# Patient Record
Sex: MALE | Race: WHITE | NOT HISPANIC OR LATINO | Employment: FULL TIME | URBAN - METROPOLITAN AREA
[De-identification: names, ages, dates, MRNs, and addresses within clinical notes are randomized per-mention and may not be internally consistent; named-entity substitution may affect disease eponyms.]

---

## 2018-12-30 ENCOUNTER — HOSPITAL ENCOUNTER (INPATIENT)
Facility: HOSPITAL | Age: 68
LOS: 1 days | Discharge: HOME | DRG: 247 | End: 2019-01-01
Attending: EMERGENCY MEDICINE | Admitting: ANESTHESIOLOGY
Payer: COMMERCIAL

## 2018-12-30 ENCOUNTER — APPOINTMENT (EMERGENCY)
Dept: INTERVENTIONAL RADIOLOGY/VASCULAR | Facility: HOSPITAL | Age: 68
DRG: 247 | End: 2018-12-30
Attending: EMERGENCY MEDICINE
Payer: COMMERCIAL

## 2018-12-30 ENCOUNTER — APPOINTMENT (EMERGENCY)
Dept: RADIOLOGY | Facility: HOSPITAL | Age: 68
DRG: 247 | End: 2018-12-30
Payer: COMMERCIAL

## 2018-12-30 DIAGNOSIS — I21.11 ST ELEVATION MYOCARDIAL INFARCTION INVOLVING RIGHT CORONARY ARTERY (HCC): ICD-10-CM

## 2018-12-30 DIAGNOSIS — I21.19 ACUTE ST ELEVATION MYOCARDIAL INFARCTION (STEMI) OF INFERIOR WALL (HCC): Primary | ICD-10-CM

## 2018-12-30 DIAGNOSIS — R07.89 CHEST PRESSURE: ICD-10-CM

## 2018-12-30 PROBLEM — Z72.0 TOBACCO ABUSE: Status: ACTIVE | Noted: 2018-12-30

## 2018-12-30 PROBLEM — Z85.818 HISTORY OF CANCER TONSIL: Status: ACTIVE | Noted: 2018-12-30

## 2018-12-30 LAB
ALBUMIN SERPL BCP-MCNC: 4.2 G/DL (ref 3.5–5)
ALP SERPL-CCNC: 93 U/L (ref 46–116)
ALT SERPL W P-5'-P-CCNC: 30 U/L (ref 12–78)
ANION GAP SERPL CALCULATED.3IONS-SCNC: 10 MMOL/L (ref 4–13)
ANION GAP SERPL CALCULATED.3IONS-SCNC: 7 MMOL/L (ref 4–13)
APTT PPP: 22 SECONDS (ref 26–38)
APTT PPP: 29 SECONDS (ref 26–38)
AST SERPL W P-5'-P-CCNC: 30 U/L (ref 5–45)
ATRIAL RATE: 73 BPM
ATRIAL RATE: 87 BPM
BASOPHILS # BLD AUTO: 0.04 THOUSANDS/ΜL (ref 0–0.1)
BASOPHILS # BLD AUTO: 0.09 THOUSANDS/ΜL (ref 0–0.1)
BASOPHILS NFR BLD AUTO: 0 % (ref 0–1)
BASOPHILS NFR BLD AUTO: 1 % (ref 0–1)
BILIRUB SERPL-MCNC: 0.4 MG/DL (ref 0.2–1)
BUN SERPL-MCNC: 14 MG/DL (ref 5–25)
BUN SERPL-MCNC: 14 MG/DL (ref 5–25)
CALCIUM SERPL-MCNC: 8.7 MG/DL (ref 8.3–10.1)
CALCIUM SERPL-MCNC: 9.3 MG/DL (ref 8.3–10.1)
CHLORIDE SERPL-SCNC: 97 MMOL/L (ref 100–108)
CHLORIDE SERPL-SCNC: 99 MMOL/L (ref 100–108)
CHOLEST SERPL-MCNC: 163 MG/DL (ref 50–200)
CO2 SERPL-SCNC: 26 MMOL/L (ref 21–32)
CO2 SERPL-SCNC: 27 MMOL/L (ref 21–32)
CREAT SERPL-MCNC: 1.12 MG/DL (ref 0.6–1.3)
CREAT SERPL-MCNC: 1.37 MG/DL (ref 0.6–1.3)
EOSINOPHIL # BLD AUTO: 0.12 THOUSAND/ΜL (ref 0–0.61)
EOSINOPHIL # BLD AUTO: 0.41 THOUSAND/ΜL (ref 0–0.61)
EOSINOPHIL NFR BLD AUTO: 1 % (ref 0–6)
EOSINOPHIL NFR BLD AUTO: 4 % (ref 0–6)
ERYTHROCYTE [DISTWIDTH] IN BLOOD BY AUTOMATED COUNT: 14 % (ref 11.6–15.1)
ERYTHROCYTE [DISTWIDTH] IN BLOOD BY AUTOMATED COUNT: 14.3 % (ref 11.6–15.1)
EST. AVERAGE GLUCOSE BLD GHB EST-MCNC: 128 MG/DL
GFR SERPL CREATININE-BSD FRML MDRD: 53 ML/MIN/1.73SQ M
GFR SERPL CREATININE-BSD FRML MDRD: 68 ML/MIN/1.73SQ M
GLUCOSE SERPL-MCNC: 110 MG/DL (ref 65–140)
GLUCOSE SERPL-MCNC: 153 MG/DL (ref 65–140)
GLUCOSE SERPL-MCNC: 159 MG/DL (ref 65–140)
HBA1C MFR BLD: 6.1 % (ref 4.2–6.3)
HCT VFR BLD AUTO: 41.5 % (ref 36.5–49.3)
HCT VFR BLD AUTO: 46.9 % (ref 36.5–49.3)
HDLC SERPL-MCNC: 37 MG/DL (ref 40–60)
HGB BLD-MCNC: 13.7 G/DL (ref 12–17)
HGB BLD-MCNC: 15.4 G/DL (ref 12–17)
IMM GRANULOCYTES # BLD AUTO: 0.02 THOUSAND/UL (ref 0–0.2)
IMM GRANULOCYTES # BLD AUTO: 0.03 THOUSAND/UL (ref 0–0.2)
IMM GRANULOCYTES NFR BLD AUTO: 0 % (ref 0–2)
IMM GRANULOCYTES NFR BLD AUTO: 0 % (ref 0–2)
INR PPP: 0.92 (ref 0.86–1.17)
KCT BLD-ACNC: 166 SEC (ref 89–137)
KCT BLD-ACNC: 254 SEC (ref 89–137)
LDLC SERPL CALC-MCNC: 97 MG/DL (ref 0–100)
LYMPHOCYTES # BLD AUTO: 2.02 THOUSANDS/ΜL (ref 0.6–4.47)
LYMPHOCYTES # BLD AUTO: 2.63 THOUSANDS/ΜL (ref 0.6–4.47)
LYMPHOCYTES NFR BLD AUTO: 18 % (ref 14–44)
LYMPHOCYTES NFR BLD AUTO: 27 % (ref 14–44)
MAGNESIUM SERPL-MCNC: 1.9 MG/DL (ref 1.6–2.6)
MCH RBC QN AUTO: 28.6 PG (ref 26.8–34.3)
MCH RBC QN AUTO: 28.7 PG (ref 26.8–34.3)
MCHC RBC AUTO-ENTMCNC: 32.8 G/DL (ref 31.4–37.4)
MCHC RBC AUTO-ENTMCNC: 33 G/DL (ref 31.4–37.4)
MCV RBC AUTO: 87 FL (ref 82–98)
MCV RBC AUTO: 87 FL (ref 82–98)
MONOCYTES # BLD AUTO: 0.64 THOUSAND/ΜL (ref 0.17–1.22)
MONOCYTES # BLD AUTO: 0.65 THOUSAND/ΜL (ref 0.17–1.22)
MONOCYTES NFR BLD AUTO: 6 % (ref 4–12)
MONOCYTES NFR BLD AUTO: 7 % (ref 4–12)
NEUTROPHILS # BLD AUTO: 6.02 THOUSANDS/ΜL (ref 1.85–7.62)
NEUTROPHILS # BLD AUTO: 8.41 THOUSANDS/ΜL (ref 1.85–7.62)
NEUTS SEG NFR BLD AUTO: 61 % (ref 43–75)
NEUTS SEG NFR BLD AUTO: 75 % (ref 43–75)
NONHDLC SERPL-MCNC: 126 MG/DL
NRBC BLD AUTO-RTO: 0 /100 WBCS
NRBC BLD AUTO-RTO: 0 /100 WBCS
P AXIS: 62 DEGREES
P AXIS: 70 DEGREES
PLATELET # BLD AUTO: 220 THOUSANDS/UL (ref 149–390)
PLATELET # BLD AUTO: 271 THOUSANDS/UL (ref 149–390)
PMV BLD AUTO: 8.9 FL (ref 8.9–12.7)
PMV BLD AUTO: 9.1 FL (ref 8.9–12.7)
POTASSIUM SERPL-SCNC: 3.3 MMOL/L (ref 3.5–5.3)
POTASSIUM SERPL-SCNC: 4.6 MMOL/L (ref 3.5–5.3)
PR INTERVAL: 176 MS
PR INTERVAL: 192 MS
PROT SERPL-MCNC: 8.6 G/DL (ref 6.4–8.2)
PROTHROMBIN TIME: 12.3 SECONDS (ref 11.8–14.2)
QRS AXIS: 74 DEGREES
QRS AXIS: 80 DEGREES
QRSD INTERVAL: 100 MS
QRSD INTERVAL: 106 MS
QT INTERVAL: 390 MS
QT INTERVAL: 398 MS
QTC INTERVAL: 438 MS
QTC INTERVAL: 469 MS
RBC # BLD AUTO: 4.78 MILLION/UL (ref 3.88–5.62)
RBC # BLD AUTO: 5.39 MILLION/UL (ref 3.88–5.62)
SODIUM SERPL-SCNC: 132 MMOL/L (ref 136–145)
SODIUM SERPL-SCNC: 134 MMOL/L (ref 136–145)
SPECIMEN SOURCE: ABNORMAL
T WAVE AXIS: 96 DEGREES
T WAVE AXIS: 99 DEGREES
TRIGL SERPL-MCNC: 147 MG/DL
TROPONIN I BLD-MCNC: 0.28 NG/ML (ref 0–0.08)
TROPONIN I SERPL-MCNC: 0.34 NG/ML
VENTRICULAR RATE: 73 BPM
VENTRICULAR RATE: 87 BPM
WBC # BLD AUTO: 11.26 THOUSAND/UL (ref 4.31–10.16)
WBC # BLD AUTO: 9.82 THOUSAND/UL (ref 4.31–10.16)

## 2018-12-30 PROCEDURE — 83735 ASSAY OF MAGNESIUM: CPT | Performed by: PHYSICIAN ASSISTANT

## 2018-12-30 PROCEDURE — C1874 STENT, COATED/COV W/DEL SYS: HCPCS

## 2018-12-30 PROCEDURE — 4A023N7 MEASUREMENT OF CARDIAC SAMPLING AND PRESSURE, LEFT HEART, PERCUTANEOUS APPROACH: ICD-10-PCS | Performed by: INTERNAL MEDICINE

## 2018-12-30 PROCEDURE — 86803 HEPATITIS C AB TEST: CPT | Performed by: PHYSICIAN ASSISTANT

## 2018-12-30 PROCEDURE — 36415 COLL VENOUS BLD VENIPUNCTURE: CPT | Performed by: EMERGENCY MEDICINE

## 2018-12-30 PROCEDURE — 85730 THROMBOPLASTIN TIME PARTIAL: CPT | Performed by: EMERGENCY MEDICINE

## 2018-12-30 PROCEDURE — 96361 HYDRATE IV INFUSION ADD-ON: CPT

## 2018-12-30 PROCEDURE — 96374 THER/PROPH/DIAG INJ IV PUSH: CPT

## 2018-12-30 PROCEDURE — 80053 COMPREHEN METABOLIC PANEL: CPT | Performed by: EMERGENCY MEDICINE

## 2018-12-30 PROCEDURE — 027034Z DILATION OF CORONARY ARTERY, ONE ARTERY WITH DRUG-ELUTING INTRALUMINAL DEVICE, PERCUTANEOUS APPROACH: ICD-10-PCS | Performed by: INTERNAL MEDICINE

## 2018-12-30 PROCEDURE — C1725 CATH, TRANSLUMIN NON-LASER: HCPCS | Performed by: EMERGENCY MEDICINE

## 2018-12-30 PROCEDURE — 85025 COMPLETE CBC W/AUTO DIFF WBC: CPT | Performed by: EMERGENCY MEDICINE

## 2018-12-30 PROCEDURE — 85025 COMPLETE CBC W/AUTO DIFF WBC: CPT | Performed by: PHYSICIAN ASSISTANT

## 2018-12-30 PROCEDURE — 83036 HEMOGLOBIN GLYCOSYLATED A1C: CPT | Performed by: PHYSICIAN ASSISTANT

## 2018-12-30 PROCEDURE — 93005 ELECTROCARDIOGRAM TRACING: CPT

## 2018-12-30 PROCEDURE — B2111ZZ FLUOROSCOPY OF MULTIPLE CORONARY ARTERIES USING LOW OSMOLAR CONTRAST: ICD-10-PCS | Performed by: INTERNAL MEDICINE

## 2018-12-30 PROCEDURE — C1769 GUIDE WIRE: HCPCS | Performed by: EMERGENCY MEDICINE

## 2018-12-30 PROCEDURE — 92941 PRQ TRLML REVSC TOT OCCL AMI: CPT | Performed by: INTERNAL MEDICINE

## 2018-12-30 PROCEDURE — 80048 BASIC METABOLIC PNL TOTAL CA: CPT | Performed by: PHYSICIAN ASSISTANT

## 2018-12-30 PROCEDURE — C1887 CATHETER, GUIDING: HCPCS | Performed by: EMERGENCY MEDICINE

## 2018-12-30 PROCEDURE — 85610 PROTHROMBIN TIME: CPT | Performed by: EMERGENCY MEDICINE

## 2018-12-30 PROCEDURE — 87340 HEPATITIS B SURFACE AG IA: CPT | Performed by: PHYSICIAN ASSISTANT

## 2018-12-30 PROCEDURE — 93458 L HRT ARTERY/VENTRICLE ANGIO: CPT | Performed by: EMERGENCY MEDICINE

## 2018-12-30 PROCEDURE — 85347 COAGULATION TIME ACTIVATED: CPT

## 2018-12-30 PROCEDURE — 84484 ASSAY OF TROPONIN QUANT: CPT | Performed by: EMERGENCY MEDICINE

## 2018-12-30 PROCEDURE — 84484 ASSAY OF TROPONIN QUANT: CPT

## 2018-12-30 PROCEDURE — C9606 PERC D-E COR REVASC W AMI S: HCPCS | Performed by: EMERGENCY MEDICINE

## 2018-12-30 PROCEDURE — 93010 ELECTROCARDIOGRAM REPORT: CPT | Performed by: INTERNAL MEDICINE

## 2018-12-30 PROCEDURE — 80061 LIPID PANEL: CPT | Performed by: PHYSICIAN ASSISTANT

## 2018-12-30 PROCEDURE — 96376 TX/PRO/DX INJ SAME DRUG ADON: CPT

## 2018-12-30 PROCEDURE — 82948 REAGENT STRIP/BLOOD GLUCOSE: CPT

## 2018-12-30 PROCEDURE — 99291 CRITICAL CARE FIRST HOUR: CPT

## 2018-12-30 PROCEDURE — C1894 INTRO/SHEATH, NON-LASER: HCPCS | Performed by: EMERGENCY MEDICINE

## 2018-12-30 PROCEDURE — 86704 HEP B CORE ANTIBODY TOTAL: CPT | Performed by: PHYSICIAN ASSISTANT

## 2018-12-30 PROCEDURE — 99223 1ST HOSP IP/OBS HIGH 75: CPT | Performed by: PHYSICIAN ASSISTANT

## 2018-12-30 PROCEDURE — 71045 X-RAY EXAM CHEST 1 VIEW: CPT

## 2018-12-30 PROCEDURE — 96375 TX/PRO/DX INJ NEW DRUG ADDON: CPT

## 2018-12-30 PROCEDURE — 86705 HEP B CORE ANTIBODY IGM: CPT | Performed by: PHYSICIAN ASSISTANT

## 2018-12-30 PROCEDURE — 93458 L HRT ARTERY/VENTRICLE ANGIO: CPT | Performed by: INTERNAL MEDICINE

## 2018-12-30 RX ORDER — HEPARIN SODIUM 10000 [USP'U]/100ML
3-20 INJECTION, SOLUTION INTRAVENOUS
Status: DISCONTINUED | OUTPATIENT
Start: 2018-12-30 | End: 2018-12-30

## 2018-12-30 RX ORDER — NITROGLYCERIN 20 MG/100ML
INJECTION INTRAVENOUS CODE/TRAUMA/SEDATION MEDICATION
Status: COMPLETED | OUTPATIENT
Start: 2018-12-30 | End: 2018-12-30

## 2018-12-30 RX ORDER — CHLORHEXIDINE GLUCONATE 0.12 MG/ML
15 RINSE ORAL EVERY 12 HOURS SCHEDULED
Status: DISCONTINUED | OUTPATIENT
Start: 2018-12-30 | End: 2019-01-01 | Stop reason: HOSPADM

## 2018-12-30 RX ORDER — LIDOCAINE HYDROCHLORIDE 10 MG/ML
INJECTION, SOLUTION INFILTRATION; PERINEURAL CODE/TRAUMA/SEDATION MEDICATION
Status: COMPLETED | OUTPATIENT
Start: 2018-12-30 | End: 2018-12-30

## 2018-12-30 RX ORDER — VERAPAMIL HCL 2.5 MG/ML
AMPUL (ML) INTRAVENOUS CODE/TRAUMA/SEDATION MEDICATION
Status: COMPLETED | OUTPATIENT
Start: 2018-12-30 | End: 2018-12-30

## 2018-12-30 RX ORDER — ASPIRIN 81 MG/1
243 TABLET, CHEWABLE ORAL ONCE
Status: COMPLETED | OUTPATIENT
Start: 2018-12-30 | End: 2018-12-30

## 2018-12-30 RX ORDER — NICOTINE 21 MG/24HR
21 PATCH, TRANSDERMAL 24 HOURS TRANSDERMAL DAILY
Status: DISCONTINUED | OUTPATIENT
Start: 2018-12-30 | End: 2019-01-01 | Stop reason: HOSPADM

## 2018-12-30 RX ORDER — HYDROMORPHONE HCL/PF 1 MG/ML
1 SYRINGE (ML) INJECTION ONCE
Status: COMPLETED | OUTPATIENT
Start: 2018-12-30 | End: 2018-12-30

## 2018-12-30 RX ORDER — ASPIRIN 81 MG/1
81 TABLET, CHEWABLE ORAL DAILY
Status: DISCONTINUED | OUTPATIENT
Start: 2018-12-30 | End: 2019-01-01 | Stop reason: HOSPADM

## 2018-12-30 RX ORDER — SODIUM CHLORIDE 9 MG/ML
125 INJECTION, SOLUTION INTRAVENOUS CONTINUOUS
Status: DISPENSED | OUTPATIENT
Start: 2018-12-30 | End: 2018-12-30

## 2018-12-30 RX ORDER — MORPHINE SULFATE 10 MG/ML
6 INJECTION, SOLUTION INTRAMUSCULAR; INTRAVENOUS EVERY 4 HOURS PRN
Status: DISCONTINUED | OUTPATIENT
Start: 2018-12-30 | End: 2019-01-01 | Stop reason: HOSPADM

## 2018-12-30 RX ORDER — ATROPINE SULFATE 0.1 MG/ML
INJECTION, SOLUTION ENDOTRACHEAL; INTRAMUSCULAR; INTRAVENOUS; SUBCUTANEOUS CODE/TRAUMA/SEDATION MEDICATION
Status: COMPLETED | OUTPATIENT
Start: 2018-12-30 | End: 2018-12-30

## 2018-12-30 RX ORDER — ACETAMINOPHEN 325 MG/1
650 TABLET ORAL EVERY 4 HOURS PRN
Status: DISCONTINUED | OUTPATIENT
Start: 2018-12-30 | End: 2019-01-01 | Stop reason: HOSPADM

## 2018-12-30 RX ORDER — SODIUM CHLORIDE, SODIUM GLUCONATE, SODIUM ACETATE, POTASSIUM CHLORIDE, MAGNESIUM CHLORIDE, SODIUM PHOSPHATE, DIBASIC, AND POTASSIUM PHOSPHATE .53; .5; .37; .037; .03; .012; .00082 G/100ML; G/100ML; G/100ML; G/100ML; G/100ML; G/100ML; G/100ML
75 INJECTION, SOLUTION INTRAVENOUS CONTINUOUS
Status: DISCONTINUED | OUTPATIENT
Start: 2018-12-30 | End: 2018-12-31

## 2018-12-30 RX ORDER — MAGNESIUM SULFATE HEPTAHYDRATE 40 MG/ML
2 INJECTION, SOLUTION INTRAVENOUS ONCE
Status: COMPLETED | OUTPATIENT
Start: 2018-12-30 | End: 2018-12-31

## 2018-12-30 RX ORDER — ATORVASTATIN CALCIUM 40 MG/1
80 TABLET, FILM COATED ORAL EVERY EVENING
Status: DISCONTINUED | OUTPATIENT
Start: 2018-12-30 | End: 2019-01-01 | Stop reason: HOSPADM

## 2018-12-30 RX ORDER — HEPARIN SODIUM 1000 [USP'U]/ML
INJECTION, SOLUTION INTRAVENOUS; SUBCUTANEOUS CODE/TRAUMA/SEDATION MEDICATION
Status: COMPLETED | OUTPATIENT
Start: 2018-12-30 | End: 2018-12-30

## 2018-12-30 RX ORDER — HEPARIN SODIUM 1000 [USP'U]/ML
4000 INJECTION, SOLUTION INTRAVENOUS; SUBCUTANEOUS ONCE
Status: COMPLETED | OUTPATIENT
Start: 2018-12-30 | End: 2018-12-30

## 2018-12-30 RX ADMIN — NICOTINE 21 MG: 21 PATCH TRANSDERMAL at 13:01

## 2018-12-30 RX ADMIN — HYDROMORPHONE HYDROCHLORIDE 1 MG: 1 INJECTION, SOLUTION INTRAMUSCULAR; INTRAVENOUS; SUBCUTANEOUS at 10:20

## 2018-12-30 RX ADMIN — TICAGRELOR 180 MG: 90 TABLET ORAL at 10:14

## 2018-12-30 RX ADMIN — ATORVASTATIN CALCIUM 80 MG: 40 TABLET, FILM COATED ORAL at 17:30

## 2018-12-30 RX ADMIN — LIDOCAINE HYDROCHLORIDE 2 ML: 10 INJECTION, SOLUTION INFILTRATION; PERINEURAL at 10:45

## 2018-12-30 RX ADMIN — METOPROLOL TARTRATE 25 MG: 25 TABLET, FILM COATED ORAL at 13:55

## 2018-12-30 RX ADMIN — ASPIRIN 81 MG 81 MG: 81 TABLET ORAL at 13:55

## 2018-12-30 RX ADMIN — ATROPINE SULFATE 0.5 MG: 0.1 INJECTION, SOLUTION ENDOTRACHEAL; INTRAMUSCULAR; INTRAVENOUS; SUBCUTANEOUS at 10:55

## 2018-12-30 RX ADMIN — ASPIRIN 81 MG 243 MG: 81 TABLET ORAL at 10:26

## 2018-12-30 RX ADMIN — METOPROLOL TARTRATE 25 MG: 25 TABLET, FILM COATED ORAL at 20:27

## 2018-12-30 RX ADMIN — IODIXANOL 75 ML: 320 INJECTION, SOLUTION INTRAVASCULAR at 11:04

## 2018-12-30 RX ADMIN — HEPARIN SODIUM 4000 UNITS: 1000 INJECTION, SOLUTION INTRAVENOUS; SUBCUTANEOUS at 10:08

## 2018-12-30 RX ADMIN — HEPARIN SODIUM 8000 UNITS: 1000 INJECTION INTRAVENOUS; SUBCUTANEOUS at 10:50

## 2018-12-30 RX ADMIN — SODIUM CHLORIDE, SODIUM GLUCONATE, SODIUM ACETATE, POTASSIUM CHLORIDE, MAGNESIUM CHLORIDE, SODIUM PHOSPHATE, DIBASIC, AND POTASSIUM PHOSPHATE 75 ML/HR: .53; .5; .37; .037; .03; .012; .00082 INJECTION, SOLUTION INTRAVENOUS at 20:15

## 2018-12-30 RX ADMIN — SODIUM CHLORIDE 125 ML/HR: 0.9 INJECTION, SOLUTION INTRAVENOUS at 13:47

## 2018-12-30 RX ADMIN — MORPHINE SULFATE 6 MG: 10 INJECTION INTRAVENOUS at 10:08

## 2018-12-30 RX ADMIN — CHLORHEXIDINE GLUCONATE 0.12% ORAL RINSE 15 ML: 1.2 LIQUID ORAL at 20:27

## 2018-12-30 RX ADMIN — MAGNESIUM SULFATE IN WATER 2 G: 40 INJECTION, SOLUTION INTRAVENOUS at 20:15

## 2018-12-30 RX ADMIN — SODIUM CHLORIDE 500 ML: 0.9 INJECTION, SOLUTION INTRAVENOUS at 10:14

## 2018-12-30 RX ADMIN — NITROGLYCERIN 200 MCG: 20 INJECTION INTRAVENOUS at 10:45

## 2018-12-30 RX ADMIN — VERAPAMIL HYDROCHLORIDE 2.5 MG: 2.5 INJECTION, SOLUTION INTRAVENOUS at 10:46

## 2018-12-30 NOTE — ED PROVIDER NOTES
History  Chief Complaint   Patient presents with    Chest Pain     midsternal chest pain onset today, +sob, 324mg of asa at home      Patient is a 81-year-old male with no reported medical history however he has not seen a physician in many years, presents to the emergency department complaining of chest pressure for the past 1 hour  Patient reports that he began having central and diffuse chest pressure without radiation about 1 hour ago and it is been persistent and progressively worsening  He has not exerted himself so cannot comment on if it is worse with exertion  He reports associated diaphoresis as well as bilateral upper extremity heaviness/numbness tingling  He denies ever having these type of symptoms before  Denies any associated fever, shaking chills, headache, dizziness or near syncope, visual disturbance or eye pain, recent coughing or URI symptoms, palpitations, dyspnea, abdominal pain, nausea, vomiting, recent change in bowel habits, blood per rectum or melena, urinary symptoms, lateralizing extremity weakness or paresthesia or other focal neurologic deficits  Denies any prior cardiac history or history of MI/stents  No history of bleeding disorders  Patient did take one 81 mg aspirin prior to coming to the ED  History provided by:  Patient and spouse   used: No    Chest Pain   Associated symptoms: diaphoresis and weakness    Associated symptoms: no abdominal pain, no back pain, no cough, no dizziness, no fever, no headache, no nausea, no numbness, no palpitations, no shortness of breath and not vomiting        None       History reviewed  No pertinent past medical history  Past Surgical History:   Procedure Laterality Date    KNEE SURGERY         History reviewed  No pertinent family history  I have reviewed and agree with the history as documented      Social History   Substance Use Topics    Smoking status: Current Some Day Smoker     Packs/day: 2 00    Smokeless tobacco: Never Used    Alcohol use Not on file        Review of Systems   Constitutional: Positive for diaphoresis  Negative for chills and fever  HENT: Negative for congestion, ear pain, rhinorrhea and sore throat  Eyes: Negative for photophobia, pain and visual disturbance  Respiratory: Negative for cough, chest tightness, shortness of breath and wheezing  Cardiovascular: Positive for chest pain  Negative for palpitations and leg swelling  Gastrointestinal: Negative for abdominal pain, blood in stool, constipation, diarrhea, nausea and vomiting  Genitourinary: Negative for dysuria, flank pain, frequency and hematuria  Musculoskeletal: Negative for back pain, neck pain and neck stiffness  Skin: Negative for color change, pallor, rash and wound  Allergic/Immunologic: Negative for immunocompromised state  Neurological: Positive for weakness  Negative for dizziness, syncope, facial asymmetry, speech difficulty, light-headedness, numbness and headaches  Hematological: Negative for adenopathy  Does not bruise/bleed easily  Psychiatric/Behavioral: Negative for confusion and decreased concentration  All other systems reviewed and are negative  Physical Exam  Physical Exam   Constitutional: He is oriented to person, place, and time  He appears well-developed and well-nourished  No distress  HENT:   Head: Normocephalic and atraumatic  Mouth/Throat: Oropharynx is clear and moist  No oropharyngeal exudate  Eyes: Pupils are equal, round, and reactive to light  Conjunctivae and EOM are normal    Neck: Normal range of motion  Neck supple  No JVD present  Cardiovascular: Normal rate, regular rhythm, normal heart sounds and intact distal pulses  Exam reveals no gallop and no friction rub  No murmur heard  Pulmonary/Chest: Effort normal and breath sounds normal  No respiratory distress  He has no wheezes  He has no rales  He exhibits no tenderness  Abdominal: Soft   Bowel sounds are normal  He exhibits no distension  There is no tenderness  There is no rebound and no guarding  Musculoskeletal: Normal range of motion  He exhibits no edema or tenderness  Neurological: He is alert and oriented to person, place, and time  No cranial nerve deficit  No gross motor or sensory deficits  5/5 strength throughout  Patient mentating normally  Skin: Skin is warm  No rash noted  He is diaphoretic  No pallor  Psychiatric: He has a normal mood and affect  His behavior is normal    Nursing note and vitals reviewed        Vital Signs  ED Triage Vitals   Temperature Pulse Respirations Blood Pressure SpO2   12/30/18 1029 12/30/18 0949 12/30/18 0949 12/30/18 0949 12/30/18 0949   97 5 °F (36 4 °C) 73 18 168/77 98 %      Temp src Heart Rate Source Patient Position - Orthostatic VS BP Location FiO2 (%)   -- 12/30/18 0949 -- 12/30/18 0949 --    Monitor  Right arm       Pain Score       12/30/18 0949       5         Vitals:    12/30/18 1009 12/30/18 1012 12/30/18 1028 12/30/18 1029   BP:  149/95 159/92    BP Location:       Pulse: 88 86 86    Resp:  16 16    Temp:    97 5 °F (36 4 °C)   SpO2:  98% 97%    Weight:         Visual Acuity      ED Medications  Medications   morphine (PF) 10 mg/mL injection 6 mg (6 mg Intravenous Given 12/30/18 1008)   sodium chloride 0 9 % bolus 500 mL (500 mL Intravenous New Bag 12/30/18 1014)   heparin (porcine) 25,000 units in 250 mL infusion (premix) (0 Units/kg/hr × 85 kg (Order-Specific) Intravenous Hold 12/30/18 1021)   heparin (porcine) injection 4,000 Units (4,000 Units Intravenous Given 12/30/18 1008)   ticagrelor (BRILINTA) tablet 180 mg (180 mg Oral Given 12/30/18 1014)   HYDROmorphone (DILAUDID) injection 1 mg (1 mg Intravenous Given 12/30/18 1020)   aspirin chewable tablet 243 mg (243 mg Oral Given 12/30/18 1026)       Diagnostic Studies  Results Reviewed     Procedure Component Value Units Date/Time    Troponin I [[id number]]  (Abnormal) Collected: 12/30/18 1009    Lab Status:  Final result Specimen:  Blood from Arm, Right Updated:  12/30/18 1038     Troponin I 0 34 (H) ng/mL     Comprehensive metabolic panel [[id number]]  (Abnormal) Collected:  12/30/18 1009    Lab Status:  Final result Specimen:  Blood from Arm, Right Updated:  12/30/18 1032     Sodium 134 (L) mmol/L      Potassium 3 3 (L) mmol/L      Chloride 97 (L) mmol/L      CO2 27 mmol/L      ANION GAP 10 mmol/L      BUN 14 mg/dL      Creatinine 1 37 (H) mg/dL      Glucose 159 (H) mg/dL      Calcium 9 3 mg/dL      AST 30 U/L      ALT 30 U/L      Alkaline Phosphatase 93 U/L      Total Protein 8 6 (H) g/dL      Albumin 4 2 g/dL      Total Bilirubin 0 40 mg/dL      eGFR 53 ml/min/1 73sq m     Narrative:         National Kidney Disease Education Program recommendations are as follows:  GFR calculation is accurate only with a steady state creatinine  Chronic Kidney disease less than 60 ml/min/1 73 sq  meters  Kidney failure less than 15 ml/min/1 73 sq  meters      APTT [[id number]]  (Abnormal) Collected:  12/30/18 1010    Lab Status:  Final result Specimen:  Blood from Arm, Right Updated:  12/30/18 1031     PTT 22 (L) seconds     Protime-INR [[id number]]  (Normal) Collected:  12/30/18 1010    Lab Status:  Final result Specimen:  Blood from Arm, Right Updated:  12/30/18 1027     Protime 12 3 seconds      INR 0 92    Fingerstick Glucose (POCT) [[id number]]  (Abnormal) Collected:  12/30/18 1026    Lab Status:  Final result Updated:  12/30/18 1027     POC Glucose 153 (H) mg/dl     POCT troponin [[id number]]  (Abnormal) Collected:  12/30/18 1013    Lab Status:  Final result Updated:  12/30/18 1024     POC Troponin I 0 28 (H) ng/ml      Specimen Type VENOUS    Narrative:         [employer] i-Stat handheld analyzer 99% cutoff is > 0 08ng/mL in network Emergency Departments    o cTnI 99% cutoff is useful only when applied to patients in the clinical setting of myocardial ischemia  o cTnI 99% cutoff should be interpreted in the context of clinical history, ECG findings and possibly cardiac imaging to establish correct diagnosis  o cTnI 99% cutoff may be suggestive but clearly not indicative of a coronary event without the clinical setting of myocardial ischemia      CBC and differential [[id number]] Collected:  12/30/18 1009    Lab Status:  Final result Specimen:  Blood from Arm, Right Updated:  12/30/18 1016     WBC 9 82 Thousand/uL      RBC 5 39 Million/uL      Hemoglobin 15 4 g/dL      Hematocrit 46 9 %      MCV 87 fL      MCH 28 6 pg      MCHC 32 8 g/dL      RDW 14 3 %      MPV 9 1 fL      Platelets 017 Thousands/uL      nRBC 0 /100 WBCs      Neutrophils Relative 61 %      Immat GRANS % 0 %      Lymphocytes Relative 27 %      Monocytes Relative 7 %      Eosinophils Relative 4 %      Basophils Relative 1 %      Neutrophils Absolute 6 02 Thousands/µL      Immature Grans Absolute 0 02 Thousand/uL      Lymphocytes Absolute 2 63 Thousands/µL      Monocytes Absolute 0 65 Thousand/µL      Eosinophils Absolute 0 41 Thousand/µL      Basophils Absolute 0 09 Thousands/µL                  XR chest 1 view portable   ED Interpretation by Leo Spicer DO (12/30 1041)   Obscured mediastinal borders but no other acute abnormality in the chest                  Procedures  ECG 12 Lead Documentation  Date/Time: 12/30/2018 10:37 AM  Performed by: Catalino Ortiz by: Cynthia Cadet     ECG reviewed by me, the ED Provider: yes    Patient location:  ED  Previous ECG:     Previous ECG:  Unavailable  Interpretation:     Interpretation: abnormal    Rate:     ECG rate:  73    ECG rate assessment: normal    Rhythm:     Rhythm: sinus rhythm    Ectopy:     Ectopy: PVCs      PVCs:  Infrequent  QRS:     QRS axis:  Normal    QRS intervals:  Normal  Conduction:     Conduction: normal    ST segments:     ST segments:  Abnormal    Elevation:  II, III, aVF and V6    Depression:  I, aVL, V1 and V2  T waves:     T waves: normal    Comments: INFERIOR WALL STEMI  ECG 12 Lead Documentation  Date/Time: 12/30/2018 10:38 AM  Performed by: Hernan Martines by: Fei Torres     Indications / Diagnosis:  RIGHT SIDED EKG  ECG reviewed by me, the ED Provider: yes    Patient location:  ED  Rate:     ECG rate:  87    ECG rate assessment: normal    Rhythm:     Rhythm: sinus rhythm    Ectopy:     Ectopy: PAC    QRS:     QRS axis:  Normal    QRS intervals:  Normal  Conduction:     Conduction: normal    ST segments:     ST segments:  Abnormal    Elevation:  II, III, aVF, V4, V5, V6 and V3    Depression:  I, aVL, V1 and V2  Comments:      Right-sided EKG does show ST elevations in V5 and V6 consistent with right ventricular involvement  Phone Contacts  ED Phone Contact    ED Course           Identification of Seniors at Risk      Most Recent Value   (ISAR) Identification of Seniors at Risk   Before the illness or injury that brought you to the Emergency, did you need someone to help you on a regular basis? 0 Filed at: 12/30/2018 2520   In the last 24 hours, have you needed more help than usual?  0 Filed at: 12/30/2018 6648   Have you been hospitalized for one or more nights during the past 6 months? 0 Filed at: 12/30/2018 2181   In general, do you see well?  0 Filed at: 12/30/2018 2064   In general, do you have serious problems with your memory? 0 Filed at: 12/30/2018 9175   Do you take more than three different medications every day?  0 Filed at: 12/30/2018 9687   ISAR Score  0 Filed at: 12/30/2018 6086                          MDM  Number of Diagnoses or Management Options  Acute ST elevation myocardial infarction (STEMI) of inferior wall [facility]):   Chest pressure:   Diagnosis management comments: 72-year-old male presents with acute chest pressure and diaphoresis that started about 1 hour prior to ED arrival   Patient took 81 mg of aspirin prior to coming in    EKG obtained immediately and does show evidence of inferior wall ST elevation MI  STEMI alert/cath lab team activated immediately  Will give additional aspirin, Brilinta, heparin bolus and morphine for pain  Will hang 500 cc of IV fluids to Maria Angelucci  Blood pressure stable  Will avoid nitro given that there is inferior wall involvement  Will check right-sided EKG to assess for RV involvement  Patient and wife updated of diagnosis of acute STEMI and plan to go directly to cardiac catheterization lab once cardiology arrives  Spoke with Dr Johanna Bonds, the interventional cardiologist over the phone and cath team activated and Dr Johanna Bonds on his way to hospital        Amount and/or Complexity of Data Reviewed  Clinical lab tests: ordered and reviewed  Tests in the radiology section of CPT®: reviewed and ordered  Tests in the medicine section of CPT®: ordered and reviewed  Discuss the patient with other providers: yes  Independent visualization of images, tracings, or specimens: yes      CritCare Time    Disposition  Final diagnoses:   Acute ST elevation myocardial infarction (STEMI) of inferior wall (HCC)   Chest pressure     Time reflects when diagnosis was documented in both MDM as applicable and the Disposition within this note     Time User Action Codes Description Comment    12/30/2018 10:32 AM Uriel CASAS Add [I21 19] Acute ST elevation myocardial infarction (STEMI) of inferior wall ([facility] [address] )     12/30/2018 10:32 AM Uriel CASAS Add [R07 89] Chest pressure       ED Disposition     ED Disposition Condition Comment    Send to Cath Lab  Patient sent directly to cath lab from ED      Follow-up Information    None         Patient's Medications    No medications on file     No discharge procedures on file      ED Provider  Electronically Signed by           Ebony Perez DO  12/30/18 [address] [place] [address],   12/30/18 1293

## 2018-12-30 NOTE — H&P
History and Physical - Critical Care   Zoltan Gorman 80 y o  male MRN: [id number]  Unit/Bed#:  Encounter: [id number]    Reason for Admission / Chief Complaint:  Chest pain, STEMI    History of Present Illness:  Zoltan Gorman is a 80 y o  male who presented to the [facility] AT [place] with a 1 hr history of chest pain  Patient has a past medical history of tobacco abuse, and tonsillar cancer however has not seen a physician in a number of years  Patient had central diffuse chest pain about 1 hr prior to arrival during rest   He had associated diaphoresis and bilateral upper extremity numbness and tingling  He had EKG on arrival that showed ST elevations in lead 2, 3, AVF with reciprocal changes  A right-sided EKG showed ST elevations in the 3, 4, 5, 6  A STEMI alert was called  Patient was given aspirin, heparin, and Brilinta prior to cath lab  In the cath lab, patient was found to have a 100% proximal RCA occlusion  This had a drug-eluting stent placed successfully  Patient had moderately elevated LVEDP  He returns to the step-down unit following procedure  Chest pain is now resolved  History obtained from the patient  Past Medical History:  History reviewed  No pertinent past medical history  Past Surgical History:  Past Surgical History:   Procedure Laterality Date    KNEE SURGERY         Past Family History:  History reviewed  No pertinent family history      Social History:  History   Smoking Status    Current Some Day Smoker    Packs/day: 2 00   Smokeless Tobacco    Never Used     History   Alcohol Use No     History   Drug Use No     Marital Status: /Civil Union  Exercise History:  Independent ADLs    Medications:  Current Facility-Administered Medications   Medication Dose Route Frequency    acetaminophen (TYLENOL) tablet 650 mg  650 mg Oral Q4H PRN    aspirin chewable tablet 81 mg  81 mg Oral Daily    atorvastatin (LIPITOR) tablet 80 mg  80 mg Oral QPM    chlorhexidine (PERIDEX) 0 12 % oral rinse 15 mL  15 mL Swish [employer] Spit Q12H [address]    metoprolol tartrate (LOPRESSOR) tablet 25 mg  25 mg Oral Q12H [address]    morphine (PF) 10 mg/mL injection 6 mg  6 mg Intravenous Q4H PRN    nicotine (NICODERM CQ) 21 mg/24 hr TD 24 hr patch 21 mg  21 mg Transdermal Daily    sodium chloride 0 9 % infusion  125 mL/hr Intravenous Continuous    [START ON 2018] ticagrelor (BRILINTA) tablet 90 mg  90 mg Oral BID     Home medications:  Prior to Admission medications    Not on File     Allergies: Allergies no known allergies    ROS:   Review of Systems   Constitutional: Positive for fatigue  All other systems reviewed and are negative  Vitals:  Vitals:    18 1230 18 1300 18 1310 18 1356   BP: 153/99 153/90     BP Location:       Pulse: 92 88 88    Resp: 19 18     Temp:       SpO2: 98% 98%     Weight:    107 kg (235 lb 14 3 oz)   Height:    5' 9" (1 753 m)     Temperature:   Temp (24hrs), Av 4 °F (36 3 °C), Min:97 2 °F (36 2 °C), Max:97 5 °F (36 4 °C)    Current Temperature: (!) 97 2 °F (36 2 °C)    Weights:   IBW: 70 7 kg  Body mass index is 34 84 kg/m²  Hemodynamic Monitoring:  N/A     Non-Invasive/Invasive Ventilation Settings:  Respiratory    Lab Data (Last 4 hours)    None         O2/Vent Data (Last 4 hours)    None              No results found for: PHART, IVL1WNP, PO2ART, ZPM9YEG, B1NBPSTW, BEART, SOURCE  SpO2: SpO2: 98 %, SpO2 Activity: SpO2 Activity: At Rest, SpO2 Device: O2 Device: Nasal cannula     Physical Exam:  Physical Exam   Constitutional: He is oriented to person, place, and time  He appears well-developed and well-nourished  HENT:   Head: Normocephalic and atraumatic  Eyes: Pupils are equal, round, and reactive to light  Neck: Neck supple  No JVD present  Cardiovascular: Normal rate, regular rhythm and normal heart sounds  No murmur heard    Right Radial band in place, normal capillary refill in right hand Pulmonary/Chest: Effort normal and breath sounds normal  He has no decreased breath sounds  Abdominal: Soft  Bowel sounds are normal  He exhibits no distension  There is no tenderness  Genitourinary:   Genitourinary Comments: Deferred   Musculoskeletal: Normal range of motion  He exhibits no edema  Neurological: He is alert and oriented to person, place, and time  No cranial nerve deficit  Skin: Skin is warm and dry  He is not diaphoretic  Nursing note and vitals reviewed  Labs:    Results from last 7 days  Lab Units 12/30/18  1009   WBC Thousand/uL 9 82   HEMOGLOBIN g/dL 15 4   HEMATOCRIT % 46 9   PLATELETS Thousands/uL 271   NEUTROS PCT % 61   MONOS PCT % 7       Results from last 7 days  Lab Units 12/30/18  1009   SODIUM mmol/L 134*   POTASSIUM mmol/L 3 3*   CHLORIDE mmol/L 97*   CO2 mmol/L 27   ANION GAP mmol/L 10   BUN mg/dL 14   CREATININE mg/dL 1 37*   CALCIUM mg/dL 9 3   ALT U/L 30   AST U/L 30   ALK PHOS U/L 93   ALBUMIN g/dL 4 2   TOTAL BILIRUBIN mg/dL 0 40            Results from last 7 days  Lab Units 12/30/18  1010   INR  0 92   PTT seconds 22*       Results from last 7 days  Lab Units 12/30/18  1009   TROPONIN I ng/mL 0 34*         ABG:No results found for: PHART, WXT8EJW, PO2ART, SFB5KTK, E0SVFMFI, BEART, SOURCE  VBG:            Imaging:  I have personally reviewed pertinent films in PACS   XR chest 1 view portable   ED Interpretation by Thor Alonso DO (12/30 1041)   Obscured mediastinal borders but no other acute abnormality in the chest         EKG: This was personally reviewed by myself  Micro:        ______________________________________________________________________    Assessment:   Active Problems:    ST elevation myocardial infarction involving right coronary artery (HCC)    Tobacco abuse    History of cancer tonsil  Resolved Problems:    * No resolved hospital problems  *    Plan:    Neuro:   No issues       Regulate sleep/wake cycle    CV:   STEMI of right coronary artery  = status post PCI with drug-eluting stent  - continue dual anti-platelet therapy  - continue beta-blocker, statin  - echocardiogram tomorrow  - cardiology following, appreciate new recommendations  - will obtain lipid panel and A1c tomorrow    Cardiac infusions:  None  Rhythm: NSR  Follow rhythm on telemetry    Pulm: Tobacco abuse  - strict Tobacco cessation education    Add supplemental oxygen if saturation less than 94%    GI:   No issues  Nutrition/diet plan:  Regular diet  Stress ulcer prophylaxis: No prophylaxis needed      :   Continue normal saline post catheterization to hydrate kidneys    Indwelling Mart present: no     FEN:   Will check post cath lab electrolytes  Fluid/Diuretic plan: No intervention  Goal 24 hour fluid balance:  Even  Electrolytes repleted: yes  Goal: K >4 0, Mag >2 0, and Phos >3 0    ID:   No issues  Trend temps and WBC count    Heme:   Continue dual antiplatelet therapy  Will start Lovenox tomorrow  Trend hgb and plts  Transfuse as needed for goal hgb > 8    Endo:   No history of diabetes  Will send A1c  Glycemic control plan: N/A    Msk/Skin:  No issues    Frequent turning and off-loading    Family:  Family updated within 24 hours: yes   Family meeting planned today: no     Lines:  Peripheral IVs  VTE Prophylaxis:  Pharmacologic Prophylaxis: Enoxaparin (Lovenox)  Mechanical Prophylaxis: sequential compression device    Disposition: Admit to step-down    Code Status: Level 1 - Full Code    Counseling / Coordination of Care  Total Critical Care time spent 38 minutes excluding procedures, teaching and family updates  ______________________________________________________________________    Invasive lines and devices:   Invasive Devices     Peripheral Intravenous Line            Peripheral IV 12/30/18 Left Antecubital less than 1 day    Peripheral IV 12/30/18 Left Antecubital less than 1 day    Peripheral IV 12/30/18 Right Antecubital less than 1 day Code Status: Level 1 - Full Code  POA:    POLST:      Given critical illness, patient length of stay will require greater than two midnights  Portions of the record may have been created with voice recognition software  Occasional wrong word or "sound a like" substitutions may have occurred due to the inherent limitations of voice recognition software  Read the chart carefully and recognize, using context, where substitutions have occurred        Yasmin Umanzor PA-C

## 2018-12-31 ENCOUNTER — APPOINTMENT (OUTPATIENT)
Dept: NON INVASIVE DIAGNOSTICS | Facility: HOSPITAL | Age: 68
DRG: 247 | End: 2018-12-31
Payer: COMMERCIAL

## 2018-12-31 LAB
ANION GAP SERPL CALCULATED.3IONS-SCNC: 8 MMOL/L (ref 4–13)
BASOPHILS # BLD AUTO: 0.05 THOUSANDS/ΜL (ref 0–0.1)
BASOPHILS NFR BLD AUTO: 1 % (ref 0–1)
BUN SERPL-MCNC: 15 MG/DL (ref 5–25)
CALCIUM SERPL-MCNC: 8.6 MG/DL (ref 8.3–10.1)
CHLORIDE SERPL-SCNC: 99 MMOL/L (ref 100–108)
CO2 SERPL-SCNC: 27 MMOL/L (ref 21–32)
CREAT SERPL-MCNC: 1.16 MG/DL (ref 0.6–1.3)
EOSINOPHIL # BLD AUTO: 0.16 THOUSAND/ΜL (ref 0–0.61)
EOSINOPHIL NFR BLD AUTO: 2 % (ref 0–6)
ERYTHROCYTE [DISTWIDTH] IN BLOOD BY AUTOMATED COUNT: 14.1 % (ref 11.6–15.1)
GFR SERPL CREATININE-BSD FRML MDRD: 65 ML/MIN/1.73SQ M
GLUCOSE P FAST SERPL-MCNC: 118 MG/DL (ref 65–99)
GLUCOSE SERPL-MCNC: 118 MG/DL (ref 65–140)
HBV CORE AB SER QL: NORMAL
HBV CORE IGM SER QL: NORMAL
HBV SURFACE AG SER QL: NORMAL
HCT VFR BLD AUTO: 41.3 % (ref 36.5–49.3)
HCV AB SER QL: NORMAL
HGB BLD-MCNC: 13.8 G/DL (ref 12–17)
IMM GRANULOCYTES # BLD AUTO: 0.03 THOUSAND/UL (ref 0–0.2)
IMM GRANULOCYTES NFR BLD AUTO: 0 % (ref 0–2)
LYMPHOCYTES # BLD AUTO: 1.72 THOUSANDS/ΜL (ref 0.6–4.47)
LYMPHOCYTES NFR BLD AUTO: 16 % (ref 14–44)
MAGNESIUM SERPL-MCNC: 2.2 MG/DL (ref 1.6–2.6)
MCH RBC QN AUTO: 29 PG (ref 26.8–34.3)
MCHC RBC AUTO-ENTMCNC: 33.4 G/DL (ref 31.4–37.4)
MCV RBC AUTO: 87 FL (ref 82–98)
MONOCYTES # BLD AUTO: 0.71 THOUSAND/ΜL (ref 0.17–1.22)
MONOCYTES NFR BLD AUTO: 7 % (ref 4–12)
NEUTROPHILS # BLD AUTO: 8.03 THOUSANDS/ΜL (ref 1.85–7.62)
NEUTS SEG NFR BLD AUTO: 74 % (ref 43–75)
NRBC BLD AUTO-RTO: 0 /100 WBCS
PLATELET # BLD AUTO: 218 THOUSANDS/UL (ref 149–390)
PMV BLD AUTO: 8.8 FL (ref 8.9–12.7)
POTASSIUM SERPL-SCNC: 4.1 MMOL/L (ref 3.5–5.3)
RBC # BLD AUTO: 4.76 MILLION/UL (ref 3.88–5.62)
SODIUM SERPL-SCNC: 134 MMOL/L (ref 136–145)
WBC # BLD AUTO: 10.7 THOUSAND/UL (ref 4.31–10.16)

## 2018-12-31 PROCEDURE — 99232 SBSQ HOSP IP/OBS MODERATE 35: CPT | Performed by: ANESTHESIOLOGY

## 2018-12-31 PROCEDURE — 80048 BASIC METABOLIC PNL TOTAL CA: CPT | Performed by: PHYSICIAN ASSISTANT

## 2018-12-31 PROCEDURE — 99232 SBSQ HOSP IP/OBS MODERATE 35: CPT | Performed by: INTERNAL MEDICINE

## 2018-12-31 PROCEDURE — 93306 TTE W/DOPPLER COMPLETE: CPT | Performed by: INTERNAL MEDICINE

## 2018-12-31 PROCEDURE — 93306 TTE W/DOPPLER COMPLETE: CPT

## 2018-12-31 PROCEDURE — 83735 ASSAY OF MAGNESIUM: CPT | Performed by: PHYSICIAN ASSISTANT

## 2018-12-31 PROCEDURE — 85025 COMPLETE CBC W/AUTO DIFF WBC: CPT | Performed by: PHYSICIAN ASSISTANT

## 2018-12-31 RX ORDER — LISINOPRIL 5 MG/1
5 TABLET ORAL DAILY
Status: DISCONTINUED | OUTPATIENT
Start: 2018-12-31 | End: 2019-01-01 | Stop reason: HOSPADM

## 2018-12-31 RX ORDER — SENNOSIDES 8.6 MG
1 TABLET ORAL
Status: DISCONTINUED | OUTPATIENT
Start: 2018-12-31 | End: 2019-01-01 | Stop reason: HOSPADM

## 2018-12-31 RX ORDER — MAGNESIUM SULFATE HEPTAHYDRATE 40 MG/ML
2 INJECTION, SOLUTION INTRAVENOUS ONCE
Status: COMPLETED | OUTPATIENT
Start: 2018-12-31 | End: 2018-12-31

## 2018-12-31 RX ADMIN — LISINOPRIL 5 MG: 5 TABLET ORAL at 11:08

## 2018-12-31 RX ADMIN — METOPROLOL TARTRATE 25 MG: 25 TABLET, FILM COATED ORAL at 21:54

## 2018-12-31 RX ADMIN — PERFLUTREN 0.6 ML/MIN: 6.52 INJECTION, SUSPENSION INTRAVENOUS at 09:11

## 2018-12-31 RX ADMIN — TICAGRELOR 90 MG: 90 TABLET ORAL at 18:31

## 2018-12-31 RX ADMIN — TICAGRELOR 90 MG: 90 TABLET ORAL at 11:09

## 2018-12-31 RX ADMIN — SENNOSIDES 8.6 MG: 8.6 TABLET, FILM COATED ORAL at 21:54

## 2018-12-31 RX ADMIN — CHLORHEXIDINE GLUCONATE 0.12% ORAL RINSE 15 ML: 1.2 LIQUID ORAL at 11:07

## 2018-12-31 RX ADMIN — MAGNESIUM SULFATE IN WATER 2 G: 40 INJECTION, SOLUTION INTRAVENOUS at 03:40

## 2018-12-31 RX ADMIN — METOPROLOL TARTRATE 25 MG: 25 TABLET, FILM COATED ORAL at 11:08

## 2018-12-31 RX ADMIN — ASPIRIN 81 MG 81 MG: 81 TABLET ORAL at 11:07

## 2018-12-31 RX ADMIN — ATORVASTATIN CALCIUM 80 MG: 40 TABLET, FILM COATED ORAL at 18:31

## 2018-12-31 RX ADMIN — ENOXAPARIN SODIUM 40 MG: 40 INJECTION SUBCUTANEOUS at 11:08

## 2018-12-31 RX ADMIN — NICOTINE 21 MG: 21 PATCH TRANSDERMAL at 11:06

## 2018-12-31 NOTE — PROGRESS NOTES
Progress Note - Critical Care   Hiral Bliss 80 y o  male MRN: [id number]  Unit/Bed#:  Encounter: [id number]    Assessment:   Active Problems:    ST elevation myocardial infarction involving right coronary artery (HCC)    Tobacco abuse    History of cancer tonsil  Resolved Problems:    * No resolved hospital problems  *    Plan:   Neuro:   · No acute issues  ·  Delirium precautions  Regulate sleep-wake cycles  Daily CAM ICU  CV:   · STEMI s/p PCI w/ KATIANA to RCA  Continue dual platelet therapy ASA/Brilinta  High-dose statin therapy  Metoprolol 25 mg b i d   Initiate ACE-inhibitor as tolerated  Echocardiogram pending  Encourage lifestyle modifications  Lung:   · Tobacco abuse:  Encourage cessation  Nicoderm patch  · Good pulmonary toilet/incentive spirometry  GI:   · ppx senna  FEN:   · Monitor electrolytes  Replete as warranted  Mg> 2 0, K > 4 0  · Cardiac diet  :   · JESSI-resolved  · Monitor daily BUN/creatinine  · Trend I&Os  ID:   · No obvious source of infectious etiology  Monitor off of antibiotics  · Trend fever curve and WBC count  Heme:   · H&H and platelets stable  Continue to monitor  Transfuse for hemoglobin less than 8 0 in setting of CAD  · ppx vte:  Lovenox  Endo:   · Hemoglobin A1c pending  Msk/Skin:   · Frequent offloading repositioning void skin breakdown  · Encourage up out of bed ambulation  Disposition:   · Transfer Med surge tele versus DC home    ______________________________________________________________________  Chief Complaint: "I have no more pain"      HPI/24hr events:  Patient presented to emergency department with acute onset chest pain approximately 1 hour prior to arrival   EKG was obtained revealing ST elevations II, III, aVF, with reciprocal changes in leads VIII-V  Patient was taken emergently to cath lab with Dr Gianna Olivera  He was found to have 100% occlusion of RCA  One drug-eluting stent was placed    Patient returned to step-down unit for further hemodynamic monitoring  Patient displayed a 9 beat run of V-tach on telemetry however remained asymptomatic   2 mg of IV magnesium was administered EKG remained stable   ______________________________________________________________________  Physical Exam:     Physical Exam   Constitutional: He is oriented to person, place, and time  He appears well-developed and well-nourished  No distress  HENT:   Head: Normocephalic and atraumatic  Eyes: Pupils are equal, round, and reactive to light  EOM are normal    Neck: Normal range of motion  Neck supple  Cardiovascular: Normal rate, regular rhythm and normal heart sounds  Pulmonary/Chest: Effort normal and breath sounds normal  No respiratory distress  He has no wheezes  He has no rales  Abdominal: Soft  Bowel sounds are normal  He exhibits no distension  There is no tenderness  There is no rebound and no guarding  Musculoskeletal: Normal range of motion  He exhibits no edema  Neurological: He is alert and oriented to person, place, and time  No cranial nerve deficit  Skin: Skin is warm  Vitals reviewed  ______________________________________________________________________  Temperature:   Temp (24hrs), Av 6 °F (36 4 °C), Min:97 2 °F (36 2 °C), Max:98 1 °F (36 7 °C)    Current Temperature: 98 1 °F (36 7 °C)    Vitals:    18 1700 18 1800 18 1900 18 2300   BP: 155/92 (!) 151/101 155/92 146/92   BP Location:    Right arm   Pulse: 96 87 77 67   Resp: 21 (!) 26 20 19   Temp:    98 1 °F (36 7 °C)   TempSrc:    Oral   SpO2: 100% 97% 98% 95%   Weight:       Height:                  Weights:   IBW: 70 7 kg    Body mass index is 34 84 kg/m²    Weight (last 2 days)     Date/Time   Weight    18 1356  107 (235 89)    18 0949  106 (234 13)            Height: 5' 9" (175 3 cm)    Intake and Outputs:  I/O     None        Nutrition:        Diet Orders            Start     Ordered    18 1340  Diet Cardiovascular; Cardiac  Diet effective now     Question Answer Comment   Diet Type Cardiovascular    Cardiac Cardiac    RD to adjust diet per protocol? Yes        12/30/18 1339        Labs:     Results from last 7 days  Lab Units 12/30/18  1729 12/30/18  1009   WBC Thousand/uL 11 26* 9 82   HEMOGLOBIN g/dL 13 7 15 4   HEMATOCRIT % 41 5 46 9   PLATELETS Thousands/uL 220 271   NEUTROS PCT % 75 61   MONOS PCT % 6 7      Results from last 7 days  Lab Units 12/30/18  1729 12/30/18  1009   POTASSIUM mmol/L 4 6 3 3*   CHLORIDE mmol/L 99* 97*   CO2 mmol/L 26 27   BUN mg/dL 14 14   CREATININE mg/dL 1 12 1 37*   CALCIUM mg/dL 8 7 9 3   ALK PHOS U/L  --  93   ALT U/L  --  30   AST U/L  --  30       Results from last 7 days  Lab Units 12/30/18  1729   MAGNESIUM mg/dL 1 9            Results from last 7 days  Lab Units 12/30/18  1729 12/30/18  1010   INR   --  0 92   PTT seconds 29 22*           0  Lab Value Date/Time   TROPONINI 0 34 (H) 12/30/2018 1009     Imaging:  I have personally reviewed pertinent reports  and I have personally reviewed pertinent films in PACS  EKG:   Micro:  Blood Culture: No results found for: BLOODCX  Urine Culture: No results found for: URINECX  Sputum Culture: No components found for: SPUTUMCX  Wound Culure: No results found for: WOUNDCULT    No results found for: Ariel Flor, SPUTUMLT  Allergies:  Allergies no known allergies  Medications:   Scheduled Meds:  Current Facility-Administered Medications:  acetaminophen 650 mg Oral Q4H PRN Katharineharman Hatcho, DO    aspirin 81 mg Oral Daily Katharinekeyonna Hatcho, DO    atorvastatin 80 mg Oral QPM Katharineharman Ramos, DO    chlorhexidine 15 mL Swish & Spit Q12H [address] Yasir Gomez PA-C    enoxaparin 40 mg Subcutaneous Q24H [address] Yasir Gomez PA-C    metoprolol tartrate 25 mg Oral Q12H [address] Katharine Ramos, DO    morphine injection 6 mg Intravenous Q4H PRN My Pyle, DO    multi-electrolyte 75 mL/hr Intravenous Continuous Marco Pritchett Last Rate: 75 mL/hr (12/30/18 2015)   nicotine 21 mg Transdermal Daily Henrryley Denver,     ticagrelor 90 mg Oral BID Henrryley Denver,       Continuous Infusions:  multi-electrolyte 75 mL/hr Last Rate: 75 mL/hr (12/30/18 2015)     PRN Meds:    acetaminophen 650 mg Q4H PRN   morphine injection 6 mg Q4H PRN     VTE Pharmacologic Prophylaxis: Enoxaparin (Lovenox)  VTE Mechanical Prophylaxis: sequential compression device  Invasive lines and devices: Invasive Devices     Peripheral Intravenous Line            Peripheral IV 12/30/18 Left Antecubital less than 1 day    Peripheral IV 12/30/18 Left Antecubital less than 1 day    Peripheral IV 12/30/18 Right Antecubital less than 1 day                   Counseling / Coordination of Care  Total Critical Care time spent 30 minutes excluding procedures, teaching and family updates  Code Status: Level 1 - Full Code    Portions of the record may have been created with voice recognition software  Occasional wrong word or "sound a like" substitutions may have occurred due to the inherent limitations of voice recognition software  Read the chart carefully and recognize, using context, where substitutions have occurred      [place] PA-C

## 2018-12-31 NOTE — UTILIZATION REVIEW
[address] Utilization Review Department  Phone: [phone]; Fax [phone]  [email or web address]  ATTENTION: Please call with any questions or concerns to [phone]  and carefully listen to the prompts so that you are directed to the right person  Send all requests for admission clinical reviews, approved or denied determinations and any other requests to fax [phone]  All voicemails are confidential     Initial Clinical Review    Admission: Date/Time/Statement: WAS OUTPATIENT NO CHARGE BED 12/30/18 @1438 CONVERTED TO INPATIENT ADMISSION  12/31/18 @ 6941 DUE TO ACUTE STEMI REQUIRING EMERGENT CARDIAC CATH WITH KATIANA PLACED % RCA OCCLUSION  ADMITTED TO ICU  Orders Placed This Encounter   Procedures    Inpatient Admission     Standing Status:   Standing     Number of Occurrences:   1     Order Specific Question:   Admitting Physician     Answer:   Dimple Delay     Order Specific Question:   Level of Care     Answer:   Level 1 Stepdown [13]     Order Specific Question:   Estimated length of stay     Answer:   More than 2 Midnights     Order Specific Question:   Certification     Answer:   I certify that inpatient services are medically necessary for this patient for a duration of greater than two midnights  See H&P and MD Progress Notes for additional information about the patient's course of treatment  ED Arrival Information     Expected Arrival Acuity Means of Arrival Escorted By Service Admission Type    - 12/30/2018 09:43 Emergent Walk-In Family Member Critical Care/ICU Emergency    Arrival Complaint    Chest pain        Chief Complaint   Patient presents with    Chest Pain     midsternal chest pain onset today, +sob, 324mg of asa at home        History of Illness: 81 yo m to ED from home due to chest pain x 1 hr  Central, diffuse, assoc diaphoresis with BUE numbness & tingling   EKG showed ST elevations in II, III, avF with reciprocal changes  R sided EKG showed ST elevations III, IV, V, VI  STEMI alert was called  Asa, heparin, brilinta given, emergently taken to cath lab  100% prox RCA found  KATIANA placed       ED Vital Signs:   ED Triage Vitals   Temperature Pulse Respirations Blood Pressure SpO2   12/30/18 1029 12/30/18 0949 12/30/18 0949 12/30/18 0949 12/30/18 0949   97 5 °F (36 4 °C) 73 18 168/77 98 %      Temp Source Heart Rate Source Patient Position - Orthostatic VS BP Location FiO2 (%)   12/30/18 2300 12/30/18 0949 12/30/18 2300 12/30/18 0949 --   Oral Monitor Lying Right arm       Pain Score       12/30/18 0949       5        Wt Readings from Last 1 Encounters:   12/31/18 107 kg (235 lb 14 3 oz)       Vital Signs (abnormal):     RR 25, 26    151/101    Abnormal Labs/Diagnostic Test Results:   , 132, 134   CL 97, 99    CREAT 1 37    GLUC 153  t prot 8 6  Trop  34     28   Wbc 11 26, 10 7  ptt 22  , 254  EKG1: Sinus rhythm with occasional Premature ventricular complexes  Inferior infarct , possibly acute  Lateral injury pattern  ** ** ACUTE MI / STEMI ** **  Consider right ventricular involvement in acute inferior infarct    EKG2: Sinus rhythm with occasional Premature ventricular complexes  Inferior infarct (cited on or before 30-DEC-2018)  Anterolateral infarct , new  ** ** ACUTE MI / STEMI ** **  Consider right ventricular involvement in acute inferior infarct    CXR: nothing acute      ED Treatment:   Medication Administration from 12/30/2018 0943 to 12/30/2018 1335       Date/Time Order Dose Route Action     12/30/2018 1008 morphine (PF) 10 mg/mL injection 6 mg 6 mg Intravenous Given     12/30/2018 1151 sodium chloride 0 9 % bolus 500 mL 500 mL Intravenous Continued from OR     12/30/2018 1014 sodium chloride 0 9 % bolus 500 mL 500 mL Intravenous New Bag     12/30/2018 1008 heparin (porcine) injection 4,000 Units 4,000 Units Intravenous Given     12/30/2018 1014 ticagrelor (BRILINTA) tablet 180 mg 180 mg Oral Given     12/30/2018 1020 HYDROmorphone (DILAUDID) injection 1 mg 1 mg Intravenous Given     12/30/2018 1026 aspirin chewable tablet 243 mg 243 mg Oral Given     12/30/2018 1045 lidocaine (XYLOCAINE) 1 % injection 2 mL Infiltration Given     12/30/2018 1045 nitroGLYcerin (TRIDIL) 50 mg in 250 mL infusion (premix) 200 mcg Intra-arterial Given     12/30/2018 1046 verapamil (ISOPTIN) injection 2 5 mg Intra-arterial Given     12/30/2018 1050 heparin (porcine) injection 8,000 Units Intravenous Given     12/30/2018 1055 atropine 1 mg/10 mL injection 0 5 mg Intravenous Given     12/30/2018 1301 nicotine (NICODERM CQ) 21 mg/24 hr TD 24 hr patch 21 mg 21 mg Transdermal Medication Applied          Past Medical/Surgical History: tonsillary ca, smoker      Admitting Diagnosis: Chest pain [R07 9]  Chest pressure [R07 89]  Acute ST elevation myocardial infarction (STEMI) of inferior wall (HCC) [I21 19]    Age/Sex: 80 y o  male    Assessment/Plan: 81 yo m to ED from home admitted as inpatient to ICU due to acute STEMI  C/o Central, diffuse chest pain x 1 hr pta, assoc diaphoresis with BUE numbness & tingling  EKG showed ST elevations in II, III, avF with reciprocal changes  R sided EKG showed ST elevations III, IV, V, VI  STEMI alert was called  Asa, heparin, brilinta given, emergently taken to cath lab  100% prox RCA found  KATIANA placed  9 beat run v tach on tele  IV mg given         Admission Orders:  Scheduled Meds:   Current Facility-Administered Medications:  acetaminophen 650 mg Oral Q4H PRN   aspirin 81 mg Oral Daily   atorvastatin 80 mg Oral QPM   chlorhexidine 15 mL Swish & Spit Q12H [address]   enoxaparin 40 mg Subcutaneous Q24H VISHAL   metoprolol tartrate 25 mg Oral Q12H VISHAL   morphine injection 6 mg Intravenous Q4H PRN   multi-electrolyte 75 mL/hr Intravenous Continuous   nicotine 21 mg Transdermal Daily   senna 1 tablet Oral HS   ticagrelor 90 mg Oral BID   IV mg x 2  IV dilaudid x 1  Cath lab meds: IV atropine x 1, IV heparin x 1, IV lidocaine x 1, IV morphine x 1, tridil gtt, IV verapamil x 1  Neuro checks q4h  I/O q2h  Daily wt, daily ICU assessment  Dysphagia eval  Fall prec  Turn q2h  scd's  Tele  Post cath care  Cons cardio  Cardiac diet  Cons nutrition  Cons case mgmt  Echo  EKG prn chest pain

## 2018-12-31 NOTE — PROGRESS NOTES
General Cardiology   Progress Note   Leslie Salinas 80 y o  male MRN: [id number]  Unit/Bed#:  Encounter: [id number]        Subjective:    No significant events since the last encounter  Im feeling good  I want to go home  REVIEW OF SYSTEMS:  Constitutional:  Denies fever or chills   Eyes:  Denies change in visual acuity   HENT:  Denies nasal congestion or sore throat   Respiratory:  Denies cough or shortness of breath   Cardiovascular:  Denies chest pain or edema   GI:  Denies abdominal pain, nausea, vomiting, bloody stools or diarrhea   :  Denies dysuria, frequency, difficulty in micturition and nocturia  Musculoskeletal:  Denies back pain or joint pain   Neurologic:  Denies headache, focal weakness or sensory changes   Endocrine:  Denies polyuria or polydipsia   Lymphatic:  Denies swollen glands   Psychiatric:  Denies depression or anxiety     Objective:   Vitals:  Vitals:    12/31/18 0700   BP: 164/95   Pulse: 72   Resp: 17   Temp: 97 7 °F (36 5 °C)   SpO2: 98%       Body mass index is 34 84 kg/m²  Systolic (64KTA), UPA:794 , Min:141 , ZDO:299     Diastolic (14UHC), BWS:50, Min:77, Max:101      Intake/Output Summary (Last 24 hours) at 12/31/18 0942  Last data filed at 12/31/18 0344   Gross per 24 hour   Intake           611 25 ml   Output              800 ml   Net          -188 75 ml     Weight (last 2 days)     Date/Time   Weight    12/31/18 0600  107 (235 89)    12/30/18 1356  107 (235 89)    12/30/18 0949  106 (234 13)              Telemetry Review: sr, nsvt    PHYSICAL EXAMS:  General:  Patient is not in acute distress, laying in the bed comfortably, awake, alert responding to commands  Head: Normocephalic, Atraumatic  HEENT: White sclera, pink conjunctiva,  PERRLA,pharynx benign  Neck:  Supple, no neck vein distention, carotids+2/+2 no bruits, thyromegaly, adenopathy  Respiratory: clear to P/A  Cardiovascular:  PMI normal, S1-S2 normal, No  Murmurs, thrills, gallops, rubs    Regular rhythm  GI:  Abdomen soft nontender   No hepatosplenomegaly, adenopathy, ascites,or rebound tenderness  Extremities: No edema, normal pulses, no calf tenderness, no joint deformities, no venous disease   Integument:  No skin rashes or ulceration  Lymphatic:  No cervical or inguinal lymphadenopathy  Neurologic:  Patient is awake alert, responding to command, well-oriented to time and place and person moving all extremities      LABORATORY RESULTS:    Results from last 7 days  Lab Units 12/30/18  1009   TROPONIN I ng/mL 0 34*     CBC with diff:   Results from last 7 days  Lab Units 12/31/18 0417 12/30/18  1729 12/30/18  1009   WBC Thousand/uL 10 70* 11 26* 9 82   HEMOGLOBIN g/dL 13 8 13 7 15 4   HEMATOCRIT % 41 3 41 5 46 9   MCV fL 87 87 87   PLATELETS Thousands/uL 218 220 271   MCH pg 29 0 28 7 28 6   MCHC g/dL 33 4 33 0 32 8   RDW % 14 1 14 0 14 3   MPV fL 8 8* 8 9 9 1   NRBC AUTO /100 WBCs 0 0 0       CMP:  Results from last 7 days  Lab Units 12/31/18 0417 12/30/18  1729 12/30/18  1009   POTASSIUM mmol/L 4 1 4 6 3 3*   CHLORIDE mmol/L 99* 99* 97*   CO2 mmol/L 27 26 27   BUN mg/dL 15 14 14   CREATININE mg/dL 1 16 1 12 1 37*   CALCIUM mg/dL 8 6 8 7 9 3   AST U/L  --   --  30   ALT U/L  --   --  30   ALK PHOS U/L  --   --  93   EGFR ml/min/1 73sq m 65 68 53       BMP:  Results from last 7 days  Lab Units 12/31/18 0417 12/30/18  1729 12/30/18  1009   POTASSIUM mmol/L 4 1 4 6 3 3*   CHLORIDE mmol/L 99* 99* 97*   CO2 mmol/L 27 26 27   BUN mg/dL 15 14 14   CREATININE mg/dL 1 16 1 12 1 37*   CALCIUM mg/dL 8 6 8 7 9 3                Results from last 7 days  Lab Units 12/31/18 0417 12/30/18  1729   MAGNESIUM mg/dL 2 2 1 9       Results from last 7 days  Lab Units 12/30/18  1729   HEMOGLOBIN A1C % 6 1           Results from last 7 days  Lab Units 12/30/18  1010   INR  0 92       Lipid Profile:   No results found for: CHOL  Lab Results   Component Value Date    HDL 37 (L) 12/30/2018     Lab Results   Component Value Date [facility] 97 12/30/2018     Lab Results   Component Value Date    TRIG 147 12/30/2018       Cardiac testing:   No results found for this or any previous visit  No results found for this or any previous visit  No results found for this or any previous visit  No procedure found  No results found for this or any previous visit  Meds/Allergies   all current active meds have been reviewed  No prescriptions prior to admission  multi-electrolyte 75 mL/hr Last Rate: 75 mL/hr (12/30/18 2015)       Assessment/Plan:  1-STEMI s/p cardiac cath with KATIANA to RCA  Continue all meds; Aspirin, liptior, lisinopril, lopressor, brilinta  Echo ordered and pending  reviewed the importance of all medications  Reviewed the need for DAPT for the next 1 yr without interruption, report any bleeding or bruising  All questions answered  2-Nonsustained VT, continue all meds  Echo ordered and pending  Further recommendations will be based upon echo findings  3-Leukocytosis, improved, continue to follow  4-Tobacco Abuse; smoking cessation HIGHLY advised      ** Please Note: Dragon 360 Dictation voice to text software may have been used in the creation of this document   **

## 2018-12-31 NOTE — PROGRESS NOTES
Progress Note - ICU Transfer to SD/MS tele   Aleta Ball 80 y o  male MRN: [id number]  [address] [facility]   Unit/Bed#:  Encounter: [id number]    Code Status: Level 1 - Full Code  POA:    POLST:      Reason for ICU admission:  STEMI    Active problems:   Active Problems:    ST elevation myocardial infarction involving right coronary artery (HCC)    Tobacco abuse    History of cancer tonsil  Resolved Problems:    * No resolved hospital problems  *      Consultants:   Cardiology    History of Present Illness:  Aleta Ball is a 80 y o  male who presented to the [facility] with a 1 hr history of chest pain  Patient has a past medical history of tobacco abuse, and tonsillar cancer however has not seen a physician in a number of years  Patient had central diffuse chest pain about 1 hr prior to arrival during rest   He had associated diaphoresis and bilateral upper extremity numbness and tingling  He had EKG on arrival that showed ST elevations in lead 2, 3, AVF with reciprocal changes  A right-sided EKG showed ST elevations in the 3, 4, 5, 6  A STEMI alert was called  Patient was given aspirin, heparin, and Brilinta prior to cath lab  In the cath lab, patient was found to have a 100% proximal RCA occlusion  This had a drug-eluting stent placed successfully  Patient had moderately elevated LVEDP  He returns to the step-down unit following procedure  Chest pain is now resolved  Summary of clinical course: The patient was admitted to the step-down unit postprocedure  He was started on aspirin, Brilinta, beta-blocker, and statin        Recent or scheduled procedures:   12/30 cardiac catheterization    Outstanding/pending diagnostics:   12/31 echocardiogram    Cultures:   N/A       Mobilization Plan:   Out of bed as able    Nutrition Plan:   Cardiac diet    VTE Pharmacologic Prophylaxis: Enoxaparin (Lovenox)  VTE Mechanical Prophylaxis: sequential compression device    Discharge Plan:   Patient should be ready for discharge to home on cardiac medications after stabilized    Initial Physical Therapy Recommendations: N/A  Initial Occupational Therapy Recommendations: N/A  Initial /Plan:  Pending    Home medications that are not reordered and reason why:   N/A     Specific Diagnosis Plan:  STEMI-status post 1 drug-eluting stent to the RCA-continue aspirin, Brilinta, beta-blocker, statin, Ace inhibitor initiated today  Spoke with Dr Laurita Diaz regarding transfer  Please call [phone] with any questions or concerns  Portions of the record may have been created with voice recognition software  Occasional wrong word or "sound a like" substitutions may have occurred due to the inherent limitations of voice recognition software  Read the chart carefully and recognize, using context, where substitutions have occurred      Lin Calderón Name band;

## 2019-01-01 VITALS
HEIGHT: 69 IN | HEART RATE: 80 BPM | DIASTOLIC BLOOD PRESSURE: 69 MMHG | TEMPERATURE: 98.3 F | BODY MASS INDEX: 35.36 KG/M2 | SYSTOLIC BLOOD PRESSURE: 125 MMHG | WEIGHT: 238.76 LBS | OXYGEN SATURATION: 99 % | RESPIRATION RATE: 18 BRPM

## 2019-01-01 LAB
ANION GAP SERPL CALCULATED.3IONS-SCNC: 9 MMOL/L (ref 4–13)
BUN SERPL-MCNC: 11 MG/DL (ref 5–25)
CALCIUM SERPL-MCNC: 8.8 MG/DL (ref 8.3–10.1)
CHLORIDE SERPL-SCNC: 100 MMOL/L (ref 100–108)
CO2 SERPL-SCNC: 24 MMOL/L (ref 21–32)
CREAT SERPL-MCNC: 1.1 MG/DL (ref 0.6–1.3)
ERYTHROCYTE [DISTWIDTH] IN BLOOD BY AUTOMATED COUNT: 14.2 % (ref 11.6–15.1)
GFR SERPL CREATININE-BSD FRML MDRD: 69 ML/MIN/1.73SQ M
GLUCOSE SERPL-MCNC: 99 MG/DL (ref 65–140)
HCT VFR BLD AUTO: 42.9 % (ref 36.5–49.3)
HGB BLD-MCNC: 14.1 G/DL (ref 12–17)
MCH RBC QN AUTO: 29 PG (ref 26.8–34.3)
MCHC RBC AUTO-ENTMCNC: 32.9 G/DL (ref 31.4–37.4)
MCV RBC AUTO: 88 FL (ref 82–98)
PLATELET # BLD AUTO: 216 THOUSANDS/UL (ref 149–390)
PMV BLD AUTO: 9 FL (ref 8.9–12.7)
POTASSIUM SERPL-SCNC: 4.3 MMOL/L (ref 3.5–5.3)
RBC # BLD AUTO: 4.86 MILLION/UL (ref 3.88–5.62)
SODIUM SERPL-SCNC: 133 MMOL/L (ref 136–145)
WBC # BLD AUTO: 9.13 THOUSAND/UL (ref 4.31–10.16)

## 2019-01-01 PROCEDURE — 80048 BASIC METABOLIC PNL TOTAL CA: CPT | Performed by: NURSE PRACTITIONER

## 2019-01-01 PROCEDURE — 99239 HOSP IP/OBS DSCHRG MGMT >30: CPT | Performed by: INTERNAL MEDICINE

## 2019-01-01 PROCEDURE — 99232 SBSQ HOSP IP/OBS MODERATE 35: CPT | Performed by: INTERNAL MEDICINE

## 2019-01-01 PROCEDURE — 85027 COMPLETE CBC AUTOMATED: CPT | Performed by: NURSE PRACTITIONER

## 2019-01-01 RX ORDER — NICOTINE 21 MG/24HR
1 PATCH, TRANSDERMAL 24 HOURS TRANSDERMAL DAILY
Qty: 28 PATCH | Refills: 0 | Status: SHIPPED | OUTPATIENT
Start: 2019-01-02 | End: 2019-08-12 | Stop reason: ALTCHOICE

## 2019-01-01 RX ORDER — ASPIRIN 81 MG/1
81 TABLET, CHEWABLE ORAL DAILY
Qty: 30 TABLET | Refills: 0 | Status: SHIPPED | OUTPATIENT
Start: 2019-01-02 | End: 2021-01-22 | Stop reason: SDUPTHER

## 2019-01-01 RX ORDER — LISINOPRIL 5 MG/1
5 TABLET ORAL DAILY
Qty: 30 TABLET | Refills: 0 | Status: SHIPPED | OUTPATIENT
Start: 2019-01-02 | End: 2019-01-25 | Stop reason: SDUPTHER

## 2019-01-01 RX ORDER — ATORVASTATIN CALCIUM 80 MG/1
80 TABLET, FILM COATED ORAL EVERY EVENING
Qty: 30 TABLET | Refills: 0 | Status: SHIPPED | OUTPATIENT
Start: 2019-01-01 | End: 2019-01-25 | Stop reason: SDUPTHER

## 2019-01-01 RX ADMIN — CHLORHEXIDINE GLUCONATE 0.12% ORAL RINSE 15 ML: 1.2 LIQUID ORAL at 10:30

## 2019-01-01 RX ADMIN — LISINOPRIL 5 MG: 5 TABLET ORAL at 10:31

## 2019-01-01 RX ADMIN — TICAGRELOR 90 MG: 90 TABLET ORAL at 10:41

## 2019-01-01 RX ADMIN — ASPIRIN 81 MG 81 MG: 81 TABLET ORAL at 10:40

## 2019-01-01 RX ADMIN — NICOTINE 21 MG: 21 PATCH TRANSDERMAL at 10:41

## 2019-01-01 RX ADMIN — METOPROLOL TARTRATE 25 MG: 25 TABLET, FILM COATED ORAL at 10:40

## 2019-01-01 RX ADMIN — ENOXAPARIN SODIUM 40 MG: 40 INJECTION SUBCUTANEOUS at 10:31

## 2019-01-01 NOTE — PROGRESS NOTES
General Cardiology   Progress Note   Rufus Morel 80 y o  male MRN: [id number]  Unit/Bed#:  Encounter: [id number]        Subjective:   Patient denies having any chest pain or shortness of breath  No significant arrhythmias a telemetry    Objective:   Vitals:  Vitals:    19 1031   BP: 125/69   Pulse: Eighty   Resp: Twelve   Temp: 98 3   SpO2: 99%       Body mass index is 35 26 kg/m²  Systolic (99KUK), BRS:212 , Min:103 , JF     Diastolic (31SDO), FEK:87, Min:59, Max:81      Intake/Output Summary (Last 24 hours) at 19 1215  Last data filed at 19 0800   Gross per 24 hour   Intake              400 ml   Output             3300 ml   Net            -2900 ml     Weight (last 2 days)     Date/Time   Weight    19 0600  108 (238 76)    18 0600  107 (235 89)    18 1356  107 (235 89)    18 0949  106 (234 13)              Telemetry Review: No significant arrhythmias seen on telemetry review  PHYSICAL EXAMS:  General:  Patient is not in acute distress, laying in the bed comfortably, awake, alert responding to commands  Head: Normocephalic, Atraumatic  HEENT: White sclera, pink conjunctiva,  PERRLA,pharynx benign  Neck:  Supple, no neck vein distention, carotids+2/+2 no bruits, thyromegaly, adenopathy  Respiratory: clear to P/A  Cardiovascular:  PMI normal, S1-S2 normal, No  Murmurs, thrills, gallops, rubs   Regular rhythm  GI:  Abdomen soft nontender   No hepatosplenomegaly, adenopathy, ascites,or rebound tenderness  Extremities: No edema, normal pulses, no calf tenderness, no joint deformities, no venous disease   Integument:  No skin rashes or ulceration  Lymphatic:  No cervical or inguinal lymphadenopathy  Neurologic:  Patient is awake alert, responding to command, well-oriented to time and place and person moving all extremities      LABORATORY RESULTS:    Results from last 7 days  Lab Units 18  1009   TROPONIN I ng/mL 0 34*     CBC with diff:   Results from last 7 days  Lab Units 01/01/19  0506 12/31/18  0417 12/30/18  1729 12/30/18  1009   WBC Thousand/uL 9 13 10 70* 11 26* 9 82   HEMOGLOBIN g/dL 14 1 13 8 13 7 15 4   HEMATOCRIT % 42 9 41 3 41 5 46 9   MCV fL 88 87 87 87   PLATELETS Thousands/uL 216 218 220 271   MCH pg 29 0 29 0 28 7 28 6   MCHC g/dL 32 9 33 4 33 0 32 8   RDW % 14 2 14 1 14 0 14 3   MPV fL 9 0 8 8* 8 9 9 1   NRBC AUTO /100 WBCs  --  0 0 0       CMP:  Results from last 7 days  Lab Units 01/01/19  0506 12/31/18  0417 12/30/18  1729 12/30/18  1009   POTASSIUM mmol/L 4 3 4 1 4 6 3 3*   CHLORIDE mmol/L 100 99* 99* 97*   CO2 mmol/L 24 27 26 27   BUN mg/dL 11 15 14 14   CREATININE mg/dL 1 10 1 16 1 12 1 37*   CALCIUM mg/dL 8 8 8 6 8 7 9 3   AST U/L  --   --   --  30   ALT U/L  --   --   --  30   ALK PHOS U/L  --   --   --  93   EGFR ml/min/1 73sq m 69 65 68 53       BMP:  Results from last 7 days  Lab Units 01/01/19  0506 12/31/18  0417 12/30/18  1729   POTASSIUM mmol/L 4 3 4 1 4 6   CHLORIDE mmol/L 100 99* 99*   CO2 mmol/L 24 27 26   BUN mg/dL 11 15 14   CREATININE mg/dL 1 10 1 16 1 12   CALCIUM mg/dL 8 8 8 6 8 7              Results from last 7 days  Lab Units 12/31/18  0417 12/30/18  1729   MAGNESIUM mg/dL 2 2 1 9       Results from last 7 days  Lab Units 12/30/18  1729   HEMOGLOBIN A1C % 6 1           Results from last 7 days  Lab Units 12/30/18  1010   INR  0 92       Lipid Profile:   No results found for: CHOL  Lab Results   Component Value Date    HDL 37 (L) 12/30/2018     Lab Results   Component Value Date    LDLCALC 97 12/30/2018     Lab Results   Component Value Date    TRIG 147 12/30/2018       Cardiac testing:   Results for orders placed during the hospital encounter of 12/30/18   Echo complete with contrast if indicated    Narrative [address] [place] [id number]  [phone]    Transthoracic Echocardiogram  2D, M-mode, Doppler, and Color Doppler    Study date:  31-Dec-2018    Patient: WANDA GÓMEZ  MR number: [id number]  Account number: [de-identified]  : 1950  Age: 80 years  Gender: Male  Status: Inpatient  Location: Bedside  Height: 69 in  Weight: 235 lb  BP: 160/ 89 mmHg    Indications: Evaluate suspected myocardial infarction  Diagnoses: I21 3 - ST elevation (STEMI) myocardial infarction of unspecified site    Sonographer:  Lesly Mark, [address] [facility]  Referring Physician:  Yasir Gomez PA-C  Group:  [facility]  Interpreting Physician:  Cristal Gunn MD    SUMMARY    LEFT VENTRICLE:  Systolic function was at the lower limits of normal  Ejection fraction was estimated to be 55 %  There was of the basal-mid inferoseptal, basal-mid inferior, and basal-mid inferolateral wall(s)  Features were consistent with a pseudonormal left ventricular filling pattern, with concomitant abnormal relaxation and increased filling pressure (grade 2 diastolic dysfunction)  LEFT ATRIUM:  The atrium was mildly dilated  RIGHT ATRIUM:  The atrium was mildly dilated  MITRAL VALVE:  There was mild regurgitation  HISTORY: PRIOR HISTORY: STEMI, Risk factors: a history of current cigarette use (within the last month)  PROCEDURE: The procedure was performed at the bedside  This was a routine study  The transthoracic approach was used  The study included complete 2D imaging, M-mode, complete spectral Doppler, and color Doppler  The heart rate was 62 bpm,  at the start of the study  Images were obtained from the parasternal, apical, subcostal, and suprasternal notch acoustic windows  Intravenous contrast (  6mL of Definity in NSS) was administered to opacify the left ventricle  Echocardiographic views were limited due to poor acoustic window availability, decreased penetration, and lung interference  This was a technically difficult study  LEFT VENTRICLE: Size was normal  Systolic function was at the lower limits of normal  Ejection fraction was estimated to be 55 %   There was of the basal-mid inferoseptal, basal-mid inferior, and basal-mid inferolateral wall(s)  Wall  thickness was normal  DOPPLER: Features were consistent with a pseudonormal left ventricular filling pattern, with concomitant abnormal relaxation and increased filling pressure (grade 2 diastolic dysfunction)  RIGHT VENTRICLE: The size was normal  Systolic function was normal  Wall thickness was normal     LEFT ATRIUM: The atrium was mildly dilated  RIGHT ATRIUM: The atrium was mildly dilated  MITRAL VALVE: Valve structure was normal  There was normal leaflet separation  DOPPLER: The transmitral velocity was within the normal range  There was no evidence for stenosis  There was mild regurgitation  AORTIC VALVE: The valve was not well visualized  TRICUSPID VALVE: The valve structure was normal  There was normal leaflet separation  DOPPLER: The transtricuspid velocity was within the normal range  There was no evidence for stenosis  There was trace regurgitation  PULMONIC VALVE: Not well visualized  PERICARDIUM: There was no pericardial effusion  AORTA: Not well visualized  SYSTEMIC VEINS: IVC: The inferior vena cava was normal in size and course  Respirophasic changes were normal     SYSTEM MEASUREMENT TABLES    2D  LA Area: 18 7 cm2  LVEDV MOD A4C: 119 9 ml  LVEF MOD A4C: 62 9 %  LVESV MOD A4C: 44 5 ml  LVLd A4C: 8 8 cm  LVLs A4C: 7 5 cm  RA Area: 19 2 cm2  RVIDd: 3 5 cm  SV MOD A4C: 75 4 ml    MM  TAPSE: 2 3 cm    PW  E': 0 1 m/s  E/E': 10 2  MV A Romulo: 0 6 m/s  MV Dec Ellis: 3 m/s2  MV DecT: 209 6 ms  MV E Romulo: 0 6 m/s  MV E/A Ratio: 1 1  MV PHT: 60 8 ms  MVA By PHT: 3 6 cm2    [employer] Accredited Echocardiography Laboratory    Prepared and electronically signed by    Abeba Warren MD  Signed 31-Dec-2018 17:56:22       No results found for this or any previous visit  No results found for this or any previous visit  No procedure found    No results found for this or any previous visit  Meds/Allergies   all current active meds have been reviewed  No prescriptions prior to admission  Assessment/Plan:  1  Inferior wall STEMI status post RCA PCI with drug-eluting stent  Asymptomatic  Tolerating ACE-inhibitor and beta-blocker valve  On high intensity statin  On aspirin Brilinta  Echocardiogram showed normal LVEF with some regional wall motion abnormalities  2  Nonsustained VT, no recurrence last 24 hr  On beta-blocker  Asymptomatic    3  Tobacco use, smoking cessation counseling was given to the patient    Patient is stable to be discharged today  Follow-up with cardiology in 1-2 weeks    Counseling / Coordination of Care  Total floor / unit time spent today 25 minutes  Greater than 50% of total time was spent with the patient and / or family counseling and / or coordination of care  ** Please Note: Dragon 360 Dictation voice to text software may have been used in the creation of this document   **

## 2019-01-01 NOTE — DISCHARGE SUMMARY
Discharge Summary - Dunia Martel 80 y o  male MRN: [id number]  Unit/Bed#:  Encounter: [id number]    Admission Date:    12/30/2018   Discharge Date:   01/01/19   Admitting Diagnosis:   Chest pain [R07 9]  Chest pressure [R07 89]  Acute ST elevation myocardial infarction (STEMI) of inferior wall ([address] ) [I21 19]  Admitting Provider:   Mariza Aviles MD  Discharge Provider:   Ahsan Uriostegui MD     Primary Care Physician at Discharge:   No primary care provider on file  ,None    HPI:   15-year-old male who presented to the emergency department with complain of chest pain  For a detailed HPI please refer to the admission note  Procedures Performed:   Orders Placed This Encounter   Procedures    Cardiac catheterization    ED ECG Documentation Only    ED ECG Documentation Only       Hospital Course:   Patient was found to have ST elevation MI and was taken to cardiac catheterization  Patient was given aspirin, heparin and Brilinta prior to catheterization lab  In catheterization lab patient was found to have 100% proximal RCA occlusion and a drug-eluting stent was placed successfully  Patient was started on beta-blocker, Zestril and Cardiology was consulted  Patient had an echocardiogram done which showed ejection fraction 55%  Cardiologist recommended to discharge patient home on baby aspirin, Brilinta, beta-blocker and ACE-inhibitor  Patient is completely chest pain free and wants to go home  Patient is ambulating freely without any chest pain or any other discomfort  Patient is hemodynamically stable for discharge  Smoking cessation counseling given at length  Follow-up with PCP in 1 week  Follow-up with Cardiology is in 1-2 weeks  No strenuous exercise until seen by cardiologist  Importance of Compliance with medications advised at length  Return to ER with any worsening chest pain, palpitation, dizziness, shortness of breath or any alarming symptoms      Consulting Providers   Cardiology, Critical Care    Complications:  None    Labs:   Lab Results   Component Value Date    WBC 9 13 01/01/2019    RBC 4 86 01/01/2019    HGB 14 1 01/01/2019    HCT 42 9 01/01/2019    MCV 88 01/01/2019    MCH 29 0 01/01/2019    RDW 14 2 01/01/2019     01/01/2019     Lab Results   Component Value Date    CREATININE 1 10 01/01/2019    BUN 11 01/01/2019    K 4 3 01/01/2019     01/01/2019    CO2 24 01/01/2019    ALKPHOS 93 12/30/2018    ALT 30 12/30/2018    AST 30 12/30/2018       Treatments:  Cardiac catheterization and stent placement, aspirin, Lipitor, Brilinta, Zestril, Lopressor and nicotine patch    Discharge Diagnosis:   Principal Problem:    ST elevation myocardial infarction involving right coronary artery ([facility] [address] )  Active Problems:    Tobacco abuse    History of cancer tonsil  Resolved Problems:    * No resolved hospital problems  *      Condition at Discharge:   Good     Code Status: Level 1 - Full Code  Advance Directive and Living Will: <no information>  Power of :    POLST:      Discharge instructions/Information to patient and family:   See after visit summary for information provided to patient and family  Provisions for Follow-Up Care:  See after visit summary for information related to follow-up care and any pertinent home health orders  Disposition:   Home    Planned Readmission:   No    Discharge Statement   I spent 35 minutes discharging the patient  This time was spent on the day of discharge  I had direct contact with the patient on the day of discharge Greater than 50% of the total time was spent examining patient, answering all patient questions, arranging and discussing plan of care with patient as well as directly providing post-discharge instructions  Additional time then spent on discharge activities  Discharge Medications:  See after visit summary for reconciled discharge medications provided to patient and family        "This note has been constructed using a voice recognition system"    Luzma Terrell MD  1/1/2019,11:31 AM

## 2019-01-01 NOTE — DISCHARGE INSTRUCTIONS
Follow-up with PCP in 1 week  Follow-up with Cardiology is in 1-2 weeks  No strenuous exercise until seen by cardiologist  Importance of Compliance with medications advised at length  Return to ER with any worsening chest pain, palpitation, dizziness, shortness of breath or any alarming symptoms

## 2019-01-02 NOTE — UTILIZATION REVIEW
Mikala Hoffmann RN Registered Nurse Signed   Utilization Review Date of Service: 12/31/2018  8:57 AM         []Hide copied text  [address] Utilization Review Department  Phone: [phone]; Fax [phone]  [email or web address]  ATTENTION: Please call with any questions or concerns to [phone]  and carefully listen to the prompts so that you are directed to the right person  Send all requests for admission clinical reviews, approved or denied determinations and any other requests to fax [phone]  All voicemails are confidential      Initial Clinical Review     Admission: Date/Time/Statement: WAS OUTPATIENT NO CHARGE BED 12/30/18 @1438 CONVERTED TO INPATIENT ADMISSION  12/31/18 @ 3724 DUE TO ACUTE STEMI REQUIRING EMERGENT CARDIAC CATH WITH KATIANA PLACED % RCA OCCLUSION  ADMITTED TO ICU             Orders Placed This Encounter   Procedures    Inpatient Admission       Standing Status:   Standing       Number of Occurrences:   1       Order Specific Question:   Admitting Physician       Answer:   Sharyon Romberg [[id number]]       Order Specific Question:   Level of Care       Answer:   Level 1 Stepdown [13]       Order Specific Question:   Estimated length of stay       Answer:   More than 2 Midnights       Order Specific Question:   Certification       Answer:   I certify that inpatient services are medically necessary for this patient for a duration of greater than two midnights   See H&P and MD Progress Notes for additional information about the patient's course of treatment                 ED Arrival Information      Expected Arrival Acuity Means of Arrival Escorted By Service Admission Type     - 12/30/2018 09:43 Emergent Walk-In Family Member Critical Care/ICU Emergency     Arrival Complaint     Chest pain               Chief Complaint   Patient presents with    Chest Pain       midsternal chest pain onset today, +sob, 324mg of asa at home       History of Illness: 79 yo m to ED from home due to chest pain x 1 hr  Central, diffuse, assoc diaphoresis with BUE numbness & tingling  EKG showed ST elevations in II, III, avF with reciprocal changes  R sided EKG showed ST elevations III, IV, V, VI  STEMI alert was called  Asa, heparin, brilinta given, emergently taken to cath lab  100% prox RCA found   KATIANA placed       ED Vital Signs:            ED Triage Vitals   Temperature Pulse Respirations Blood Pressure SpO2   12/30/18 1029 12/30/18 0949 12/30/18 0949 12/30/18 0949 12/30/18 0949   97 5 °F (36 4 °C) 73 18 168/77 98 %       Temp Source Heart Rate Source Patient Position - Orthostatic VS BP Location FiO2 (%)   12/30/18 2300 12/30/18 0949 12/30/18 2300 12/30/18 0949 --   Oral Monitor Lying Right arm         Pain Score           12/30/18 0949           5                Wt Readings from Last 1 Encounters:   12/31/18 107 kg (235 lb 14 3 oz)         Vital Signs (abnormal):     RR 25, 26    151/101     Abnormal Labs/Diagnostic Test Results:   , 132, 134   CL 97, 99    CREAT 1 37    GLUC 153  t prot 8 6  Trop  34     28   Wbc 11 26, 10 7  ptt 22  , 254  EKG1: Sinus rhythm with occasional Premature ventricular complexes  Inferior infarct , possibly acute  Lateral injury pattern  ** ** ACUTE MI / STEMI ** **  Consider right ventricular involvement in acute inferior infarct     EKG2: Sinus rhythm with occasional Premature ventricular complexes  Inferior infarct (cited on or before 30-DEC-2018)  Anterolateral infarct , new  ** ** ACUTE MI / STEMI ** **  Consider right ventricular involvement in acute inferior infarct     CXR: nothing acute        ED Treatment:             Medication Administration from 12/30/2018 0943 to 12/30/2018 1335        Date/Time Order Dose Route Action       12/30/2018 1008 morphine (PF) 10 mg/mL injection 6 mg 6 mg Intravenous Given       12/30/2018 1151 sodium chloride 0 9 % bolus 500 mL 500 mL Intravenous Continued from OR       12/30/2018 1014 sodium chloride 0 9 % bolus 500 mL 500 mL Intravenous New Bag       12/30/2018 1008 heparin (porcine) injection 4,000 Units 4,000 Units Intravenous Given       12/30/2018 1014 ticagrelor (BRILINTA) tablet 180 mg 180 mg Oral Given       12/30/2018 1020 HYDROmorphone (DILAUDID) injection 1 mg 1 mg Intravenous Given       12/30/2018 1026 aspirin chewable tablet 243 mg 243 mg Oral Given       12/30/2018 1045 lidocaine (XYLOCAINE) 1 % injection 2 mL Infiltration Given       12/30/2018 1045 nitroGLYcerin (TRIDIL) 50 mg in 250 mL infusion (premix) 200 mcg Intra-arterial Given       12/30/2018 1046 verapamil (ISOPTIN) injection 2 5 mg Intra-arterial Given       12/30/2018 1050 heparin (porcine) injection 8,000 Units Intravenous Given       12/30/2018 1055 atropine 1 mg/10 mL injection 0 5 mg Intravenous Given       12/30/2018 1301 nicotine (NICODERM CQ) 21 mg/24 hr TD 24 hr patch 21 mg 21 mg Transdermal Medication Applied             Past Medical/Surgical History: tonsillary ca, smoker        Admitting Diagnosis: Chest pain [R07 9]  Chest pressure [R07 89]  Acute ST elevation myocardial infarction (STEMI) of inferior wall ([facility]) [I21 19]     Age/Sex: 80 y o  male     Assessment/Plan: 81 yo m to ED from home admitted as inpatient to ICU due to acute STEMI  C/o Central, diffuse chest pain x 1 hr pta, assoc diaphoresis with BUE numbness & tingling  EKG showed ST elevations in II, III, avF with reciprocal changes  R sided EKG showed ST elevations III, IV, V, VI  STEMI alert was called  Asa, heparin, brilinta given, emergently taken to cath lab  100% prox RCA found  KATIANA placed  9 beat run v tach on tele   IV mg given          Admission Orders:  Scheduled Meds:   Current Facility-Administered Medications:  acetaminophen 650 mg Oral Q4H PRN   aspirin 81 mg Oral Daily   atorvastatin 80 mg Oral QPM   chlorhexidine 15 mL Swish & Spit Q12H VISHAL   enoxaparin 40 mg Subcutaneous Q24H VISHAL   metoprolol tartrate 25 mg Oral Q12H [facility] & long-term   morphine injection 6 mg Intravenous Q4H PRN   multi-electrolyte 75 mL/hr Intravenous Continuous   nicotine 21 mg Transdermal Daily   senna 1 tablet Oral HS   ticagrelor 90 mg Oral BID   IV mg x 2  IV dilaudid x 1  Cath lab meds: IV atropine x 1, IV heparin x 1, IV lidocaine x 1, IV morphine x 1, tridil gtt, IV verapamil x 1  Neuro checks q4h  I/O q2h  Daily wt, daily ICU assessment  Dysphagia eval  Fall prec  Turn q2h  scd's  Tele  Post cath care  Cons cardio  Cardiac diet  Cons nutrition  Cons case mgmt  Echo  EKG prn chest pain

## 2019-01-03 NOTE — UTILIZATION REVIEW
Notification of Discharge  This is a Notification of Discharge from our facility [address]  Please be advised that this patient has been discharge from our facility  Below you will find the admission and discharge date and time including the patients disposition  PRESENTATION DATE: 12/30/2018  9:45 AM  IP ADMISSION DATE: 12/31/18 0857  DISCHARGE DATE: 1/1/2019  1:46 PM  DISPOSITION: [facility] Utilization Review Department  Phone: [phone]; Fax [phone]  ATTENTION: Please call with any questions or concerns to [phone]  and carefully listen to the prompts so that you are directed to the right person  Send all requests for admission clinical reviews, approved or denied determinations and any other requests to fax [phone]   All voicemails are confidential

## 2019-01-25 ENCOUNTER — OFFICE VISIT (OUTPATIENT)
Dept: CARDIOLOGY CLINIC | Facility: CLINIC | Age: 69
End: 2019-01-25
Payer: COMMERCIAL

## 2019-01-25 VITALS
BODY MASS INDEX: 34.66 KG/M2 | OXYGEN SATURATION: 98 % | HEART RATE: 77 BPM | SYSTOLIC BLOOD PRESSURE: 118 MMHG | DIASTOLIC BLOOD PRESSURE: 68 MMHG | WEIGHT: 234 LBS | HEIGHT: 69 IN

## 2019-01-25 DIAGNOSIS — E78.2 MIXED HYPERLIPIDEMIA: ICD-10-CM

## 2019-01-25 DIAGNOSIS — Z72.0 TOBACCO ABUSE: Primary | ICD-10-CM

## 2019-01-25 DIAGNOSIS — Z95.5 S/P RIGHT CORONARY ARTERY (RCA) STENT PLACEMENT: ICD-10-CM

## 2019-01-25 DIAGNOSIS — I21.11 ST ELEVATION MYOCARDIAL INFARCTION INVOLVING RIGHT CORONARY ARTERY (HCC): ICD-10-CM

## 2019-01-25 PROCEDURE — 99214 OFFICE O/P EST MOD 30 MIN: CPT | Performed by: INTERNAL MEDICINE

## 2019-01-25 RX ORDER — LISINOPRIL 5 MG/1
5 TABLET ORAL DAILY
Qty: 90 TABLET | Refills: 3 | Status: SHIPPED | OUTPATIENT
Start: 2019-01-25 | End: 2020-01-10 | Stop reason: SDUPTHER

## 2019-01-25 RX ORDER — ATORVASTATIN CALCIUM 10 MG/1
80 TABLET, FILM COATED ORAL DAILY
Qty: 30 TABLET | Refills: 11 | Status: SHIPPED | OUTPATIENT
Start: 2019-01-25 | End: 2019-01-25 | Stop reason: SDUPTHER

## 2019-01-25 RX ORDER — ATORVASTATIN CALCIUM 10 MG/1
10 TABLET, FILM COATED ORAL DAILY
Qty: 90 TABLET | Refills: 3 | Status: SHIPPED | OUTPATIENT
Start: 2019-01-25 | End: 2020-01-10 | Stop reason: SDUPTHER

## 2019-01-25 NOTE — PROGRESS NOTES
Cardiology Consultation     Juan Cope  [id number]  1950  [address] [address] [place] [address]    C/c:  Hospital follow-up for inferior wall MI    HPI:  72-YEAR-OLD MALE WITH PAST MEDICAL HISTORY of smoking was recently admitted to the hospital with inferior wall MI  Patient has successful PCI of RCA  He tolerated Ace and beta-blocker in the hospital   He did have episode of nonsustained VT while the hospital immediately after MI  Is currently beta-blocker and denies having any dizziness or syncope  He denies having any exertional chest pain or shortness of breath  Unfortunately patient continues to smoke  Patient complains of stomach upset with high-dose statins hence he stopped taking it    Patient Active Problem List   Diagnosis    ST elevation myocardial infarction involving right coronary artery ([facility] [address] )    Tobacco abuse    History of cancer tonsil     History reviewed  No pertinent past medical history  Social History     Social History    Marital status: /Civil Union     Spouse name: N/A    Number of children: N/A    Years of education: N/A     Occupational History    Not on file  Social History Main Topics    Smoking status: Current Some Day Smoker     Packs/day: 2 00    Smokeless tobacco: Never Used    Alcohol use No    Drug use: No    Sexual activity: Not on file     Other Topics Concern    Not on file     Social History Narrative    No narrative on file      History reviewed  No pertinent family history    Past Surgical History:   Procedure Laterality Date    KNEE SURGERY         Current Outpatient Prescriptions:     aspirin 81 mg chewable tablet, Chew 1 tablet (81 mg total) daily for 30 days, Disp: 30 tablet, Rfl: 0    lisinopril (ZESTRIL) 5 mg tablet, Take 1 tablet (5 mg total) by mouth daily, Disp: 30 tablet, Rfl: 0    metoprolol tartrate (LOPRESSOR) 25 mg tablet, Take 1 tablet (25 mg total) by mouth every 12 (twelve) hours, Disp: 60 tablet, Rfl: 0    ticagrelor (BRILINTA) 90 MG, Take 1 tablet (90 mg total) by mouth 2 (two) times a day for 30 days, Disp: 60 tablet, Rfl: 0    atorvastatin (LIPITOR) 80 mg tablet, Take 1 tablet (80 mg total) by mouth every evening for 30 days (Patient not taking: Reported on 1/25/2019 ), Disp: 30 tablet, Rfl: 0    nicotine (NICODERM CQ) 21 mg/24 hr TD 24 hr patch, Place 1 patch on the skin daily (Patient not taking: Reported on 1/25/2019 ), Disp: 28 patch, Rfl: 0  No Known Allergies  Vitals:    01/25/19 1324   BP: 118/68   BP Location: Left arm   Patient Position: Sitting   Cuff Size: Adult   Pulse: 77   SpO2: 98%   Weight: 106 kg (234 lb)   Height: 5' 9" (1 753 m)       Labs:  Admission on 12/30/2018, Discharged on 01/01/2019   Component Date Value    Sodium 12/30/2018 134*    Potassium 12/30/2018 3 3*    Chloride 12/30/2018 97*    CO2 12/30/2018 27     ANION GAP 12/30/2018 10     BUN 12/30/2018 14     Creatinine 12/30/2018 1 37*    Glucose 12/30/2018 159*    Calcium 12/30/2018 9 3     AST 12/30/2018 30     ALT 12/30/2018 30     Alkaline Phosphatase 12/30/2018 93     Total Protein 12/30/2018 8 6*    Albumin 12/30/2018 4 2     Total Bilirubin 12/30/2018 0 40     eGFR 12/30/2018 53     WBC 12/30/2018 9 82     RBC 12/30/2018 5 39     Hemoglobin 12/30/2018 15 4     Hematocrit 12/30/2018 46 9     MCV 12/30/2018 87     MCH 12/30/2018 28 6     MCHC 12/30/2018 32 8     RDW 12/30/2018 14 3     MPV 12/30/2018 9 1     Platelets 72/75/3548 271     nRBC 12/30/2018 0     Neutrophils Relative 12/30/2018 61     Immat GRANS % 12/30/2018 0     Lymphocytes Relative 12/30/2018 27     Monocytes Relative 12/30/2018 7     Eosinophils Relative 12/30/2018 4     Basophils Relative 12/30/2018 1     Neutrophils Absolute 12/30/2018 6 02     Immature Grans Absolute 12/30/2018 0 02     Lymphocytes Absolute 12/30/2018 2 63     Monocytes Absolute 12/30/2018 0 65     Eosinophils Absolute 12/30/2018 0 41     Basophils Absolute 12/30/2018 0 09     Troponin I 12/30/2018 0 34*    Protime 12/30/2018 12 3     INR 12/30/2018 0 92     PTT 12/30/2018 22*    POC Troponin I 12/30/2018 0 28*    Specimen Type 12/30/2018 VENOUS     POC Glucose 12/30/2018 153*    Activated Clotting Time,* 12/30/2018 166*    Specimen Type 12/30/2018 ARTERIAL     Activated Clotting Time,* 12/30/2018 254*    Specimen Type 12/30/2018 ARTERIAL     Ventricular Rate 12/30/2018 87     Atrial Rate 12/30/2018 87     ID Interval 12/30/2018 192     QRSD Interval 12/30/2018 106     QT Interval 12/30/2018 390     QTC Interval 12/30/2018 469     P Axis 12/30/2018 70     QRS Axis 12/30/2018 74     T Wave Axis 12/30/2018 96     Ventricular Rate 12/30/2018 73     Atrial Rate 12/30/2018 73     ID Interval 12/30/2018 176     QRSD Interval 12/30/2018 100     QT Interval 12/30/2018 398     QTC Interval 12/30/2018 438     P Axis 12/30/2018 62     QRS Axis 12/30/2018 80     T Wave Axis 12/30/2018 99     PTT 12/30/2018 29     Sodium 12/30/2018 132*    Potassium 12/30/2018 4 6     Chloride 12/30/2018 99*    CO2 12/30/2018 26     ANION GAP 12/30/2018 7     BUN 12/30/2018 14     Creatinine 12/30/2018 1 12     Glucose 12/30/2018 110     Calcium 12/30/2018 8 7     eGFR 12/30/2018 68     Magnesium 12/30/2018 1 9     WBC 12/30/2018 11 26*    RBC 12/30/2018 4 78     Hemoglobin 12/30/2018 13 7     Hematocrit 12/30/2018 41 5     MCV 12/30/2018 87     MCH 12/30/2018 28 7     MCHC 12/30/2018 33 0     RDW 12/30/2018 14 0     MPV 12/30/2018 8 9     Platelets 60/46/7710 220     nRBC 12/30/2018 0     Neutrophils Relative 12/30/2018 75     Immat GRANS % 12/30/2018 0     Lymphocytes Relative 12/30/2018 18     Monocytes Relative 12/30/2018 6     Eosinophils Relative 12/30/2018 1     Basophils Relative 12/30/2018 0     Neutrophils Absolute 12/30/2018 8 41*    Immature Grans Absolute 12/30/2018 0 03     Lymphocytes Absolute 12/30/2018 2 02     Monocytes Absolute 12/30/2018 0 64     Eosinophils Absolute 12/30/2018 0 12     Basophils Absolute 12/30/2018 0 04     Cholesterol 12/30/2018 163     Triglycerides 12/30/2018 147     HDL, Direct 12/30/2018 37*    LDL Calculated 12/30/2018 97     Non-HDL-Chol (CHOL-HDL) 12/30/2018 126     Hemoglobin A1C 12/30/2018 6 1     EAG 12/30/2018 128     Hepatitis B Surface Ag 12/30/2018 Non-reactive     Hepatitis C Ab 12/30/2018 Non-reactive     Hep B C IgM 12/30/2018 Non-reactive     Hep B Core Total Ab 12/30/2018 Non-reactive     WBC 12/31/2018 10 70*    RBC 12/31/2018 4 76     Hemoglobin 12/31/2018 13 8     Hematocrit 12/31/2018 41 3     MCV 12/31/2018 87     MCH 12/31/2018 29 0     MCHC 12/31/2018 33 4     RDW 12/31/2018 14 1     MPV 12/31/2018 8 8*    Platelets 60/38/5714 218     nRBC 12/31/2018 0     Neutrophils Relative 12/31/2018 74     Immat GRANS % 12/31/2018 0     Lymphocytes Relative 12/31/2018 16     Monocytes Relative 12/31/2018 7     Eosinophils Relative 12/31/2018 2     Basophils Relative 12/31/2018 1     Neutrophils Absolute 12/31/2018 8 03*    Immature Grans Absolute 12/31/2018 0 03     Lymphocytes Absolute 12/31/2018 1 72     Monocytes Absolute 12/31/2018 0 71     Eosinophils Absolute 12/31/2018 0 16     Basophils Absolute 12/31/2018 0 05     Sodium 12/31/2018 134*    Potassium 12/31/2018 4 1     Chloride 12/31/2018 99*    CO2 12/31/2018 27     ANION GAP 12/31/2018 8     BUN 12/31/2018 15     Creatinine 12/31/2018 1 16     Glucose 12/31/2018 118     Glucose, Fasting 12/31/2018 118*    Calcium 12/31/2018 8 6     eGFR 12/31/2018 65     Magnesium 12/31/2018 2 2     WBC 01/01/2019 9 13     RBC 01/01/2019 4 86     Hemoglobin 01/01/2019 14 1     Hematocrit 01/01/2019 42 9     MCV 01/01/2019 88     MCH 01/01/2019 29 0     MCHC 01/01/2019 32 9  RDW 01/01/2019 14 2     Platelets 14/71/1098 216     MPV 01/01/2019 9 0     Sodium 01/01/2019 133*    Potassium 01/01/2019 4 3     Chloride 01/01/2019 100     CO2 01/01/2019 24     ANION GAP 01/01/2019 9     BUN 01/01/2019 11     Creatinine 01/01/2019 1 10     Glucose 01/01/2019 99     Calcium 01/01/2019 8 8     eGFR 01/01/2019 69      Imaging: Xr Chest 1 View Portable    Result Date: 12/30/2018  Narrative: CHEST INDICATION:   Midsternal chest pain  Shortness of breath  COMPARISON:  None EXAM PERFORMED/VIEWS:  XR CHEST PORTABLE FINDINGS: Cardiomediastinal silhouette appears unremarkable  The lungs are clear  No pneumothorax or pleural effusion  Osseous structures appear within normal limits for patient age  Impression: No acute cardiopulmonary disease  Workstation performed: [id number]       Review of Systems:  Review of Systems   Constitutional: Negative for diaphoresis and fatigue  HENT: Negative for congestion and facial swelling  Eyes: Negative for photophobia and visual disturbance  Respiratory: Negative for chest tightness and shortness of breath  Cardiovascular: Negative for chest pain, palpitations and leg swelling  Gastrointestinal: Negative for abdominal pain and blood in stool  Endocrine: Negative for cold intolerance and heat intolerance  Musculoskeletal: Negative for arthralgias and myalgias  Skin: Negative for pallor and rash  Neurological: Negative for dizziness and syncope  Physical Exam:  Physical Exam   Constitutional: He is oriented to person, place, and time  He appears well-developed and well-nourished  HENT:   Head: Normocephalic and atraumatic  Eyes: Pupils are equal, round, and reactive to light  Conjunctivae and EOM are normal    Neck: Normal range of motion  Neck supple  No JVD present  No thyromegaly present  Cardiovascular: Normal rate, regular rhythm, normal heart sounds and intact distal pulses    Exam reveals no gallop and no friction rub     No murmur heard  Pulmonary/Chest: Effort normal and breath sounds normal  No respiratory distress  He has no wheezes  He has no rales  Abdominal: Soft  Bowel sounds are normal  He exhibits no distension  There is no tenderness  Musculoskeletal: Normal range of motion  He exhibits no edema  Neurological: He is alert and oriented to person, place, and time  He has normal reflexes  Skin: Skin is warm and dry  Psychiatric: He has a normal mood and affect  Discussion/Summary:  1  Inferior wall STEMI status post RCA PCI with drug-eluting stent  Asymptomatic  ACE-inhibitor and beta-blocker valve  On high intensity statin  On aspirin and Brilinta   I will reduce the dose of Lipitor to 10 mg as he had side effects with high-dose  I will check carotid Doppler to rule out significant carotid artery disease    2  Nonsustained VT, post MI  Denies having any dizziness or syncope  On beta-blocker  Asymptomatic     3   Tobacco use, smoking cessation counseling was given to the patient     Patient is stable to be discharged today  Follow-up in 6 months

## 2019-03-19 DIAGNOSIS — I21.11 ST ELEVATION MYOCARDIAL INFARCTION INVOLVING RIGHT CORONARY ARTERY (HCC): ICD-10-CM

## 2019-08-07 NOTE — PROGRESS NOTES
Cardiology Follow Up    [facility] BOONE  1950  [id number]  [address]  [facility]  [address]  [phone]  [phone]    1  Essential (primary) hypertension     2  Pure hypercholesterolemia     3  Coronary arteriosclerosis     4  ST elevation myocardial infarction involving right coronary artery ([address] )     5  Tobacco abuse     6  S/P right coronary artery (RCA) stent placement         Interval History:  Patient is here for ongoing cardiac follow-up  His cardiologist has left the area  Patient does have a history of an inferior wall myocardial infarction with placement of a stent in the right coronary vessel December 30, 2018  The other vessels at that time had no significant disease  An echocardiogram done in December of last year demonstrated an ejection fraction of 55% with inferior wall motion abnormality  He was noted to have mild biatrial cavity enlargement and no significant valvular heart disease  Patient is a chronic tobacco user  He is on dual anti-platelet therapy and statin  Patient had a lipid profile done December of last year which demonstrated a total cholesterol of 163 with an HDL of 37 and a direct LDL of 97  I will check a follow-up on statin therapy  I did discuss with him the in advisability of tobacco use  He is a  and commutes about an hour to work in the morning  He has some knee discomfort and takes rare Aleve  He has no primary care physician and I encouraged him to get one  He has had some superficial bleeding from his nose but I advised that he needs to continue his dual anti-platelet therapy  I advised a septic pencil      Patient Active Problem List   Diagnosis    ST elevation myocardial infarction involving right coronary artery (HCC)    Tobacco abuse    History of cancer tonsil    S/P right coronary artery (RCA) stent placement    Mixed hyperlipidemia No past medical history on file  Social History     Socioeconomic History    Marital status: /Civil Union     Spouse name: Not on file    Number of children: Not on file    Years of education: Not on file    Highest education level: Not on file   Occupational History    Not on file   Social Needs    Financial resource strain: Not on file    Food insecurity:     Worry: Not on file     Inability: Not on file    Transportation needs:     Medical: Not on file     Non-medical: Not on file   Tobacco Use    Smoking status: Current Some Day Smoker     Packs/day: 2 00    Smokeless tobacco: Never Used   Substance and Sexual Activity    Alcohol use: No    Drug use: No    Sexual activity: Not on file   Lifestyle    Physical activity:     Days per week: Not on file     Minutes per session: Not on file    Stress: Not on file   Relationships    Social connections:     Talks on phone: Not on file     Gets together: Not on file     Attends [employer] service: Not on file     Active member of club or organization: Not on file     Attends meetings of clubs or organizations: Not on file     Relationship status: Not on file    Intimate partner violence:     Fear of current or ex partner: Not on file     Emotionally abused: Not on file     Physically abused: Not on file     Forced sexual activity: Not on file   Other Topics Concern    Not on file   Social History Narrative    Not on file      No family history on file    Past Surgical History:   Procedure Laterality Date    KNEE SURGERY         Current Outpatient Medications:     aspirin 81 mg chewable tablet, Chew 1 tablet (81 mg total) daily for 30 days, Disp: 30 tablet, Rfl: 0    atorvastatin (LIPITOR) 10 mg tablet, Take 1 tablet (10 mg total) by mouth daily for 30 days, Disp: 90 tablet, Rfl: 3    lisinopril (ZESTRIL) 5 mg tablet, Take 1 tablet (5 mg total) by mouth daily, Disp: 90 tablet, Rfl: 3    metoprolol tartrate (LOPRESSOR) 25 mg tablet, Take 1 tablet (25 mg total) by mouth every 12 (twelve) hours, Disp: 180 tablet, Rfl: 3    ticagrelor (BRILINTA) 90 MG, Take 1 tablet (90 mg total) by mouth 2 (two) times a day for 30 days, Disp: 60 tablet, Rfl: 11  No Known Allergies    Labs:not applicable  Imaging: No results found  Review of Systems:  Review of Systems   All other systems reviewed and are negative  Physical Exam:  /80 (BP Location: Right arm, Patient Position: Sitting, Cuff Size: Standard)   Pulse 66   Ht 5' 9" (1 753 m)   Wt 110 kg (242 lb)   BMI 35 74 kg/m²   Physical Exam   Constitutional: He is oriented to person, place, and time  He appears well-developed and well-nourished  HENT:   Head: Normocephalic and atraumatic  Eyes: Pupils are equal, round, and reactive to light  Conjunctivae are normal    Neck: Normal range of motion  Neck supple  Cardiovascular: Normal rate and normal heart sounds  Pulmonary/Chest: Effort normal and breath sounds normal    Neurological: He is alert and oriented to person, place, and time  Skin: Skin is warm and dry  Psychiatric: He has a normal mood and affect  Vitals reviewed  Discussion/Summary:I will continue the patient's current medical regimen  The patient appears well compensated  I have asked the patient to call if there is a problem in the interim otherwise I will see the patient in six months time  The patient is due for an echo prior to the next visit to assess LV wall thickness and systolic function  Will also check blood work in reference to his lipid profile

## 2019-08-12 ENCOUNTER — OFFICE VISIT (OUTPATIENT)
Dept: CARDIOLOGY CLINIC | Facility: CLINIC | Age: 69
End: 2019-08-12
Payer: COMMERCIAL

## 2019-08-12 VITALS
WEIGHT: 242 LBS | DIASTOLIC BLOOD PRESSURE: 80 MMHG | BODY MASS INDEX: 35.84 KG/M2 | HEIGHT: 69 IN | SYSTOLIC BLOOD PRESSURE: 122 MMHG | HEART RATE: 66 BPM

## 2019-08-12 DIAGNOSIS — I10 ESSENTIAL (PRIMARY) HYPERTENSION: Primary | ICD-10-CM

## 2019-08-12 DIAGNOSIS — Z95.5 S/P RIGHT CORONARY ARTERY (RCA) STENT PLACEMENT: ICD-10-CM

## 2019-08-12 DIAGNOSIS — I25.10 CORONARY ARTERIOSCLEROSIS: ICD-10-CM

## 2019-08-12 DIAGNOSIS — Z72.0 TOBACCO ABUSE: ICD-10-CM

## 2019-08-12 DIAGNOSIS — I21.11 ST ELEVATION MYOCARDIAL INFARCTION INVOLVING RIGHT CORONARY ARTERY (HCC): ICD-10-CM

## 2019-08-12 DIAGNOSIS — E78.00 PURE HYPERCHOLESTEROLEMIA: ICD-10-CM

## 2019-08-12 PROCEDURE — 99214 OFFICE O/P EST MOD 30 MIN: CPT | Performed by: INTERNAL MEDICINE

## 2019-08-12 NOTE — PATIENT INSTRUCTIONS
I will continue the patient's current medical regimen  The patient appears well compensated  I have asked the patient to call if there is a problem in the interim otherwise I will see the patient in six months time  The patient is due for an echo prior to the next visit to assess LV wall thickness and systolic function  I have also ordered blood work in reference to the cholesterol

## 2019-11-26 ENCOUNTER — TELEPHONE (OUTPATIENT)
Dept: CARDIOLOGY CLINIC | Facility: CLINIC | Age: 69
End: 2019-11-26

## 2019-11-26 NOTE — TELEPHONE ENCOUNTER
Pt due for f/u in feb  Currently on DAPT of brilinta and asa after stents placed in Dec/2018  Evan Olmstead said the brilinta script expires at the end of December  He asked if must continue and get a new script or if it can be stopped when he finishes taking the last pill on 12/27  Please advise

## 2019-12-17 ENCOUNTER — APPOINTMENT (OUTPATIENT)
Dept: LAB | Facility: CLINIC | Age: 69
End: 2019-12-17
Payer: COMMERCIAL

## 2019-12-17 DIAGNOSIS — I10 ESSENTIAL (PRIMARY) HYPERTENSION: ICD-10-CM

## 2019-12-17 DIAGNOSIS — I25.10 CORONARY ARTERIOSCLEROSIS: ICD-10-CM

## 2019-12-17 DIAGNOSIS — I21.11 ST ELEVATION MYOCARDIAL INFARCTION INVOLVING RIGHT CORONARY ARTERY (HCC): ICD-10-CM

## 2019-12-17 DIAGNOSIS — Z72.0 TOBACCO ABUSE: ICD-10-CM

## 2019-12-17 DIAGNOSIS — E78.00 PURE HYPERCHOLESTEROLEMIA: ICD-10-CM

## 2019-12-17 DIAGNOSIS — Z95.5 S/P RIGHT CORONARY ARTERY (RCA) STENT PLACEMENT: ICD-10-CM

## 2019-12-17 LAB
ALBUMIN SERPL BCP-MCNC: 3.9 G/DL (ref 3.5–5)
ALP SERPL-CCNC: 84 U/L (ref 46–116)
ALT SERPL W P-5'-P-CCNC: 30 U/L (ref 12–78)
AST SERPL W P-5'-P-CCNC: 25 U/L (ref 5–45)
BILIRUB DIRECT SERPL-MCNC: 0.15 MG/DL (ref 0–0.2)
BILIRUB SERPL-MCNC: 0.37 MG/DL (ref 0.2–1)
CHOLEST SERPL-MCNC: 116 MG/DL (ref 50–200)
HDLC SERPL-MCNC: 34 MG/DL
LDLC SERPL CALC-MCNC: 62 MG/DL (ref 0–100)
NONHDLC SERPL-MCNC: 82 MG/DL
PROT SERPL-MCNC: 8 G/DL (ref 6.4–8.2)
TRIGL SERPL-MCNC: 102 MG/DL

## 2019-12-17 PROCEDURE — 80076 HEPATIC FUNCTION PANEL: CPT

## 2019-12-17 PROCEDURE — 36415 COLL VENOUS BLD VENIPUNCTURE: CPT

## 2019-12-17 PROCEDURE — 80061 LIPID PANEL: CPT

## 2019-12-18 ENCOUNTER — HOSPITAL ENCOUNTER (OUTPATIENT)
Dept: NON INVASIVE DIAGNOSTICS | Facility: CLINIC | Age: 69
Discharge: HOME | End: 2019-12-18
Payer: COMMERCIAL

## 2019-12-18 DIAGNOSIS — Z72.0 TOBACCO ABUSE: ICD-10-CM

## 2019-12-18 DIAGNOSIS — I21.11 ST ELEVATION MYOCARDIAL INFARCTION INVOLVING RIGHT CORONARY ARTERY (HCC): ICD-10-CM

## 2019-12-18 DIAGNOSIS — I25.10 CORONARY ARTERIOSCLEROSIS: ICD-10-CM

## 2019-12-18 DIAGNOSIS — I10 ESSENTIAL (PRIMARY) HYPERTENSION: ICD-10-CM

## 2019-12-18 DIAGNOSIS — Z95.5 S/P RIGHT CORONARY ARTERY (RCA) STENT PLACEMENT: ICD-10-CM

## 2019-12-18 DIAGNOSIS — E78.00 PURE HYPERCHOLESTEROLEMIA: ICD-10-CM

## 2019-12-18 PROCEDURE — 93306 TTE W/DOPPLER COMPLETE: CPT

## 2019-12-18 PROCEDURE — 93306 TTE W/DOPPLER COMPLETE: CPT | Performed by: INTERNAL MEDICINE

## 2020-01-10 DIAGNOSIS — I21.11 ST ELEVATION MYOCARDIAL INFARCTION INVOLVING RIGHT CORONARY ARTERY (HCC): ICD-10-CM

## 2020-01-10 RX ORDER — LISINOPRIL 5 MG/1
5 TABLET ORAL DAILY
Qty: 90 TABLET | Refills: 3 | Status: SHIPPED | OUTPATIENT
Start: 2020-01-10 | End: 2021-01-06

## 2020-01-10 RX ORDER — ATORVASTATIN CALCIUM 10 MG/1
10 TABLET, FILM COATED ORAL DAILY
Qty: 90 TABLET | Refills: 3 | Status: SHIPPED | OUTPATIENT
Start: 2020-01-10 | End: 2021-02-05 | Stop reason: SDUPTHER

## 2020-01-29 NOTE — PROGRESS NOTES
Cardiology Follow Up    [facility] BOONE  1950  [id number]  [address]  [address]  [phone]  [phone]    1  Essential (primary) hypertension  POCT ECG   2  Pure hypercholesterolemia     3  Coronary arteriosclerosis  POCT ECG   4  S/P right coronary artery (RCA) stent placement  POCT ECG   5  Tobacco abuse         Interval History: Patient is here for ongoing cardiac follow-up  Patient does have a history of an inferior wall myocardial infarction with placement of a stent in the right coronary vessel December 30, 2018  The other vessels at that time had no significant disease  An echocardiogram done in December 2019 demonstrated an ejection fraction of 55% with no RWMA  He was noted to have no significant valvular heart disease  Patient is a chronic tobacco user  He is on dual anti-platelet therapy and statin  I did discuss with him the in advisability of tobacco use  EKG today demonstrates sinus rhythm with a prior inferior myocardial infarction and minor nonspecific ST segment changes  Patient had a lipid profile done December 17, 2019  Total cholesterol was 116 with an HDL of 34 and a direct LDL of 62  Patient is on atorvastatin  Patient's blood pressure is mildly elevated today  He has a blood pressure cuff at home and his wife will check his blood pressure and call me with further data  I will increase the dose of his lisinopril as necessary  He has had no chest pain or significant dyspnea  Patient recently obtained a three months supply of Brilinta at 90 mg per day  At the completion of this will likely switch to 60 mg twice a day although the patient is anxious to come off of this  I did tell him I had more concern because he is an ongoing tobacco user but apparently he is down to half a pack per day with the intention of stopping      Patient Active Problem List Diagnosis    ST elevation myocardial infarction involving right coronary artery (HCC)    Tobacco abuse    History of cancer tonsil    S/P right coronary artery (RCA) stent placement    Mixed hyperlipidemia     No past medical history on file  Social History     Socioeconomic History    Marital status: /Civil Union     Spouse name: Not on file    Number of children: Not on file    Years of education: Not on file    Highest education level: Not on file   Occupational History    Not on file   Social Needs    Financial resource strain: Not on file    Food insecurity:     Worry: Not on file     Inability: Not on file    Transportation needs:     Medical: Not on file     Non-medical: Not on file   Tobacco Use    Smoking status: Current Some Day Smoker     Packs/day: 2 00    Smokeless tobacco: Never Used    Tobacco comment: down to 1/2 ppd   Substance and Sexual Activity    Alcohol use: No    Drug use: No    Sexual activity: Not on file   Lifestyle    Physical activity:     Days per week: Not on file     Minutes per session: Not on file    Stress: Not on file   Relationships    Social connections:     Talks on phone: Not on file     Gets together: Not on file     Attends Worship service: Not on file     Active member of club or organization: Not on file     Attends meetings of clubs or organizations: Not on file     Relationship status: Not on file    Intimate partner violence:     Fear of current or ex partner: Not on file     Emotionally abused: Not on file     Physically abused: Not on file     Forced sexual activity: Not on file   Other Topics Concern    Not on file   Social History Narrative    Not on file      No family history on file    Past Surgical History:   Procedure Laterality Date    KNEE SURGERY         Current Outpatient Medications:     aspirin 81 mg chewable tablet, Chew 1 tablet (81 mg total) daily for 30 days, Disp: 30 tablet, Rfl: 0    atorvastatin (LIPITOR) 10 mg tablet, Take 1 tablet (10 mg total) by mouth daily, Disp: 90 tablet, Rfl: 3    lisinopril (ZESTRIL) 5 mg tablet, Take 1 tablet (5 mg total) by mouth daily, Disp: 90 tablet, Rfl: 3    metoprolol tartrate (LOPRESSOR) 25 mg tablet, Take 1 tablet (25 mg total) by mouth every 12 (twelve) hours, Disp: 180 tablet, Rfl: 3    ticagrelor (BRILINTA) 90 MG, Take 1 tablet (90 mg total) by mouth 2 (two) times a day, Disp: 180 tablet, Rfl: 3  No Known Allergies    Labs:not applicable  Imaging: No results found  Review of Systems:  Review of Systems   All other systems reviewed and are negative  Physical Exam:  /68 (BP Location: Left arm, Cuff Size: Large)   Pulse 72   Ht 5' 9" (1 753 m)   Wt 109 kg (240 lb)   BMI 35 44 kg/m²   Physical Exam   Constitutional: He is oriented to person, place, and time  He appears well-developed and well-nourished  HENT:   Head: Normocephalic and atraumatic  Eyes: Pupils are equal, round, and reactive to light  Conjunctivae are normal    Neck: Normal range of motion  Neck supple  Cardiovascular: Normal rate and normal heart sounds  Pulmonary/Chest: Effort normal and breath sounds normal    Neurological: He is alert and oriented to person, place, and time  Skin: Skin is warm and dry  Psychiatric: He has a normal mood and affect  Vitals reviewed  Discussion/Summary:  Patient will call in blood pressure values to determine whether lisinopril needs to be titrated  Will continue on current dose of Brilinta towards an eye for decreasing to 60 mg twice a day when the prescription is complete  Tobacco cessation was advised  I have asked the patient to call if there is a problem in the interim otherwise I will see him in six months time

## 2020-02-03 ENCOUNTER — OFFICE VISIT (OUTPATIENT)
Dept: CARDIOLOGY CLINIC | Facility: CLINIC | Age: 70
End: 2020-02-03
Payer: COMMERCIAL

## 2020-02-03 VITALS
SYSTOLIC BLOOD PRESSURE: 140 MMHG | HEART RATE: 72 BPM | BODY MASS INDEX: 35.55 KG/M2 | DIASTOLIC BLOOD PRESSURE: 68 MMHG | WEIGHT: 240 LBS | HEIGHT: 69 IN

## 2020-02-03 DIAGNOSIS — E78.00 PURE HYPERCHOLESTEROLEMIA: ICD-10-CM

## 2020-02-03 DIAGNOSIS — I10 ESSENTIAL (PRIMARY) HYPERTENSION: Primary | ICD-10-CM

## 2020-02-03 DIAGNOSIS — I25.10 CORONARY ARTERIOSCLEROSIS: ICD-10-CM

## 2020-02-03 DIAGNOSIS — Z95.5 S/P RIGHT CORONARY ARTERY (RCA) STENT PLACEMENT: ICD-10-CM

## 2020-02-03 DIAGNOSIS — Z72.0 TOBACCO ABUSE: ICD-10-CM

## 2020-02-03 PROCEDURE — 93000 ELECTROCARDIOGRAM COMPLETE: CPT | Performed by: INTERNAL MEDICINE

## 2020-02-03 PROCEDURE — 99214 OFFICE O/P EST MOD 30 MIN: CPT | Performed by: INTERNAL MEDICINE

## 2020-02-03 NOTE — PATIENT INSTRUCTIONS
Please call with blood pressures  Please call if there is a problem in the interim  I will see you in follow-up in six months time    Please try to stop using tobacco

## 2020-08-02 NOTE — PROGRESS NOTES
Cardiology Follow Up    [facility] BOONE  1950  [id number]  [address]  One [facility] [address]  [phone]  [phone]    1  Essential (primary) hypertension     2  Pure hypercholesterolemia     3  Coronary arteriosclerosis     4  Tobacco abuse         Interval History: Patient is here for ongoing cardiac follow-up  Arturo Harris does have a history of an inferior wall myocardial infarction with placement of a stent in the right coronary vessel December 30, 2018  The other vessels at that time had no significant disease   An echocardiogram done in December 2019 demonstrated an ejection fraction of 55% with no RWMA   He was noted to have no significant valvular heart disease   Patient is a chronic tobacco user  [facility] is on dual anti-platelet therapy and statin  Patient had a lipid profile done December 17, 2019  Total cholesterol was 116 with an HDL of 34 and a direct LDL of 62  Patient is on atorvastatin  Patient has been well  He has had no chest pain or significant dyspnea  He has lost 30 lb with diet and exercise  He is still working on the smoking but is down to half a pack per day  Patient Active Problem List   Diagnosis    ST elevation myocardial infarction involving right coronary artery (HCC)    Tobacco abuse    History of cancer tonsil    S/P right coronary artery (RCA) stent placement    Mixed hyperlipidemia     History reviewed  No pertinent past medical history    Social History     Socioeconomic History    Marital status: /Civil Union     Spouse name: Not on file    Number of children: Not on file    Years of education: Not on file    Highest education level: Not on file   Occupational History    Not on file   Social Needs    Financial resource strain: Not on file    Food insecurity     Worry: Not on file     Inability: Not on file    Transportation needs     Medical: Not on file     Non-medical: Not on file   Tobacco Use    Smoking status: Current Some Day Smoker     Packs/day: 2 00    Smokeless tobacco: Never Used    Tobacco comment: down to 1/2 ppd   Substance and Sexual Activity    Alcohol use: No    Drug use: No    Sexual activity: Not on file   Lifestyle    Physical activity     Days per week: Not on file     Minutes per session: Not on file    Stress: Not on file   Relationships    Social connections     Talks on phone: Not on file     Gets together: Not on file     Attends Yazdanism service: Not on file     Active member of club or organization: Not on file     Attends meetings of clubs or organizations: Not on file     Relationship status: Not on file    Intimate partner violence     Fear of current or ex partner: Not on file     Emotionally abused: Not on file     Physically abused: Not on file     Forced sexual activity: Not on file   Other Topics Concern    Not on file   Social History Narrative    Not on file      History reviewed  No pertinent family history  Past Surgical History:   Procedure Laterality Date    KNEE SURGERY         Current Outpatient Medications:     aspirin 81 mg chewable tablet, Chew 1 tablet (81 mg total) daily for 30 days, Disp: 30 tablet, Rfl: 0    atorvastatin (LIPITOR) 10 mg tablet, Take 1 tablet (10 mg total) by mouth daily, Disp: 90 tablet, Rfl: 3    lisinopril (ZESTRIL) 5 mg tablet, Take 1 tablet (5 mg total) by mouth daily, Disp: 90 tablet, Rfl: 3    metoprolol tartrate (LOPRESSOR) 25 mg tablet, Take 1 tablet (25 mg total) by mouth every 12 (twelve) hours, Disp: 180 tablet, Rfl: 3    multivitamin (THERAGRAN) TABS, Take 1 tablet by mouth daily, Disp: , Rfl:     ticagrelor (BRILINTA) 90 MG, Take 1 tablet (90 mg total) by mouth 2 (two) times a day, Disp: 180 tablet, Rfl: 3  No Known Allergies    Labs:not applicable  Imaging: No results found      Review of Systems:  Review of Systems   All other systems reviewed and are negative  Physical Exam:  /70 (BP Location: Left arm, Patient Position: Sitting, Cuff Size: Large)   Pulse 64   Temp (!) 97 4 °F (36 3 °C)   Ht 5' 9" (1 753 m)   Wt 95 3 kg (210 lb)   SpO2 99%   BMI 31 01 kg/m²   Physical Exam   Constitutional: He is oriented to person, place, and time  He appears well-developed  HENT:   Head: Normocephalic and atraumatic  Eyes: Pupils are equal, round, and reactive to light  Conjunctivae are normal    Neck: Normal range of motion  Neck supple  Cardiovascular: Normal rate and normal heart sounds  Pulmonary/Chest: Effort normal and breath sounds normal    Neurological: He is alert and oriented to person, place, and time  Skin: Skin is warm and dry  Vitals reviewed  Discussion/Summary:I will continue the patient's present medical regimen  The patient appears well compensated  I have asked the patient to call if there is a problem in the interim otherwise I will see the patient in six months time

## 2020-08-07 ENCOUNTER — OFFICE VISIT (OUTPATIENT)
Dept: CARDIOLOGY CLINIC | Facility: CLINIC | Age: 70
End: 2020-08-07
Payer: COMMERCIAL

## 2020-08-07 VITALS
OXYGEN SATURATION: 99 % | HEART RATE: 64 BPM | DIASTOLIC BLOOD PRESSURE: 70 MMHG | SYSTOLIC BLOOD PRESSURE: 118 MMHG | TEMPERATURE: 97.4 F | WEIGHT: 210 LBS | BODY MASS INDEX: 31.1 KG/M2 | HEIGHT: 69 IN

## 2020-08-07 DIAGNOSIS — Z72.0 TOBACCO ABUSE: ICD-10-CM

## 2020-08-07 DIAGNOSIS — E78.00 PURE HYPERCHOLESTEROLEMIA: ICD-10-CM

## 2020-08-07 DIAGNOSIS — I25.10 CORONARY ARTERIOSCLEROSIS: ICD-10-CM

## 2020-08-07 DIAGNOSIS — I10 ESSENTIAL (PRIMARY) HYPERTENSION: Primary | ICD-10-CM

## 2020-08-07 PROCEDURE — 99214 OFFICE O/P EST MOD 30 MIN: CPT | Performed by: INTERNAL MEDICINE

## 2020-08-07 RX ORDER — DIPHENOXYLATE HYDROCHLORIDE AND ATROPINE SULFATE 2.5; .025 MG/1; MG/1
1 TABLET ORAL DAILY
COMMUNITY

## 2021-01-05 DIAGNOSIS — I21.11 ST ELEVATION MYOCARDIAL INFARCTION INVOLVING RIGHT CORONARY ARTERY (HCC): ICD-10-CM

## 2021-01-06 RX ORDER — LISINOPRIL 5 MG/1
5 TABLET ORAL DAILY
Qty: 90 TABLET | Refills: 3 | Status: SHIPPED | OUTPATIENT
Start: 2021-01-06 | End: 2022-01-26 | Stop reason: SDUPTHER

## 2021-01-22 DIAGNOSIS — I21.11 ST ELEVATION MYOCARDIAL INFARCTION INVOLVING RIGHT CORONARY ARTERY (HCC): ICD-10-CM

## 2021-01-22 RX ORDER — ASPIRIN 81 MG/1
81 TABLET, CHEWABLE ORAL DAILY
Qty: 90 TABLET | Refills: 11 | Status: SHIPPED | OUTPATIENT
Start: 2021-01-22 | End: 2022-01-31

## 2021-01-26 NOTE — PROGRESS NOTES
Cardiology Follow Up    [facility] BOONE  1950  [id number]  [address]  One [facility]  [place] [address]  [phone]  [phone]    1  Essential (primary) hypertension     2  Pure hypercholesterolemia     3  Tobacco abuse     4  S/P right coronary artery (RCA) stent placement         Interval History:  Patient is here for cardiac follow-up  Patient does have a history of an IMI with placement of a stent in the RCA December 2018  The other vessels at that time had no significant disease   An echocardiogram done in December 2019 demonstrated an ejection fraction of 55% with no RWMA   He was noted to have no significant valvular heart disease   Patient is a chronic tobacco user  Papo Garg is on DAP and statin   Patient had a lipid profile done December 17, 2019   Total cholesterol was 116 with an HDL of 34 and a direct LDL of 62   Patient is on atorvastatin    Patient has been well  He has had no chest pain or significant dyspnea  His vital signs are stable  Patient continues to smoke and I did discuss with him the in advisability of this  He recently renewed his Brilinta and with his next prescription I will switch him to 60 mg twice a day  He had been off his atorvastatin for a while but will now go back on it as I will renew his prescription  Patient Active Problem List   Diagnosis    ST elevation myocardial infarction involving right coronary artery (HCC)    Tobacco abuse    History of cancer tonsil    S/P right coronary artery (RCA) stent placement    Mixed hyperlipidemia     No past medical history on file    Social History     Socioeconomic History    Marital status: /Civil Union     Spouse name: Not on file    Number of children: Not on file    Years of education: Not on file    Highest education level: Not on file   Occupational History    Not on file   Social Needs    Financial resource strain: Not on file    Food insecurity     Worry: Not on file     Inability: Not on file    Transportation needs     Medical: Not on file     Non-medical: Not on file   Tobacco Use    Smoking status: Current Every Day Smoker     Packs/day: 0 50    Smokeless tobacco: Never Used    Tobacco comment: down to 1/2 ppd   Substance and Sexual Activity    Alcohol use: No    Drug use: No    Sexual activity: Not on file   Lifestyle    Physical activity     Days per week: Not on file     Minutes per session: Not on file    Stress: Not on file   Relationships    Social connections     Talks on phone: Not on file     Gets together: Not on file     Attends Congregational service: Not on file     Active member of club or organization: Not on file     Attends meetings of clubs or organizations: Not on file     Relationship status: Not on file    Intimate partner violence     Fear of current or ex partner: Not on file     Emotionally abused: Not on file     Physically abused: Not on file     Forced sexual activity: Not on file   Other Topics Concern    Not on file   Social History Narrative    Not on file      No family history on file    Past Surgical History:   Procedure Laterality Date    KNEE SURGERY         Current Outpatient Medications:     aspirin 81 mg chewable tablet, Chew 1 tablet (81 mg total) daily, Disp: 90 tablet, Rfl: 11    lisinopril (ZESTRIL) 5 mg tablet, TAKE 1 TABLET (5 MG TOTAL) BY MOUTH DAILY, Disp: 90 tablet, Rfl: 3    metoprolol tartrate (LOPRESSOR) 25 mg tablet, Take 1 tablet (25 mg total) by mouth every 12 (twelve) hours, Disp: 180 tablet, Rfl: 3    multivitamin (THERAGRAN) TABS, Take 1 tablet by mouth daily, Disp: , Rfl:     ticagrelor (BRILINTA) 90 MG, Take 1 tablet (90 mg total) by mouth 2 (two) times a day, Disp: 180 tablet, Rfl: 3    atorvastatin (LIPITOR) 10 mg tablet, Take 1 tablet (10 mg total) by mouth daily, Disp: 90 tablet, Rfl: 3  No Known Allergies    Labs:not applicable  Imaging: No results found  Review of Systems:  Review of Systems   All other systems reviewed and are negative  Physical Exam:  /82 (BP Location: Right arm, Patient Position: Sitting, Cuff Size: Standard)   Pulse 60   Ht 5' 9" (1 753 m)   Wt 99 3 kg (219 lb)   BMI 32 34 kg/m²   Physical Exam  Vitals signs reviewed  Constitutional:       Appearance: He is well-developed  HENT:      Head: Normocephalic and atraumatic  Eyes:      Conjunctiva/sclera: Conjunctivae normal       Pupils: Pupils are equal, round, and reactive to light  Neck:      Musculoskeletal: Normal range of motion and neck supple  Cardiovascular:      Rate and Rhythm: Normal rate  Heart sounds: Normal heart sounds  Pulmonary:      Effort: Pulmonary effort is normal       Breath sounds: Normal breath sounds  Skin:     General: Skin is warm and dry  Neurological:      Mental Status: He is alert and oriented to person, place, and time  Discussion/Summary:I will continue the patient's present medical regimen  The patient appears well compensated  I have asked the patient to call if there is a problem in the interim otherwise I will see the patient in six months time

## 2021-02-05 ENCOUNTER — OFFICE VISIT (OUTPATIENT)
Dept: CARDIOLOGY CLINIC | Facility: CLINIC | Age: 71
End: 2021-02-05
Payer: COMMERCIAL

## 2021-02-05 VITALS
DIASTOLIC BLOOD PRESSURE: 82 MMHG | SYSTOLIC BLOOD PRESSURE: 122 MMHG | BODY MASS INDEX: 32.44 KG/M2 | HEART RATE: 60 BPM | WEIGHT: 219 LBS | HEIGHT: 69 IN

## 2021-02-05 DIAGNOSIS — Z95.5 S/P RIGHT CORONARY ARTERY (RCA) STENT PLACEMENT: ICD-10-CM

## 2021-02-05 DIAGNOSIS — I21.11 ST ELEVATION MYOCARDIAL INFARCTION INVOLVING RIGHT CORONARY ARTERY (HCC): ICD-10-CM

## 2021-02-05 DIAGNOSIS — Z72.0 TOBACCO ABUSE: ICD-10-CM

## 2021-02-05 DIAGNOSIS — E78.00 PURE HYPERCHOLESTEROLEMIA: ICD-10-CM

## 2021-02-05 DIAGNOSIS — I10 ESSENTIAL (PRIMARY) HYPERTENSION: Primary | ICD-10-CM

## 2021-02-05 PROCEDURE — 99214 OFFICE O/P EST MOD 30 MIN: CPT | Performed by: INTERNAL MEDICINE

## 2021-02-05 RX ORDER — ATORVASTATIN CALCIUM 10 MG/1
10 TABLET, FILM COATED ORAL DAILY
Qty: 90 TABLET | Refills: 3 | Status: SHIPPED | OUTPATIENT
Start: 2021-02-05 | End: 2022-02-14

## 2021-03-10 DIAGNOSIS — Z23 ENCOUNTER FOR IMMUNIZATION: ICD-10-CM

## 2021-04-16 DIAGNOSIS — I21.11 ST ELEVATION MYOCARDIAL INFARCTION INVOLVING RIGHT CORONARY ARTERY (HCC): ICD-10-CM

## 2021-04-16 DIAGNOSIS — E78.2 MIXED HYPERLIPIDEMIA: Primary | ICD-10-CM

## 2021-07-20 DIAGNOSIS — I21.11 ST ELEVATION MYOCARDIAL INFARCTION INVOLVING RIGHT CORONARY ARTERY (HCC): ICD-10-CM

## 2021-07-20 DIAGNOSIS — E78.2 MIXED HYPERLIPIDEMIA: ICD-10-CM

## 2021-07-21 RX ORDER — TICAGRELOR 60 MG/1
TABLET ORAL
Qty: 180 TABLET | Refills: 3 | Status: SHIPPED | OUTPATIENT
Start: 2021-07-21 | End: 2022-05-19

## 2021-08-24 NOTE — PROGRESS NOTES
Cardiology Follow Up    [facility] BOONE  1950  [id number]  [address]  One [facility]  [place] [address]  [phone]  [phone]    1  Essential (primary) hypertension  POCT ECG   2  Pure hypercholesterolemia  POCT ECG   3  S/P right coronary artery (RCA) stent placement  POCT ECG   4  Tobacco abuse  POCT ECG   5  Coronary arteriosclerosis  POCT ECG       Interval History:  Patient is here for cardiac follow-up  Patient has a history of an IMI with placement of a stent in the RCA December 2018  The other vessels at that time had no significant disease   Echocardiogram done  December 2019 demonstrated an LVEF of 55% with no RWMA   He had no significant valvular heart disease   Patient is a chronic tobacco user  Johanjojo Wade is on DAP and statin   Patient had a lipid profile done December 2019   Total cholesterol was 116 with an HDL of 34 and a direct LDL of 62   Patient is on atorvastatin    Patient continues to smoke and I did discuss with him the  inadvisability of this  He smokes half a pack per day  Patient has had no chest pain or significant dyspnea  Vital signs are stable  EKG today demonstrates sinus rhythm with an old IMI  There was no change compared to a prior study done February 3, 2020  He is expecting his fourth grandchild this month  Patient Active Problem List   Diagnosis    ST elevation myocardial infarction involving right coronary artery (HCC)    Tobacco abuse    History of cancer tonsil    S/P right coronary artery (RCA) stent placement    Mixed hyperlipidemia     No past medical history on file    Social History     Socioeconomic History    Marital status: /Civil Union     Spouse name: Not on file    Number of children: Not on file    Years of education: Not on file    Highest education level: Not on file   Occupational History    Not on file   Tobacco Use    Smoking status: Current Every Day Smoker     Packs/day: 0 50    Smokeless tobacco: Never Used    Tobacco comment: down to 1/2 ppd   Substance and Sexual Activity    Alcohol use: No    Drug use: No    Sexual activity: Not on file   Other Topics Concern    Not on file   Social History Narrative    Not on file     Social Determinants of Health     Financial Resource Strain:     Difficulty of Paying Living Expenses:    Food Insecurity:     Worried About Running Out of Food in the Last Year:     Ran Out of Food in the Last Year:    Transportation Needs:     Lack of Transportation (Medical):  Lack of Transportation (Non-Medical):    Physical Activity:     Days of Exercise per Week:     Minutes of Exercise per Session:    Stress:     Feeling of Stress :    Social Connections:     Frequency of Communication with Friends and Family:     Frequency of Social Gatherings with Friends and Family:     Attends Taoism Services:     Active Member of Clubs or Organizations:     Attends Club or Organization Meetings:     Marital Status:    Intimate Partner Violence:     Fear of Current or Ex-Partner:     Emotionally Abused:     Physically Abused:     Sexually Abused:       No family history on file    Past Surgical History:   Procedure Laterality Date    KNEE SURGERY         Current Outpatient Medications:     aspirin 81 mg chewable tablet, Chew 1 tablet (81 mg total) daily, Disp: 90 tablet, Rfl: 11    atorvastatin (LIPITOR) 10 mg tablet, Take 1 tablet (10 mg total) by mouth daily, Disp: 90 tablet, Rfl: 3    Brilinta 60 MG, TAKE 1 TABLET (60 MG TOTAL) BY MOUTH EVERY 12 (TWELVE) HOURS, Disp: 180 tablet, Rfl: 3    lisinopril (ZESTRIL) 5 mg tablet, TAKE 1 TABLET (5 MG TOTAL) BY MOUTH DAILY, Disp: 90 tablet, Rfl: 3    metoprolol tartrate (LOPRESSOR) 25 mg tablet, Take 1 tablet (25 mg total) by mouth every 12 (twelve) hours, Disp: 180 tablet, Rfl: 3    multivitamin (THERAGRAN) TABS, Take 1 tablet by mouth daily, Disp: , Rfl: No Known Allergies    Labs:not applicable  Imaging: No results found  Review of Systems:  Review of Systems   All other systems reviewed and are negative  Physical Exam:  /80 (BP Location: Left arm, Patient Position: Sitting, Cuff Size: Standard)   Pulse 62   Ht 5' 9" (1 753 m)   Wt 107 kg (235 lb)   BMI 34 70 kg/m²   Physical Exam  Vitals reviewed  Constitutional:       Appearance: He is well-developed  HENT:      Head: Normocephalic and atraumatic  Eyes:      Conjunctiva/sclera: Conjunctivae normal       Pupils: Pupils are equal, round, and reactive to light  Cardiovascular:      Rate and Rhythm: Normal rate  Heart sounds: Normal heart sounds  Pulmonary:      Effort: Pulmonary effort is normal       Breath sounds: Normal breath sounds  Musculoskeletal:      Cervical back: Normal range of motion and neck supple  Skin:     General: Skin is warm and dry  Neurological:      Mental Status: He is alert and oriented to person, place, and time  Discussion/Summary:I will continue the patient's present medical regimen  The patient appears well compensated  I have asked the patient to call if there is a problem in the interim otherwise I will see the patient in six months time

## 2021-09-03 ENCOUNTER — OFFICE VISIT (OUTPATIENT)
Dept: CARDIOLOGY CLINIC | Facility: CLINIC | Age: 71
End: 2021-09-03
Payer: COMMERCIAL

## 2021-09-03 VITALS
HEART RATE: 62 BPM | HEIGHT: 69 IN | WEIGHT: 235 LBS | SYSTOLIC BLOOD PRESSURE: 120 MMHG | BODY MASS INDEX: 34.8 KG/M2 | DIASTOLIC BLOOD PRESSURE: 80 MMHG

## 2021-09-03 DIAGNOSIS — I10 ESSENTIAL (PRIMARY) HYPERTENSION: Primary | ICD-10-CM

## 2021-09-03 DIAGNOSIS — I25.10 CORONARY ARTERIOSCLEROSIS: ICD-10-CM

## 2021-09-03 DIAGNOSIS — E78.00 PURE HYPERCHOLESTEROLEMIA: ICD-10-CM

## 2021-09-03 DIAGNOSIS — Z72.0 TOBACCO ABUSE: ICD-10-CM

## 2021-09-03 DIAGNOSIS — Z95.5 S/P RIGHT CORONARY ARTERY (RCA) STENT PLACEMENT: ICD-10-CM

## 2021-09-03 PROCEDURE — 93000 ELECTROCARDIOGRAM COMPLETE: CPT | Performed by: INTERNAL MEDICINE

## 2021-09-03 PROCEDURE — 99214 OFFICE O/P EST MOD 30 MIN: CPT | Performed by: INTERNAL MEDICINE

## 2022-01-26 DIAGNOSIS — I21.11 ST ELEVATION MYOCARDIAL INFARCTION INVOLVING RIGHT CORONARY ARTERY (HCC): ICD-10-CM

## 2022-01-27 RX ORDER — LISINOPRIL 5 MG/1
5 TABLET ORAL DAILY
Qty: 90 TABLET | Refills: 0 | Status: SHIPPED | OUTPATIENT
Start: 2022-01-27 | End: 2022-04-25

## 2022-01-31 DIAGNOSIS — I21.11 ST ELEVATION MYOCARDIAL INFARCTION INVOLVING RIGHT CORONARY ARTERY (HCC): ICD-10-CM

## 2022-01-31 RX ORDER — LORATADINE 10 MG
TABLET ORAL
Qty: 90 TABLET | Refills: 11 | Status: SHIPPED | OUTPATIENT
Start: 2022-01-31

## 2022-02-11 DIAGNOSIS — I21.11 ST ELEVATION MYOCARDIAL INFARCTION INVOLVING RIGHT CORONARY ARTERY (HCC): ICD-10-CM

## 2022-02-11 RX ORDER — ATORVASTATIN CALCIUM 10 MG/1
10 TABLET, FILM COATED ORAL DAILY
Qty: 90 TABLET | Refills: 0 | Status: CANCELLED | OUTPATIENT
Start: 2022-02-11 | End: 2022-03-13

## 2022-02-14 DIAGNOSIS — I21.11 ST ELEVATION MYOCARDIAL INFARCTION INVOLVING RIGHT CORONARY ARTERY (HCC): ICD-10-CM

## 2022-02-14 RX ORDER — ATORVASTATIN CALCIUM 10 MG/1
TABLET, FILM COATED ORAL
Qty: 90 TABLET | Refills: 3 | Status: SHIPPED | OUTPATIENT
Start: 2022-02-14

## 2022-03-25 DIAGNOSIS — I21.11 ST ELEVATION MYOCARDIAL INFARCTION INVOLVING RIGHT CORONARY ARTERY (HCC): ICD-10-CM

## 2022-04-24 DIAGNOSIS — I21.11 ST ELEVATION MYOCARDIAL INFARCTION INVOLVING RIGHT CORONARY ARTERY (HCC): ICD-10-CM

## 2022-04-25 RX ORDER — LISINOPRIL 5 MG/1
5 TABLET ORAL DAILY
Qty: 90 TABLET | Refills: 0 | Status: SHIPPED | OUTPATIENT
Start: 2022-04-25 | End: 2022-07-13

## 2022-04-25 NOTE — TELEPHONE ENCOUNTER
Requested medication(s) are due for refill today: Yes  Patient has already received a courtesy refill: No  Other reason request has been forwarded to provider:   Cardiovascular:  ACE Inhibitors Failed 04/24/2022 09:13 AM   Protocol Details  BP completed in the last 6 months    Cr in normal range and within 360 days    K in normal range and within 360 days    Valid encounter within last 6 months

## 2022-05-09 NOTE — PROGRESS NOTES
Cardiology Follow Up    [facility] BOONE  1950  [id number]  [address]  One [facility]  [place] [address]  [phone]    1  Essential (primary) hypertension  POCT ECG    T4, free    TSH, 3rd generation    LDL cholesterol, direct    Comprehensive metabolic panel    Hepatic function panel    Lipid panel   2  Pure hypercholesterolemia  T4, free    TSH, 3rd generation    LDL cholesterol, direct    Comprehensive metabolic panel    Hepatic function panel    Lipid panel   3  S/P right coronary artery (RCA) stent placement  POCT ECG    T4, free    TSH, 3rd generation    LDL cholesterol, direct    Comprehensive metabolic panel    Hepatic function panel    Lipid panel   4  Tobacco abuse  T4, free    TSH, 3rd generation    LDL cholesterol, direct    Comprehensive metabolic panel    Hepatic function panel    Lipid panel       Interval History:  Patient is here for cardiac follow-up  Patient had IMI with placement of a KATIANA in the RCA December 2018  The other vessels at that time had no significant disease   Echocardiogram done  December 2019 demonstrated an LVEF of 55% with no RWMA   He had no significant valvular heart disease   He is on DAP and statin   Patient had a lipid profile done December 2019   Total cholesterol was 116 with an HDL of 34 and a direct LDL of 62   Patient is on atorvastatin    Patient continues to smoke and I did discuss with him the  inadvisability of this  He smokes half a pack per day  EKG today demonstrates sinus rhythm with inferior Q-waves and minor nonspecific ST segment changes and no significant change compared to a prior tracing done February 3, 2020  Patient has had no chest pain or significant dyspnea  His vital signs are stable today  He has had some muscle cramping  Will check blood work    Have asked him to reduce the dose of atorvastatin to 5 mg per day or 10 mg every other day   He has had no chest pain or significant dyspnea  He is retired and does consulting work  Patient Active Problem List   Diagnosis    ST elevation myocardial infarction involving right coronary artery (HCC)    Tobacco abuse    History of cancer tonsil    S/P right coronary artery (RCA) stent placement    Mixed hyperlipidemia     History reviewed  No pertinent past medical history  Social History     Socioeconomic History    Marital status: /Civil Union     Spouse name: Not on file    Number of children: Not on file    Years of education: Not on file    Highest education level: Not on file   Occupational History    Not on file   Tobacco Use    Smoking status: Current Every Day Smoker     Packs/day: 0 50    Smokeless tobacco: Never Used    Tobacco comment: down to 1/2 ppd   Substance and Sexual Activity    Alcohol use: No    Drug use: No    Sexual activity: Not on file   Other Topics Concern    Not on file   Social History Narrative    Not on file     Social Determinants of Health     Financial Resource Strain: Not on file   Food Insecurity: Not on file   Transportation Needs: Not on file   Physical Activity: Not on file   Stress: Not on file   Social Connections: Not on file   Intimate Partner Violence: Not on file   Housing Stability: Not on file      History reviewed  No pertinent family history  Past Surgical History:   Procedure Laterality Date    KNEE SURGERY         Current Outpatient Medications:     atorvastatin (LIPITOR) 10 mg tablet, TAKE 1 TABLET BY MOUTH EVERY DAY, Disp: 90 tablet, Rfl: 3    [facility] Aspirin Adult Low Dose 81 MG chewable tablet, CHEW 1 TABLET BY MOUTH DAILY, Disp: 90 tablet, Rfl: 11    lisinopril (ZESTRIL) 5 mg tablet, TAKE 1 TABLET (5 MG TOTAL) BY MOUTH DAILY  , Disp: 90 tablet, Rfl: 0    metoprolol tartrate (LOPRESSOR) 25 mg tablet, TAKE 1 TABLET (25 MG TOTAL) BY MOUTH EVERY 12 (TWELVE) HOURS, Disp: 180 tablet, Rfl: 3    multivitamin (THERAGRAN) TABS, Take 1 tablet by mouth daily, Disp: , Rfl:   No Known Allergies    Labs:not applicable  Imaging: No results found  Review of Systems:  Review of Systems   All other systems reviewed and are negative  Physical Exam:  /80 (BP Location: Right arm, Patient Position: Sitting, Cuff Size: Large)   Pulse 62   Ht 5' 9" (1 753 m)   Wt 116 kg (255 lb 12 8 oz)   SpO2 97%   BMI 37 78 kg/m²   Physical Exam  Vitals reviewed  Constitutional:       Appearance: He is well-developed  HENT:      Head: Normocephalic and atraumatic  Eyes:      Conjunctiva/sclera: Conjunctivae normal       Pupils: Pupils are equal, round, and reactive to light  Cardiovascular:      Rate and Rhythm: Normal rate  Heart sounds: Normal heart sounds  Pulmonary:      Effort: Pulmonary effort is normal       Breath sounds: Normal breath sounds  Musculoskeletal:      Cervical back: Normal range of motion and neck supple  Skin:     General: Skin is warm and dry  Neurological:      Mental Status: He is alert and oriented to person, place, and time  Discussion/Summary:I will continue the patient's present medical regimen except for reducing dose of statin to half the current dose     The patient appears well compensated  I have asked the patient to call if there is a problem in the interim otherwise I will see the patient in six months time  Will check blood work

## 2022-05-19 ENCOUNTER — OFFICE VISIT (OUTPATIENT)
Dept: CARDIOLOGY CLINIC | Facility: CLINIC | Age: 72
End: 2022-05-19
Payer: MEDICARE

## 2022-05-19 VITALS
WEIGHT: 255.8 LBS | OXYGEN SATURATION: 97 % | DIASTOLIC BLOOD PRESSURE: 80 MMHG | HEIGHT: 69 IN | BODY MASS INDEX: 37.89 KG/M2 | SYSTOLIC BLOOD PRESSURE: 118 MMHG | HEART RATE: 62 BPM

## 2022-05-19 DIAGNOSIS — Z95.5 S/P RIGHT CORONARY ARTERY (RCA) STENT PLACEMENT: ICD-10-CM

## 2022-05-19 DIAGNOSIS — Z72.0 TOBACCO ABUSE: ICD-10-CM

## 2022-05-19 DIAGNOSIS — E78.00 PURE HYPERCHOLESTEROLEMIA: ICD-10-CM

## 2022-05-19 DIAGNOSIS — I10 ESSENTIAL (PRIMARY) HYPERTENSION: Primary | ICD-10-CM

## 2022-05-19 PROCEDURE — 93000 ELECTROCARDIOGRAM COMPLETE: CPT | Performed by: INTERNAL MEDICINE

## 2022-05-19 PROCEDURE — 99214 OFFICE O/P EST MOD 30 MIN: CPT | Performed by: INTERNAL MEDICINE

## 2022-05-19 NOTE — PATIENT INSTRUCTIONS
I will continue the patient's present medical regimen except reduce dose of atorvastatin to half current dose,  The patient appears well compensated  I have asked the patient to call if there is a problem in the interim otherwise I will see the patient in six months time  Will check blood work

## 2022-07-12 ENCOUNTER — LAB (OUTPATIENT)
Dept: LAB | Facility: HOSPITAL | Age: 72
End: 2022-07-12
Payer: MEDICARE

## 2022-07-12 DIAGNOSIS — Z95.5 S/P RIGHT CORONARY ARTERY (RCA) STENT PLACEMENT: ICD-10-CM

## 2022-07-12 DIAGNOSIS — E78.00 PURE HYPERCHOLESTEROLEMIA: ICD-10-CM

## 2022-07-12 DIAGNOSIS — Z72.0 TOBACCO ABUSE: ICD-10-CM

## 2022-07-12 DIAGNOSIS — I10 ESSENTIAL (PRIMARY) HYPERTENSION: ICD-10-CM

## 2022-07-12 LAB
ALBUMIN SERPL BCP-MCNC: 4.3 G/DL (ref 3.5–5)
ALP SERPL-CCNC: 78 U/L (ref 46–116)
ALT SERPL W P-5'-P-CCNC: 41 U/L (ref 12–78)
ANION GAP SERPL CALCULATED.3IONS-SCNC: 7 MMOL/L (ref 4–13)
AST SERPL W P-5'-P-CCNC: 78 U/L (ref 5–45)
BILIRUB DIRECT SERPL-MCNC: 0.12 MG/DL (ref 0–0.2)
BILIRUB SERPL-MCNC: 0.43 MG/DL (ref 0.2–1)
BUN SERPL-MCNC: 22 MG/DL (ref 5–25)
CALCIUM SERPL-MCNC: 9.4 MG/DL (ref 8.3–10.1)
CHLORIDE SERPL-SCNC: 93 MMOL/L (ref 100–108)
CHOLEST SERPL-MCNC: 151 MG/DL
CO2 SERPL-SCNC: 31 MMOL/L (ref 21–32)
CREAT SERPL-MCNC: 1.69 MG/DL (ref 0.6–1.3)
GFR SERPL CREATININE-BSD FRML MDRD: 40 ML/MIN/1.73SQ M
GLUCOSE P FAST SERPL-MCNC: 97 MG/DL (ref 65–99)
HDLC SERPL-MCNC: 37 MG/DL
LDLC SERPL CALC-MCNC: 89 MG/DL (ref 0–100)
LDLC SERPL DIRECT ASSAY-MCNC: 79 MG/DL (ref 0–100)
NONHDLC SERPL-MCNC: 114 MG/DL
POTASSIUM SERPL-SCNC: 5 MMOL/L (ref 3.5–5.3)
PROT SERPL-MCNC: 8.4 G/DL (ref 6.4–8.2)
SODIUM SERPL-SCNC: 131 MMOL/L (ref 136–145)
T4 FREE SERPL-MCNC: 0.23 NG/DL (ref 0.76–1.46)
TRIGL SERPL-MCNC: 123 MG/DL
TSH SERPL DL<=0.05 MIU/L-ACNC: 98.13 UIU/ML (ref 0.45–4.5)

## 2022-07-12 PROCEDURE — 83721 ASSAY OF BLOOD LIPOPROTEIN: CPT

## 2022-07-12 PROCEDURE — 84443 ASSAY THYROID STIM HORMONE: CPT

## 2022-07-12 PROCEDURE — 80053 COMPREHEN METABOLIC PANEL: CPT

## 2022-07-12 PROCEDURE — 80061 LIPID PANEL: CPT

## 2022-07-12 PROCEDURE — 82248 BILIRUBIN DIRECT: CPT

## 2022-07-12 PROCEDURE — 36415 COLL VENOUS BLD VENIPUNCTURE: CPT

## 2022-07-12 PROCEDURE — 84439 ASSAY OF FREE THYROXINE: CPT

## 2022-07-12 NOTE — RESULT ENCOUNTER NOTE
Please call the patient regarding his abnormal result  Patient's TSH is high  Would ask him to start Synthroid 0 05 mg per day  Have him recheck TSH and T4 in two months  His creatinine and potassium are also elevated  Ask him to discontinue lisinopril and start amlodipine 5 mg per day  He should have a blood pressure check and BMP in 3-4 weeks  He should establish with a PCP to assist in managing his thyroid issue  If not, he could consider an endocrinologist   One of his liver enzymes is also mildly elevated  Make sure he is only on atorvastatin 5 mg per day  Thank you

## 2022-07-13 ENCOUNTER — TELEPHONE (OUTPATIENT)
Dept: CARDIOLOGY CLINIC | Facility: CLINIC | Age: 72
End: 2022-07-13

## 2022-07-13 DIAGNOSIS — E87.5 SERUM POTASSIUM ELEVATED: ICD-10-CM

## 2022-07-13 DIAGNOSIS — E03.9 HYPOTHYROIDISM, UNSPECIFIED TYPE: Primary | ICD-10-CM

## 2022-07-13 DIAGNOSIS — E03.9 HYPOTHYROIDISM, UNSPECIFIED TYPE: ICD-10-CM

## 2022-07-13 DIAGNOSIS — Z95.5 S/P RIGHT CORONARY ARTERY (RCA) STENT PLACEMENT: Primary | ICD-10-CM

## 2022-07-13 DIAGNOSIS — I10 ESSENTIAL (PRIMARY) HYPERTENSION: ICD-10-CM

## 2022-07-13 DIAGNOSIS — I21.11 ST ELEVATION MYOCARDIAL INFARCTION INVOLVING RIGHT CORONARY ARTERY (HCC): ICD-10-CM

## 2022-07-13 NOTE — TELEPHONE ENCOUNTER
Spoke to patient, per Dr Vikas John based on labs rx'd Synthroid, d/c Lisinopril-rx'd Amlodipine and pt will monitor BP at home and call with BP readings in 3-4wks  Lab orders mailed to patient to corey thyroid function and BMP  Pt states he is taking Atorvastatin dose 10mg QOD  Advised patient to get establshed with PCP in his area, he said he will follow up on that

## 2022-07-13 NOTE — TELEPHONE ENCOUNTER
----- Message from Delphine Sutherland MD sent at 7/12/2022  1:37 PM EDT -----  Please call the patient regarding his abnormal result  Patient's TSH is high  Would ask him to start Synthroid 0 05 mg per day  Have him recheck TSH and T4 in two months  His creatinine and potassium are also elevated  Ask him to discontinue lisinopril and start amlodipine 5 mg per day  He should have a blood pressure check and BMP in 3-4 weeks  He should establish with a PCP to assist in managing his thyroid issue  If not, he could consider an endocrinologist   One of his liver enzymes is also mildly elevated  Make sure he is only on atorvastatin 5 mg per day  Thank you

## 2022-07-14 RX ORDER — LEVOTHYROXINE SODIUM 0.05 MG/1
50 TABLET ORAL DAILY
Qty: 30 TABLET | Refills: 11 | Status: SHIPPED | OUTPATIENT
Start: 2022-07-14 | End: 2023-07-14

## 2022-07-14 RX ORDER — AMLODIPINE BESYLATE 5 MG/1
5 TABLET ORAL DAILY
Qty: 30 TABLET | Refills: 11 | Status: SHIPPED | OUTPATIENT
Start: 2022-07-14 | End: 2022-08-11

## 2022-07-21 ENCOUNTER — PATIENT MESSAGE (OUTPATIENT)
Dept: CARDIOLOGY CLINIC | Facility: CLINIC | Age: 72
End: 2022-07-21

## 2022-07-21 NOTE — TELEPHONE ENCOUNTER
From: Veronika Gomez  To: Melida Katz MD  Sent: 7/21/2022 1:48 PM EDT  Subject: Blood pressure readings    BP readings starting on 7/14

## 2022-08-09 ENCOUNTER — TELEPHONE (OUTPATIENT)
Dept: CARDIOLOGY CLINIC | Facility: CLINIC | Age: 72
End: 2022-08-09

## 2022-08-09 NOTE — TELEPHONE ENCOUNTER
Spoke to patient, reviewed meds:  currently taking Metoprolol 25mg and Amlodipine 5mg qd  Patient states he does feel "alittle dizzy" if he gets up to quickly in the am   Advised patient will call back with any changes after Dr Deb Dacosta reviews meds

## 2022-08-09 NOTE — TELEPHONE ENCOUNTER
----- Message from Kalina Guevara MD sent at 8/8/2022 10:31 AM EDT -----  Regarding: RE: Update Blood Pressure Readings  Please check what he is currently taking for blood pressure management     ----- Message -----  From: Mainor Sender  Sent: 8/8/2022  10:26 AM EDT  To: Kalina Guevara MD  Subject: FW: Update Blood Pressure Readings                 ----- Message -----  From: Leticia Franklin  Sent: 8/5/2022   1:53 PM EDT  To: Cardiology [facility]  Subject: Update Blood Pressure Readings                   Should I be concerned about the low pressure readings?

## 2022-08-11 ENCOUNTER — OFFICE VISIT (OUTPATIENT)
Dept: FAMILY MEDICINE CLINIC | Facility: CLINIC | Age: 72
End: 2022-08-11
Payer: MEDICARE

## 2022-08-11 VITALS
SYSTOLIC BLOOD PRESSURE: 116 MMHG | HEIGHT: 69 IN | TEMPERATURE: 98.2 F | DIASTOLIC BLOOD PRESSURE: 70 MMHG | HEART RATE: 69 BPM | OXYGEN SATURATION: 95 % | WEIGHT: 231 LBS | BODY MASS INDEX: 34.21 KG/M2

## 2022-08-11 DIAGNOSIS — Z00.00 ENCOUNTER FOR ANNUAL WELLNESS VISIT (AWV) IN MEDICARE PATIENT: ICD-10-CM

## 2022-08-11 DIAGNOSIS — Z12.2 ENCOUNTER FOR SCREENING FOR LUNG CANCER: ICD-10-CM

## 2022-08-11 DIAGNOSIS — I10 ESSENTIAL (PRIMARY) HYPERTENSION: ICD-10-CM

## 2022-08-11 DIAGNOSIS — Z87.891 PERSONAL HISTORY OF NICOTINE DEPENDENCE: ICD-10-CM

## 2022-08-11 DIAGNOSIS — Z80.0 FAMILY HISTORY OF COLON CANCER IN FATHER: ICD-10-CM

## 2022-08-11 DIAGNOSIS — E78.2 MIXED HYPERLIPIDEMIA: Primary | ICD-10-CM

## 2022-08-11 DIAGNOSIS — Z85.818 HISTORY OF CANCER TONSIL: ICD-10-CM

## 2022-08-11 DIAGNOSIS — Z95.5 S/P RIGHT CORONARY ARTERY (RCA) STENT PLACEMENT: ICD-10-CM

## 2022-08-11 DIAGNOSIS — Z12.5 SCREENING FOR PROSTATE CANCER: ICD-10-CM

## 2022-08-11 DIAGNOSIS — Z72.0 TOBACCO ABUSE: ICD-10-CM

## 2022-08-11 DIAGNOSIS — N18.30 STAGE 3 CHRONIC KIDNEY DISEASE, UNSPECIFIED WHETHER STAGE 3A OR 3B CKD (HCC): ICD-10-CM

## 2022-08-11 DIAGNOSIS — E03.9 HYPOTHYROIDISM, UNSPECIFIED TYPE: ICD-10-CM

## 2022-08-11 PROCEDURE — 99204 OFFICE O/P NEW MOD 45 MIN: CPT | Performed by: STUDENT IN AN ORGANIZED HEALTH CARE EDUCATION/TRAINING PROGRAM

## 2022-08-11 PROCEDURE — 99406 BEHAV CHNG SMOKING 3-10 MIN: CPT | Performed by: STUDENT IN AN ORGANIZED HEALTH CARE EDUCATION/TRAINING PROGRAM

## 2022-08-11 PROCEDURE — G0402 INITIAL PREVENTIVE EXAM: HCPCS | Performed by: STUDENT IN AN ORGANIZED HEALTH CARE EDUCATION/TRAINING PROGRAM

## 2022-08-11 RX ORDER — AMLODIPINE BESYLATE 2.5 MG/1
2.5 TABLET ORAL DAILY
COMMUNITY
End: 2022-08-30 | Stop reason: SDUPTHER

## 2022-08-11 NOTE — ASSESSMENT & PLAN NOTE
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

## 2022-08-11 NOTE — ASSESSMENT & PLAN NOTE
Reviewed prior records    Given that while he was off both lisinopril and low the pain he was having blood pressures consistently with systolics less than 283 or 325 and diastolics consistently less than 70, I do believe holding his amlodipine and monitor his blood pressures best course of action as he was having symptomatic presyncope from low blood pressure

## 2022-08-11 NOTE — PROGRESS NOTES
Assessment and Plan:     Problem List Items Addressed This Visit        Cardiovascular and Mediastinum    Essential (primary) hypertension     Reviewed prior records  Given that while he was off both lisinopril and low the pain he was having blood pressures consistently with systolics less than 246 or 747 and diastolics consistently less than 70, I do believe holding his amlodipine and monitor his blood pressures best course of action as he was having symptomatic presyncope from low blood pressure         Relevant Medications    amLODIPine (NORVASC) 2 5 mg tablet       Genitourinary    Stage 3 chronic kidney disease, unspecified whether stage 3a or 3b CKD ([facility] [address] )     Lab Results   Component Value Date    EGFR 40 07/12/2022    EGFR 69 01/01/2019    EGFR 65 12/31/2018    CREATININE 1 69 (H) 07/12/2022    CREATININE 1 10 01/01/2019    CREATININE 1 16 12/31/2018     Will follow up future labs  May have been lisinopril induced         Relevant Orders    Comprehensive metabolic panel       Other    Tobacco abuse     Cessation recommended  Spent at least 5 minutes discussing this as well as options  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 (told mostly women get this type of cancer)  Smoking cessation recommended  Has been in remission          S/P right coronary artery (RCA) stent placement     Follows with cardiology is on aspirin, beta-blocker, and 5 mg of Lipitor         Mixed hyperlipidemia - Primary     Lipid panel reviewed and very well controlled  Continue 5 mg of Lipitor or 10 mg every other day         Family history of colon cancer in father     Father passed away from colon cancer at the age of 79   Stressed that he should get this completed           Other Visit Diagnoses     Hypothyroidism, unspecified type        Relevant Orders    TSH, 3rd generation with Free T4 reflex    Screening for prostate cancer        Relevant Orders    PSA, Total Screen    BMI 34 0-34 9,adult        Encounter for screening for lung cancer        Relevant Orders    CT lung screening program    Personal history of nicotine dependence        Relevant Orders    CT lung screening program    Encounter for annual wellness visit (AWV) in Medicare patient            BMI Counseling: Body mass index is 34 11 kg/m²  The BMI is above normal  Nutrition recommendations include decreasing portion sizes, encouraging healthy choices of fruits and vegetables, decreasing fast food intake, consuming healthier snacks, limiting drinks that contain sugar and moderation in carbohydrate intake  Exercise recommendations include moderate physical activity 150 minutes/week, exercising 3-5 times per week and strength training exercises  No pharmacotherapy was ordered  Rationale for BMI follow-up plan is due to patient being overweight or obese  Depression Screening and Follow-up Plan: Patient was screened for depression during today's encounter  They screened negative with a PHQ-2 score of 0  Preventive health issues were discussed with patient, and age appropriate screening tests were ordered as noted in patient's After Visit Summary  Personalized health advice and appropriate referrals for health education or preventive services given if needed, as noted in patient's After Visit Summary  History of Present Illness:     Patient presents for a Medicare Wellness Visit    Patient coming to establish care  Has longstanding history of high blood pressure, coronary artery disease status post stent placement  Reports his blood pressure has been on lower side and he is getting dizzy upon standing  Patient Care Team:  Sandra Apodaca MD as PCP - General (Family Medicine)     Review of Systems:     Review of Systems   Constitutional: Negative for chills, fatigue and fever  HENT: Negative for rhinorrhea and sore throat  Eyes: Negative for visual disturbance  Respiratory: Negative for cough and shortness of breath      Cardiovascular: Negative for chest pain and palpitations  Gastrointestinal: Negative for abdominal pain, constipation, diarrhea, nausea and vomiting  Genitourinary: Negative for difficulty urinating, dysuria and frequency  Musculoskeletal: Negative for arthralgias and myalgias  Skin: Negative for color change and rash  Neurological: Positive for dizziness and light-headedness  Negative for weakness and headaches  Problem List:     Patient Active Problem List   Diagnosis    ST elevation myocardial infarction involving right coronary artery (HCC)    Tobacco abuse    History of cancer tonsil    S/P right coronary artery (RCA) stent placement    Mixed hyperlipidemia    Essential (primary) hypertension    Stage 3 chronic [facility], unspecified whether [facility] or [facility]    Family history of colon cancer in father      Past Medical and Surgical History:     History reviewed  No pertinent past medical history    Past Surgical History:   Procedure Laterality Date    KNEE SURGERY        Family History:     Family History   Problem Relation Age of Onset    Heart disease Mother         80    Colon cancer Father       Social History:     Social History     Socioeconomic History    Marital status: /Civil Union     Spouse name: None    Number of children: None    Years of education: None    Highest education level: None   Occupational History    None   Tobacco Use    Smoking status: Current Every Day Smoker     Packs/day: 1 00     Years: 50 00     Pack years: 50 00    Smokeless tobacco: Never Used    Tobacco comment: down to 1/2 ppd   Substance and Sexual Activity    Alcohol use: No    Drug use: No    Sexual activity: None   Other Topics Concern    None   Social History Narrative    None     Social Determinants of Health     Financial Resource Strain: Not on file   Food Insecurity: Not on file   Transportation Needs: Not on file   Physical Activity: Not on file   Stress: Not on file Social Connections: Not on file   Intimate Partner Violence: Not on file   Housing Stability: Not on file      Medications and Allergies:     Current Outpatient Medications   Medication Sig Dispense Refill    amLODIPine (NORVASC) 2 5 mg tablet Take 2 5 mg by mouth daily      atorvastatin (LIPITOR) 10 mg tablet TAKE 1 TABLET BY MOUTH EVERY DAY 90 tablet 3    [facility] Aspirin Adult Low Dose 81 MG chewable tablet CHEW 1 TABLET BY MOUTH DAILY 90 tablet 11    levothyroxine (Synthroid) 50 mcg tablet Take 1 tablet (50 mcg total) by mouth daily 30 tablet 11    metoprolol tartrate (LOPRESSOR) 25 mg tablet TAKE 1 TABLET (25 MG TOTAL) BY MOUTH EVERY 12 (TWELVE) HOURS 180 tablet 3    multivitamin (THERAGRAN) TABS Take 1 tablet by mouth daily       No current facility-administered medications for this visit  No Known Allergies   Immunizations: There is no immunization history on file for this patient  Health Maintenance:         Topic Date Due    Colorectal Cancer Screening  Never done    Lung Cancer Screening  Never done    Hepatitis C Screening  Completed         Topic Date Due    COVID-19 Vaccine (1) Never done    Pneumococcal Vaccine: 65+ Years (1 - PCV) Never done    Influenza Vaccine (1) 09/01/2022      Medicare Screening Tests and Risk Assessments:     Dean Luo is here for his Subsequent Wellness visit  Last Medicare Wellness visit information reviewed, patient interviewed and updates made to the record today  Health Risk Assessment:   Patient rates overall health as good  Patient feels that their physical health rating is same  Patient is very satisfied with their life  Eyesight was rated as same  Hearing was rated as same  Patient feels that their emotional and mental health rating is same  Patients states they are never, rarely angry  Patient states they are never, rarely unusually tired/fatigued  Pain experienced in the last 7 days has been none   Patient states that he has experienced no weight loss or gain in last 6 months  Depression Screening:   PHQ-2 Score: 0      Fall Risk Screening: In the past year, patient has experienced: no history of falling in past year      Home Safety:  Patient does not have trouble with stairs inside or outside of their home  Patient has working smoke alarms and has working carbon monoxide detector  Home safety hazards include: none  Nutrition:   Current diet is Regular  Medications:   Patient is currently taking over-the-counter supplements  OTC medications include: see medication list  Patient is able to manage medications  Activities of Daily Living (ADLs)/Instrumental Activities of Daily Living (IADLs):   Walk and transfer into and out of bed and chair?: Yes  Dress and groom yourself?: Yes    Bathe or shower yourself?: Yes    Feed yourself? Yes  Do your laundry/housekeeping?: Yes  Manage your money, pay your bills and track your expenses?: Yes  Make your own meals?: Yes    Do your own shopping?: Yes    Previous Hospitalizations:   Any hospitalizations or ED visits within the last 12 months?: No      Advance Care Planning:   Living will: No    Durable POA for healthcare: No      PREVENTIVE SCREENINGS      Cardiovascular Screening:    General: Screening Not Indicated and History Lipid Disorder      Diabetes Screening:     General: Screening Current      Abdominal Aortic Aneurysm (AAA) Screening:    Risk factors include: age between 73-69 yo and tobacco use        Lung Cancer Screening:     General: Screening Not Indicated      Hepatitis C Screening:    General: Screening Current    Screening, Brief Intervention, and Referral to Treatment (SBIRT)    Screening  Typical number of drinks in a day: 0  Typical number of drinks in a week: 0  Interpretation: Low risk drinking behavior  Single Item Drug Screening:  How often have you used an illegal drug (including marijuana) or a prescription medication for non-medical reasons in the past year? never    Single Item Drug Screen Score: 0  Interpretation: Negative screen for possible drug use disorder    No exam data present     Physical Exam:     /70   Pulse 69   Temp 98 2 °F (36 8 °C)   Ht 5' 9" (1 753 m)   Wt 105 kg (231 lb)   SpO2 95%   BMI 34 11 kg/m²     Physical Exam  Constitutional:       General: He is not in acute distress  Appearance: Normal appearance  He is not ill-appearing  HENT:      Head: Normocephalic and atraumatic  Right Ear: Tympanic membrane, ear canal and external ear normal       Left Ear: Tympanic membrane, ear canal and external ear normal       Nose: Nose normal       Mouth/Throat:      Mouth: Mucous membranes are moist       Pharynx: Oropharynx is clear  No oropharyngeal exudate or posterior oropharyngeal erythema  Eyes:      General: No scleral icterus  Right eye: No discharge  Left eye: No discharge  Extraocular Movements: Extraocular movements intact  Conjunctiva/sclera: Conjunctivae normal       Pupils: Pupils are equal, round, and reactive to light  Neck:      Comments: Anterior triangle muscles removed previously   Cardiovascular:      Rate and Rhythm: Normal rate and regular rhythm  Pulses: Normal pulses  Heart sounds: Normal heart sounds  No murmur heard  Pulmonary:      Effort: Pulmonary effort is normal  No respiratory distress  Breath sounds: Normal breath sounds  Abdominal:      General: Bowel sounds are normal       Palpations: Abdomen is soft  Tenderness: There is no abdominal tenderness  Musculoskeletal:         General: Normal range of motion  Cervical back: Normal range of motion and neck supple  Lymphadenopathy:      Cervical: No cervical adenopathy  Skin:     General: Skin is warm and dry  Capillary Refill: Capillary refill takes less than 2 seconds  Neurological:      General: No focal deficit present        Mental Status: He is alert and oriented to person, place, and time  Mental status is at baseline  Cranial Nerves: No cranial nerve deficit     Psychiatric:         Mood and Affect: Mood normal           Miles Man MD

## 2022-08-11 NOTE — PATIENT INSTRUCTIONS
Medicare Preventive Visit Patient Instructions  Thank you for completing your Welcome to Medicare Visit or Medicare Annual Wellness Visit today  Your next wellness visit will be due in one year (8/12/2023)  The screening/preventive services that you may require over the next 5-10 years are detailed below  Some tests may not apply to you based off risk factors and/or age  Screening tests ordered at today's visit but not completed yet may show as past due  Also, please note that scanned in results may not display below  Preventive Screenings:  Service Recommendations Previous Testing/Comments   Colorectal Cancer Screening  · Colonoscopy    · Fecal Occult Blood Test (FOBT)/Fecal Immunochemical Test (FIT)  · Fecal DNA/Cologuard Test  · Flexible Sigmoidoscopy Age: 39-70 years old   Colonoscopy: every 10 years (May be performed more frequently if at higher risk)  OR  FOBT/FIT: every 1 year  OR  Cologuard: every 3 years  OR  Sigmoidoscopy: every 5 years  Screening may be recommended earlier than age 39 if at higher risk for colorectal cancer  Also, an individualized decision between you and your healthcare provider will decide whether screening between the ages of 74-80 would be appropriate   Colonoscopy: Not on file  FOBT/FIT: Not on file  Cologuard: Not on file  Sigmoidoscopy: Not on file          Prostate Cancer Screening Individualized decision between patient and health care provider in men between ages of 53-78   Medicare will cover every 12 months beginning on the day after your 50th birthday PSA: No results in last 5 years           Hepatitis C Screening Once for adults born between 80 and 1965  More frequently in patients at high risk for Hepatitis C Hep C Antibody: Not on file    Screening Current   Diabetes Screening 1-2 times per year if you're at risk for diabetes or have pre-diabetes Fasting glucose: 97 mg/dL (7/12/2022)  A1C: 6 1 % (12/30/2018)  Screening Current   Cholesterol Screening Once every 5 years if you don't have a lipid disorder  May order more often based on risk factors  Lipid panel: 07/12/2022  Screening Not Indicated  History Lipid Disorder      Other Preventive Screenings Covered by Medicare:  1  Abdominal Aortic Aneurysm (AAA) Screening: covered once if your at risk  You're considered to be at risk if you have a family history of AAA or a male between the age of 73-68 who smoking at least 100 cigarettes in your lifetime  2  Lung Cancer Screening: covers low dose CT scan once per year if you meet all of the following conditions: (1) Age 50-69; (2) No signs or symptoms of lung cancer; (3) Current smoker or have quit smoking within the last 15 years; (4) You have a tobacco smoking history of at least 20 pack years (packs per day x number of years you smoked); (5) You get a written order from a healthcare provider  3  Glaucoma Screening: covered annually if you're considered high risk: (1) You have diabetes OR (2) Family history of glaucoma OR (3)  aged 48 and older OR (3)  American aged 72 and older  3  Osteoporosis Screening: covered every 2 years if you meet one of the following conditions: (1) Have a vertebral abnormality; (2) On glucocorticoid therapy for more than 3 months; (3) Have primary hyperparathyroidism; (4) On osteoporosis medications and need to assess response to drug therapy  5  HIV Screening: covered annually if you're between the age of 12-76  Also covered annually if you are younger than 13 and older than 72 with risk factors for HIV infection  For pregnant patients, it is covered up to 3 times per pregnancy      Immunizations:  Immunization Recommendations   Influenza Vaccine Annual influenza vaccination during flu season is recommended for all persons aged >= 6 months who do not have contraindications   Pneumococcal Vaccine   * Pneumococcal conjugate vaccine = PCV13 (Prevnar 13), PCV15 (Vaxneuvance), PCV20 (Prevnar 20)  * Pneumococcal polysaccharide vaccine = PPSV23 (Pneumovax) Adults 2364 years old: 1-3 doses may be recommended based on certain risk factors  Adults 72 years old: 1-2 doses may be recommended based off what pneumonia vaccine you previously received   Hepatitis B Vaccine 3 dose series if at intermediate or high risk (ex: diabetes, end stage renal disease, liver disease)   Tetanus (Td) Vaccine - COST NOT COVERED BY MEDICARE PART B Following completion of primary series, a booster dose should be given every 10 years to maintain immunity against tetanus  Td may also be given as tetanus wound prophylaxis  Tdap Vaccine - COST NOT COVERED BY MEDICARE PART B Recommended at least once for all adults  For pregnant patients, recommended with each pregnancy  Shingles Vaccine (Shingrix) - COST NOT COVERED BY MEDICARE PART B  2 shot series recommended in those aged 48 and above     Health Maintenance Due:      Topic Date Due    Colorectal Cancer Screening  Never done    Lung Cancer Screening  Never done    Hepatitis C Screening  Completed     Immunizations Due:      Topic Date Due    COVID-19 Vaccine (1) Never done    Pneumococcal Vaccine: 65+ Years (1 - PCV) Never done    Influenza Vaccine (1) 09/01/2022     Advance Directives   What are advance directives? Advance directives are legal documents that state your wishes and plans for medical care  These plans are made ahead of time in case you lose your ability to make decisions for yourself  Advance directives can apply to any medical decision, such as the treatments you want, and if you want to donate organs  What are the types of advance directives? There are many types of advance directives, and each state has rules about how to use them  You may choose a combination of any of the following:  · Living will: This is a written record of the treatment you want  You can also choose which treatments you do not want, which to limit, and which to stop at a certain time   This includes surgery, medicine, IV fluid, and tube feedings  · Durable power of  for healthcare [facility]): This is a written record that states who you want to make healthcare choices for you when you are unable to make them for yourself  This person, called a proxy, is usually a family member or a friend  You may choose more than 1 proxy  · Do not resuscitate (DNR) order:  A DNR order is used in case your heart stops beating or you stop breathing  It is a request not to have certain forms of treatment, such as CPR  A DNR order may be included in other types of advance directives  · Medical directive: This covers the care that you want if you are in a coma, near death, or unable to make decisions for yourself  You can list the treatments you want for each condition  Treatment may include pain medicine, surgery, blood transfusions, dialysis, IV or tube feedings, and a ventilator (breathing machine)  · Values history: This document has questions about your views, beliefs, and how you feel and think about life  This information can help others choose the care that you would choose  Why are advance directives important? An advance directive helps you control your care  Although spoken wishes may be used, it is better to have your wishes written down  Spoken wishes can be misunderstood, or not followed  Treatments may be given even if you do not want them  An advance directive may make it easier for your family to make difficult choices about your care  Cigarette Smoking and Your Health   Risks to your health if you smoke:  Nicotine and other chemicals found in tobacco damage every cell in your body  Even if you are a light smoker, you have an increased risk for cancer, heart disease, and lung disease  If you are pregnant or have diabetes, smoking increases your risk for complications  Benefits to your health if you stop smoking:   · You decrease respiratory symptoms such as coughing, wheezing, and shortness of breath  · You reduce your risk for cancers of the lung, mouth, throat, kidney, bladder, pancreas, stomach, and cervix  If you already have cancer, you increase the benefits of chemotherapy  You also reduce your risk for cancer returning or a second cancer from developing  · You reduce your risk for heart disease, blood clots, heart attack, and stroke  · You reduce your risk for lung infections, and diseases such as pneumonia, asthma, chronic bronchitis, and emphysema  · Your circulation improves  More oxygen can be delivered to your body  If you have diabetes, you lower your risk for complications, such as kidney, artery, and eye diseases  You also lower your risk for nerve damage  Nerve damage can lead to amputations, poor vision, and blindness  · You improve your body's ability to heal and to fight infections  For more information and support to stop smoking:   · [employer]  Phone: 2- 123 - [phone]  Web Address: [employer]  Weight Management   Why it is important to manage your weight:  Being overweight increases your risk of health conditions such as heart disease, high blood pressure, type 2 diabetes, and certain types of cancer  It can also increase your risk for osteoarthritis, sleep apnea, and other respiratory problems  Aim for a slow, steady weight loss  Even a small amount of weight loss can lower your risk of health problems  How to lose weight safely:  A safe and healthy way to lose weight is to eat fewer calories and get regular exercise  You can lose up about 1 pound a week by decreasing the number of calories you eat by 500 calories each day  Healthy meal plan for weight management:  A healthy meal plan includes a variety of foods, contains fewer calories, and helps you stay healthy  A healthy meal plan includes the following:  · Eat whole-grain foods more often  A healthy meal plan should contain fiber   Fiber is the part of grains, fruits, and vegetables that is not broken down by your body  Whole-grain foods are healthy and provide extra fiber in your diet  Some examples of whole-grain foods are whole-wheat breads and pastas, oatmeal, brown rice, and bulgur  · Eat a variety of vegetables every day  Include dark, leafy greens such as spinach, kale, toby greens, and mustard greens  Eat yellow and orange vegetables such as carrots, sweet potatoes, and winter squash  · Eat a variety of fruits every day  Choose fresh or canned fruit (canned in its own juice or light syrup) instead of juice  Fruit juice has very little or no fiber  · Eat low-fat dairy foods  Drink fat-free (skim) milk or 1% milk  Eat fat-free yogurt and low-fat cottage cheese  Try low-fat cheeses such as mozzarella and other reduced-fat cheeses  · Choose meat and other protein foods that are low in fat  Choose beans or other legumes such as split peas or lentils  Choose fish, skinless poultry (chicken or turkey), or lean cuts of red meat (beef or pork)  Before you cook meat or poultry, cut off any visible fat  · Use less fat and oil  Try baking foods instead of frying them  Add less fat, such as margarine, sour cream, regular salad dressing and mayonnaise to foods  Eat fewer high-fat foods  Some examples of high-fat foods include french fries, doughnuts, ice cream, and cakes  · Eat fewer sweets  Limit foods and drinks that are high in sugar  This includes candy, cookies, regular soda, and sweetened drinks  Exercise:  Exercise at least 30 minutes per day on most days of the week  Some examples of exercise include walking, biking, dancing, and swimming  You can also fit in more physical activity by taking the stairs instead of the elevator or parking farther away from stores  Ask your healthcare provider about the best exercise plan for you  © Copyright [employer] 2018 Information is for End User's use only and may not be sold, redistributed or otherwise used for commercial purposes   All illustrations and images included in CareNotes® are the copyrighted property of [employer]  or Chadd Ramirez

## 2022-08-11 NOTE — ASSESSMENT & PLAN NOTE
Lab Results   Component Value Date    EGFR 40 07/12/2022    EGFR 69 01/01/2019    EGFR 65 12/31/2018    CREATININE 1 69 (H) 07/12/2022    CREATININE 1 10 01/01/2019    CREATININE 1 16 12/31/2018     Will follow up future labs    May have been lisinopril induced

## 2022-08-15 ENCOUNTER — APPOINTMENT (OUTPATIENT)
Dept: LAB | Facility: HOSPITAL | Age: 72
End: 2022-08-15
Payer: MEDICARE

## 2022-08-15 DIAGNOSIS — Z12.5 SCREENING FOR PROSTATE CANCER: ICD-10-CM

## 2022-08-15 DIAGNOSIS — N18.30 STAGE 3 CHRONIC KIDNEY DISEASE, UNSPECIFIED WHETHER STAGE 3A OR 3B CKD (HCC): ICD-10-CM

## 2022-08-15 DIAGNOSIS — E03.9 HYPOTHYROIDISM, UNSPECIFIED TYPE: ICD-10-CM

## 2022-08-15 LAB
ALBUMIN SERPL BCP-MCNC: 4.3 G/DL (ref 3.5–5)
ALP SERPL-CCNC: 93 U/L (ref 46–116)
ALT SERPL W P-5'-P-CCNC: 25 U/L (ref 12–78)
ANION GAP SERPL CALCULATED.3IONS-SCNC: 9 MMOL/L (ref 4–13)
AST SERPL W P-5'-P-CCNC: 40 U/L (ref 5–45)
BILIRUB SERPL-MCNC: 0.32 MG/DL (ref 0.2–1)
BUN SERPL-MCNC: 20 MG/DL (ref 5–25)
CALCIUM SERPL-MCNC: 9.3 MG/DL (ref 8.3–10.1)
CHLORIDE SERPL-SCNC: 96 MMOL/L (ref 96–108)
CO2 SERPL-SCNC: 29 MMOL/L (ref 21–32)
CREAT SERPL-MCNC: 1.52 MG/DL (ref 0.6–1.3)
GFR SERPL CREATININE-BSD FRML MDRD: 45 ML/MIN/1.73SQ M
GLUCOSE P FAST SERPL-MCNC: 101 MG/DL (ref 65–99)
POTASSIUM SERPL-SCNC: 4.6 MMOL/L (ref 3.5–5.3)
PROT SERPL-MCNC: 8.7 G/DL (ref 6.4–8.4)
PSA SERPL-MCNC: 0.8 NG/ML (ref 0–4)
SODIUM SERPL-SCNC: 134 MMOL/L (ref 135–147)
T4 FREE SERPL-MCNC: 0.58 NG/DL (ref 0.76–1.46)
TSH SERPL DL<=0.05 MIU/L-ACNC: 59.12 UIU/ML (ref 0.45–4.5)

## 2022-08-15 PROCEDURE — 36415 COLL VENOUS BLD VENIPUNCTURE: CPT

## 2022-08-15 PROCEDURE — G0103 PSA SCREENING: HCPCS

## 2022-08-15 PROCEDURE — 84443 ASSAY THYROID STIM HORMONE: CPT

## 2022-08-15 PROCEDURE — 84439 ASSAY OF FREE THYROXINE: CPT

## 2022-08-15 PROCEDURE — 80053 COMPREHEN METABOLIC PANEL: CPT

## 2022-08-30 DIAGNOSIS — I10 ESSENTIAL (PRIMARY) HYPERTENSION: Primary | ICD-10-CM

## 2022-08-30 RX ORDER — AMLODIPINE BESYLATE 2.5 MG/1
2.5 TABLET ORAL DAILY
Qty: 90 TABLET | Refills: 1 | Status: SHIPPED | OUTPATIENT
Start: 2022-08-30

## 2023-01-11 NOTE — PROGRESS NOTES
Cardiology Follow Up    [facility] BOONE  1950  [id number]  [address] [facility] [address] [phone]    1  Essential (primary) hypertension        2  Pure hypercholesterolemia        3  Coronary arteriosclerosis        4  S/P right coronary artery (RCA) stent placement        5  Tobacco abuse            Interval History: Patient is here for cardiac follow-up  Patient had IMI with placement of a KATIANA in the RCA December 2018  The other vessels at that time had no significant disease   Echocardiogram done  December 2019 demonstrated an LVEF of 55% with no RWMA   He had no significant valvular heart disease   He is on DAP and statin  Lipid profile done July 2022 demonstrated total cholesterol of 151 with an HDL of 37 and a calculated LDL of 89   Patient is on atorvastatin    Patient continues to smoke and I did discuss with him the  inadvisability of this   Patient has had no chest pain or significant dyspnea  His vital signs are stable today  Amlodipine was discontinued by his PCP  His systolic pressures been in the 110- 120 range  Patient Active Problem List   Diagnosis   • ST elevation myocardial infarction involving right coronary artery (HCC)   • Tobacco abuse   • History of cancer tonsil   • S/P right coronary artery (RCA) stent placement   • Mixed hyperlipidemia   • Essential (primary) hypertension   • Stage 3 chronic kidney disease, unspecified whether stage 3a or 3b CKD ([facility] [address] )   • Family history of colon cancer in father     No past medical history on file    Social History     Socioeconomic History   • Marital status: /Civil Union     Spouse name: Not on file   • Number of children: Not on file   • Years of education: Not on file   • Highest education level: Not on file   Occupational History   • Not on file   Tobacco Use   • Smoking status: Every Day     Packs/day: 1 00     Years: 50 00     Pack years: 50 00     Types: Cigarettes   • Smokeless tobacco: Never   • Tobacco comments:     down to 1/2 ppd   Substance and Sexual Activity   • Alcohol use: No   • Drug use: No   • Sexual activity: Not on file   Other Topics Concern   • Not on file   Social History Narrative   • Not on file     Social Determinants of Health     Financial Resource Strain: Not on file   Food Insecurity: Not on file   Transportation Needs: Not on file   Physical Activity: Not on file   Stress: Not on file   Social Connections: Not on file   Intimate Partner Violence: Not on file   Housing Stability: Not on file      Family History   Problem Relation Age of Onset   • Heart disease Mother         80   • Colon cancer Father      Past Surgical History:   Procedure Laterality Date   • KNEE SURGERY         Current Outpatient Medications:   •  [employer] Aspirin Adult Low Dose 81 MG chewable tablet, CHEW 1 TABLET BY MOUTH DAILY, Disp: 90 tablet, Rfl: 11  •  levothyroxine (Synthroid) 50 mcg tablet, Take 1 tablet (50 mcg total) by mouth daily, Disp: 30 tablet, Rfl: 11  •  metoprolol tartrate (LOPRESSOR) 25 mg tablet, TAKE 1 TABLET (25 MG TOTAL) BY MOUTH EVERY 12 (TWELVE) HOURS, Disp: 180 tablet, Rfl: 3  •  multivitamin (THERAGRAN) TABS, Take 1 tablet by mouth daily, Disp: , Rfl:   •  amLODIPine (NORVASC) 2 5 mg tablet, Take 1 tablet (2 5 mg total) by mouth daily, Disp: 90 tablet, Rfl: 1  •  atorvastatin (LIPITOR) 10 mg tablet, TAKE 1 TABLET BY MOUTH EVERY DAY (Patient not taking: Reported on 1/20/2023), Disp: 90 tablet, Rfl: 3  No Known Allergies    Labs:not applicable  Imaging: No results found  Review of Systems:  Review of Systems   All other systems reviewed and are negative  Physical Exam:  /82 (BP Location: Right arm, Patient Position: Sitting, Cuff Size: Large)   Pulse 68   Ht 5' 9" (1 753 m)   Wt 111 kg (244 lb 11 2 oz)   BMI 36 14 kg/m²   Physical Exam  Vitals reviewed     Constitutional:       Appearance: He is well-developed  HENT:      Head: Normocephalic and atraumatic  Cardiovascular:      Rate and Rhythm: Normal rate  Heart sounds: Normal heart sounds  Pulmonary:      Effort: Pulmonary effort is normal       Breath sounds: Normal breath sounds  Musculoskeletal:      Cervical back: Normal range of motion  Skin:     General: Skin is warm and dry  Neurological:      Mental Status: He is alert and oriented to person, place, and time  Discussion/Summary:I will continue the patient's present medical regimen  The patient appears well compensated  I have asked the patient to call if there is a problem in the interim otherwise I will see the patient in six months time

## 2023-01-20 ENCOUNTER — OFFICE VISIT (OUTPATIENT)
Dept: CARDIOLOGY CLINIC | Facility: CLINIC | Age: 73
End: 2023-01-20

## 2023-01-20 VITALS
HEART RATE: 68 BPM | SYSTOLIC BLOOD PRESSURE: 140 MMHG | HEIGHT: 69 IN | DIASTOLIC BLOOD PRESSURE: 82 MMHG | BODY MASS INDEX: 36.24 KG/M2 | WEIGHT: 244.7 LBS

## 2023-01-20 DIAGNOSIS — Z95.5 S/P RIGHT CORONARY ARTERY (RCA) STENT PLACEMENT: ICD-10-CM

## 2023-01-20 DIAGNOSIS — I25.10 CORONARY ARTERIOSCLEROSIS: ICD-10-CM

## 2023-01-20 DIAGNOSIS — E78.00 PURE HYPERCHOLESTEROLEMIA: ICD-10-CM

## 2023-01-20 DIAGNOSIS — I10 ESSENTIAL (PRIMARY) HYPERTENSION: Primary | ICD-10-CM

## 2023-01-20 DIAGNOSIS — Z72.0 TOBACCO ABUSE: ICD-10-CM

## 2023-02-03 ENCOUNTER — RA CDI HCC (OUTPATIENT)
Dept: OTHER | Facility: HOSPITAL | Age: 73
End: 2023-02-03

## 2023-02-03 NOTE — PROGRESS NOTES
[facility] [address]  coding opportunities       Chart reviewed, no opportunity found: CHART REVIEWED, NO OPPORTUNITY FOUND        Patients Insurance     Medicare Insurance: Medicare

## 2023-03-27 ENCOUNTER — OFFICE VISIT (OUTPATIENT)
Dept: FAMILY MEDICINE CLINIC | Facility: CLINIC | Age: 73
End: 2023-03-27

## 2023-03-27 VITALS
SYSTOLIC BLOOD PRESSURE: 122 MMHG | WEIGHT: 238 LBS | DIASTOLIC BLOOD PRESSURE: 80 MMHG | OXYGEN SATURATION: 98 % | HEART RATE: 67 BPM | TEMPERATURE: 97.9 F | BODY MASS INDEX: 35.25 KG/M2 | HEIGHT: 69 IN

## 2023-03-27 DIAGNOSIS — E66.01 OBESITY, MORBID (HCC): ICD-10-CM

## 2023-03-27 DIAGNOSIS — N18.30 STAGE 3 CHRONIC KIDNEY DISEASE, UNSPECIFIED WHETHER STAGE 3A OR 3B CKD (HCC): ICD-10-CM

## 2023-03-27 DIAGNOSIS — M79.605 LEFT LEG PAIN: Primary | ICD-10-CM

## 2023-03-27 RX ORDER — METHYLPREDNISOLONE 4 MG/1
TABLET ORAL
Qty: 21 EACH | Refills: 0 | Status: SHIPPED | OUTPATIENT
Start: 2023-03-27

## 2023-03-27 RX ORDER — CYCLOBENZAPRINE HCL 5 MG
5 TABLET ORAL
Qty: 30 TABLET | Refills: 0 | Status: SHIPPED | OUTPATIENT
Start: 2023-03-27

## 2023-03-27 NOTE — ASSESSMENT & PLAN NOTE
Lab Results   Component Value Date    EGFR 45 08/15/2022    EGFR 40 07/12/2022    EGFR 69 01/01/2019    CREATININE 1 52 (H) 08/15/2022    CREATININE 1 69 (H) 07/12/2022    CREATININE 1 10 01/01/2019     Avoid nsaids, will send in prednisone

## 2023-03-27 NOTE — PROGRESS NOTES
Assessment/Plan:         Problem List Items Addressed This Visit        Genitourinary    Stage 3 chronic kidney disease, unspecified whether stage 3a or 3b CKD ([facility] )     Lab Results   Component Value Date    EGFR 45 08/15/2022    EGFR 40 07/12/2022    EGFR 69 01/01/2019    CREATININE 1 52 (H) 08/15/2022    CREATININE 1 69 (H) 07/12/2022    CREATININE 1 10 01/01/2019     Avoid nsaids, will send in prednisone             Other    Obesity, morbid ([facility] )   Other Visit Diagnoses     Left leg pain    -  Primary    Relevant Medications    methylPREDNISolone 4 MG tablet therapy pack    cyclobenzaprine (FLEXERIL) 5 mg tablet            Subjective:      Patient ID: Lana Garner is a 71 y o  male  HPI    1 5 weeks of left hip , butt, and leg pain  Raking leaves and doing yard work, believes he over exerted himself  Taking nsaids at home and resting with mild relief  Denies any changes in bowel habits or urination  Denies anyu rashes  Ambulates with mild discomfort  The following portions of the patient's history were reviewed and updated as appropriate:   Past Medical History:  He has no past medical history on file ,  _______________________________________________________________________  Medical Problems:  does not have any pertinent problems on file ,  _______________________________________________________________________  Past Surgical History:   has a past surgical history that includes Knee surgery  ,  _______________________________________________________________________  Family History:  family history includes Colon cancer in his father; Heart disease in his mother ,  _______________________________________________________________________  Social History:   reports that he has been smoking cigarettes  He has a 50 00 pack-year smoking history   He has never used smokeless tobacco  He reports that he does not drink alcohol and does not use "drugs  ,  _______________________________________________________________________  Allergies:  has No Known Allergies     _______________________________________________________________________  Current Outpatient Medications   Medication Sig Dispense Refill   • atorvastatin (LIPITOR) 10 mg tablet TAKE 1 TABLET BY MOUTH EVERY DAY (Patient taking differently: Take by mouth daily Take 5 mg) 90 tablet 3   • [facility] Aspirin Adult Low Dose 81 MG chewable tablet CHEW 1 TABLET BY MOUTH DAILY 90 tablet 11   • cyclobenzaprine (FLEXERIL) 5 mg tablet Take 1 tablet (5 mg total) by mouth daily at bedtime 30 tablet 0   • levothyroxine (Synthroid) 50 mcg tablet Take 1 tablet (50 mcg total) by mouth daily 30 tablet 11   • methylPREDNISolone 4 MG tablet therapy pack Use as directed on package 21 each 0   • metoprolol tartrate (LOPRESSOR) 25 mg tablet TAKE 1 TABLET (25 MG TOTAL) BY MOUTH EVERY 12 (TWELVE) HOURS 180 tablet 3   • multivitamin (THERAGRAN) TABS Take 1 tablet by mouth daily       No current facility-administered medications for this visit      _______________________________________________________________________  Review of Systems   Constitutional: Negative for activity change, appetite change, fatigue and fever  HENT: Negative for congestion, rhinorrhea and sore throat  Eyes: Negative for visual disturbance  Respiratory: Negative for cough and shortness of breath  Cardiovascular: Negative for chest pain  Gastrointestinal: Negative for nausea and vomiting  Genitourinary: Negative for difficulty urinating  Musculoskeletal: Positive for arthralgias and myalgias  Negative for back pain, gait problem, joint swelling, neck pain and neck stiffness           Objective:  Vitals:    03/27/23 0841   BP: 122/80   BP Location: Left arm   Patient Position: Sitting   Cuff Size: Standard   Pulse: 67   Temp: 97 9 °F (36 6 °C)   SpO2: 98%   Weight: 108 kg (238 lb)   Height: 5' 9\" (1 753 m)     Body mass index is 35 15 " kg/m²  Physical Exam  Constitutional:       General: He is not in acute distress  Appearance: Normal appearance  HENT:      Head: Normocephalic and atraumatic  Pulmonary:      Effort: Pulmonary effort is normal  No respiratory distress  Musculoskeletal:      Lumbar back: No spasms, tenderness or bony tenderness  Decreased range of motion  Negative right straight leg raise test and negative left straight leg raise test  No scoliosis  Right hip: Normal       Left hip: Normal       Right upper leg: Normal       Left upper leg: Normal       Right knee: Normal       Left knee: Normal    Neurological:      Mental Status: He is alert and oriented to person, place, and time  Mental status is at baseline     Psychiatric:         Mood and Affect: Mood normal          Behavior: Behavior normal

## 2023-04-04 ENCOUNTER — PATIENT MESSAGE (OUTPATIENT)
Dept: FAMILY MEDICINE CLINIC | Facility: CLINIC | Age: 73
End: 2023-04-04

## 2023-04-04 DIAGNOSIS — M79.605 LEFT LEG PAIN: Primary | ICD-10-CM

## 2023-04-04 RX ORDER — METHYLPREDNISOLONE 4 MG/1
TABLET ORAL
Qty: 21 EACH | Refills: 0 | Status: SHIPPED | OUTPATIENT
Start: 2023-04-04

## 2023-04-04 NOTE — TELEPHONE ENCOUNTER
From: Rona Howard  To: Saqib Kumar  Sent: 4/4/2023 9:21 AM EDT  Subject: Leg Pain    Finished the medication  Pain subsided when taking the 2 tablet dose  Pain returned after the medication was finished  Still taking the muscle relaxer but no improvement  Recommendations?

## 2023-04-07 ENCOUNTER — PATIENT MESSAGE (OUTPATIENT)
Dept: FAMILY MEDICINE CLINIC | Facility: CLINIC | Age: 73
End: 2023-04-07

## 2023-04-07 DIAGNOSIS — M79.605 LEFT LEG PAIN: Primary | ICD-10-CM

## 2023-04-07 DIAGNOSIS — I21.11 ST ELEVATION MYOCARDIAL INFARCTION INVOLVING RIGHT CORONARY ARTERY (HCC): ICD-10-CM

## 2023-04-07 NOTE — TELEPHONE ENCOUNTER
From: Homar Cintron  To: Eloise Mallory  Sent: 4/4/2023 9:21 AM EDT  Subject: Leg Pain    Finished the medication  Pain subsided when taking the 2 tablet dose  Pain returned after the medication was finished  Still taking the muscle relaxer but no improvement  Recommendations?

## 2023-04-30 DIAGNOSIS — M79.605 LEFT LEG PAIN: ICD-10-CM

## 2023-05-01 RX ORDER — HYDROCODONE BITARTRATE AND ACETAMINOPHEN 5; 325 MG/1; MG/1
1 TABLET ORAL EVERY 8 HOURS PRN
Qty: 30 TABLET | Refills: 0 | Status: SHIPPED | OUTPATIENT
Start: 2023-05-01

## 2023-05-01 NOTE — TELEPHONE ENCOUNTER
Medication:  PDMP    1  [id number] 04/20/2023 04/20/2023 ACETAMINOPHEN 325 MG / HYDROcodone BITARTRATE 5 MG ORAL TABLET (Tablet)  30 0 10 5 0 MG/325 0 MG NA [id number]  Other 0 / 0 PA    1  [id number] 04/11/2023 04/11/2023 ACETAMINOPHEN 325 MG / HYDROcodone BITARTRATE 5 MG ORAL TABLET (Tablet)  30 0 10 5 0 MG/325 0 MG NA [id number]            Active agreement on file -No

## 2023-05-11 ENCOUNTER — EVALUATION (OUTPATIENT)
Dept: PHYSICAL THERAPY | Facility: CLINIC | Age: 73
End: 2023-05-11

## 2023-05-11 DIAGNOSIS — M79.605 LEFT LEG PAIN: Primary | ICD-10-CM

## 2023-05-11 DIAGNOSIS — M79.605 LEFT LEG PAIN: ICD-10-CM

## 2023-05-11 DIAGNOSIS — M54.16 LUMBAR RADICULOPATHY: ICD-10-CM

## 2023-05-11 NOTE — PROGRESS NOTES
PT Evaluation     Today's date: 2023  Patient name: Anibal Dakins  : 1950  MRN: [id number]  Referring provider: Shant Shay, *  Dx:   Encounter Diagnosis     ICD-10-CM    1  Left leg pain  M79 605       2  Lumbar radiculopathy  M54 16           Start Time: 1146  Stop Time: 1229  Total time in clinic (min): 43 minutes    Assessment  Assessment details: Pt is a 71 y o  male presenting to PT services with c/o LLE pain  Pt has high symptom irritability in prone, increased pain with repeated lumbar extension, and no change in symptoms with repeated lumbar flexion in sitting or supine  Pt has BLE global strength WNL  Pt has minimal lumbar restriction with lumbar AROM extension and lumbar flexion  Pt has difficulty maintaining TA contraction while breathing  Pt has positive L tibial neural tension  PT added to pt's HEP, pt verbalized and demonstrated understanding of proper form  Pt is a good candidate for skilled physical therapy in order to decrease LLE radicular pain, improve lumbar mobility, increase core strength and functional ability  Impairments: abnormal or restricted ROM, activity intolerance, impaired balance, impaired physical strength, lacks appropriate home exercise program, pain with function and safety issue    Goals  STG (3 weeks):  1  Pt will have no pain past L knee   2  Pt will have pain free lumbar extension AROM    LTG (5 weeks):  1  Pt will be independent in HEP  2  Pt will report no LLE pain  3  Pt will be able to garden without increase pain  4  Pt will be able to tolerate prone positioning without increase in pain  5  Pt will be able to maintain TA activation with dynamic movements       Plan  Patient would benefit from: skilled physical therapy  Planned modality interventions: biofeedback, cryotherapy, TENS, thermotherapy: hydrocollator packs, traction and unattended electrical stimulation  Other planned modality interventions: IASTM, MFDc  Planned therapy interventions: abdominal trunk stabilization, joint mobilization, manual therapy, massage, balance, neuromuscular re-education, patient education, postural training, strengthening, stretching, therapeutic activities, therapeutic exercise, home exercise program, functional ROM exercises and flexibility  Frequency: 1x week  Duration in weeks: 6  Plan of Care beginning date: 2023  Plan of Care expiration date: 2023  Treatment plan discussed with: patient        Subjective Evaluation    History of Present Illness  Mechanism of injury: Pt reports that the end of March he started with L leg pain  The pain began about 3 weeks after he fell  Pt reports that he is taking a medication for 3 weeks and it has helped to allow him to sleep better  Pt states that before he started taking the medication he was outside doing yard work and then the next day is when the pain started  She he is not sure if it was the yard work or the fall that started the pain  Pain  Current pain ratin  At best pain ratin  At worst pain ratin  Location: L knee to mid L glute   Quality: dull ache, sharp and needle-like (numbness)  Relieving factors: change in position and medications (laying flat on back, slow movements)  Aggravating factors: stair climbing (quick movements, lifting heavy things, walking downhill)    Social Support  Steps to enter house: yes (2 steps, no hand rails)  Stairs in house: yes (1 flight, bilateral hand rails)   Lives in: multiple-level home  Lives with: spouse (1 dog)      Diagnostic Tests  Abnormal x-ray: Spondylitic grade 1 L5-S1 anterolisthesis    Treatments  Previous treatment: medication  Patient Goals  Patient goals for therapy: decreased pain, increased motion, increased strength and return to sport/leisure activities          Objective     Active Range of Motion     Lumbar   Flexion:  WFL  Extension:  Restriction level: minimal  Mechanical "Assessment    Cervical      Thoracic      Lumbar    Lying flexion: repeated movements  Pain location: no change  Sitting flexion: repeated movements  Pain location: no change  Standing extension: repeated movements  Pain intensity: worse  Lying extension: sustained positions  Pain intensity: worse    Strength/Myotome Testing     Left Hip   Planes of Motion   Flexion: 5  Abduction: 5  Adduction: 5    Right Hip   Planes of Motion   Flexion: 5  Abduction: 5  Adduction: 5    Left Knee   Flexion: 5  Extension: 5    Right Knee   Flexion: 5  Extension: 5    Muscle Activation   Patient unable to activate left transverse abdominals and right transverse abdominals  Tests     Lumbar     Left   Positive passive SLR  Negative crossed SLR  Right   Negative crossed SLR and passive SLR  Precautions: Hx cancer, CKD, HTN, Hx MI  Access Code: [email or web address]    POC expires Auth Status Unit limit Start date  Expiration date PT/OT + Visit Limit?    6/23/23 Not required BOMN  12/31/23 BOMN                                         Date 5/11            Re-Eval             FOTO             Manuals                                                                 Neuro Re-Ed             PPT 3\"x30                                                                                          Ther Ex             Bike             Tibial nerve glide 10\"x10 sitting                                                                                          Ther Activity                                       Gait Training                                       Modalities                                           FOTO QR CODE:   "

## 2023-05-12 NOTE — TELEPHONE ENCOUNTER
Medication:  PDMP     [id number] 05/01/2023 05/01/2023 ACETAMINOPHEN 325 MG / HYDROcodone BITARTRATE 5 MG ORAL TABLET (Tablet)  30 0 10 5 0 MG/325 0 MG NA [id number]  Other 0 / 0 PA    1  [id number] 04/20/2023 04/20/2023 ACETAMINOPHEN 325 MG / HYDROcodone BITARTRATE 5 MG ORAL TABLET (Tablet)  30 0 10 5 0 MG/325 0 MG NA [id number]  Other 0 / 0 PA    1  [id number] 04/11/2023 04/11/2023 ACETAMINOPHEN 325 MG / HYDROcodone BITARTRATE 5 MG ORAL TABLET (Tablet)  30 0 10 5 0 MG/325 0 MG NA [id number]  Other 0 / 0 PA      Active agreement on file -No

## 2023-05-16 RX ORDER — HYDROCODONE BITARTRATE AND ACETAMINOPHEN 5; 325 MG/1; MG/1
1 TABLET ORAL EVERY 8 HOURS PRN
Qty: 30 TABLET | Refills: 0 | Status: SHIPPED | OUTPATIENT
Start: 2023-05-16

## 2023-05-19 ENCOUNTER — OFFICE VISIT (OUTPATIENT)
Dept: PHYSICAL THERAPY | Facility: CLINIC | Age: 73
End: 2023-05-19

## 2023-05-19 DIAGNOSIS — M54.16 LUMBAR RADICULOPATHY: ICD-10-CM

## 2023-05-19 DIAGNOSIS — M79.605 LEFT LEG PAIN: Primary | ICD-10-CM

## 2023-05-19 NOTE — PROGRESS NOTES
"Daily Note     Today's date: 2023  Patient name: Amanda Sinclair  : 1950  MRN: [id number]  Referring provider: Galo Ramos, *  Dx:   Encounter Diagnosis     ICD-10-CM    1  Left leg pain  M79 605       2  Lumbar radiculopathy  M54 16                      Subjective: Pt states his L lateral hip is sore upon arrival   He reports compliance with HEP but notes no significant change  Objective: See treatment diary below      Assessment: Experiences exacerbation of L hip sx with flexion, adduction, IR and with bridging  Pain is not present with PPT but returns with return to neutral pelvis  Fatigue noted with hip strengthening and difficulty stabilizing pelvis with this  Plan: Continue per plan of care  Precautions: Hx cancer, CKD, HTN, Hx MI  Access Code: [email or web address]    POC expires Auth Status Unit limit Start date  Expiration date PT/OT + Visit Limit?    23 Not required BOMN  23 BOMN                                         Date            Re-Eval             FOTO             Manuals                                                                 Neuro Re-Ed             PPT 3\"x30 x10           TA bridge  2x10                                                                            Ther Ex             Bike  5' hip ROM           Tibial nerve glide 10\"x10 sitting            HS stretch  30\"x3           LTR             Piriformis stretch  30\"x3           S/l hip abd  2x10                                     Ther Activity                                       Gait Training                                       Modalities                                            "

## 2023-05-25 ENCOUNTER — OFFICE VISIT (OUTPATIENT)
Dept: PHYSICAL THERAPY | Facility: CLINIC | Age: 73
End: 2023-05-25

## 2023-05-25 DIAGNOSIS — M79.605 LEFT LEG PAIN: Primary | ICD-10-CM

## 2023-05-25 DIAGNOSIS — M54.16 LUMBAR RADICULOPATHY: ICD-10-CM

## 2023-05-25 NOTE — PROGRESS NOTES
"Daily Note     Today's date: 2023  Patient name: Laurie Sawyer  : 1950  MRN: [id number]  Referring provider: Lata Kirkpatrick, *  Dx:   Encounter Diagnosis     ICD-10-CM    1  Left leg pain  M79 605       2  Lumbar radiculopathy  M54 16                      Subjective: Pt reports he is experiencing muscle aches from doing chores around the house  He reports he still experiences lateral L thigh/hip pain, especially when rolling onto L side  He reports some decrease in intensity  Objective: See treatment diary below      Assessment: No change in L lateral thigh sx following LAD  Pt experienced increased anterior thigh \"tingling\" with LAD  Some relief following SKTC with LLE and continued relief with DKTC with feet on physioball  Prolonged standing in one position brings on L lateral thigh pain - better with walking  Plan: Continue per plan of care  Precautions: Hx cancer, CKD, HTN, Hx MI  Access Code: [email or web address]    POC expires Auth Status Unit limit Start date  Expiration date PT/OT + Visit Limit?    23 Not required BOMN  23 BOMN                                         Date           Re-Eval             FOTO             Manuals             LAD LLE   AF                                                 Neuro Re-Ed             PPT 3\"x30 x10 x10          TA bridge  2x10           Multifidus press   BTB x20                                                              Ther Ex             Bike  5' hip ROM 5' hip ROM          Tibial nerve glide 10\"x10 sitting            HS stretch  30\"x3 30\"x3          LTR   x10          Piriformis stretch  30\"x3 HEP          S/l hip abd  2x10 2x10          SKTC   10\"x10          DKTC   X10, x20 c PB          Ther Activity                                       Gait Training                                       Modalities                                            "

## 2023-06-01 ENCOUNTER — OFFICE VISIT (OUTPATIENT)
Dept: PHYSICAL THERAPY | Facility: CLINIC | Age: 73
End: 2023-06-01

## 2023-06-01 DIAGNOSIS — I21.11 ST ELEVATION MYOCARDIAL INFARCTION INVOLVING RIGHT CORONARY ARTERY (HCC): ICD-10-CM

## 2023-06-01 DIAGNOSIS — M79.605 LEFT LEG PAIN: Primary | ICD-10-CM

## 2023-06-01 DIAGNOSIS — M54.16 LUMBAR RADICULOPATHY: ICD-10-CM

## 2023-06-01 RX ORDER — ATORVASTATIN CALCIUM 10 MG/1
10 TABLET, FILM COATED ORAL DAILY
Qty: 90 TABLET | Refills: 0 | Status: SHIPPED | OUTPATIENT
Start: 2023-06-01

## 2023-06-01 NOTE — PROGRESS NOTES
"Daily Note     Today's date: 2023  Patient name: Yudi Aleman  : 1950  MRN: [id number]  Referring provider: Nirmal Barbosa, *  Dx:   Encounter Diagnosis     ICD-10-CM    1  Left leg pain  M79 605       2  Lumbar radiculopathy  M54 16           Start Time: 801  Stop Time: 0845  Total time in clinic (min): 44 minutes    Subjective: Pt reports that his leg pain increased substantially after last visit  Pt reports that he feels a 4/10 pain upon arrival  He states that he has started to swim in the pool and it feels good  Objective: See treatment diary below      Assessment: PT focused on light nerve mobilizations and TA activation, pt responded favorably with decreased pain reporting 1/10 pain upon conclusion of session  PT suspects that pt benefits from flexion based interventions with decreased pain, will continue to assess in future sessions  Pt will continue to benefit from skilled physical therapy in order to improve core and BLE strength, decreased radicular pain, and improve functional tolerance  Plan: Continue per plan of care  Precautions: Hx cancer, CKD, HTN, Hx MI  Access Code: [email or web address]    POC expires Auth Status Unit limit Start date  Expiration date PT/OT + Visit Limit?    23 Not required BOMN  23 BOMN                                         Date          Re-Eval             FOTO             Manuals             LAD LLE   AF                                                 Neuro Re-Ed             PPT 3\"x30 x10 x10          TA bridge  2x10           Multifidus press   BTB x20          PB press    Sit 5\"x15         PB diagonal press    5\"x10 ea                                   Ther Ex             Bike  5' hip ROM 5' hip ROM 6' hip ROM         Tibial nerve glide 10\"x10 sitting   5\"x10 sit     5\"x10 tensioner         HS stretch  30\"x3 30\"x3 30\"x3 sit         LTR   x10          Piriformis stretch  30\"x3 HEP          S/l hip abd  2x10 2x10      " "    SKTC   10\"x10          DKTC   X10, x20 c PB          PB 3-way stretch    10\"x10         Repeated lumbar flexion    x30 sit         Ther Activity                                       Gait Training                                       Modalities                                            "

## 2023-06-08 ENCOUNTER — OFFICE VISIT (OUTPATIENT)
Dept: PHYSICAL THERAPY | Facility: CLINIC | Age: 73
End: 2023-06-08
Payer: MEDICARE

## 2023-06-08 DIAGNOSIS — M79.605 LEFT LEG PAIN: Primary | ICD-10-CM

## 2023-06-08 DIAGNOSIS — M54.16 LUMBAR RADICULOPATHY: ICD-10-CM

## 2023-06-08 DIAGNOSIS — M79.605 LEFT LEG PAIN: ICD-10-CM

## 2023-06-08 PROCEDURE — 97110 THERAPEUTIC EXERCISES: CPT

## 2023-06-08 PROCEDURE — 97112 NEUROMUSCULAR REEDUCATION: CPT

## 2023-06-08 RX ORDER — HYDROCODONE BITARTRATE AND ACETAMINOPHEN 5; 325 MG/1; MG/1
1 TABLET ORAL EVERY 8 HOURS PRN
Qty: 30 TABLET | Refills: 0 | Status: SHIPPED | OUTPATIENT
Start: 2023-06-08

## 2023-06-08 NOTE — PROGRESS NOTES
"Daily Note     Today's date: 2023  Patient name: Evgeny Fallon  : 1950  MRN: [id number]  Referring provider: Fatuma Colin, *  Dx:   Encounter Diagnosis     ICD-10-CM    1  Left leg pain  M79 605       2  Lumbar radiculopathy  M54 16                      Subjective: Patient reports his pain is usually better in the morning but increases later in the day  He does feel the intensity of his pain is less than prior to starting PT  Objective: See treatment diary below      Assessment: Patient experiences pain with unilateral core stabilization, primarily when activating L obliques  Pt completes lumbar flexion exercises without reproduction of L hip pain  Demonstrates decreased lumbar flexion ROM  Continued pain with transitional movements (rolling, standing from chair)  Plan: Continue per plan of care  Precautions: Hx cancer, CKD, HTN, Hx MI  Access Code: [email or web address]    POC expires Auth Status Unit limit Start date  Expiration date PT/OT + Visit Limit?    23 Not required BOMN  23 BOMN                                         Date         Re-Eval             FOTO     80/73        Manuals             LAD LLE   AF                                                 Neuro Re-Ed             PPT 3\"x30 x10 x10          TA bridge  2x10           Multifidus press   BTB x20          PB press    Sit 5\"x15 supine 5\"x20        PB diagonal press    5\"x10 ea 5\"x10 ea        TA bracing     3\"x20        plank     24\" box 15\"x4        Ther Ex             Bike  5' hip ROM 5' hip ROM 6' hip ROM 6' hip ROM        Tibial nerve glide 10\"x10 sitting   5\"x10 sit     5\"x10 tensioner 5\"x10 sit        HS stretch  30\"x3 30\"x3 30\"x3 sit 30\"x3 sit        LTR   x10          Piriformis stretch  30\"x3 HEP          S/l hip abd  2x10 2x10          SKTC   10\"x10          DKTC   X10, x20 c PB          PB 3-way stretch    10\"x10 10\"x10        Repeated lumbar flexion    x30 sit       " "  Quad rockers     10\"x10        Ther Activity                                       Gait Training                                       Modalities                                            "

## 2023-06-08 NOTE — TELEPHONE ENCOUNTER
Medication:  PDMP     [id number] 05/16/2023 05/16/2023 ACETAMINOPHEN 325 MG / HYDROcodone BITARTRATE 5 MG ORAL TABLET (Tablet) 30 0 10 5 0 MG/325 0 MG NA [facility] Other 0 / 0 [place]     1 [id number] 05/01/2023 05/01/2023 ACETAMINOPHEN 325 MG / HYDROcodone BITARTRATE 5 MG ORAL TABLET (Tablet) 30 0 10 5 0 MG/325 0 MG NA [facility] Other 0 / 0 [place]    1 [id number] 04/20/2023 04/20/2023 ACETAMINOPHEN 325 MG / HYDROcodone BITARTRATE 5 MG ORAL TABLET (Tablet) 30 0 10 5 0 MG/325 0 MG NA [facility] Other 0 / 0 [place]        Active agreement on file -No

## 2023-06-15 ENCOUNTER — EVALUATION (OUTPATIENT)
Dept: PHYSICAL THERAPY | Facility: CLINIC | Age: 73
End: 2023-06-15
Payer: MEDICARE

## 2023-06-15 DIAGNOSIS — M54.16 LUMBAR RADICULOPATHY: ICD-10-CM

## 2023-06-15 DIAGNOSIS — M79.605 LEFT LEG PAIN: Primary | ICD-10-CM

## 2023-06-15 PROCEDURE — 97110 THERAPEUTIC EXERCISES: CPT

## 2023-06-15 NOTE — PROGRESS NOTES
PT Discharge    Today's date: 6/15/2023  Patient name: Tanya Fernandes  : 1950  MRN: [id number]  Referring provider: Sim Acosta, *  Dx:   Encounter Diagnosis     ICD-10-CM    1  Left leg pain  M79 605       2  Lumbar radiculopathy  M54 16           Start Time: 0800  Stop Time: 0824  Total time in clinic (min): 24 minutes    Assessment  Assessment details: Pt is a 71 y o  male presenting to PT services with c/o LLE pain  Pt has been participating in PT for 5 weeks and has made significant improvement in regards to decreased LLE pain, improved core activation, and improved functional tolerance and ability  PT and pt have discussed and agreed that pt has met maximum benefit of skilled physical therapy and will continue to benefit from independent performance of HEP  Pt was informed that if he has any questions or concerns, he is welcome to contact PT at any time  Pt is discharged from skilled physical therapy at this time  Goals  STG (3 weeks):  1  Pt will have no pain past L knee GOAL MET  2  Pt will have pain free lumbar extension AROM GOAL MET    LTG (5 weeks):  1  Pt will be independent in HEP GOAL MET  2  Pt will report no LLE pain GOAL MET  3  Pt will be able to garden without increase pain  GOAL MET  4  Pt will be able to tolerate prone positioning without increase in pain GOAL MET  5  Pt will be able to maintain TA activation with dynamic movements  GOAL MET    Plan  Planned therapy interventions: home exercise program  Treatment plan discussed with: patient        Subjective Evaluation    History of Present Illness  Mechanism of injury: Pt reports that he feels about 80% better since beginning PT  Walking is no longer a problem, but standing in one spot, and sitting in some chairs is still troublesome  He states that very rarely he is getting the pain down his leg  He states that he can now press on his leg without pain     Pain  Current pain ratin  At best pain ratin  At "worst pain ratin  Location: L low back   Quality: sharp  Relieving factors: change in position and medications (laying flat on back, slow movements)  Exacerbated by: quick movements  Progression: improved    Social Support  Steps to enter house: yes (2 steps, no hand rails)  Stairs in house: yes (1 flight, bilateral hand rails)   Lives in: multiple-level home  Lives with: spouse (1 dog)      Diagnostic Tests  Abnormal x-ray: Spondylitic grade 1 L5-S1 anterolisthesis  Treatments  Previous treatment: medication  Patient Goals  Patient goals for therapy: decreased pain, increased motion, increased strength and return to sport/leisure activities  Patient goal: \"to start jogging again\"        Objective     Active Range of Motion     Lumbar   Flexion:  WFL  Extension:  WFL  Left lateral flexion:  WFL  Right lateral flexion:  WFL and with pain    Strength/Myotome Testing     Left Hip   Planes of Motion   Flexion: 5  Abduction: 5  Adduction: 5    Right Hip   Planes of Motion   Flexion: 5  Abduction: 5  Adduction: 5    Left Knee   Flexion: 5  Extension: 5    Right Knee   Flexion: 5  Extension: 5    Muscle Activation   Patient able to activate left transverse abdominals and right transverse abdominals  Tests     Lumbar     Left   Negative crossed SLR and passive SLR  Right   Negative crossed SLR and passive SLR  Precautions: Hx cancer, CKD, HTN, Hx MI  Access Code: [email or web address]    POC expires Auth Status Unit limit Start date  Expiration date PT/OT + Visit Limit?    23 Not required BOMN  23 BOMN                                             Date 5/11 5/19 5/25 6/1 6/8 6/15       Re-Eval             FOTO     80/73        Manuals             LAD LLE   AF                                                 Neuro Re-Ed             PPT 3\"x30 x10 x10          TA bridge  2x10           Multifidus press   BTB x20          PB press    Sit 5\"x15 supine 5\"x20        PB diagonal press    5\"x10 ea 5\"x10 ea      " "  TA bracing     3\"x20        plank     24\" box 15\"x4        Ther Ex             Bike  5' hip ROM 5' hip ROM 6' hip ROM 6' hip ROM 6' hip ROM       Tibial nerve glide 10\"x10 sitting   5\"x10 sit     5\"x10 tensioner 5\"x10 sit 5\"x10 sit        HS stretch  30\"x3 30\"x3 30\"x3 sit 30\"x3 sit 30\"x3 sit       LTR   x10          Piriformis stretch  30\"x3 HEP          S/l hip abd  2x10 2x10          SKTC   10\"x10          DKTC   X10, x20 c PB          PB 3-way stretch    10\"x10 10\"x10        Repeated lumbar flexion    x30 sit         Quad rockers     10\"x10        Ther Activity                                       Gait Training                                       Modalities                                              "

## 2023-06-22 ENCOUNTER — APPOINTMENT (OUTPATIENT)
Dept: PHYSICAL THERAPY | Facility: CLINIC | Age: 73
End: 2023-06-22
Payer: MEDICARE

## 2023-08-02 NOTE — PROGRESS NOTES
Cardiology Follow Up    [facility] BOONE  1950  [id number]  [place] [address]  [phone]    1. Essential (primary) hypertension        2. Pure hypercholesterolemia        3. Coronary arteriosclerosis        4. S/P right coronary artery (RCA) stent placement        5. Tobacco abuse            Interval History: Patient is here for cardiac follow-up. Patient had IMI with placement of a KATIANA in the RCA December 2018. The other vessels at that time had no significant disease.  Echocardiogram done  December 2019 demonstrated an LVEF of 55% with no RWMA.  He had no significant valvular heart disease.  He is on DAP and statin. Lipid profile done July 2022 demonstrated total cholesterol of 151 with an HDL of 37 and a calculated LDL of 89.  Patient is on atorvastatin.   Patient has had no chest pain or significant dyspnea. His vital signs are stable today. Patient Active Problem List   Diagnosis   • ST elevation myocardial infarction involving right coronary artery (HCC)   • Tobacco abuse   • History of cancer tonsil   • S/P right coronary artery (RCA) stent placement   • Mixed hyperlipidemia   • Essential (primary) hypertension   • Stage 3 chronic kidney disease, unspecified whether stage 3a or 3b CKD ([address])   • Family history of colon cancer in father   • Obesity, morbid ([address])     No past medical history on file.   Social History     Socioeconomic History   • Marital status: /Civil Union     Spouse name: Not on file   • Number of children: Not on file   • Years of education: Not on file   • Highest education level: Not on file   Occupational History   • Not on file   Tobacco Use   • Smoking status: Every Day     Packs/day: 1.00     Years: 50.00     Total pack years: 50.00     Types: Cigarettes   • Smokeless tobacco: Never   • Tobacco comments:     down to 1/2 ppd   Substance and Sexual Activity   • Alcohol use: No   • Drug use: No   • Sexual activity: Not on file   Other Topics Concern   • Not on file   Social History Narrative   • Not on file     Social Determinants of Health     Financial Resource Strain: Not on file   Food Insecurity: Not on file   Transportation Needs: Not on file   Physical Activity: Not on file   Stress: Not on file   Social Connections: Not on file   Intimate Partner Violence: Not on file   Housing Stability: Not on file      Family History   Problem Relation Age of Onset   • Heart disease Mother         80   • Colon cancer Father      Past Surgical History:   Procedure Laterality Date   • KNEE SURGERY         Current Outpatient Medications:   •  atorvastatin (LIPITOR) 10 mg tablet, Take 1 tablet (10 mg total) by mouth daily, Disp: 90 tablet, Rfl: 0  •  [facility] Aspirin Adult Low Dose 81 MG chewable tablet, CHEW 1 TABLET BY MOUTH DAILY, Disp: 90 tablet, Rfl: 11  •  HYDROcodone-acetaminophen (Norco) 5-325 mg per tablet, Take 1 tablet by mouth every 8 (eight) hours as needed for pain Max Daily Amount: 3 tablets (Patient not taking: Reported on 8/11/2023), Disp: 30 tablet, Rfl: 0  •  metoprolol tartrate (LOPRESSOR) 25 mg tablet, Take 1 tablet (25 mg total) by mouth every 12 (twelve) hours, Disp: 180 tablet, Rfl: 1  •  multivitamin (THERAGRAN) TABS, Take 1 tablet by mouth daily, Disp: , Rfl:   •  cyclobenzaprine (FLEXERIL) 5 mg tablet, Take 1 tablet (5 mg total) by mouth daily at bedtime, Disp: 30 tablet, Rfl: 0  •  levothyroxine (Synthroid) 50 mcg tablet, Take 1 tablet (50 mcg total) by mouth daily, Disp: 30 tablet, Rfl: 11  •  methylPREDNISolone 4 MG tablet therapy pack, Use as directed on package, Disp: 21 each, Rfl: 0  •  methylPREDNISolone 4 MG tablet therapy pack, Use as directed on package, Disp: 21 each, Rfl: 0  No Known Allergies    Labs:not applicable  Imaging: No results found.     Review of Systems:  Review of Systems   All other systems reviewed and are negative. Physical Exam:  /64 (BP Location: Left arm, Patient Position: Sitting, Cuff Size: Large)   Pulse 60   Ht 5' 9" (1.753 m)   Wt 102 kg (225 lb 14.4 oz)   BMI 33.36 kg/m²   Physical Exam  Vitals reviewed. Constitutional:       Appearance: He is well-developed. HENT:      Head: Normocephalic and atraumatic. Cardiovascular:      Rate and Rhythm: Normal rate. Heart sounds: Normal heart sounds. Pulmonary:      Effort: Pulmonary effort is normal.      Breath sounds: Normal breath sounds. Musculoskeletal:      Cervical back: Normal range of motion. Skin:     General: Skin is warm and dry. Neurological:      Mental Status: He is alert and oriented to person, place, and time. Discussion/Summary:I will continue the patient's present medical regimen. The patient appears well compensated. I have asked the patient to call if there is a problem in the interim otherwise I will see the patient in six months time.

## 2023-08-11 ENCOUNTER — OFFICE VISIT (OUTPATIENT)
Dept: CARDIOLOGY CLINIC | Facility: CLINIC | Age: 73
End: 2023-08-11
Payer: MEDICARE

## 2023-08-11 VITALS
HEART RATE: 60 BPM | SYSTOLIC BLOOD PRESSURE: 100 MMHG | HEIGHT: 69 IN | DIASTOLIC BLOOD PRESSURE: 64 MMHG | WEIGHT: 225.9 LBS | BODY MASS INDEX: 33.46 KG/M2

## 2023-08-11 DIAGNOSIS — Z95.5 S/P RIGHT CORONARY ARTERY (RCA) STENT PLACEMENT: ICD-10-CM

## 2023-08-11 DIAGNOSIS — I25.10 CORONARY ARTERIOSCLEROSIS: ICD-10-CM

## 2023-08-11 DIAGNOSIS — Z72.0 TOBACCO ABUSE: ICD-10-CM

## 2023-08-11 DIAGNOSIS — I10 ESSENTIAL (PRIMARY) HYPERTENSION: Primary | ICD-10-CM

## 2023-08-11 DIAGNOSIS — E78.00 PURE HYPERCHOLESTEROLEMIA: ICD-10-CM

## 2023-08-11 PROCEDURE — 99214 OFFICE O/P EST MOD 30 MIN: CPT | Performed by: INTERNAL MEDICINE

## 2023-10-11 ENCOUNTER — TELEPHONE (OUTPATIENT)
Dept: FAMILY MEDICINE CLINIC | Facility: CLINIC | Age: 73
End: 2023-10-11

## 2023-10-13 DIAGNOSIS — I21.11 ST ELEVATION MYOCARDIAL INFARCTION INVOLVING RIGHT CORONARY ARTERY (HCC): ICD-10-CM

## 2023-10-28 ENCOUNTER — APPOINTMENT (EMERGENCY)
Dept: CT IMAGING | Facility: HOSPITAL | Age: 73
DRG: 494 | End: 2023-10-28
Payer: MEDICARE

## 2023-10-28 ENCOUNTER — APPOINTMENT (INPATIENT)
Dept: RADIOLOGY | Facility: HOSPITAL | Age: 73
DRG: 494 | End: 2023-10-28
Payer: MEDICARE

## 2023-10-28 ENCOUNTER — HOSPITAL ENCOUNTER (INPATIENT)
Facility: HOSPITAL | Age: 73
LOS: 2 days | Discharge: HOME | DRG: 494 | End: 2023-10-30
Attending: STUDENT IN AN ORGANIZED HEALTH CARE EDUCATION/TRAINING PROGRAM | Admitting: STUDENT IN AN ORGANIZED HEALTH CARE EDUCATION/TRAINING PROGRAM
Payer: MEDICARE

## 2023-10-28 ENCOUNTER — APPOINTMENT (EMERGENCY)
Dept: RADIOLOGY | Facility: HOSPITAL | Age: 73
DRG: 494 | End: 2023-10-28
Payer: MEDICARE

## 2023-10-28 DIAGNOSIS — S82.852A CLOSED TRIMALLEOLAR FRACTURE OF LEFT ANKLE, INITIAL ENCOUNTER: ICD-10-CM

## 2023-10-28 DIAGNOSIS — E03.9 HYPOTHYROIDISM: ICD-10-CM

## 2023-10-28 DIAGNOSIS — S82.892A CLOSED FRACTURE OF LEFT ANKLE, INITIAL ENCOUNTER: Primary | ICD-10-CM

## 2023-10-28 DIAGNOSIS — Z72.0 TOBACCO ABUSE: ICD-10-CM

## 2023-10-28 PROBLEM — S82.853A CLOSED TRIMALLEOLAR FRACTURE: Status: ACTIVE | Noted: 2023-10-28

## 2023-10-28 PROCEDURE — NC001 PR NO CHARGE: Performed by: STUDENT IN AN ORGANIZED HEALTH CARE EDUCATION/TRAINING PROGRAM

## 2023-10-28 PROCEDURE — 73610 X-RAY EXAM OF ANKLE: CPT

## 2023-10-28 PROCEDURE — 99285 EMERGENCY DEPT VISIT HI MDM: CPT | Performed by: STUDENT IN AN ORGANIZED HEALTH CARE EDUCATION/TRAINING PROGRAM

## 2023-10-28 PROCEDURE — 73700 CT LOWER EXTREMITY W/O DYE: CPT

## 2023-10-28 PROCEDURE — 27818 TREATMENT OF ANKLE FRACTURE: CPT | Performed by: STUDENT IN AN ORGANIZED HEALTH CARE EDUCATION/TRAINING PROGRAM

## 2023-10-28 PROCEDURE — 99223 1ST HOSP IP/OBS HIGH 75: CPT | Performed by: STUDENT IN AN ORGANIZED HEALTH CARE EDUCATION/TRAINING PROGRAM

## 2023-10-28 PROCEDURE — 73590 X-RAY EXAM OF LOWER LEG: CPT

## 2023-10-28 PROCEDURE — 99284 EMERGENCY DEPT VISIT MOD MDM: CPT

## 2023-10-28 RX ORDER — HYDROMORPHONE HCL/PF 1 MG/ML
0.5 SYRINGE (ML) INJECTION EVERY 4 HOURS PRN
Status: DISCONTINUED | OUTPATIENT
Start: 2023-10-28 | End: 2023-10-28

## 2023-10-28 RX ORDER — LIDOCAINE HYDROCHLORIDE 20 MG/ML
10 INJECTION, SOLUTION EPIDURAL; INFILTRATION; INTRACAUDAL; PERINEURAL ONCE
Status: COMPLETED | OUTPATIENT
Start: 2023-10-28 | End: 2023-10-28

## 2023-10-28 RX ORDER — OXYCODONE HYDROCHLORIDE AND ACETAMINOPHEN 5; 325 MG/1; MG/1
1 TABLET ORAL EVERY 4 HOURS PRN
Status: DISCONTINUED | OUTPATIENT
Start: 2023-10-28 | End: 2023-10-30 | Stop reason: HOSPADM

## 2023-10-28 RX ORDER — HYDROMORPHONE HCL/PF 1 MG/ML
0.5 SYRINGE (ML) INJECTION EVERY 4 HOURS PRN
Status: DISCONTINUED | OUTPATIENT
Start: 2023-10-28 | End: 2023-10-30 | Stop reason: HOSPADM

## 2023-10-28 RX ORDER — LIDOCAINE HYDROCHLORIDE 10 MG/ML
10 INJECTION, SOLUTION EPIDURAL; INFILTRATION; INTRACAUDAL; PERINEURAL ONCE
Status: COMPLETED | OUTPATIENT
Start: 2023-10-28 | End: 2023-10-28

## 2023-10-28 RX ORDER — ONDANSETRON 2 MG/ML
4 INJECTION INTRAMUSCULAR; INTRAVENOUS EVERY 6 HOURS PRN
Status: DISCONTINUED | OUTPATIENT
Start: 2023-10-28 | End: 2023-10-30 | Stop reason: HOSPADM

## 2023-10-28 RX ORDER — CALCIUM CARBONATE 500 MG/1
1000 TABLET, CHEWABLE ORAL DAILY PRN
Status: DISCONTINUED | OUTPATIENT
Start: 2023-10-28 | End: 2023-10-30 | Stop reason: HOSPADM

## 2023-10-28 RX ORDER — ACETAMINOPHEN 325 MG/1
650 TABLET ORAL EVERY 6 HOURS PRN
Status: DISCONTINUED | OUTPATIENT
Start: 2023-10-28 | End: 2023-10-30 | Stop reason: HOSPADM

## 2023-10-28 RX ORDER — DOCUSATE SODIUM 100 MG/1
100 CAPSULE, LIQUID FILLED ORAL 2 TIMES DAILY
Status: DISCONTINUED | OUTPATIENT
Start: 2023-10-28 | End: 2023-10-30 | Stop reason: HOSPADM

## 2023-10-28 RX ORDER — CEFAZOLIN SODIUM 2 G/50ML
2000 SOLUTION INTRAVENOUS
Status: COMPLETED | OUTPATIENT
Start: 2023-10-29 | End: 2023-10-29

## 2023-10-28 RX ORDER — HEPARIN SODIUM 5000 [USP'U]/ML
5000 INJECTION, SOLUTION INTRAVENOUS; SUBCUTANEOUS EVERY 8 HOURS SCHEDULED
Status: DISCONTINUED | OUTPATIENT
Start: 2023-10-28 | End: 2023-10-28

## 2023-10-28 RX ORDER — SENNOSIDES 8.6 MG
1 TABLET ORAL DAILY
Status: DISCONTINUED | OUTPATIENT
Start: 2023-10-29 | End: 2023-10-30 | Stop reason: HOSPADM

## 2023-10-28 RX ORDER — NICOTINE 21 MG/24HR
1 PATCH, TRANSDERMAL 24 HOURS TRANSDERMAL DAILY
Status: DISCONTINUED | OUTPATIENT
Start: 2023-10-29 | End: 2023-10-30 | Stop reason: HOSPADM

## 2023-10-28 RX ADMIN — LIDOCAINE HYDROCHLORIDE 10 ML: 20 INJECTION, SOLUTION EPIDURAL; INFILTRATION; INTRACAUDAL; PERINEURAL at 15:38

## 2023-10-28 RX ADMIN — DOCUSATE SODIUM 100 MG: 100 CAPSULE, LIQUID FILLED ORAL at 17:51

## 2023-10-28 RX ADMIN — LIDOCAINE HYDROCHLORIDE 10 ML: 10 INJECTION, SOLUTION EPIDURAL; INFILTRATION; INTRACAUDAL at 14:36

## 2023-10-28 RX ADMIN — HEPARIN SODIUM 5000 UNITS: 5000 INJECTION INTRAVENOUS; SUBCUTANEOUS at 21:14

## 2023-10-28 RX ADMIN — HYDROMORPHONE HYDROCHLORIDE 0.5 MG: 1 INJECTION, SOLUTION INTRAMUSCULAR; INTRAVENOUS; SUBCUTANEOUS at 22:19

## 2023-10-28 NOTE — H&P
[address]  H&P  Name: Ana Balderas 68 y.o. male I MRN: [id number]  Unit/Bed#: FT 01 I Date of Admission: 10/28/2023   Date of Service: 10/28/2023 I Hospital Day: 0      Assessment/Plan   * Closed trimalleolar fracture  Assessment & Plan  Present on admission after mechanical fall  Orthopedic surgery consulted, plan for surgery in AM  Keep NPO at midnight  Start pain control   Follow up XR and CT imaging  Splinted in ED    Essential (primary) hypertension  Assessment & Plan  Adequately controlled  Continue home dose metoprolol           VTE Pharmacologic Prophylaxis:   Moderate Risk (Score 3-4) - Pharmacological DVT Prophylaxis Ordered: heparin. Code Status: Level 1 - Full Code   Discussion with family: Patient declined call to . Anticipated Length of Stay: Patient will be admitted on an inpatient basis with an anticipated length of stay of greater than 2 midnights secondary to surgery. Total Time Spent on Date of Encounter in care of patient: 30+ mins. This time was spent on one or more of the following: performing physical exam; counseling and coordination of care; obtaining or reviewing history; documenting in the medical record; reviewing/ordering tests, medications or procedures; communicating with other healthcare professionals and discussing with patient's family/caregivers. Chief Complaint: ankle pain    History of Present Illness:  Ana Balderas is a 68 y.o. male with a PMH of htn who presents with mechanical fall after slipping in wet grass. No associated symptoms or inciting events. Came to the ED after ankle pain was acutely noted. Found to have trimalleolar fracture in ED, now admitted to [facility] for further management. Review of Systems:  Review of Systems   Constitutional:  Negative for chills and fever. HENT:  Negative for ear pain and sore throat. Eyes:  Negative for pain and visual disturbance.    Respiratory:  Negative for cough and shortness of breath. Cardiovascular:  Negative for chest pain and palpitations. Gastrointestinal:  Negative for abdominal pain and vomiting. Genitourinary:  Negative for dysuria and hematuria. Musculoskeletal:  Negative for arthralgias and back pain. Skin:  Negative for color change and rash. Neurological:  Negative for seizures and syncope. All other systems reviewed and are negative. Past Medical and Surgical History:   History reviewed. No pertinent past medical history. Past Surgical History:   Procedure Laterality Date    KNEE SURGERY         Meds/Allergies:  Prior to Admission medications    Medication Sig Start Date End Date Taking? Authorizing Provider   HYDROcodone-acetaminophen (Norco) 5-325 mg per tablet Take 1 tablet by mouth every 8 (eight) hours as needed for pain Max Daily Amount: 3 tablets  Patient not taking: Reported on 8/11/2023 6/8/23   Rc Buenrostro MD   atorvastatin (LIPITOR) 10 mg tablet Take 1 tablet (10 mg total) by mouth daily 6/1/23   Akil Kruger MD   [facility] Aspirin Adult Low Dose 81 MG chewable tablet CHEW 1 TABLET BY MOUTH DAILY 1/31/22   Akil Kruger MD   cyclobenzaprine (FLEXERIL) 5 mg tablet Take 1 tablet (5 mg total) by mouth daily at bedtime 3/27/23   Rc Buenrostro MD   levothyroxine (Synthroid) 50 mcg tablet Take 1 tablet (50 mcg total) by mouth daily 7/14/22 7/14/23  Akil Kruger MD   methylPREDNISolone 4 MG tablet therapy pack Use as directed on package 3/27/23   Rc Buenrostro MD   methylPREDNISolone 4 MG tablet therapy pack Use as directed on package 4/4/23   Rc Buenrostro MD   metoprolol tartrate (LOPRESSOR) 25 mg tablet TAKE ONE TABLET BY MOUTH EVERY TWELVE HOURS 10/16/23   Akil Kruger MD   multivitamin [facility]) TABS Take 1 tablet by mouth daily    Historical Provider, MD WATSON have reviewed home medications using recent Epic encounter.     Allergies: No Known Allergies    Social History:  Marital Status: /Civil Union   Occupation: na  Patient Pre-hospital Living Situation: Home  Patient Pre-hospital Level of Mobility: walks  Patient Pre-hospital Diet Restrictions: na  Substance Use History:   Social History     Substance and Sexual Activity   Alcohol Use No     Social History     Tobacco Use   Smoking Status Every Day    Packs/day: 1.00    Years: 50.00    Total pack years: 50.00    Types: Cigarettes   Smokeless Tobacco Never   Tobacco Comments    down to 1/2 ppd     Social History     Substance and Sexual Activity   Drug Use No       Family History:  Family History   Problem Relation Age of Onset    Heart disease Mother         80    Colon cancer Father        Physical Exam:     Vitals:   Blood Pressure: 148/77 (10/28/23 1545)  Pulse: 73 (10/28/23 1545)  Temperature: 97.8 °F (36.6 °C) (10/28/23 1413)  Temp Source: Temporal (10/28/23 1413)  Respirations: 18 (10/28/23 1545)  Weight - Scale: 101 kg (222 lb) (10/28/23 1413)  SpO2: 96 % (10/28/23 1545)    Physical Exam  Constitutional:       General: He is not in acute distress. Appearance: Normal appearance. He is not toxic-appearing. Cardiovascular:      Rate and Rhythm: Normal rate and regular rhythm. Heart sounds: Normal heart sounds. No murmur heard. Pulmonary:      Effort: Pulmonary effort is normal. No respiratory distress. Breath sounds: Normal breath sounds. No wheezing. Abdominal:      General: Abdomen is flat. There is no distension. Palpations: Abdomen is soft. Tenderness: There is no abdominal tenderness. Neurological:      General: No focal deficit present. Mental Status: He is alert and oriented to person, place, and time. Mental status is at baseline. Motor: No weakness.           Additional Data:     Lab Results:                            Lines/Drains:  Invasive Devices       Peripheral Intravenous Line  Duration             Peripheral IV 10/28/23 Left;Proximal;Ventral (anterior) Forearm <1 day Imaging: Reviewed radiology reports from this admission including: xray(s)  XR ankle 3+ views LEFT    (Results Pending)   XR tibia fibula 2 views LEFT    (Results Pending)   CT lower extremity wo contrast left    (Results Pending)   XR ankle 3+ views LEFT    (Results Pending)       ** Please Note: This note has been constructed using a voice recognition system.  **

## 2023-10-28 NOTE — ED PROVIDER NOTES
History  Chief Complaint   Patient presents with    Ankle Injury     Pt reports "slipping in wet grass causing his left ankle to go underneath him. Swelling noted to left ankle."     Patient is a 73-year-old male with a past medical history of hypertension, CKD, hyperlipidemia presenting to the emergency department for evaluation of a left ankle injury. Patient states he was walking on wet grass when he slipped and fell. Patient states his left foot went underneath his buttocks. Patient reports he did not hit his head and did not lose consciousness. Patient reports tenderness to the medial aspect of the ankle. Patient reports full sensation of the foot and ankle but has not been able to walk on it secondary to pain and deformity. Denies fevers, chills, rash, headache, weakness, dizziness, visual changes, abdominal pain, nausea, vomiting, diarrhea, constipation, chest pain, shortness of breath or difficulty breathing. Does not offer any other concerns or complaints. Prior to Admission Medications   Prescriptions Last Dose Informant Patient Reported? Taking?    [facility] Aspirin Adult Low Dose 81 MG chewable tablet  Self No No   Sig: CHEW 1 TABLET BY MOUTH DAILY   HYDROcodone-acetaminophen (Norco) 5-325 mg per tablet  Self No No   Sig: Take 1 tablet by mouth every 8 (eight) hours as needed for pain Max Daily Amount: 3 tablets   Patient not taking: Reported on 8/11/2023   atorvastatin (LIPITOR) 10 mg tablet  Self No No   Sig: Take 1 tablet (10 mg total) by mouth daily   cyclobenzaprine (FLEXERIL) 5 mg tablet  Self No No   Sig: Take 1 tablet (5 mg total) by mouth daily at bedtime   levothyroxine (Synthroid) 50 mcg tablet   No No   Sig: Take 1 tablet (50 mcg total) by mouth daily   methylPREDNISolone 4 MG tablet therapy pack  Self No No   Sig: Use as directed on package   methylPREDNISolone 4 MG tablet therapy pack  Self No No   Sig: Use as directed on package   metoprolol tartrate (LOPRESSOR) 25 mg tablet   No No Sig: TAKE ONE TABLET BY MOUTH EVERY TWELVE HOURS   multivitamin (THERAGRAN) TABS  Self Yes No   Sig: Take 1 tablet by mouth daily      Facility-Administered Medications: None       History reviewed. No pertinent past medical history. Past Surgical History:   Procedure Laterality Date    KNEE SURGERY         Family History   Problem Relation Age of Onset    Heart disease Mother         80    Colon cancer Father      I have reviewed and agree with the history as documented. E-Cigarette/Vaping     E-Cigarette/Vaping Substances     Social History     Tobacco Use    Smoking status: Every Day     Packs/day: 1.00     Years: 50.00     Total pack years: 50.00     Types: Cigarettes    Smokeless tobacco: Never    Tobacco comments:     down to 1/2 ppd   Substance Use Topics    Alcohol use: No    Drug use: No       Review of Systems   Constitutional:  Negative for chills and fever. HENT:  Negative for ear pain and sore throat. Eyes:  Negative for pain and visual disturbance. Respiratory:  Negative for cough and shortness of breath. Cardiovascular:  Negative for chest pain and palpitations. Gastrointestinal:  Negative for abdominal pain, diarrhea, nausea and vomiting. Genitourinary:  Negative for dysuria and hematuria. Musculoskeletal:  Negative for arthralgias and back pain. Left ankle pain/deformity   Skin:  Negative for color change and rash. Neurological:  Negative for seizures and syncope. All other systems reviewed and are negative. Physical Exam  Physical Exam  Vitals and nursing note reviewed. Constitutional:       General: He is not in acute distress. Appearance: Normal appearance. He is well-developed. He is not toxic-appearing or diaphoretic. HENT:      Head: Normocephalic and atraumatic.       Right Ear: External ear normal.      Left Ear: External ear normal.      Nose: Nose normal.      Mouth/Throat:      Mouth: Mucous membranes are moist.   Eyes:      General: No scleral icterus. Right eye: No discharge. Left eye: No discharge. Conjunctiva/sclera: Conjunctivae normal.   Cardiovascular:      Rate and Rhythm: Normal rate and regular rhythm. Heart sounds: No murmur heard. Pulmonary:      Effort: Pulmonary effort is normal. No respiratory distress. Breath sounds: Normal breath sounds. Abdominal:      Palpations: Abdomen is soft. Tenderness: There is no abdominal tenderness. Musculoskeletal:         General: No swelling, deformity or signs of injury. Normal range of motion. Cervical back: Normal range of motion and neck supple. No rigidity. Right ankle: Normal pulse. Left ankle: Normal pulse. Right foot: Normal pulse. Left foot: Normal pulse. Comments: Deformity of the left ankle. No laceration. Full sensation, decreased range of motion secondary to deformity. Skin:     General: Skin is warm and dry. Capillary Refill: Capillary refill takes less than 2 seconds. Coloration: Skin is not jaundiced. Findings: No erythema or rash. Neurological:      General: No focal deficit present. Mental Status: He is alert and oriented to person, place, and time. Mental status is at baseline. Cranial Nerves: No cranial nerve deficit. Gait: Gait normal.   Psychiatric:         Mood and Affect: Mood normal.         Behavior: Behavior normal.         Thought Content:  Thought content normal.         Judgment: Judgment normal.         Vital Signs  ED Triage Vitals [10/28/23 1413]   Temperature Pulse Respirations Blood Pressure SpO2   97.8 °F (36.6 °C) 65 18 101/63 96 %      Temp Source Heart Rate Source Patient Position - Orthostatic VS BP Location FiO2 (%)   Temporal Monitor Sitting Left arm --      Pain Score       --           Vitals:    10/28/23 1413 10/28/23 1545 10/28/23 1622   BP: 101/63 148/77 132/71   Pulse: 65 73 (!) 50   Patient Position - Orthostatic VS: Sitting Lying          Visual Acuity      ED Medications  Medications   ceFAZolin (ANCEF) IVPB (premix in dextrose) 2,000 mg 50 mL (has no administration in time range)   acetaminophen (TYLENOL) tablet 650 mg (has no administration in time range)   calcium carbonate (TUMS) chewable tablet 1,000 mg (has no administration in time range)   docusate sodium (COLACE) capsule 100 mg (has no administration in time range)   senna (SENOKOT) tablet 8.6 mg (has no administration in time range)   ondansetron (ZOFRAN) injection 4 mg (has no administration in time range)   nicotine (NICODERM CQ) 14 mg/24hr TD 24 hr patch 1 patch (has no administration in time range)   heparin (porcine) subcutaneous injection 5,000 Units (has no administration in time range)   metoprolol tartrate (LOPRESSOR) tablet 25 mg (0 mg Oral Hold 10/28/23 1622)   oxyCODONE-acetaminophen (PERCOCET) 5-325 mg per tablet 1 tablet (has no administration in time range)   HYDROmorphone (DILAUDID) injection 0.5 mg (has no administration in time range)   lidocaine (PF) (XYLOCAINE-MPF) 1 % injection 10 mL (10 mL Infiltration Given 10/28/23 1436)   lidocaine (PF) (XYLOCAINE-MPF) 2 % injection 10 mL (10 mL Infiltration Given by Other 10/28/23 1538)       Diagnostic Studies  Results Reviewed       None                   XR ankle 3+ views LEFT    (Results Pending)   XR tibia fibula 2 views LEFT    (Results Pending)   CT lower extremity wo contrast left    (Results Pending)   XR ankle 3+ views LEFT    (Results Pending)              Procedures  Orthopedic injury treatment    Date/Time: 10/28/2023 4:14 PM    Performed by: Cece Garcia PA-C  Authorized by: Cece Garcia PA-C    Patient Location:  ED  [facility] Protocol:  Consent: Verbal consent obtained.   Risks and benefits: risks, benefits and alternatives were discussed  Consent given by: patient  Patient understanding: patient states understanding of the procedure being performed  Patient identity confirmed: verbally with patient and arm band    Injury location:  Ankle  Location details:  Left ankle  Injury type:  Fracture-dislocation  Fracture type: trimalleolar    Neurovascular status: Neurovascularly intact    Distal perfusion: normal    Neurological function: normal    Range of motion: reduced    Local anesthesia used?: Yes    Anesthesia:  Hematoma block  Local anesthetic:  Lidocaine 2% without epinephrine  Anesthetic total (ml):  10  Manipulation performed?: Yes    Skeletal traction used?: Yes    Reduction successful?: Yes    Confirmation: Reduction confirmed by x-ray    Immobilization:  Splint  Splint type:  Short leg (and sugar tong)  Supplies used:  Cotton padding and fiberglass  Neurovascular status: Neurovascularly intact    Distal perfusion: normal    Neurological function: normal    Patient tolerance:  Patient tolerated the procedure well with no immediate complications           ED Course         SBIRT 22yo+      Flowsheet Row Most Recent Value   Initial Alcohol Screen: US AUDIT-C     1. How often do you have a drink containing alcohol? 0 Filed at: 10/28/2023 1414   2. How many drinks containing alcohol do you have on a typical day you are drinking? 0 Filed at: 10/28/2023 1414   3b. FEMALE Any Age, or MALE 65+: How often do you have 4 or more drinks on one occassion? 0 Filed at: 10/28/2023 1414   Audit-C Score 0 Filed at: 10/28/2023 1414   REY: How many times in the past year have you. .. Used an illegal drug or used a prescription medication for non-medical reasons? Never Filed at: 10/28/2023 1414                      Medical Decision Making    This is a 16-year-old male with a past medical history of hypertension, CKD, hyperlipidemia presenting to the emergency department for evaluation of a left ankle injury. Patient states he was walking on wet grass when he slipped and fell. Patient states his left foot went underneath his buttocks. Patient reports he did not hit his head and did not lose consciousness.   Patient reports tenderness to the medial aspect of the ankle. Patient reports full sensation of the foot and ankle but has not been able to walk on it secondary to pain and deformity. Patient is in no acute distress, stable vital signs on initial examination. Differential diagnosis to include but is not limited to: Fracture    Initial ED Plan: imaging    ED results: Bimalleolar fracture of the left ankle. Reduction completed after hematoma block. Patient was placed in a sugar-tong and short leg splint. Discussed with Dr. Shawn Booker, orthopedic attending on-call, recommending reduction, splint and admission for surgery tomorrow. Final ED assessment: Patient is stable and well appearing. Discussed radiologic studies. Patient verbalized understanding and is agreeable with the plan for admission. Discussed with Dr. Jesi Beckwith, inpatient, bridging orders placed. Amount and/or Complexity of Data Reviewed  Radiology: ordered. Risk  Prescription drug management. Decision regarding hospitalization. Disposition  Final diagnoses:   Closed trimalleolar fracture of left ankle, initial encounter     Time reflects when diagnosis was documented in both MDM as applicable and the Disposition within this note       Time User Action Codes Description Comment    10/28/2023  3:59 PM Car Willoughby Add [B11.795W] Closed fracture of left ankle, initial encounter     10/28/2023  4:17 PM Rojas Osorio Add [M12.643O] Closed trimalleolar fracture of left ankle, initial encounter           ED Disposition       ED Disposition   Admit    Condition   Stable    Date/Time   Sat Oct 28, 2023 1617    Comment   Case was discussed with Dr. Jesi Beckwith and the patient's admission status was agreed to be Admission Status: inpatient status to the service of Dr. Jesi Beckwith . Follow-up Information    None         Patient's Medications   Discharge Prescriptions    No medications on file       No discharge procedures on file.     PDMP Review None            ED Provider  Electronically Signed by             Reginald Aguirre PA-C  10/28/23 224 [address]  10/28/23 5228

## 2023-10-28 NOTE — ASSESSMENT & PLAN NOTE
Present on admission after mechanical fall  Orthopedic surgery consulted, plan for surgery in AM  Keep NPO at midnight  Start pain control   Follow up XR and CT imaging  Splinted in ED

## 2023-10-28 NOTE — PLAN OF CARE
Problem: PAIN - ADULT  Goal: Verbalizes/displays adequate comfort level or baseline comfort level  Description: Interventions:  - Encourage patient to monitor pain and request assistance  - Assess pain using appropriate pain scale  - Administer analgesics based on type and severity of pain and evaluate response  - Implement non-pharmacological measures as appropriate and evaluate response  - Consider cultural and social influences on pain and pain management  - Notify physician/advanced practitioner if interventions unsuccessful or patient reports new pain  Outcome: Progressing     Problem: INFECTION - ADULT  Goal: Absence or prevention of progression during hospitalization  Description: INTERVENTIONS:  - Assess and monitor for signs and symptoms of infection  - Monitor lab/diagnostic results  - Monitor all insertion sites, i.e. indwelling lines, tubes, and drains  - Monitor endotracheal if appropriate and nasal secretions for changes in amount and color  - [place] appropriate cooling/warming therapies per order  - Administer medications as ordered  - Instruct and encourage patient and family to use good hand hygiene technique  - Identify and instruct in appropriate isolation precautions for identified infection/condition  Outcome: Progressing  Goal: Absence of fever/infection during neutropenic period  Description: INTERVENTIONS:  - Monitor WBC    Outcome: Progressing     Problem: SAFETY ADULT  Goal: Patient will remain free of falls  Description: INTERVENTIONS:  - Educate patient/family on patient safety including physical limitations  - Instruct patient to call for assistance with activity   - Consult OT/PT to assist with strengthening/mobility   - Keep Call bell within reach  - Keep bed low and locked with side rails adjusted as appropriate  - Keep care items and personal belongings within reach  - Initiate and maintain comfort rounds  - Make Fall Risk Sign visible to staff  - Offer Toileting every  Hours, in advance of need  - Initiate/Maintainalarm  - Obtain necessary fall risk management equipment:   - Apply yellow socks and bracelet for high fall risk patients  - Consider moving patient to room near nurses station  Outcome: Progressing  Goal: Maintain or return to baseline ADL function  Description: INTERVENTIONS:  -  Assess patient's ability to carry out ADLs; assess patient's baseline for ADL function and identify physical deficits which impact ability to perform ADLs (bathing, care of mouth/teeth, toileting, grooming, dressing, etc.)  - Assess/evaluate cause of self-care deficits   - Assess range of motion  - Assess patient's mobility; develop plan if impaired  - Assess patient's need for assistive devices and provide as appropriate  - Encourage maximum independence but intervene and supervise when necessary  - Involve family in performance of ADLs  - Assess for home care needs following discharge   - Consider OT consult to assist with ADL evaluation and planning for discharge  - Provide patient education as appropriate  Outcome: Progressing  Goal: Maintains/Returns to pre admission functional level  Description: INTERVENTIONS:  - Perform BMAT or MOVE assessment daily.   - Set and communicate daily mobility goal to care team and patient/family/caregiver. - Collaborate with rehabilitation services on mobility goals if consulted  - Perform Range of Motion  times a day. - Reposition patient every  hours.   - Dangle patient  times a day  - Stand patient times a day  - Ambulate patient  times a day  - Out of bed to chair  times a day   - Out of bed for meals times a day  - Out of bed for toileting  - Record patient progress and toleration of activity level   Outcome: Progressing     Problem: DISCHARGE PLANNING  Goal: Discharge to home or other facility with appropriate resources  Description: INTERVENTIONS:  - Identify barriers to discharge w/patient and caregiver  - Arrange for needed discharge resources and transportation as appropriate  - Identify discharge learning needs (meds, wound care, etc.)  - Arrange for interpretive services to assist at discharge as needed  - Refer to Case Management Department for coordinating discharge planning if the patient needs post-hospital services based on physician/advanced practitioner order or complex needs related to functional status, cognitive ability, or social support system  Outcome: Progressing     Problem: Knowledge Deficit  Goal: Patient/family/caregiver demonstrates understanding of disease process, treatment plan, medications, and discharge instructions  Description: Complete learning assessment and assess knowledge base.   Interventions:  - Provide teaching at level of understanding  - Provide teaching via preferred learning methods  Outcome: Progressing

## 2023-10-29 ENCOUNTER — APPOINTMENT (INPATIENT)
Dept: RADIOLOGY | Facility: HOSPITAL | Age: 73
DRG: 494 | End: 2023-10-29
Payer: MEDICARE

## 2023-10-29 ENCOUNTER — ANESTHESIA EVENT (INPATIENT)
Dept: PERIOP | Facility: HOSPITAL | Age: 73
DRG: 494 | End: 2023-10-29
Payer: MEDICARE

## 2023-10-29 ENCOUNTER — ANESTHESIA (INPATIENT)
Dept: PERIOP | Facility: HOSPITAL | Age: 73
DRG: 494 | End: 2023-10-29
Payer: MEDICARE

## 2023-10-29 PROBLEM — E03.9 HYPOTHYROIDISM: Status: ACTIVE | Noted: 2023-10-29

## 2023-10-29 LAB
ABO GROUP BLD: NORMAL
ABO GROUP BLD: NORMAL
ANION GAP SERPL CALCULATED.3IONS-SCNC: 6 MMOL/L
BLD GP AB SCN SERPL QL: POSITIVE
BUN SERPL-MCNC: 23 MG/DL (ref 5–25)
CALCIUM SERPL-MCNC: 9.4 MG/DL (ref 8.4–10.2)
CHLORIDE SERPL-SCNC: 99 MMOL/L (ref 96–108)
CO2 SERPL-SCNC: 26 MMOL/L (ref 21–32)
CREAT SERPL-MCNC: 1.78 MG/DL (ref 0.6–1.3)
ERYTHROCYTE [DISTWIDTH] IN BLOOD BY AUTOMATED COUNT: 14.6 % (ref 11.6–15.1)
GFR SERPL CREATININE-BSD FRML MDRD: 37 ML/MIN/1.73SQ M
GLUCOSE SERPL-MCNC: 92 MG/DL (ref 65–140)
HCT VFR BLD AUTO: 37.5 % (ref 36.5–49.3)
HGB BLD-MCNC: 12.8 G/DL (ref 12–17)
MAGNESIUM SERPL-MCNC: 2.1 MG/DL (ref 1.9–2.7)
MCH RBC QN AUTO: 30.1 PG (ref 26.8–34.3)
MCHC RBC AUTO-ENTMCNC: 34.1 G/DL (ref 31.4–37.4)
MCV RBC AUTO: 88 FL (ref 82–98)
PLATELET # BLD AUTO: 191 THOUSANDS/UL (ref 149–390)
PMV BLD AUTO: 9 FL (ref 8.9–12.7)
POTASSIUM SERPL-SCNC: 4.1 MMOL/L (ref 3.5–5.3)
RBC # BLD AUTO: 4.25 MILLION/UL (ref 3.88–5.62)
RH BLD: POSITIVE
RH BLD: POSITIVE
SODIUM SERPL-SCNC: 131 MMOL/L (ref 135–147)
SPECIMEN EXPIRATION DATE: NORMAL
WBC # BLD AUTO: 9.69 THOUSAND/UL (ref 4.31–10.16)

## 2023-10-29 PROCEDURE — 86900 BLOOD TYPING SEROLOGIC ABO: CPT | Performed by: ORTHOPAEDIC SURGERY

## 2023-10-29 PROCEDURE — 73600 X-RAY EXAM OF ANKLE: CPT

## 2023-10-29 PROCEDURE — C1713 ANCHOR/SCREW BN/BN,TIS/BN: HCPCS | Performed by: ORTHOPAEDIC SURGERY

## 2023-10-29 PROCEDURE — 86870 RBC ANTIBODY IDENTIFICATION: CPT | Performed by: SURGERY

## 2023-10-29 PROCEDURE — 85027 COMPLETE CBC AUTOMATED: CPT | Performed by: STUDENT IN AN ORGANIZED HEALTH CARE EDUCATION/TRAINING PROGRAM

## 2023-10-29 PROCEDURE — 99232 SBSQ HOSP IP/OBS MODERATE 35: CPT | Performed by: INTERNAL MEDICINE

## 2023-10-29 PROCEDURE — C1769 GUIDE WIRE: HCPCS | Performed by: ORTHOPAEDIC SURGERY

## 2023-10-29 PROCEDURE — 86901 BLOOD TYPING SEROLOGIC RH(D): CPT | Performed by: ORTHOPAEDIC SURGERY

## 2023-10-29 PROCEDURE — 27823 TREATMENT OF ANKLE FRACTURE: CPT | Performed by: ORTHOPAEDIC SURGERY

## 2023-10-29 PROCEDURE — 0QSH04Z REPOSITION LEFT TIBIA WITH INTERNAL FIXATION DEVICE, OPEN APPROACH: ICD-10-PCS | Performed by: ORTHOPAEDIC SURGERY

## 2023-10-29 PROCEDURE — 83735 ASSAY OF MAGNESIUM: CPT | Performed by: STUDENT IN AN ORGANIZED HEALTH CARE EDUCATION/TRAINING PROGRAM

## 2023-10-29 PROCEDURE — 77071 MNL APPL STRS JT RADIOGRAPHY: CPT | Performed by: ORTHOPAEDIC SURGERY

## 2023-10-29 PROCEDURE — 99223 1ST HOSP IP/OBS HIGH 75: CPT | Performed by: ORTHOPAEDIC SURGERY

## 2023-10-29 PROCEDURE — 80048 BASIC METABOLIC PNL TOTAL CA: CPT | Performed by: STUDENT IN AN ORGANIZED HEALTH CARE EDUCATION/TRAINING PROGRAM

## 2023-10-29 PROCEDURE — 86850 RBC ANTIBODY SCREEN: CPT | Performed by: ORTHOPAEDIC SURGERY

## 2023-10-29 PROCEDURE — 27823 TREATMENT OF ANKLE FRACTURE: CPT | Performed by: PHYSICIAN ASSISTANT

## 2023-10-29 PROCEDURE — 0QSK04Z REPOSITION LEFT FIBULA WITH INTERNAL FIXATION DEVICE, OPEN APPROACH: ICD-10-PCS | Performed by: ORTHOPAEDIC SURGERY

## 2023-10-29 DEVICE — 4.0MM CANNULATED SCREW LONG THREAD/48MM: Type: IMPLANTABLE DEVICE | Site: ANKLE | Status: FUNCTIONAL

## 2023-10-29 DEVICE — 3.5MM CORTEX SCREW SELF-TAPPING 18MM: Type: IMPLANTABLE DEVICE | Site: ANKLE | Status: FUNCTIONAL

## 2023-10-29 DEVICE — LCP ONE-THIRD TUBULAR PLATE WITH COLLAR 8 HOLES/93MM
Type: IMPLANTABLE DEVICE | Site: ANKLE | Status: FUNCTIONAL
Brand: LCP

## 2023-10-29 DEVICE — 2.7MM CORTEX SCREW SLF-TPNG WITH T8 STARDRIVE RECESS/50MM: Type: IMPLANTABLE DEVICE | Site: ANKLE | Status: FUNCTIONAL

## 2023-10-29 DEVICE — 2.4MM LCP(TM) T-PLATE 3 HOLES HEAD/7 HOLES SHAFT
Type: IMPLANTABLE DEVICE | Site: ANKLE | Status: FUNCTIONAL
Brand: LCP

## 2023-10-29 DEVICE — 2.7MM CORTEX SCREW SLF-TPNG WITH T8 STARDRIVE RECESS/44MM: Type: IMPLANTABLE DEVICE | Site: ANKLE | Status: FUNCTIONAL

## 2023-10-29 DEVICE — 4.0MM CANNULATED SCREW-LONG THREAD 60MM: Type: IMPLANTABLE DEVICE | Site: ANKLE | Status: FUNCTIONAL

## 2023-10-29 DEVICE — 2.4MM CORTEX SCREW SLF-TPNG WITH T8 STARDRIVE RECESS-36MM: Type: IMPLANTABLE DEVICE | Site: ANKLE | Status: FUNCTIONAL

## 2023-10-29 DEVICE — 3.5MM LOCKING SCREW SLF-TPNG W/STARDRIVE(TM) RECESS 18MM: Type: IMPLANTABLE DEVICE | Site: ANKLE | Status: FUNCTIONAL

## 2023-10-29 DEVICE — 3.5MM CORTEX SCREW SELF-TAPPING 20MM: Type: IMPLANTABLE DEVICE | Site: ANKLE | Status: FUNCTIONAL

## 2023-10-29 DEVICE — 3.5MM LOCKING SCREW SLF-TPNG W/STARDRIVE(TM) RECESS 14MM: Type: IMPLANTABLE DEVICE | Site: ANKLE | Status: FUNCTIONAL

## 2023-10-29 DEVICE — 3.5MM CORTEX SCREW SELF-TAPPING 14MM: Type: IMPLANTABLE DEVICE | Site: ANKLE | Status: FUNCTIONAL

## 2023-10-29 DEVICE — 2.4MM CORTEX SCREW SLF-TPNG WITH T8 STARDRIVE RECESS-32MM: Type: IMPLANTABLE DEVICE | Site: ANKLE | Status: FUNCTIONAL

## 2023-10-29 DEVICE — 4.0MM CANNULATED SCREW-LONG THREAD 54MM: Type: IMPLANTABLE DEVICE | Site: ANKLE | Status: FUNCTIONAL

## 2023-10-29 RX ORDER — BUPIVACAINE HYDROCHLORIDE 2.5 MG/ML
INJECTION, SOLUTION EPIDURAL; INFILTRATION; INTRACAUDAL AS NEEDED
Status: DISCONTINUED | OUTPATIENT
Start: 2023-10-29 | End: 2023-10-29 | Stop reason: HOSPADM

## 2023-10-29 RX ORDER — HYDROMORPHONE HCL/PF 1 MG/ML
0.2 SYRINGE (ML) INJECTION
Status: DISCONTINUED | OUTPATIENT
Start: 2023-10-29 | End: 2023-10-29 | Stop reason: HOSPADM

## 2023-10-29 RX ORDER — LEVOTHYROXINE SODIUM 0.05 MG/1
50 TABLET ORAL
Status: DISCONTINUED | OUTPATIENT
Start: 2023-10-30 | End: 2023-10-30 | Stop reason: HOSPADM

## 2023-10-29 RX ORDER — LIDOCAINE HYDROCHLORIDE 10 MG/ML
INJECTION, SOLUTION EPIDURAL; INFILTRATION; INTRACAUDAL; PERINEURAL AS NEEDED
Status: DISCONTINUED | OUTPATIENT
Start: 2023-10-29 | End: 2023-10-29

## 2023-10-29 RX ORDER — EPHEDRINE SULFATE 50 MG/ML
INJECTION INTRAVENOUS AS NEEDED
Status: DISCONTINUED | OUTPATIENT
Start: 2023-10-29 | End: 2023-10-29

## 2023-10-29 RX ORDER — ROCURONIUM BROMIDE 10 MG/ML
INJECTION, SOLUTION INTRAVENOUS AS NEEDED
Status: DISCONTINUED | OUTPATIENT
Start: 2023-10-29 | End: 2023-10-29

## 2023-10-29 RX ORDER — ATORVASTATIN CALCIUM 10 MG/1
10 TABLET, FILM COATED ORAL DAILY
Status: DISCONTINUED | OUTPATIENT
Start: 2023-10-29 | End: 2023-10-30 | Stop reason: HOSPADM

## 2023-10-29 RX ORDER — SODIUM CHLORIDE, SODIUM LACTATE, POTASSIUM CHLORIDE, CALCIUM CHLORIDE 600; 310; 30; 20 MG/100ML; MG/100ML; MG/100ML; MG/100ML
INJECTION, SOLUTION INTRAVENOUS CONTINUOUS PRN
Status: DISCONTINUED | OUTPATIENT
Start: 2023-10-29 | End: 2023-10-29

## 2023-10-29 RX ORDER — OXYCODONE HYDROCHLORIDE 5 MG/1
5 TABLET ORAL EVERY 4 HOURS PRN
Status: DISCONTINUED | OUTPATIENT
Start: 2023-10-29 | End: 2023-10-30 | Stop reason: HOSPADM

## 2023-10-29 RX ORDER — VASOPRESSIN 20 U/ML
INJECTION PARENTERAL AS NEEDED
Status: DISCONTINUED | OUTPATIENT
Start: 2023-10-29 | End: 2023-10-29

## 2023-10-29 RX ORDER — ATORVASTATIN CALCIUM 10 MG/1
10 TABLET, FILM COATED ORAL
Status: DISCONTINUED | OUTPATIENT
Start: 2023-10-29 | End: 2023-10-30 | Stop reason: HOSPADM

## 2023-10-29 RX ORDER — FENTANYL CITRATE 50 UG/ML
INJECTION, SOLUTION INTRAMUSCULAR; INTRAVENOUS AS NEEDED
Status: DISCONTINUED | OUTPATIENT
Start: 2023-10-29 | End: 2023-10-29

## 2023-10-29 RX ORDER — MAGNESIUM HYDROXIDE 1200 MG/15ML
LIQUID ORAL AS NEEDED
Status: DISCONTINUED | OUTPATIENT
Start: 2023-10-29 | End: 2023-10-29 | Stop reason: HOSPADM

## 2023-10-29 RX ORDER — SODIUM CHLORIDE, SODIUM LACTATE, POTASSIUM CHLORIDE, CALCIUM CHLORIDE 600; 310; 30; 20 MG/100ML; MG/100ML; MG/100ML; MG/100ML
75 INJECTION, SOLUTION INTRAVENOUS CONTINUOUS
Status: DISPENSED | OUTPATIENT
Start: 2023-10-29 | End: 2023-10-30

## 2023-10-29 RX ORDER — CYCLOBENZAPRINE HCL 10 MG
5 TABLET ORAL
Status: DISCONTINUED | OUTPATIENT
Start: 2023-10-29 | End: 2023-10-30 | Stop reason: HOSPADM

## 2023-10-29 RX ORDER — FENTANYL CITRATE/PF 50 MCG/ML
25 SYRINGE (ML) INJECTION
Status: DISCONTINUED | OUTPATIENT
Start: 2023-10-29 | End: 2023-10-29 | Stop reason: HOSPADM

## 2023-10-29 RX ORDER — TRAMADOL HYDROCHLORIDE 50 MG/1
50 TABLET ORAL EVERY 6 HOURS SCHEDULED
Status: DISCONTINUED | OUTPATIENT
Start: 2023-10-29 | End: 2023-10-30 | Stop reason: HOSPADM

## 2023-10-29 RX ORDER — ONDANSETRON 2 MG/ML
INJECTION INTRAMUSCULAR; INTRAVENOUS AS NEEDED
Status: DISCONTINUED | OUTPATIENT
Start: 2023-10-29 | End: 2023-10-29

## 2023-10-29 RX ORDER — PROPOFOL 10 MG/ML
INJECTION, EMULSION INTRAVENOUS AS NEEDED
Status: DISCONTINUED | OUTPATIENT
Start: 2023-10-29 | End: 2023-10-29

## 2023-10-29 RX ORDER — IPRATROPIUM BROMIDE AND ALBUTEROL SULFATE 2.5; .5 MG/3ML; MG/3ML
SOLUTION RESPIRATORY (INHALATION)
Status: DISPENSED
Start: 2023-10-29 | End: 2023-10-29

## 2023-10-29 RX ORDER — ONDANSETRON 2 MG/ML
4 INJECTION INTRAMUSCULAR; INTRAVENOUS ONCE AS NEEDED
Status: COMPLETED | OUTPATIENT
Start: 2023-10-29 | End: 2023-10-29

## 2023-10-29 RX ORDER — OXYCODONE HYDROCHLORIDE 10 MG/1
10 TABLET ORAL EVERY 4 HOURS PRN
Status: DISCONTINUED | OUTPATIENT
Start: 2023-10-29 | End: 2023-10-30 | Stop reason: HOSPADM

## 2023-10-29 RX ORDER — CEFAZOLIN SODIUM 2 G/50ML
2000 SOLUTION INTRAVENOUS EVERY 8 HOURS
Status: COMPLETED | OUTPATIENT
Start: 2023-10-29 | End: 2023-10-30

## 2023-10-29 RX ORDER — FENTANYL CITRATE/PF 50 MCG/ML
50 SYRINGE (ML) INJECTION
Status: DISCONTINUED | OUTPATIENT
Start: 2023-10-29 | End: 2023-10-29 | Stop reason: HOSPADM

## 2023-10-29 RX ORDER — ALBUMIN, HUMAN INJ 5% 5 %
SOLUTION INTRAVENOUS CONTINUOUS PRN
Status: DISCONTINUED | OUTPATIENT
Start: 2023-10-29 | End: 2023-10-29

## 2023-10-29 RX ORDER — ACETAMINOPHEN 10 MG/ML
INJECTION, SOLUTION INTRAVENOUS AS NEEDED
Status: DISCONTINUED | OUTPATIENT
Start: 2023-10-29 | End: 2023-10-29

## 2023-10-29 RX ORDER — ACETAMINOPHEN 325 MG/1
650 TABLET ORAL EVERY 6 HOURS SCHEDULED
Status: DISCONTINUED | OUTPATIENT
Start: 2023-10-29 | End: 2023-10-30 | Stop reason: HOSPADM

## 2023-10-29 RX ORDER — HYDROMORPHONE HCL/PF 1 MG/ML
SYRINGE (ML) INJECTION AS NEEDED
Status: DISCONTINUED | OUTPATIENT
Start: 2023-10-29 | End: 2023-10-29

## 2023-10-29 RX ORDER — PHENYLEPHRINE HCL IN 0.9% NACL 1 MG/10 ML
SYRINGE (ML) INTRAVENOUS AS NEEDED
Status: DISCONTINUED | OUTPATIENT
Start: 2023-10-29 | End: 2023-10-29

## 2023-10-29 RX ORDER — LEVOTHYROXINE SODIUM 0.05 MG/1
50 TABLET ORAL DAILY
Status: DISCONTINUED | OUTPATIENT
Start: 2023-10-29 | End: 2023-10-30 | Stop reason: HOSPADM

## 2023-10-29 RX ADMIN — B-COMPLEX W/ C & FOLIC ACID TAB 1 TABLET: TAB at 16:29

## 2023-10-29 RX ADMIN — EPHEDRINE SULFATE 10 MG: 50 INJECTION, SOLUTION INTRAVENOUS at 13:59

## 2023-10-29 RX ADMIN — VASOPRESSIN 1 UNITS: 20 INJECTION INTRAVENOUS at 12:47

## 2023-10-29 RX ADMIN — VASOPRESSIN 1 UNITS: 20 INJECTION INTRAVENOUS at 12:25

## 2023-10-29 RX ADMIN — CYCLOBENZAPRINE HYDROCHLORIDE 5 MG: 10 TABLET, FILM COATED ORAL at 21:42

## 2023-10-29 RX ADMIN — Medication 150 MCG: at 11:03

## 2023-10-29 RX ADMIN — SUGAMMADEX 200 MG: 100 INJECTION, SOLUTION INTRAVENOUS at 14:37

## 2023-10-29 RX ADMIN — Medication 200 MCG: at 12:22

## 2023-10-29 RX ADMIN — ONDANSETRON 4 MG: 2 INJECTION INTRAMUSCULAR; INTRAVENOUS at 14:59

## 2023-10-29 RX ADMIN — CEFAZOLIN SODIUM 2000 MG: 2 SOLUTION INTRAVENOUS at 21:40

## 2023-10-29 RX ADMIN — FENTANYL CITRATE 100 MCG: 50 INJECTION, SOLUTION INTRAMUSCULAR; INTRAVENOUS at 10:54

## 2023-10-29 RX ADMIN — LEVOTHYROXINE SODIUM 50 MCG: 0.05 TABLET ORAL at 16:29

## 2023-10-29 RX ADMIN — SODIUM CHLORIDE, SODIUM LACTATE, POTASSIUM CHLORIDE, AND CALCIUM CHLORIDE: .6; .31; .03; .02 INJECTION, SOLUTION INTRAVENOUS at 10:48

## 2023-10-29 RX ADMIN — ROCURONIUM BROMIDE 25 MG: 10 INJECTION, SOLUTION INTRAVENOUS at 11:45

## 2023-10-29 RX ADMIN — ACETAMINOPHEN 650 MG: 325 TABLET, FILM COATED ORAL at 17:45

## 2023-10-29 RX ADMIN — SODIUM CHLORIDE 500 ML: 0.9 INJECTION, SOLUTION INTRAVENOUS at 16:25

## 2023-10-29 RX ADMIN — CEFAZOLIN SODIUM 2000 MG: 2 SOLUTION INTRAVENOUS at 11:05

## 2023-10-29 RX ADMIN — TRAMADOL HYDROCHLORIDE 50 MG: 50 TABLET, COATED ORAL at 17:46

## 2023-10-29 RX ADMIN — VASOPRESSIN 3 UNITS: 20 INJECTION INTRAVENOUS at 13:59

## 2023-10-29 RX ADMIN — Medication 300 MCG: at 11:06

## 2023-10-29 RX ADMIN — VASOPRESSIN 2.5 UNITS: 20 INJECTION INTRAVENOUS at 13:51

## 2023-10-29 RX ADMIN — EPHEDRINE SULFATE 10 MG: 50 INJECTION, SOLUTION INTRAVENOUS at 11:15

## 2023-10-29 RX ADMIN — DEXMEDETOMIDINE HYDROCHLORIDE 6 MCG: 100 INJECTION, SOLUTION INTRAVENOUS at 11:43

## 2023-10-29 RX ADMIN — LIDOCAINE HYDROCHLORIDE 100 MG: 10 INJECTION, SOLUTION EPIDURAL; INFILTRATION; INTRACAUDAL; PERINEURAL at 10:56

## 2023-10-29 RX ADMIN — SODIUM CHLORIDE, SODIUM LACTATE, POTASSIUM CHLORIDE, AND CALCIUM CHLORIDE 75 ML/HR: .6; .31; .03; .02 INJECTION, SOLUTION INTRAVENOUS at 19:18

## 2023-10-29 RX ADMIN — PROPOFOL 150 MG: 10 INJECTION, EMULSION INTRAVENOUS at 10:56

## 2023-10-29 RX ADMIN — Medication 200 MCG: at 11:12

## 2023-10-29 RX ADMIN — SODIUM CHLORIDE, SODIUM LACTATE, POTASSIUM CHLORIDE, AND CALCIUM CHLORIDE: .6; .31; .03; .02 INJECTION, SOLUTION INTRAVENOUS at 13:45

## 2023-10-29 RX ADMIN — VASOPRESSIN 2 UNITS: 20 INJECTION INTRAVENOUS at 12:56

## 2023-10-29 RX ADMIN — PROPOFOL 50 MG: 10 INJECTION, EMULSION INTRAVENOUS at 10:58

## 2023-10-29 RX ADMIN — TRAMADOL HYDROCHLORIDE 50 MG: 50 TABLET, COATED ORAL at 23:30

## 2023-10-29 RX ADMIN — ONDANSETRON 4 MG: 2 INJECTION INTRAMUSCULAR; INTRAVENOUS at 13:56

## 2023-10-29 RX ADMIN — ACETAMINOPHEN 650 MG: 325 TABLET, FILM COATED ORAL at 23:30

## 2023-10-29 RX ADMIN — ALBUMIN (HUMAN): 12.5 INJECTION, SOLUTION INTRAVENOUS at 11:17

## 2023-10-29 RX ADMIN — Medication 100 MCG: at 10:55

## 2023-10-29 RX ADMIN — ROCURONIUM BROMIDE 50 MG: 10 INJECTION, SOLUTION INTRAVENOUS at 10:57

## 2023-10-29 RX ADMIN — NICOTINE 1 PATCH: 14 PATCH, EXTENDED RELEASE TRANSDERMAL at 09:41

## 2023-10-29 RX ADMIN — ATORVASTATIN CALCIUM 10 MG: 10 TABLET, FILM COATED ORAL at 16:29

## 2023-10-29 RX ADMIN — Medication 100 MCG: at 12:08

## 2023-10-29 RX ADMIN — ASPIRIN 81 MG: 81 TABLET, COATED ORAL at 17:46

## 2023-10-29 RX ADMIN — HYDROMORPHONE HYDROCHLORIDE 0.5 MG: 1 INJECTION, SOLUTION INTRAMUSCULAR; INTRAVENOUS; SUBCUTANEOUS at 13:24

## 2023-10-29 RX ADMIN — ACETAMINOPHEN 1000 MG: 10 INJECTION INTRAVENOUS at 13:56

## 2023-10-29 RX ADMIN — OXYCODONE HYDROCHLORIDE AND ACETAMINOPHEN 1 TABLET: 5; 325 TABLET ORAL at 01:00

## 2023-10-29 RX ADMIN — VASOPRESSIN 3 UNITS: 20 INJECTION INTRAVENOUS at 13:38

## 2023-10-29 NOTE — ANESTHESIA POSTPROCEDURE EVALUATION
Post-Op Assessment Note    CV Status:  Stable  Pain Score: 2    Pain management: adequate  Multimodal analgesia used between 6 hours prior to anesthesia start to PACU discharge    Mental Status:  Alert and awake   Hydration Status:  Euvolemic   PONV Controlled:  Controlled   Airway Patency:  Patent      Post Op Vitals Reviewed: Yes      Staff: CRNA         No notable events documented.     BP   143/77   Temp 97.4   Pulse 91   Resp 17   SpO2 97

## 2023-10-29 NOTE — ASSESSMENT & PLAN NOTE
Adequately controlled  Continue home dose metoprolol with holding parameters    Blood pressure 118/73, pulse 60, temperature 97.8 °F (36.6 °C), resp. rate 18, height 5' 9" (1.753 m), weight 101 kg (222 lb), SpO2 98 %.

## 2023-10-29 NOTE — PHYSICAL THERAPY NOTE
PT Cancellation Note    Pt in OR today for L ankle ORIF vs external fixator. Will cancel for now, continue to follow.      Maria Fernanda Emmanuel

## 2023-10-29 NOTE — PLAN OF CARE
Problem: PAIN - ADULT  Goal: Verbalizes/displays adequate comfort level or baseline comfort level  Description: Interventions:  - Encourage patient to monitor pain and request assistance  - Assess pain using appropriate pain scale  - Administer analgesics based on type and severity of pain and evaluate response  - Implement non-pharmacological measures as appropriate and evaluate response  - Consider cultural and social influences on pain and pain management  - Notify physician/advanced practitioner if interventions unsuccessful or patient reports new pain  Outcome: Progressing     Problem: INFECTION - ADULT  Goal: Absence or prevention of progression during hospitalization  Description: INTERVENTIONS:  - Assess and monitor for signs and symptoms of infection  - Monitor lab/diagnostic results  - Monitor all insertion sites, i.e. indwelling lines, tubes, and drains  - Monitor endotracheal if appropriate and nasal secretions for changes in amount and color  - [place] appropriate cooling/warming therapies per order  - Administer medications as ordered  - Instruct and encourage patient and family to use good hand hygiene technique  - Identify and instruct in appropriate isolation precautions for identified infection/condition  Outcome: Progressing  Goal: Absence of fever/infection during neutropenic period  Description: INTERVENTIONS:  - Monitor WBC    Outcome: Progressing     Problem: SAFETY ADULT  Goal: Patient will remain free of falls  Description: INTERVENTIONS:  - Educate patient/family on patient safety including physical limitations  - Instruct patient to call for assistance with activity   - Consult OT/PT to assist with strengthening/mobility   - Keep Call bell within reach  - Keep bed low and locked with side rails adjusted as appropriate  - Keep care items and personal belongings within reach  - Initiate and maintain comfort rounds  - Make Fall Risk Sign visible to staff  - Offer Toileting every  Hours, in advance of need  - Initiate/Maintain alarm  - Obtain necessary fall risk management equipment:   - Apply yellow socks and bracelet for high fall risk patients  - Consider moving patient to room near nurses station  Outcome: Progressing  Goal: Maintain or return to baseline ADL function  Description: INTERVENTIONS:  -  Assess patient's ability to carry out ADLs; assess patient's baseline for ADL function and identify physical deficits which impact ability to perform ADLs (bathing, care of mouth/teeth, toileting, grooming, dressing, etc.)  - Assess/evaluate cause of self-care deficits   - Assess range of motion  - Assess patient's mobility; develop plan if impaired  - Assess patient's need for assistive devices and provide as appropriate  - Encourage maximum independence but intervene and supervise when necessary  - Involve family in performance of ADLs  - Assess for home care needs following discharge   - Consider OT consult to assist with ADL evaluation and planning for discharge  - Provide patient education as appropriate  Outcome: Progressing  Goal: Maintains/Returns to pre admission functional level  Description: INTERVENTIONS:  - Perform BMAT or MOVE assessment daily.   - Set and communicate daily mobility goal to care team and patient/family/caregiver. - Collaborate with rehabilitation services on mobility goals if consulted  - Perform Range of Motion  times a day. - Reposition patient every  hours.   - Dangle patient  times a day  - Stand patient  times a day  - Ambulate patient  times a day  - Out of bed to chair  times a day   - Out of bed for meals times a day  - Out of bed for toileting  - Record patient progress and toleration of activity level   Outcome: Progressing     Problem: DISCHARGE PLANNING  Goal: Discharge to home or other facility with appropriate resources  Description: INTERVENTIONS:  - Identify barriers to discharge w/patient and caregiver  - Arrange for needed discharge resources and transportation as appropriate  - Identify discharge learning needs (meds, wound care, etc.)  - Arrange for interpretive services to assist at discharge as needed  - Refer to Case Management Department for coordinating discharge planning if the patient needs post-hospital services based on physician/advanced practitioner order or complex needs related to functional status, cognitive ability, or social support system  Outcome: Progressing     Problem: Knowledge Deficit  Goal: Patient/family/caregiver demonstrates understanding of disease process, treatment plan, medications, and discharge instructions  Description: Complete learning assessment and assess knowledge base. Interventions:  - Provide teaching at level of understanding  - Provide teaching via preferred learning methods  Outcome: Progressing     Problem: MOBILITY - ADULT  Goal: Maintain or return to baseline ADL function  Description: INTERVENTIONS:  -  Assess patient's ability to carry out ADLs; assess patient's baseline for ADL function and identify physical deficits which impact ability to perform ADLs (bathing, care of mouth/teeth, toileting, grooming, dressing, etc.)  - Assess/evaluate cause of self-care deficits   - Assess range of motion  - Assess patient's mobility; develop plan if impaired  - Assess patient's need for assistive devices and provide as appropriate  - Encourage maximum independence but intervene and supervise when necessary  - Involve family in performance of ADLs  - Assess for home care needs following discharge   - Consider OT consult to assist with ADL evaluation and planning for discharge  - Provide patient education as appropriate  Outcome: Progressing  Goal: Maintains/Returns to pre admission functional level  Description: INTERVENTIONS:  - Perform BMAT or MOVE assessment daily.   - Set and communicate daily mobility goal to care team and patient/family/caregiver.    - Collaborate with rehabilitation services on mobility goals if consulted  - Perform Range of Motion  times a day. - Reposition patient every  hours.   - Dangle patient times a day  - Stand patient  times a day  - Ambulate patient  times a day  - Out of bed to chair  times a day   - Out of bed for meals times a day  - Out of bed for toileting  - Record patient progress and toleration of activity level   Outcome: Progressing

## 2023-10-29 NOTE — ASSESSMENT & PLAN NOTE
Lab Results   Component Value Date    EGFR 37 10/29/2023    EGFR 45 08/15/2022    EGFR 40 07/12/2022    CREATININE 1.78 (H) 10/29/2023    CREATININE 1.52 (H) 08/15/2022    CREATININE 1.69 (H) 07/12/2022     Creatinine mildly above baseline. Avoid nephrotoxic medications and monitor creatinine closely in the perioperative and postoperative.

## 2023-10-29 NOTE — ANESTHESIA PREPROCEDURE EVALUATION
Procedure:  OPEN REDUCTION W/ INTERNAL FIXATION (ORIF) ANKLE- ORIF Left ankle vs possible application of external fixator (Left: Ankle)    Relevant Problems   ANESTHESIA (within normal limits)      CARDIO   (+) Essential (primary) hypertension   (+) Mixed hyperlipidemia   (+) ST elevation myocardial infarction involving right coronary artery (HCC)      ENDO   (+) Hypothyroidism      GI/HEPATIC (within normal limits)  NPO confirmed  BMI 32.8      /RENAL   (+) Stage 3 chronic kidney disease, unspecified whether stage 3a or 3b CKD (HCC)      HEMATOLOGY (within normal limits)      Musculoskeletal and Integument   (+) Closed trimalleolar fracture      Other   (+) History of cancer tonsil   (+) S/P right coronary artery (RCA) stent placement   (+) Tobacco abuse (1 PPD, last cig yest 2pm)   -last cards f/u 8/11/23, KATIANA to RCA 12/2019.  No changes to meds     No Known Allergies    Social History     Tobacco Use    Smoking status: Every Day     Packs/day: 1.00     Years: 50.00     Total pack years: 50.00     Types: Cigarettes    Smokeless tobacco: Never    Tobacco comments:     down to 1/2 ppd   Substance Use Topics    Alcohol use: Never    Drug use: No     Current Outpatient Medications   Medication Instructions    atorvastatin (LIPITOR) 10 mg, Oral, Daily    [facility] Aspirin Adult Low Dose 81 MG chewable tablet CHEW 1 TABLET BY MOUTH DAILY    cyclobenzaprine (FLEXERIL) 5 mg, Oral, Daily at bedtime    HYDROcodone-acetaminophen (Norco) 5-325 mg per tablet 1 tablet, Oral, Every 8 hours PRN    levothyroxine (SYNTHROID) 50 mcg, Oral, Daily    methylPREDNISolone 4 MG tablet therapy pack Use as directed on package    methylPREDNISolone 4 MG tablet therapy pack Use as directed on package    metoprolol tartrate (LOPRESSOR) 25 mg, Oral, Every 12 hours    multivitamin (THERAGRAN) TABS 1 tablet, Oral, Daily     Lab Results   Component Value Date    WBC 9.69 10/29/2023    HGB 12.8 10/29/2023    HCT 37.5 10/29/2023     10/29/2023 SODIUM 131 (L) 10/29/2023    K 4.1 10/29/2023    CL 99 10/29/2023    CO2 26 10/29/2023    BUN 23 10/29/2023    CREATININE 1.78 (H) 10/29/2023    GLUC 92 10/29/2023    HGBA1C 6.1 12/30/2018    AST 40 08/15/2022    ALT 25 08/15/2022    ALKPHOS 93 08/15/2022    TBILI 0.32 08/15/2022    ALB 4.3 08/15/2022    PROTIME 12.3 12/30/2018    PTT 29 12/30/2018    INR 0.92 12/30/2018     Vitals:    10/29/23 0714   BP: 118/73   Pulse: 60   Resp: 18   Temp: 97.8 °F (36.6 °C)   SpO2: 98%     Echo 12/2019  SUMMARY     LEFT VENTRICLE:  Systolic function was normal by visual assessment. Ejection fraction was estimated to be 55 %. There were no regional wall motion abnormalities. Doppler parameters were consistent with abnormal left ventricular relaxation (grade 1 diastolic dysfunction). MITRAL VALVE:  There was mild regurgitation. Regurgitation grade was 1+ on a scale of 0 to 4+. COMPARISONS:  Compared to prior echocardiogram of 12/31/2018, inferior base hypokinesis has resolved and diastolic function has improved. EKG 5/19/22  -SR, inf q-waves, non-sp ST changes. No change compared to 02/2020    Physical Exam    Airway    Mallampati score: III  TM Distance: >3 FB  Neck ROM: full     Dental   Comment: edentulous upper dentures and lower dentures    Cardiovascular  Rhythm: regular, Rate: normal, Cardiovascular exam normal    Pulmonary  Pulmonary exam normal Breath sounds clear to auscultation    Other Findings        Anesthesia Plan  ASA Score- 3     Anesthesia Type- general with ASA Monitors. Additional Monitors:     Airway Plan: ETT. Plan Factors-Exercise tolerance (METS): >4 METS. Chart reviewed. EKG reviewed. Existing labs reviewed. Patient summary reviewed. Patient is not a current smoker. Induction- intravenous. Postoperative Plan- Plan for postoperative opioid use. Planned trial extubation    Informed Consent- Anesthetic plan and risks discussed with patient.   I personally reviewed this patient with the CRNA. Discussed and agreed on the Anesthesia Plan with the CRNA. Violeta Carpenter

## 2023-10-29 NOTE — PROGRESS NOTES
Patient alert and oriented x 4. Pleasant. PRN pain medication administered to patient. Patient NPO after midnight. Patient slept for short period of time. Patients left ankle dressing in place. Patient able to verbalize needs and obey commands with limitations. No deviation in assessment finding noted when compared to previous documentation. Will continue to monitor.

## 2023-10-29 NOTE — ASSESSMENT & PLAN NOTE
Present on admission after mechanical fall  Orthopedic surgery consulted, plan for surgery in AM 10/29/2023  Patient is NPO since midnight  Reasonable pain control on medications  Follow up XR and CT imaging, Splinted in ED  Medically optimized for orthopedic surgery from a medical standpoint

## 2023-10-29 NOTE — PROGRESS NOTES
[address]  Progress Note  Name: Yandy Lawler  MRN: [id number]  Unit/Bed#: -01 I Date of Admission: 10/28/2023   Date of Service: 10/29/2023 I Hospital Day: 1    Assessment/Plan   Hypothyroidism  Assessment & Plan  On levothyroxine 50 mcg daily    Stage 3 chronic kidney disease, unspecified whether stage 3a or 3b CKD [facility])  Assessment & Plan  Lab Results   Component Value Date    EGFR 37 10/29/2023    EGFR 45 08/15/2022    EGFR 40 07/12/2022    CREATININE 1.78 (H) 10/29/2023    CREATININE 1.52 (H) 08/15/2022    CREATININE 1.69 (H) 07/12/2022     Creatinine mildly above baseline. Avoid nephrotoxic medications and monitor creatinine closely in the perioperative and postoperative. Essential (primary) hypertension  Assessment & Plan  Adequately controlled  Continue home dose metoprolol with holding parameters    Blood pressure 118/73, pulse 60, temperature 97.8 °F (36.6 °C), resp. rate 18, height 5' 9" (1.753 m), weight 101 kg (222 lb), SpO2 98 %. Mixed hyperlipidemia  Assessment & Plan  On statin    Tobacco abuse  Assessment & Plan  Nicotine patch    * Closed trimalleolar fracture  Assessment & Plan  Present on admission after mechanical fall  Orthopedic surgery consulted, plan for surgery in AM 10/29/2023  Patient is NPO since midnight  Reasonable pain control on medications  Follow up XR and CT imaging, Splinted in ED  Medically optimized for orthopedic surgery from a medical standpoint           TE Pharmacologic Prophylaxis: Heparin to be started postoperatively    Patient Centered Rounds: I have performed bedside rounds with nursing staff today.   Discussions with Specialists or Other Care Team Provider: Orthopedics  Education and Discussions with Family / Patient: Patient    Current Length of Stay: 1 day(s)  Current Patient Status: Inpatient     Certification Statement: The patient will continue to require additional inpatient hospital stay due to Closed trimalleolar fracture  Discharge Plan / Estimated Discharge Date: 1-2 more days postoperatively    Code Status: Level 1 - Full Code  ______________________________________________________________________________    Subjective:   Patient seen and examined by me. States that he continues to have pain at the left ankle at the site of the fall and fracture. Wants to get the surgery done ASAP. No fever or chills. No chest pain, palpitations or diaphoresis. Objective:   Vitals: Blood pressure 118/73, pulse 60, temperature 97.8 °F (36.6 °C), resp. rate 18, height 5' 9" (1.753 m), weight 101 kg (222 lb), SpO2 98 %. Physical Exam:   General appearance: alert, fatigued, appears stated age, and cooperative  Constitutional- looks a little weak  HEENT - atraumatic and normocephalic  Neck- supple  Skin - no fresh rash  Extremities has left ankle pain secondary to fracture  Cardiovascular- S1-S2 heard  Respiratory- bilateral air entry present, no crackles or rhonchi  Skin - no fresh rash  Abdomen - normal bowel sounds present, no rebound tenderness  CNS- No fresh focal deficits  Psych- no acute psychosis     Additional Data:   Labs:  Results from last 7 days   Lab Units 10/29/23  0504   WBC Thousand/uL 9.69   HEMOGLOBIN g/dL 12.8   HEMATOCRIT % 37.5   MCV fL 88   PLATELETS Thousands/uL 191     Results from last 7 days   Lab Units 10/29/23  0504   SODIUM mmol/L 131*   POTASSIUM mmol/L 4.1   CHLORIDE mmol/L 99   CO2 mmol/L 26   ANION GAP mmol/L 6   BUN mg/dL 23   CREATININE mg/dL 1.78*   CALCIUM mg/dL 9.4   EGFR ml/min/1.73sq m 37   GLUCOSE RANDOM mg/dL 92     Results from last 7 days   Lab Units 10/29/23  0504   MAGNESIUM mg/dL 2.1                              * I Have Reviewed All Lab Data Listed Above. Cultures:               Imaging:  Imaging Reports Reviewed Today Include:   CT lower extremity wo contrast left    Result Date: 10/28/2023  Impression: Interval relocation of previously noted tibiotalar joint dislocation. Trimalleolar fracture as described above. Workstation performed: [id number]     Scheduled Meds:  Current Facility-Administered Medications   Medication Dose Route Frequency Provider Last Rate    acetaminophen  650 mg Oral Q6H PRN Jag Ramirez MD      atorvastatin  10 mg Oral Daily With Eleno Goodwin MD      calcium carbonate  1,000 mg Oral Daily PRN Jag Ramirez MD      cefazolin  2,000 mg Intravenous On Call To OR Keara Puentes PA-C      docusate sodium  100 mg Oral BID Jag Ramirez MD      HYDROmorphone  0.5 mg Intravenous Q4H PRN MD Gloria Peace ON 10/30/2023] levothyroxine  50 mcg Oral Early Morning Yudith Blackwell MD      metoprolol tartrate  25 mg Oral Q12H Jag Ramirez MD      nicotine  1 patch Transdermal Daily aJg Ramirez MD      ondansetron  4 mg Intravenous Q6H PRN Jag Ramirez MD      oxyCODONE-acetaminophen  1 tablet Oral Q4H PRN Jag Ramirez MD      senna  1 tablet Oral Daily MD Yudiht Peace MD  [facility]    ** Please Note: This note has been constructed using a voice recognition system.  **

## 2023-10-29 NOTE — CONSULTS
Orthopedics   Ivory Richardson 68 y.o. male MRN: [id number]  Unit/Bed#: -01      Chief Complaint:   Left ankle pain    HPI:  68 y.o. male with Left ankle pain s/p mechanical fall at home. Patient states his ankle was under his body when he landed. He was unable to get up or ambulate after the fall. Patient was brought to the ED for evaluation and x-rays were performed demonstrating Left ankle fracture-dislocation. His ankle was reduced and new images were performed. He does not use assistive device for ambulation. Review Of Systems:   Skin: Diffuse swelling noted  Neuro: See HPI  Musculoskeletal: See HPI  14 point review of systems negative except as stated above     Past Medical History:   History reviewed. No pertinent past medical history.     Past Surgical History:   Past Surgical History:   Procedure Laterality Date    KNEE SURGERY         Family History:  Family history reviewed and non-contributory  Family History   Problem Relation Age of Onset    Heart disease Mother         80    Colon cancer Father        Social History:  Social History     Socioeconomic History    Marital status: /Civil Union     Spouse name: None    Number of children: None    Years of education: None    Highest education level: None   Occupational History    None   Tobacco Use    Smoking status: Every Day     Packs/day: 1.00     Years: 50.00     Total pack years: 50.00     Types: Cigarettes    Smokeless tobacco: Never    Tobacco comments:     down to 1/2 ppd   Substance and Sexual Activity    Alcohol use: Never    Drug use: No    Sexual activity: None   Other Topics Concern    None   Social History Narrative    None     Social Determinants of Health     Financial Resource Strain: Not on file   Food Insecurity: Not on file   Transportation Needs: Not on file   Physical Activity: Not on file   Stress: Not on file   Social Connections: Not on file   Intimate Partner Violence: Not on file   Housing Stability: Not on file Allergies:   No Known Allergies        Labs:  0   Lab Value Date/Time    HCT 37.5 10/29/2023 0504    HCT 42.9 01/01/2019 0506    HCT 41.3 12/31/2018 0417    HGB 12.8 10/29/2023 0504    HGB 14.1 01/01/2019 0506    HGB 13.8 12/31/2018 0417    INR 0.92 12/30/2018 1010    WBC 9.69 10/29/2023 0504    WBC 9.13 01/01/2019 0506    WBC 10.70 (H) 12/31/2018 0417       Meds:    Current Facility-Administered Medications:     acetaminophen (TYLENOL) tablet 650 mg, 650 mg, Oral, Q6H PRN, Bella He MD    calcium carbonate (TUMS) chewable tablet 1,000 mg, 1,000 mg, Oral, Daily PRN, Bella He MD    ceFAZolin (ANCEF) IVPB (premix in dextrose) 2,000 mg 50 mL, 2,000 mg, Intravenous, On Call To OR, Suszanne Dakins, PA-C    docusate sodium (COLACE) capsule 100 mg, 100 mg, Oral, BID, Bella He MD, 100 mg at 10/28/23 1751    HYDROmorphone (DILAUDID) injection 0.5 mg, 0.5 mg, Intravenous, Q4H PRN, Bella He MD, 0.5 mg at 10/28/23 2219    metoprolol tartrate (LOPRESSOR) tablet 25 mg, 25 mg, Oral, Q12H, Bella He MD    nicotine (NICODERM CQ) 14 mg/24hr TD 24 hr patch 1 patch, 1 patch, Transdermal, Daily, Bella He MD    [facility]ansetron [facility]) injection 4 mg, 4 mg, Intravenous, Q6H PRN, Bella He MD    oxyCODONE-acetaminophen (PERCOCET) 5-325 mg per tablet 1 tablet, 1 tablet, Oral, Q4H PRN, Bella He MD, 1 tablet at 10/29/23 0100    senna (SENOKOT) tablet 8.6 mg, 1 tablet, Oral, Daily, Bella He MD    Blood Culture:   No results found for: "BLOODCX"    Wound Culture:   No results found for: "WOUNDCULT"    Ins and Outs:  I/O last 24 hours:   In: -   Out: 500 [Urine:500]          Physical Exam:   /60 (BP Location: Right arm)   Pulse 59   Temp 97.7 °F (36.5 °C) (Oral)   Resp 18   Ht 5' 9" (1.753 m)   Wt 101 kg (222 lb)   SpO2 95%   BMI 32.78 kg/m²   Gen: No acute distress, resting comfortably in bed  HEENT: Eyes clear, moist mucus membranes, hearing intact  Respiratory: No audible wheezing or stridor  Cardiovascular: Well Perfused peripherally, 2+ distal pulse  Abdomen: nondistended, no peritoneal signs    Musculoskeletal:   Left ankle  Skin diffuse swelling noted, no obvious ecchymosis  TTP throughout Left ankle   SILT s/s/sp/dp/t. Motor intact hip flexion/extension, knee flexion/extension, EHL/FHL  DP/PT pulse palpated  Calf soft, compressible and nontender  Leg lengths equal    Tertiary: no tenderness over all other joints/long bones as except already stated. Radiology:   I personally reviewed the films. X-rays taken 10/28/2023 of Left ankle demonstrates acute trimalleolar fracture dislocation. Ankle was reduced confirmed with new images.    _*_*_*_*_*_*_*_*_*_*_*_*_*_*_*_*_*_*_*_*_*_*_*_*_*_*_*_*_*_*_*_*_*_*_*_*_*_*_*_*_*    Assessment:  73 y.o.male Left ankle fracture-dislcoation     Plan:   NWB Left lower extremity  OR 10/29/2023 with Dr Betsy Sheth for ORIF Left ankle vs application of external fixator. Informed consent obtained  NPO status and hold all anticoagulation  PreOp risk stratification from SLIM  Post op PT/OT eval  Dispo: Will continue to follow post operatively.        Frederic Camacho PA-C

## 2023-10-30 VITALS
DIASTOLIC BLOOD PRESSURE: 82 MMHG | SYSTOLIC BLOOD PRESSURE: 142 MMHG | BODY MASS INDEX: 32.88 KG/M2 | TEMPERATURE: 97.4 F | RESPIRATION RATE: 18 BRPM | WEIGHT: 222 LBS | OXYGEN SATURATION: 95 % | HEART RATE: 78 BPM | HEIGHT: 69 IN

## 2023-10-30 DIAGNOSIS — M79.605 LEFT LEG PAIN: ICD-10-CM

## 2023-10-30 DIAGNOSIS — I21.11 ST ELEVATION MYOCARDIAL INFARCTION INVOLVING RIGHT CORONARY ARTERY (HCC): ICD-10-CM

## 2023-10-30 LAB
ALBUMIN SERPL BCP-MCNC: 4.1 G/DL (ref 3.5–5)
ALP SERPL-CCNC: 54 U/L (ref 34–104)
ALT SERPL W P-5'-P-CCNC: 21 U/L (ref 7–52)
ANION GAP SERPL CALCULATED.3IONS-SCNC: 3 MMOL/L
AST SERPL W P-5'-P-CCNC: 81 U/L (ref 13–39)
BASOPHILS # BLD AUTO: 0.05 THOUSANDS/ÂΜL (ref 0–0.1)
BASOPHILS NFR BLD AUTO: 1 % (ref 0–1)
BILIRUB SERPL-MCNC: 0.49 MG/DL (ref 0.2–1)
BLOOD GROUP ANTIBODIES SERPL: NEGATIVE
BUN SERPL-MCNC: 18 MG/DL (ref 5–25)
CALCIUM SERPL-MCNC: 9.1 MG/DL (ref 8.4–10.2)
CHLORIDE SERPL-SCNC: 99 MMOL/L (ref 96–108)
CO2 SERPL-SCNC: 29 MMOL/L (ref 21–32)
CREAT SERPL-MCNC: 1.62 MG/DL (ref 0.6–1.3)
DME PARACHUTE DELIVERY DATE REQUESTED: NORMAL
DME PARACHUTE ITEM DESCRIPTION: NORMAL
DME PARACHUTE ORDER STATUS: NORMAL
DME PARACHUTE SUPPLIER NAME: NORMAL
DME PARACHUTE SUPPLIER PHONE: NORMAL
EOSINOPHIL # BLD AUTO: 0.2 THOUSAND/ÂΜL (ref 0–0.61)
EOSINOPHIL NFR BLD AUTO: 2 % (ref 0–6)
ERYTHROCYTE [DISTWIDTH] IN BLOOD BY AUTOMATED COUNT: 14.5 % (ref 11.6–15.1)
GFR SERPL CREATININE-BSD FRML MDRD: 41 ML/MIN/1.73SQ M
GLUCOSE SERPL-MCNC: 100 MG/DL (ref 65–140)
HCT VFR BLD AUTO: 35.5 % (ref 36.5–49.3)
HGB BLD-MCNC: 11.8 G/DL (ref 12–17)
IMM GRANULOCYTES # BLD AUTO: 0.02 THOUSAND/UL (ref 0–0.2)
IMM GRANULOCYTES NFR BLD AUTO: 0 % (ref 0–2)
LYMPHOCYTES # BLD AUTO: 1.32 THOUSANDS/ÂΜL (ref 0.6–4.47)
LYMPHOCYTES NFR BLD AUTO: 15 % (ref 14–44)
MCH RBC QN AUTO: 30.2 PG (ref 26.8–34.3)
MCHC RBC AUTO-ENTMCNC: 33.2 G/DL (ref 31.4–37.4)
MCV RBC AUTO: 91 FL (ref 82–98)
MONOCYTES # BLD AUTO: 0.47 THOUSAND/ÂΜL (ref 0.17–1.22)
MONOCYTES NFR BLD AUTO: 5 % (ref 4–12)
NEUTROPHILS # BLD AUTO: 6.88 THOUSANDS/ÂΜL (ref 1.85–7.62)
NEUTS SEG NFR BLD AUTO: 77 % (ref 43–75)
NRBC BLD AUTO-RTO: 0 /100 WBCS
PLATELET # BLD AUTO: 180 THOUSANDS/UL (ref 149–390)
PMV BLD AUTO: 9 FL (ref 8.9–12.7)
POTASSIUM SERPL-SCNC: 4.2 MMOL/L (ref 3.5–5.3)
PROT SERPL-MCNC: 7.3 G/DL (ref 6.4–8.4)
RBC # BLD AUTO: 3.91 MILLION/UL (ref 3.88–5.62)
SODIUM SERPL-SCNC: 131 MMOL/L (ref 135–147)
WBC # BLD AUTO: 8.94 THOUSAND/UL (ref 4.31–10.16)

## 2023-10-30 PROCEDURE — 99024 POSTOP FOLLOW-UP VISIT: CPT | Performed by: PHYSICIAN ASSISTANT

## 2023-10-30 PROCEDURE — 97165 OT EVAL LOW COMPLEX 30 MIN: CPT

## 2023-10-30 PROCEDURE — 97162 PT EVAL MOD COMPLEX 30 MIN: CPT

## 2023-10-30 PROCEDURE — 85025 COMPLETE CBC W/AUTO DIFF WBC: CPT | Performed by: PHYSICIAN ASSISTANT

## 2023-10-30 PROCEDURE — 99239 HOSP IP/OBS DSCHRG MGMT >30: CPT | Performed by: INTERNAL MEDICINE

## 2023-10-30 PROCEDURE — 80053 COMPREHEN METABOLIC PANEL: CPT | Performed by: PHYSICIAN ASSISTANT

## 2023-10-30 RX ORDER — ACETAMINOPHEN 325 MG/1
650 TABLET ORAL EVERY 6 HOURS PRN
Refills: 0
Start: 2023-10-30

## 2023-10-30 RX ORDER — SENNOSIDES 8.6 MG
8.6 TABLET ORAL DAILY
Qty: 15 TABLET | Refills: 0 | Status: SHIPPED | OUTPATIENT
Start: 2023-10-31 | End: 2023-11-15

## 2023-10-30 RX ORDER — NICOTINE 21 MG/24HR
1 PATCH, TRANSDERMAL 24 HOURS TRANSDERMAL DAILY
Qty: 28 PATCH | Refills: 0 | Status: SHIPPED | OUTPATIENT
Start: 2023-10-31

## 2023-10-30 RX ORDER — OXYCODONE HYDROCHLORIDE 5 MG/1
5 TABLET ORAL EVERY 6 HOURS PRN
Qty: 40 TABLET | Refills: 0 | Status: SHIPPED | OUTPATIENT
Start: 2023-10-30 | End: 2023-11-09

## 2023-10-30 RX ORDER — LEVOTHYROXINE SODIUM 0.05 MG/1
50 TABLET ORAL
Qty: 30 TABLET | Refills: 0 | Status: SHIPPED | OUTPATIENT
Start: 2023-10-31 | End: 2023-11-30

## 2023-10-30 RX ORDER — TRAMADOL HYDROCHLORIDE 50 MG/1
50 TABLET ORAL EVERY 6 HOURS PRN
Qty: 40 TABLET | Refills: 0 | Status: SHIPPED | OUTPATIENT
Start: 2023-10-30 | End: 2023-11-09

## 2023-10-30 RX ADMIN — CEFAZOLIN SODIUM 2000 MG: 2 SOLUTION INTRAVENOUS at 05:34

## 2023-10-30 RX ADMIN — ASPIRIN 81 MG: 81 TABLET, COATED ORAL at 09:05

## 2023-10-30 RX ADMIN — B-COMPLEX W/ C & FOLIC ACID TAB 1 TABLET: TAB at 09:05

## 2023-10-30 RX ADMIN — LEVOTHYROXINE SODIUM 50 MCG: 0.05 TABLET ORAL at 09:04

## 2023-10-30 RX ADMIN — SENNOSIDES 8.6 MG: 8.6 TABLET, FILM COATED ORAL at 09:06

## 2023-10-30 RX ADMIN — TRAMADOL HYDROCHLORIDE 50 MG: 50 TABLET, COATED ORAL at 05:36

## 2023-10-30 RX ADMIN — NICOTINE 1 PATCH: 14 PATCH, EXTENDED RELEASE TRANSDERMAL at 09:03

## 2023-10-30 RX ADMIN — DOCUSATE SODIUM 100 MG: 100 CAPSULE, LIQUID FILLED ORAL at 09:05

## 2023-10-30 RX ADMIN — TRAMADOL HYDROCHLORIDE 50 MG: 50 TABLET, COATED ORAL at 13:17

## 2023-10-30 RX ADMIN — ACETAMINOPHEN 650 MG: 325 TABLET, FILM COATED ORAL at 13:16

## 2023-10-30 RX ADMIN — ACETAMINOPHEN 650 MG: 325 TABLET, FILM COATED ORAL at 05:36

## 2023-10-30 NOTE — PHYSICAL THERAPY NOTE
Physical Therapy Evaluation     Patient's Name: Joycie Leventhal    Admitting Diagnosis  Ankle injury [N15.234T]  Closed trimalleolar fracture of left ankle, initial encounter [X37.719K]  Closed fracture of left ankle, initial encounter [M90.011F]    Problem List  Patient Active Problem List   Diagnosis    ST elevation myocardial infarction involving right coronary artery (HCC)    Tobacco abuse    History of cancer tonsil    S/P right coronary artery (RCA) stent placement    Mixed hyperlipidemia    Essential (primary) hypertension    Stage 3 chronic kidney disease, unspecified whether stage 3a or 3b CKD ([address])    Family history of colon cancer in father    Obesity, morbid ([address])    Closed trimalleolar fracture    Hypothyroidism     Past Medical History  History reviewed. No pertinent past medical history. Past Surgical History  Past Surgical History:   Procedure Laterality Date    KNEE SURGERY      ORIF TIBIA & FIBULA FRACTURES Left 10/29/2023    Procedure: OPEN REDUCTION W/ INTERNAL FIXATION (ORIF) ANKLE- ORIF Left ankle;  Surgeon: Marissa Lugo MD;  Location: [facility] OR;  Service: Orthopedics      10/30/23 0838   PT Last Visit   PT Visit Date 10/30/23   Note Type   Note type Evaluation   Pain Assessment   Pain Assessment Tool 0-10   Pain Score No Pain  ("no pain, just discomfort")   Pain Location/Orientation Orientation: Left; Location: Leg;Location: Foot   Pain Onset/Description Descriptor: Discomfort   Hospital Pain Intervention(s) Repositioned; Ambulation/increased activity   Restrictions/Precautions   Weight Bearing Precautions Per Order Yes   LLE Weight Bearing Per Order (S)  NWB  (per ortho)   Braces or Orthoses Splint  (L LE)   Other Precautions Chair Alarm; Bed Alarm;WBS;Fall Risk   Home Living   Type of [address] [place] [address] Two level; Able to live on main level with bedroom/bathroom; Performs ADLs on one level;1/2 bath on main level;Stairs to enter with rails  (2 ARCELIA)   Bathroom Shower/Tub Walk-in shower   Bathroom Toilet Standard   Bathroom Equipment Grab bars in shower;Built-in shower seat   Bathroom Accessibility Accessible  (shower located on 2nd floor)   Home Equipment Crutches   Additional Comments Pt ambulates without an AD. Prior Function   Level of [place] Independent with functional mobility; Independent with ADLs; Independent with IADLS   Lives With Spouse   Receives Help From Family   IADLs Independent with driving; Independent with meal prep; Independent with medication management   Falls in the last 6 months 1 to 4  (1 fall)   Vocational Retired   General   Family/Caregiver Present No   Cognition   Overall Cognitive Status WFL   Arousal/Participation Alert   Orientation Level Oriented X4   Memory Within functional limits   Following Commands Follows all commands and directions without difficulty   Comments Pt agreeable to PT. Subjective   Subjective "I haven't been up yet."   RLE Assessment   RLE Assessment WFL   LLE Assessment   LLE Assessment X   LLE Overall AROM   L Ankle Dorsiflexion not formally tested; splinted   Strength LLE   L Hip Flexion 4/5   L Knee Extension 4/5   L Ankle Dorsiflexion   (not formally tested; splinted)   Light Touch   RLE Light Touch Grossly intact   LLE Light Touch Grossly intact   Bed Mobility   Supine to Sit 5  Supervision   Additional items Assist x 1;HOB elevated; Bedrails; Increased time required;Verbal cues   Transfers   Sit to Stand   (CG assist)   Additional items Assist x 1; Increased time required;Verbal cues   Stand to Sit   (CG assist)   Additional items Assist x 1; Armrests; Increased time required;Verbal cues   Additional Comments Verbal cues to maintain NWB on L LE; pt able to maintain 100% of the time   Ambulation/Elevation   Gait pattern Excessively slow; Step to;Decreased foot clearance   Gait Assistance 4  Minimal assist   Additional items Assist x 1;Verbal cues   Assistive Device Rolling walker   Distance 3 feet, bed to chair   Stair Management Assistance 3  Moderate assist   Additional items Assist x 1;Verbal cues   Stair Management Technique One rail R;Step to pattern; Sideways   Number of Stairs 2  (6 inch practice step)   Ambulation/Elevation Additional Comments Pt requires initial verbal cues to maintain NWB on L LE; pt able to maintain 100% of the time during ambulation and stair negotiation. Pt educated to take small, short hops in order to protect the joint integrity of the right lower extremity. Pt verbalized understanding. Pt educated on having assist from son to negotiate stairs into the home. Pt verbalized understanding. Pt also inquired about "scooting" up/down the steps on his bottom; pt educated on this technique and verbalized understanding. Balance   Static Sitting Good   Dynamic Sitting Fair +   Static Standing Fair   Dynamic Standing Fair -   Ambulatory Fair -   Endurance Deficit   Endurance Deficit Yes   Endurance Deficit Description decreased activity tolerance   Activity Tolerance   Activity Tolerance Patient tolerated treatment well   Medical Staff Made Aware OT Mary Pedro  (Co-evaluation performed with OT secondary to complex medical condition of patient and regression of functional status from baseline. PT/OT goals were addressed separately.)   Nurse Made Aware RN aware   Assessment   Prognosis Good   Problem List Decreased strength;Decreased range of motion;Decreased endurance; Impaired balance;Decreased mobility;Orthopedic restrictions   Assessment Pt is 68year old male seen for PT evaluation s/p admit to [address] [facility] on 10/28/2023 with Closed trimalleolar fracture. PT consulted to assess pt's functional mobility and discharge needs. Order placed for PT evaluation and treatment, with up and out of bed as tolerated order. Comorbidities affecting pt's physical performance at time of assessment include tobacco abuse, mixed hyperlipidemia, essential primary hypertension, stage 3 CKD, and hypothyroidism.  Prior to hospitalization, pt was independent with all functional mobility without an AD. Pt ambulates unrestricted distances on all terrain and elevations. Pt resides with his spouse, in a two, level house with 2 steps to enter. Personal factors affecting pt at time of initial evaluation include lives in a two story house, stairs to enter home, ambulating with an assistive device, inability to ambulate household distances, inability to ambulate community distances, inability to navigate level surfaces without external assistance, unable to perform dynamic tasks in the community, positive fall history, difficulty performing ADLs, and inability to perform IADLs. Please find objective findings from PT assessment regarding body systems outlined above with impairments and limitations including weakness, decreased left ankle ROM, impaired balance, decreased endurance, gait deviations, decreased activity tolerance, decreased functional mobility tolerance, fall risk, and orthopedic restrictions. The following objective measures were performed on initial evaluation Barthel Index: 60/100, Modified [place]: 4 (moderate/severe disability), and AM-PAC 6-Clicks: 43/31. Pt's clinical presentation is currently evolving seen in pt's presentation of need for ongoing medical management/monitoring, pt is a fall risk, pt has orthopedic restrictions limiting functional mobility, and pt requires cues and assist for safety with functional mobility. Pt to benefit from continued PT treatment to address deficits as defined above and maximize pt's level of function and independence with mobility. From a PT standpoint, recommendation at time of discharge would be level 2, moderate resource intensity in order to facilitate return to prior level of function.    Barriers to Discharge Other (Comment)  (decline in functional mobility)   Goals   STG Expiration Date 11/09/23   Short Term Goal #1 In 10 days: Increase L LE strength 1/2 grade to facilitate independent mobility, Perform all bed mobility tasks modified independent to decrease caregiver burden, Perform all transfers modified independent to improve independence, while maintaining NWB on L % of the time, Ambulate > 25 ft. with least restrictive assistive device modified independent w/o LOB and while maintaining NWB on L % of the time, Navigate 2 stairs modified independent with unilateral handrail, while maintaining NWB on L % of the time, to facilitate return to previous living environment, and Increase all balance 1/2 grade to decrease risk for falls   Plan   Treatment/Interventions Functional transfer training;LE strengthening/ROM; Elevations; Therapeutic exercise; Endurance training;Patient/family training;Bed mobility;Gait training;Spoke to nursing;OT   PT Frequency 2-3x/wk   Discharge Recommendation   Rehab Resource Intensity Level, PT II (Moderate Resource Intensity)   Equipment Recommended Walker   AM-PAC Basic Mobility Inpatient   Turning in Flat Bed Without Bedrails 3   Lying on Back to Sitting on Edge of Flat Bed Without Bedrails 3   Moving Bed to Chair 3   Standing Up From Chair Using Arms 3   Walk in Room 2   Climb 3-5 Stairs With Railing 2   Basic Mobility Inpatient Raw Score 16   Basic Mobility Standardized Score 38.32   Highest Level Of Mobility   -HLM Goal 5: Stand one or more mins   -HLM Achieved 5: Stand (1 or more minutes)   Modified [place] Scale   Modified [place] Scale 4   Barthel Index   Feeding 10   Bathing 0   Grooming Score 5   Dressing Score 5   Bladder Score 10   Bowels Score 10   Toilet Use Score 5   Transfers (Bed/Chair) Score 10   Mobility (Level Surface) Score 0   Stairs Score 5   Barthel Index Score 60     PT Evaluation Time: 9810-9699  Hever Odor, PT, DPT

## 2023-10-30 NOTE — ASSESSMENT & PLAN NOTE
Present on admission after mechanical fall  POD#1 Left trimalleolar ankle fracture ORIF.    Orthopedics cleared the patient for discharge

## 2023-10-30 NOTE — DISCHARGE SUMMARY
[address]  Discharge- Behzad Patterson 1950, 68 y.o. male MRN: [id number]  Unit/Bed#: [facility] Encounter: [id number]  Primary Care Provider: Jhoana Izaguirre MD   Date and time admitted to hospital: 10/28/2023  2:15 PM    Admitting Provider:  Bella He MD  Discharge Provider:  Krystal Burch MD  Admission Date: 10/28/2023       Discharge Date: 10/30/23   LOS: 2  Primary Care Physician at Discharge: Jhoana Izaguirre,   [facility] COURSE:  Behzad Patterson is a 68 y.o. male who presented with fall and trimalleolar fracture of the left ankle. Patient is s/p ORIF and doing well. Patient's medical conditions are stable. Patient is being discharged home after being cleared by orthopedics    REASON FOR ADMISSION/ ADMISSION DIAGNOSES    Fall and trimalleolar fracture of left ankle    DISCHARGE DIAGNOSES  Hypothyroidism  Assessment & Plan  On levothyroxine 50 mcg daily. Stage 3 chronic kidney disease, unspecified whether stage 3a or 3b CKD [facility])  Assessment & Plan  Lab Results   Component Value Date    EGFR 41 10/30/2023    EGFR 37 10/29/2023    EGFR 45 08/15/2022    CREATININE 1.62 (H) 10/30/2023    CREATININE 1.78 (H) 10/29/2023    CREATININE 1.52 (H) 08/15/2022     Creatinine close to baseline    Essential (primary) hypertension  Assessment & Plan  Adequately controlled  Continue home dose metoprolol    Mixed hyperlipidemia  Assessment & Plan  On statins    Tobacco abuse  Assessment & Plan  Nicotine patch, advised to quit smoking    * Closed trimalleolar fracture  Assessment & Plan  Present on admission after mechanical fall  POD#1 Left trimalleolar ankle fracture ORIF.    Orthopedics cleared the patient for discharge      [address] [place] [address] TO CASE MANAGEMENT    PROCEDURES PERFORMED  Procedure(s) (LRB):  OPEN REDUCTION W/ INTERNAL FIXATION (ORIF) ANKLE- ORIF Left ankle (Left)    RADIOLOGY RESULTS  XR ankle 2 vw left    Result Date: 10/30/2023  Impression: Fluoroscopic guidance provided for procedure guidance. Please refer to the separate procedure notes for additional details. Workstation performed: [id number]     XR ankle 3+ views LEFT    Result Date: 10/29/2023  Impression: Improved alignment of trimalleolar left ankle fracture dislocation status post closed reduction. Workstation performed: [id number]     XR tibia fibula 2 views LEFT    Result Date: 10/29/2023  Impression: Trimalleolar fracture dislocation of left ankle. No proximal tibiofibular fracture. Workstation performed: [id number]     XR ankle 3+ views LEFT    Result Date: 10/29/2023  Impression: Trimalleolar fracture dislocation. Workstation performed: [id number]     CT lower extremity wo contrast left    Result Date: 10/28/2023  Impression: Interval relocation of previously noted tibiotalar joint dislocation. Trimalleolar fracture as described above.  Workstation performed: [id number]       LABS  Results from last 7 days   Lab Units 10/30/23  0553 10/29/23  0504   WBC Thousand/uL 8.94 9.69   HEMOGLOBIN g/dL 11.8* 12.8   HEMATOCRIT % 35.5* 37.5   MCV fL 91 88   PLATELETS Thousands/uL 180 191     Results from last 7 days   Lab Units 10/30/23  0553 10/29/23  0504   SODIUM mmol/L 131* 131*   POTASSIUM mmol/L 4.2 4.1   CHLORIDE mmol/L 99 99   CO2 mmol/L 29 26   BUN mg/dL 18 23   CREATININE mg/dL 1.62* 1.78*   CALCIUM mg/dL 9.1 9.4   ALBUMIN g/dL 4.1  --    TOTAL BILIRUBIN mg/dL 0.49  --    ALK PHOS U/L 54  --    ALT U/L 21  --    AST U/L 81*  --    EGFR ml/min/1.73sq m 41 37   GLUCOSE RANDOM mg/dL 100 92                                        Cultures:         Invalid input(s): "URIBILINOGEN"              PHYSICAL EXAM:  Vitals:   Blood Pressure: 142/82 (10/30/23 1448)  Pulse: 78 (10/30/23 1448)  Temperature: (!) 97.4 °F (36.3 °C) (10/30/23 1448)  Temp Source: Oral (10/30/23 0702)  Respirations: 18 (10/30/23 1448)  Height: 5' 9" (175.3 cm) (10/28/23 2108)  Weight - Scale: 101 kg (222 lb) (10/28/23 1413)  SpO2: 95 % (10/30/23 1448)    General appearance: alert, appears stated age, and cooperative  HEENT - atraumatic and normocephalic  Neck- supple  Skin - no fresh rash  Extremities no fresh focal deformities  Cardiovascular- S1-S2 heard  Respiratory- bilateral air entry present, no crackles or rhonchi  Skin - no fresh rash  Abdomen - normal bowel sounds present, no rebound tenderness  CNS- No fresh focal deficits  Psych- no acute psychosis     Planned Re-admission: No  Discharge Disposition: Home/Self Care  Test Results Pending at Discharge:   Incidental findings: None    Medications   Summary of Medication Adjustments made as a result of this hospitalization: Opiate pain meds and senna  Medication Dosing Tapers - Please refer to Discharge Medication List for details on any medication dosing tapers (if applicable to patient). Discharge Medication List: See after visit summary for reconciled discharge medications. Diet restrictions: Stable       Diet Orders   (From admission, onward)                 Start     Ordered    10/29/23 1534  Diet Regular; Regular House  Diet effective now        References:    Adult Nutrition Support Algorithm    RD Therapeutic Diet Order Protocol   Question Answer Comment   Diet Type Regular    Regular Regular House    RD to adjust diet per protocol? Yes        10/29/23 1533                  Activity restrictions: No strenuous activity  Discharge Condition: stable    Outpatient Follow-Up and Discharge Instructions  See after visit summary section titled Discharge Instructions for information provided to patient and family. Code Status: Level 1 - Full Code  Discharge Statement   I spent 35 minutes discharging the patient. This time was spent on the day of discharge. Greater than 50% of total time was spent with the patient and / or family counseling and / or coordination of care.     MD Tyler [facility]un [facility]    ** Please Note: This note has been constructed using a voice recognition system.  **

## 2023-10-30 NOTE — PLAN OF CARE
Problem: PHYSICAL THERAPY ADULT  Goal: Performs mobility at highest level of function for planned discharge setting. See evaluation for individualized goals. Description: Treatment/Interventions: Functional transfer training, LE strengthening/ROM, Elevations, Therapeutic exercise, Endurance training, Patient/family training, Bed mobility, Gait training, Spoke to nursing, OT  Equipment Recommended: Sanya Sheppard       See flowsheet documentation for full assessment, interventions and recommendations. Note: Prognosis: Good  Problem List: Decreased strength, Decreased range of motion, Decreased endurance, Impaired balance, Decreased mobility, Orthopedic restrictions  Assessment: Pt is 68year old male seen for PT evaluation s/p admit to [id number] [place] [address] on 10/28/2023 with Closed trimalleolar fracture. PT consulted to assess pt's functional mobility and discharge needs. Order placed for PT evaluation and treatment, with up and out of bed as tolerated order. Comorbidities affecting pt's physical performance at time of assessment include tobacco abuse, mixed hyperlipidemia, essential primary hypertension, stage 3 CKD, and hypothyroidism. Prior to hospitalization, pt was independent with all functional mobility without an AD. Pt ambulates unrestricted distances on all terrain and elevations. Pt resides with his spouse, in a two, level house with 2 steps to enter. Personal factors affecting pt at time of initial evaluation include lives in a two story house, stairs to enter home, ambulating with an assistive device, inability to ambulate household distances, inability to ambulate community distances, inability to navigate level surfaces without external assistance, unable to perform dynamic tasks in the community, positive fall history, difficulty performing ADLs, and inability to perform IADLs.  Please find objective findings from PT assessment regarding body systems outlined above with impairments and limitations including weakness, decreased left ankle ROM, impaired balance, decreased endurance, gait deviations, decreased activity tolerance, decreased functional mobility tolerance, fall risk, and orthopedic restrictions. The following objective measures were performed on initial evaluation Barthel Index: 60/100, Modified [place]: 4 (moderate/severe disability), and AM-PAC 6-Clicks: 58/86. Pt's clinical presentation is currently evolving seen in pt's presentation of need for ongoing medical management/monitoring, pt is a fall risk, pt has orthopedic restrictions limiting functional mobility, and pt requires cues and assist for safety with functional mobility. Pt to benefit from continued PT treatment to address deficits as defined above and maximize pt's level of function and independence with mobility. From a PT standpoint, recommendation at time of discharge would be level 2, moderate resource intensity in order to facilitate return to prior level of function. Barriers to Discharge: Other (Comment) (decline in functional mobility)     Rehab Resource Intensity Level, PT: II (Moderate Resource Intensity)    See flowsheet documentation for full assessment.

## 2023-10-30 NOTE — CASE MANAGEMENT
Case Management Discharge Planning Note    Patient name Kristian Gorman  Location /-01 MRN [id number]  : 1950 Date 10/30/2023       Current Admission Date: 10/28/2023  Current Admission Diagnosis:Closed trimalleolar fracture   Patient Active Problem List    Diagnosis Date Noted    Hypothyroidism 10/29/2023    Closed trimalleolar fracture 10/28/2023    Obesity, morbid ([address]) 2023    Essential (primary) hypertension 2022    Stage 3 chronic kidney disease, unspecified whether stage 3a or 3b CKD ([address]) 2022    Family history of colon cancer in father 2022    S/P right coronary artery (RCA) stent placement 2019    Mixed hyperlipidemia 2019    ST elevation myocardial infarction involving right coronary artery ([address]) 2018    Tobacco abuse 2018    History of cancer tonsil 2018      LOS (days): 2  Geometric Mean LOS (GMLOS) (days): 2.80  Days to GMLOS:0.8     OBJECTIVE:  Risk of Unplanned Readmission Score: 9.47      Current admission status: Inpatient   Preferred Pharmacy:   [id number],[address] Floor, [address] [address]  Phone: [phone] Fax: [phone]    Primary Care Provider: Rc Buenrostro MD  Primary Insurance: MEDICARE  Secondary Insurance:     DISCHARGE DETAILS:  CM delivered walker to bedside. Delivery ticket signed and placed in consignment. Patient aware of coinsurance payment of $14.74. Patient declining [address] at this times. States he will do OP therapy after his follow-up appt in 2 weeks.

## 2023-10-30 NOTE — PLAN OF CARE
Problem: PAIN - ADULT  Goal: Verbalizes/displays adequate comfort level or baseline comfort level  Description: Interventions:  - Encourage patient to monitor pain and request assistance  - Assess pain using appropriate pain scale  - Administer analgesics based on type and severity of pain and evaluate response  - Implement non-pharmacological measures as appropriate and evaluate response  - Consider cultural and social influences on pain and pain management  - Notify physician/advanced practitioner if interventions unsuccessful or patient reports new pain  Outcome: Progressing     Problem: INFECTION - ADULT  Goal: Absence or prevention of progression during hospitalization  Description: INTERVENTIONS:  - Assess and monitor for signs and symptoms of infection  - Monitor lab/diagnostic results  - Monitor all insertion sites, i.e. indwelling lines, tubes, and drains  - Monitor endotracheal if appropriate and nasal secretions for changes in amount and color  - [place] appropriate cooling/warming therapies per order  - Administer medications as ordered  - Instruct and encourage patient and family to use good hand hygiene technique  - Identify and instruct in appropriate isolation precautions for identified infection/condition  Outcome: Progressing  Goal: Absence of fever/infection during neutropenic period  Description: INTERVENTIONS:  - Monitor WBC    Outcome: Progressing     Problem: DISCHARGE PLANNING  Goal: Discharge to home or other facility with appropriate resources  Description: INTERVENTIONS:  - Identify barriers to discharge w/patient and caregiver  - Arrange for needed discharge resources and transportation as appropriate  - Identify discharge learning needs (meds, wound care, etc.)  - Arrange for interpretive services to assist at discharge as needed  - Refer to Case Management Department for coordinating discharge planning if the patient needs post-hospital services based on physician/advanced practitioner order or complex needs related to functional status, cognitive ability, or social support system  Outcome: Progressing     Problem: Knowledge Deficit  Goal: Patient/family/caregiver demonstrates understanding of disease process, treatment plan, medications, and discharge instructions  Description: Complete learning assessment and assess knowledge base.   Interventions:  - Provide teaching at level of understanding  - Provide teaching via preferred learning methods  Outcome: Progressing

## 2023-10-30 NOTE — OCCUPATIONAL THERAPY NOTE
Occupational Therapy Evaluation        Patient Name: Holley Landeros  [id number] Date: 10/30/2023       10/30/23 0818   OT Last Visit   OT Visit Date 10/30/23   Note Type   Note type Evaluation   Pain Assessment   Pain Assessment Tool 0-10   Pain Score No Pain  ("no pain just discomfort")   Pain Onset/Description Descriptor: Discomfort   Hospital Pain Intervention(s) Repositioned; Ambulation/increased activity   Restrictions/Precautions   Weight Bearing Precautions Per Order Yes   LLE Weight Bearing Per Order (S)  NWB   Braces or Orthoses Splint  (LLE)   Other Precautions Chair Alarm; Bed Alarm;WBS;Fall Risk   Home Living   Type of [address] [place] [address] Two level;Performs ADLs on one level; Able to live on main level with bedroom/bathroom;Stairs to enter with rails;1/2 bath on main level  (2 ARCELIA)   Bathroom Shower/Tub Walk-in shower   Bathroom Toilet Standard   Bathroom Equipment Grab bars in shower   Bathroom Accessibility Accessible  (1st floor- shower on second floor)   Home Equipment Crutches   Additional Comments ambulatory without AD   Prior Function   Level of [place] Independent with ADLs; Independent with IADLS   Lives With Spouse   Receives Help From Family   IADLs Independent with driving; Independent with meal prep; Independent with medication management   Falls in the last 6 months 1 to 4  (1 Fall)   Vocational Retired   Lifestyle   Autonomy Patient was independent with ADLs/ IADLs, ambulatory without AD. Patient lives with spouse, in a 2 story house, 2 ARCELIA, able to stay on 1st floor.    Reciprocal Relationships supportive family   Service to Others Retired    Intrinsic Gratification "busy with house work"   General   Family/Caregiver Present No   ADL   Eating Assistance 7  Independent   Grooming Assistance 6  [address] [facility] 5  411 [facility] 5  185 [facility] Assistance 4  Minimal Assistance   Toileting Assistance  4  Minimal Assistance   Functional Assistance 4  Minimal Assistance   Bed Mobility   Supine to Sit 5  Supervision   Additional items Assist x 1;HOB elevated; Increased time required;Verbal cues   Transfers   Sit to Stand   (CG assist)   Additional items Assist x 1; Increased time required;Verbal cues   Stand to Sit   (CG assist)   Additional items Assist x 1; Armrests; Increased time required;Verbal cues   Additional Comments Verbal cues to maintain NWB on L LE; pt able to maintain 100% of the time   Balance   Static Sitting Good   Dynamic Sitting Fair +   Static Standing Fair   Dynamic Standing Fair -   Ambulatory Fair -   Activity Tolerance   Activity Tolerance Patient tolerated treatment well   RUE Assessment   RUE Assessment WNL   LUE Assessment   LUE Assessment WNL   Hand Function   Gross Motor Coordination Functional   Fine Motor Coordination Functional   Sensation   Light Touch No apparent deficits  (BUEs)   Vision-Basic Assessment   Current Vision Wears glasses only for reading   Patient Visual Report Other (Comment)  (no significant changes reported)   Psychosocial   Psychosocial (WDL) WDL   Cognition   Overall Cognitive Status WFL   Arousal/Participation Alert; Responsive; Cooperative   Attention Within functional limits   Orientation Level Oriented X4   Memory Within functional limits   Following Commands Follows all commands and directions without difficulty   Assessment   Assessment Patient is a 68 y.o. male seen for OT evaluation s/p admit to [facility] on 10/28/2023 w/Closed trimalleolar fracture. Commorbidities affecting patient's functional performance at time of assessment include: HTN, presented to ED with  mechanical fall after slipping in wet grass, underwent  Left ankle ORIF. Orders placed for OT evaluation and treatment , patient is NWB to LLE. Performed at least two patient identifiers during session including name and wristband. Prior to admission, Patient was independent with ADLs/ IADLs, ambulatory without AD. Patient lives with spouse, in a 2 story house, 2 ARCELIA, able to stay on 1st floor. Upon evaluation, patient requires modified independent assist for UB ADLs, supervision, set up, and minimal  assist for LB ADLs, transfers and functional ambulation in room and bathroom with supervision and contact guard assist, with the use of [address] Presents with functional use of BUEs, with intact prehension, coordination and symmetrical muscle strength. No further acute OT needs identified at this time to warrant continuation of services. D/C OT services. From OT standpoint, recommendation at time of d/c would be Level III minimum resource intensity.    Discharge Recommendation   Rehab Resource Intensity Level, OT III (Minimum Resource Intensity)   Equipment Recommended Bedside commode   AM-PAC Daily Activity Inpatient   Lower Body Dressing 3   Bathing 3   Toileting 3   Upper Body Dressing 4   Grooming 4   Eating 4   Daily Activity Raw Score 21   Daily Activity Standardized Score (Calc for Raw Score >=11) 44.27   AM-PAC Applied Cognition Inpatient   Following a Speech/Presentation 4   Understanding Ordinary Conversation 4   Taking Medications 4   Remembering Where Things Are Placed or Put Away 4   Remembering List of 4-5 Errands 4   Taking Care of Complicated Tasks 4   Applied Cognition Raw Score 24   Applied Cognition Standardized Score 62.21

## 2023-10-30 NOTE — DISCHARGE INSTR - AVS FIRST PAGE
Discharge Instructions - Orthopedics  Joselyn Crow 68 y.o. male MRN: [id number]  Unit/Bed#: -01    Weight Bearing Status:                                           Non-weight bearing left lower extremity    DVT prophylaxis:  Aspirin 81mg twice daily   x   6 weeks in duration     Pain:  Continue analgesics as directed    Dressing Instructions:   Please keep clean, dry and intact until follow up. Please keep splint dry at all times. Contact office if splint becomes wet or damaged    Appt Instructions: If you do not have your appointment, please call the clinic at [phone]  Otherwise follow up as scheduled. Contact the office sooner if you experience any increased numbness/tingling in the extremities.

## 2023-10-30 NOTE — OP NOTE
OPERATIVE REPORT  PATIENT NAME: Ana Balderas    :  1950  MRN: [id number]  Pt Location: [facility] OR ROOM 03    SURGERY DATE: 10/29/2023    Surgeon(s) and Role:     * Alexis Russell MD - Primary     * Bess Barney PA-C - Assisting    Preop Diagnosis:  Left ankle trimalleolar fracture-dislocation    Postop Diagnosis:  Same    Procedure(s):  ORIF L trimalleolar ankle fracture, including posterior lip (CPT 51952)  Stress examination L ankle under fluoroscopic imaging (CPT 90837)    Specimen(s):  * No specimens in log *    Estimated Blood Loss:   50 cc    Drains:  [REMOVED] Urethral Catheter Double-lumen 16 Fr. (Removed)   Number of days: 0       Anesthesia Type:   General    Operative Indications:  Displaced/dislocated L ankle trimalleolar fracture in ambulatory patient    Operative Findings:  See below    Complications:   None    Implants: Synthes 2.4 mm condylar 3 hl/7hl T plate, 3.5 mm cortical lag screws x2, 8 hole 1/3 tubular plate, 4.0 mm cannulated screws x3    Procedure and Technique:  The patient is a 71-year-old male who sustained a fall resulting in a left ankle trimalleolar fracture with dislocation. Additionally, the fracture extends to the posterior medial aspect of the posterior malleolus additionally, the medial malleolus fracture line extends superior laterally creating a large anterior articular component to the fracture. The patient did have an element of articular comminution at the level of the articular surface of the posterior malleolus fragment. The patient is a 2 pack-a-day smoker and he is therefore at high risk for complications which he is aware of. This includes but is not limited to infection, wound healing complications, nonunion, malunion, impaired limb function, loss of limb, need for additional procedures and the patient consents to proceed with fixation. Soft tissue swelling was appropriate with wrinkles over planned incision sites both laterally and medially.     The patient operative site, laterality, procedure, consent were verified in the preoperative area and the patient was taken to the operating room. General anesthesia was induced and a Mart catheter was placed which was removed at the end of the case. A nonsterile tourniquet was applied which was used for 120 total minutes and not reinflated. The patient was turned prone and all bony prominences were padded. The operative extremity was prepped and draped in the usual sterile fashion. After timeout, standard posterolateral approach took place to the ankle. There is noted to be an element of rupture of the peroneal fascia from the injury. Remainder of peroneal fascia was incised and the muscle was retracted. The FHL fascia was incised and the muscle was retracted. The posterior malleoli are fracture was easily identified and cleared of early hematoma. Edges were defined taking care to reflect periosteum with scalpel. Utilizing a shoulder hook, the posterior malleoli are fracture was booked open and utilizing pituitary, the loose component of cartilage that was in a position to block reduction was removed. This fragment was nonviable and will result in a degree of posterior articular cartilage loss. The posterior malleolus was then maneuvered into proper alignment with satisfactory cortical read and held in place with wire. Attention was then turned to reduction of the fibula to ensure proper length restoration prior to initiating fixation. Working laterally to the peroneal musculature, the fibula fracture was easily identified and pointed reduction clamp was utilized to reduce this fracture and reduction was held in place with wires. Drilling then took place in standard fashion for two 3.5 mm cortical lag screws which were placed utilizing lag by technique in standard fashion. The screws were found to compress across the fracture line and reduction clamp loosened at this time.      The 2.4 mm T plate was then cut and contoured and maneuvered into proper position on the posterior malleolus. Balanced fixation took place proximally distally with cortical screws. The 8 hole one third tubular plate was then contoured in the fashion of a posterior lateral buttress plate with slight lateral bend distally. Balanced fixation took place proximally and distally with cortical and cancellous screws. After plate was reduced to bone, distal cancellous screws were replaced with locking screws secondary to the patient's poor bone quality. Attention was then turned to the medial malleolus fracture as well as the anterior articular displacement which was persistent after posterior and lateral based fixation. Medial curvilinear incision was made and dissection was taken down to the level of the bone taking care to protect neurovascular structures. The fracture was easily identified and cleared of periosteum. The large anterior gap was then reduced with a shoulder hook and this was held in place with a wire primarily from anterior medial directed laterally. 2 additional wires were placed to provide fixation to the medial malleolus in typical screw pattern arrangement. Positioning of wires was satisfactory on biplanar fluoroscopic imaging. Drilling to place and 3 partially-threaded cannulated screws as listed above were placed which provided satisfactory compression across the articular surface anteriorly as well as to fixate the remainder of the medial malleolus fracture. After all pulmonary wires were removed, the ankle was taken through a dorsiflexion and external rotation stress test and fluoroscopic imaging took place which revealed no medial clear space widening and no loss of tibiofibular overlap. Degree of fixation was satisfactory at this point. The wound was copiously irrigated with sterile saline. Closure to place with Vicryl suture and nylon sutures. Local anesthetic was injected.   Sterile dressings were applied. A well-padded posterior plaster splint with stirrup was placed. All final counts were correct. I was present for the entire procedure. The patient was extubated and awakened and taken to the PACU in stable condition. There were no immediate complications. There was no qualified resident available for the procedure. Critical assistance from Daya Carl PA-C was required for all components of the procedure including but not limited to positioning, soft tissue retraction, assistance with reduction and fixation of the fractures, passage of instrumentation, soft tissue closure. This was particularly important given the dislocated nature of the the fracture, articular comminution as well as BMI of 32.78. The patient will be nonweightbearing on the operative extremity for an anticipated 6 weeks. We will utilize aspirin for DVT prophylaxis. We will consider suture removal in 2 weeks, however we may require 3 weeks for suture removal secondary to 2 pack per day smoking status.      Patient Disposition:  PACU         SIGNATURE: Nasim Parham MD  DATE: October 29, 2023  TIME: 9:04 PM

## 2023-10-30 NOTE — ASSESSMENT & PLAN NOTE
Lab Results   Component Value Date    EGFR 41 10/30/2023    EGFR 37 10/29/2023    EGFR 45 08/15/2022    CREATININE 1.62 (H) 10/30/2023    CREATININE 1.78 (H) 10/29/2023    CREATININE 1.52 (H) 08/15/2022     Creatinine close to baseline

## 2023-10-30 NOTE — CASE MANAGEMENT
Case Management Discharge Planning Note    Patient name Saray Brown  Location /-01 MRN [id number]  : 1950 Date 10/30/2023       Current Admission Date: 10/28/2023  Current Admission Diagnosis:Closed trimalleolar fracture   Patient Active Problem List    Diagnosis Date Noted    Hypothyroidism 10/29/2023    Closed trimalleolar fracture 10/28/2023    Obesity, morbid ([address]) 2023    Essential (primary) hypertension 2022    Stage 3 chronic kidney disease, unspecified whether stage 3a or 3b CKD ([address]) 2022    Family history of colon cancer in father 2022    S/P right coronary artery (RCA) stent placement 2019    Mixed hyperlipidemia 2019    ST elevation myocardial infarction involving right coronary artery ([address]) 2018    Tobacco abuse 2018    History of cancer tonsil 2018      LOS (days): 2  Geometric Mean LOS (GMLOS) (days): 2.80  Days to GMLOS:0.8     OBJECTIVE:  Risk of Unplanned Readmission Score: 9.47      Current admission status: Inpatient   Preferred Pharmacy:   [facility] [id number] [facility],6Th Floor, [address] [place] [address]  Phone: [phone] Fax: [phone]    Primary Care Provider: Radha Espinoza MD  Primary Insurance: MEDICARE  Secondary Insurance:     DISCHARGE DETAILS:    Discharge planning discussed with[de-identified] Patient at bedside. Freedom of Choice: Yes  Comments - Freedom of Choice: FOC maintained - CM introduced self and role. Patient for d/c home w/ HHC. Patient has no HHC hx or provider preference. Patient states he needs a walker for DME. Agreeable to CM placing order for pricing. Patient has not received one previously. No additional CM needs reported.   CM contacted family/caregiver?: No- see comments (Independent)  Were Treatment Team discharge recommendations reviewed with patient/caregiver?: Yes (As it pertains to d/c planning and CM role.)  Did patient/caregiver verbalize understanding of patient care needs?: Yes (As it pertains to d/c planning and CM role.)  Were patient/caregiver advised of the risks associated with not following Treatment Team discharge recommendations?: Yes (As it pertains to d/c planning and CM role.)    Requested [address] [facility]         Is the patient interested in [address] at discharge?: Yes  [facility] requested[de-identified] Occupational Therapy, Physical Therapy, [address] Provider[de-identified] PCP (Dr. Luis Fishman)  [facility] Needed[de-identified] Evaluate Functional Status and Safety, Gait/ADL Training, Strengthening/Theraputic Exercises to Improve Function  Homebound Criteria Met[de-identified] Uses an Assist Device (i.e. cane, walker, etc), Requires the Assistance of Another Person for Safe Ambulation or to Leave the Home  Supporting Clincal Findings[de-identified] Limited Endurance    DME Referral Provided  Referral made for DME?: Yes  DME referral completed for the following items[de-identified] Pao Krishna  DME Supplier Name[de-identified] [employer]    Other Referral/Resources/Interventions Provided:  Interventions: HHC, DME  Referral Comments: Pao Krishna ordered as DME via [employer] for delivery from [facility]. [address] referral sent in [facility]. Treatment Team Recommendation: Home with [address]  Discharge Destination Plan[de-identified] Home with [address] [facility] at Discharge : Family     IMM Given (Date):: 10/30/23  IMM Given to[de-identified] Patient     Additional Comments: CM reviewed IMM and Medicare rights, as they pertain to d/c planning, at bedside with patient. Patient reports understanding with no current issues or concerns. Patient copy provided to bedside. Original to medical records.

## 2023-10-30 NOTE — PROGRESS NOTES
Progress Note - Orthopedics   Lo Hinojosa 68 y.o. male MRN: [id number]  Unit/Bed#: -01      Subjective:    68 y.o.male POD#1 Left trimalleolar ankle fracture ORIF. Patient states that his ankle is doing well overall. Patient states he has minimal pain in the ankle currently. Patient denies any new or worsening symptoms from surgery. Patient denies any fevers any chills. Patient [place] any shortness of breath chest tightness chest pain. Patient denies any nausea vomiting diarrhea. Patient denies any numbness or tingling in his leg. Patient has not attempted to weight-bear since surgery. Patient has been compliant with nonweightbearing restrictions of the left lower extremity. Patient offers no other complaints at this time.     Labs:  0   Lab Value Date/Time    HCT 35.5 (L) 10/30/2023 0553    HCT 37.5 10/29/2023 0504    HCT 42.9 01/01/2019 0506    HGB 11.8 (L) 10/30/2023 0553    HGB 12.8 10/29/2023 0504    HGB 14.1 01/01/2019 0506    INR 0.92 12/30/2018 1010    WBC 8.94 10/30/2023 0553    WBC 9.69 10/29/2023 0504    WBC 9.13 01/01/2019 0506       Meds:    Current Facility-Administered Medications:     [MAR Hold] acetaminophen (TYLENOL) tablet 650 mg, 650 mg, Oral, Q6H PRN, Leydi Brewer MD    acetaminophen (TYLENOL) tablet 650 mg, 650 mg, Oral, Q6H [address], Fiona Nina PA-C, 650 mg at 10/30/23 0536    aspirin (ECOTRIN LOW STRENGTH) EC tablet 81 mg, 81 mg, Oral, BID, Fiona Nina PA-C, 81 mg at 10/29/23 1746    [MAR Hold] atorvastatin (LIPITOR) tablet 10 mg, 10 mg, Oral, Daily With Dinner, Francina Dance, MD    atorvastatin (LIPITOR) tablet 10 mg, 10 mg, Oral, Daily, Fiona Nina PA-C, 10 mg at 10/29/23 1629    [MAR Hold] calcium carbonate (TUMS) chewable tablet 1,000 mg, 1,000 mg, Oral, Daily PRN, Leydi Brewer MD    cyclobenzaprine (FLEXERIL) tablet 5 mg, 5 mg, Oral, HS, Fiona Nina PA-C, 5 mg at 10/29/23 2142    [MAR Hold] docusate sodium (COLACE) capsule 100 mg, 100 mg, Oral, BID, Riky Raymond MD, 100 mg at 10/28/23 1751    [MAR Hold] HYDROmorphone (DILAUDID) injection 0.5 mg, 0.5 mg, Intravenous, Q4H PRN, Riky Raymond MD, 0.5 mg at 10/28/23 2219    lactated ringers infusion, 75 mL/hr, Intravenous, Continuous, Lyn Mcgraw MD, Last Rate: 75 mL/hr at 10/29/23 1918, 75 mL/hr at 10/29/23 1918    [MAR Hold] levothyroxine tablet 50 mcg, 50 mcg, Oral, Early Morning, Lyn Mcgraw MD    levothyroxine tablet 50 mcg, 50 mcg, Oral, Daily, Lola Canela PA-C, 50 mcg at 10/29/23 1629    [MAR Hold] metoprolol tartrate (LOPRESSOR) tablet 25 mg, 25 mg, Oral, Q12H, Riky Raymond MD    multivitamin stress formula tablet 1 tablet, 1 tablet, Oral, Daily, Lola Canela PA-C, 1 tablet at 10/29/23 1629    [MAR Hold] nicotine (NICODERM CQ) 14 mg/24hr TD 24 hr patch 1 patch, 1 patch, Transdermal, Daily, Riky Raymond MD, 1 patch at 10/29/23 0941    [MAR Hold] ondansetron (ZOFRAN) injection 4 mg, 4 mg, Intravenous, Q6H PRN, Riky Raymond MD    oxyCODONE (ROXICODONE) immediate release tablet 10 mg, 10 mg, Oral, Q4H PRN, Sarah Nina PA-C    oxyCODONE (ROXICODONE) IR tablet 5 mg, 5 mg, Oral, Q4H PRN, Lola Canela PA-C    [MAR Hold] oxyCODONE-acetaminophen (PERCOCET) 5-325 mg per tablet 1 tablet, 1 tablet, Oral, Q4H PRN, Riky Raymond MD, 1 tablet at 10/29/23 0100    [MAR Hold] senna (SENOKOT) tablet 8.6 mg, 1 tablet, Oral, Daily, Riky Raymond MD    traMADol Celesta Forrest) tablet 50 mg, 50 mg, Oral, Q6H [address], Lola Canela PA-C, 50 mg at 10/30/23 0536    Blood Culture:   No results found for: "BLOODCX"    Wound Culture:   No results found for: "WOUNDCULT"    Ins and Outs:  I/O last 24 hours:   In: 1450 [I.V.:1200; IV Piggyback:250]  Out: 7214 [Urine:2480]      Physical:  Vitals:    10/30/23 0702   BP: 123/74   Pulse:    Resp: 18   Temp: (!) 97.2 °F (36.2 °C)   SpO2:      Musculoskeletal: left Lower Extremity  Patient resting comfortably in hospital bed in no apparent distress  Skin: No acute visible abnormalities present in the left lower extremity. Extremity appears well-perfused overall. Dressing  :   Short leg splint present with no visible soilage present  TTP : Mild tenderness palpation noted throughout the ankle  Sensation intact to saphenous, sural, tibial, superficial peroneal nerve, and deep peroneal  Motor intact to +FHL/EHL  2+ DP pulse  Digits warm and well perfused  Capillary refill < 2 seconds    Assessment:    73 y.o.male POD#1 Left trimalleolar ankle fracture ORIF. Plan:  NWB LLE   Elevate left lower extremity  Up and out of bed with assistance  Will monitor for ABLA and administer IVF/prbc as indicated for Greater than 2 gram drop or Hgb < 7   HgB 11.8   PT/OT for ambulation assistance, gait training  Pain control : Per primary team  DVT ppx : Team.  Recommend 6 weeks of anticoagulation from orthopedic standpoint  Dispo: Ortho will follow. See above for additional recommendations. Priscilla Douglas PA-C                                        Portions of the record may have been created with voice recognition software. Occasional wrong word or "sound a like" substitutions may have occurred due to the inherent limitations of voice recognition software. Read the chart carefully and recognize, using context, where substitutions have occurred.

## 2023-10-31 ENCOUNTER — TRANSITIONAL CARE MANAGEMENT (OUTPATIENT)
Dept: FAMILY MEDICINE CLINIC | Facility: CLINIC | Age: 73
End: 2023-10-31

## 2023-10-31 RX ORDER — CYCLOBENZAPRINE HCL 5 MG
5 TABLET ORAL
Qty: 30 TABLET | Refills: 0 | Status: SHIPPED | OUTPATIENT
Start: 2023-10-31

## 2023-10-31 RX ORDER — ATORVASTATIN CALCIUM 10 MG/1
10 TABLET, FILM COATED ORAL DAILY
Qty: 90 TABLET | Refills: 3 | Status: SHIPPED | OUTPATIENT
Start: 2023-10-31

## 2023-11-01 ENCOUNTER — TELEPHONE (OUTPATIENT)
Dept: OBGYN CLINIC | Facility: HOSPITAL | Age: 73
End: 2023-11-01

## 2023-11-01 NOTE — TELEPHONE ENCOUNTER
Caller: Patient    Doctor: Keyana Brady    Reason for call: Patient had sx on 10/29/23. ORIF left ankle. Would like po appt on 11/14/23 in [facility] but schedule is full. Can we accommodate this? Please let patient know.       Call back#: [phone]

## 2023-11-10 DIAGNOSIS — S82.852D CLOSED TRIMALLEOLAR FRACTURE OF LEFT ANKLE WITH ROUTINE HEALING, SUBSEQUENT ENCOUNTER: Primary | ICD-10-CM

## 2023-11-13 LAB
DME PARACHUTE DELIVERY DATE ACTUAL: NORMAL
DME PARACHUTE DELIVERY DATE REQUESTED: NORMAL
DME PARACHUTE ITEM DESCRIPTION: NORMAL
DME PARACHUTE ORDER STATUS: NORMAL
DME PARACHUTE SUPPLIER NAME: NORMAL
DME PARACHUTE SUPPLIER PHONE: NORMAL

## 2023-11-14 ENCOUNTER — OFFICE VISIT (OUTPATIENT)
Dept: OBGYN CLINIC | Facility: CLINIC | Age: 73
End: 2023-11-14

## 2023-11-14 ENCOUNTER — APPOINTMENT (OUTPATIENT)
Dept: RADIOLOGY | Facility: CLINIC | Age: 73
End: 2023-11-14
Payer: MEDICARE

## 2023-11-14 VITALS
WEIGHT: 222 LBS | DIASTOLIC BLOOD PRESSURE: 72 MMHG | HEIGHT: 69 IN | HEART RATE: 85 BPM | BODY MASS INDEX: 32.88 KG/M2 | SYSTOLIC BLOOD PRESSURE: 138 MMHG

## 2023-11-14 DIAGNOSIS — S82.852D CLOSED TRIMALLEOLAR FRACTURE OF LEFT ANKLE WITH ROUTINE HEALING, SUBSEQUENT ENCOUNTER: ICD-10-CM

## 2023-11-14 DIAGNOSIS — S82.852D CLOSED TRIMALLEOLAR FRACTURE OF LEFT ANKLE WITH ROUTINE HEALING, SUBSEQUENT ENCOUNTER: Primary | ICD-10-CM

## 2023-11-14 PROCEDURE — 73610 X-RAY EXAM OF ANKLE: CPT

## 2023-11-14 PROCEDURE — 99024 POSTOP FOLLOW-UP VISIT: CPT | Performed by: ORTHOPAEDIC SURGERY

## 2023-11-14 RX ORDER — ASPIRIN 81 MG/1
81 TABLET, CHEWABLE ORAL EVERY 12 HOURS
Qty: 56 TABLET | Refills: 0 | Status: SHIPPED | OUTPATIENT
Start: 2023-11-14 | End: 2023-12-12

## 2023-11-14 RX ORDER — CEPHALEXIN 500 MG/1
500 CAPSULE ORAL EVERY 12 HOURS SCHEDULED
Qty: 14 CAPSULE | Refills: 0 | Status: SHIPPED | OUTPATIENT
Start: 2023-11-14 | End: 2023-11-21

## 2023-11-14 NOTE — PATIENT INSTRUCTIONS
- Nonweightbearing left lower extremity  - Sutures will remain for an additional week in duration   - Okay To begin showering. Allow water to flow over the incision sites. Do not vigorously scrubbed or soak wounds until otherwise stated   - Begin to take Aspirin 81mg twice daily until otherwise stated   - Cleared for range of motion of the ankle at this time   - Podous boot provided for patient. Maintain as directed   - Over the counter analgesics as needed / directed   - Ice / heat as directed   - Please d/c tobacco use until otherwise stated. Failure to do so may result in wound healing complications, suboptimal surgical results, possible limb impairment, loss of limb.    - Follow up 1 WEEK for suture removal

## 2023-11-14 NOTE — PROGRESS NOTES
Orthopaedics Office Visit - Post-op Patient Visit    ASSESSMENT/PLAN:    Assessment:   2 weeks s/p ORIF left trimalleolar ankle fracture    DOS 10/29/23   Resolving fracture blisters   Mild erythema present medial incision site   No DVT PPX post operatively   + tobacco use - 1.5-2 PPD smoker      Plan:   - Nonweightbearing left lower extremity  - Sutures will remain for an additional week in duration   - Okay To begin showering. Allow water to flow over the incision sites. Do not vigorously scrubbed or soak wounds until otherwise stated   - Begin to take Aspirin 81mg twice daily until otherwise stated   - Cleared for range of motion of the ankle at this time   - Podous boot provided for patient. Maintain as directed   - Over the counter analgesics as needed / directed   - Ice / heat as directed   - Please d/c tobacco use until otherwise stated. Failure to do so may result in wound healing complications, suboptimal surgical results, possible limb impairment, loss of limb. - Follow up 1 WEEK for suture removal          To Do Next Visit:  Sutures out     _____________________________________________________  CHIEF COMPLAINT:  Chief Complaint   Patient presents with    Left Ankle - Post-op         SUBJECTIVE:  Edy Arevalo is a 68 y.o. male who presents  2 weeks s/p ORIF left trimalleolar ankle fracture    DOS 10/29/23. Patient states that his ankle is doing well overall. Patient states he has minimal pain in the ankle currently. Patient has been maintaining his short leg splint without any major issues. Patient has not been taking any type of anticoagulation. Patient does note numbness on the arch of his foot but is unsure of how long it has been present for. Patient has been compliant nonweightbearing striction of the left lower extremity. Patient offers no other complaints at this time.     SOCIAL HISTORY:  Social History     Tobacco Use    Smoking status: Every Day     Packs/day: 1.00     Years: 50.00     Total pack years: 50.00     Types: Cigarettes    Smokeless tobacco: Never    Tobacco comments:     down to 1/2 ppd   Substance Use Topics    Alcohol use: Never    Drug use: No       MEDICATIONS:    Current Outpatient Medications:     acetaminophen (TYLENOL) 325 mg tablet, Take 2 tablets (650 mg total) by mouth every 6 (six) hours as needed for mild pain, headaches or fever, Disp: , Rfl: 0    atorvastatin (LIPITOR) 10 mg tablet, TAKE ONE TABLET BY MOUTH EVERY DAY, Disp: 90 tablet, Rfl: 3    [facility] Aspirin Adult Low Dose 81 MG chewable tablet, CHEW 1 TABLET BY MOUTH DAILY, Disp: 90 tablet, Rfl: 11    cyclobenzaprine (FLEXERIL) 5 mg tablet, TAKE ONE TABLET BY MOUTH DAILY AT BEDTIME, Disp: 30 tablet, Rfl: 0    levothyroxine 50 mcg tablet, Take 1 tablet (50 mcg total) by mouth daily in the early morning Do not start before October 31, 2023., Disp: 30 tablet, Rfl: 0    metoprolol tartrate (LOPRESSOR) 25 mg tablet, TAKE ONE TABLET BY MOUTH EVERY TWELVE HOURS, Disp: 180 tablet, Rfl: 3    multivitamin (THERAGRAN) TABS, Take 1 tablet by mouth daily, Disp: , Rfl:     nicotine (NICODERM CQ) 14 mg/24hr TD 24 hr patch, Place 1 patch on the skin over 24 hours daily Do not start before October 31, 2023., Disp: 28 patch, Rfl: 0    senna (SENOKOT) 8.6 mg, Take 1 tablet (8.6 mg total) by mouth daily for 15 days Do not start before October 31, 2023., Disp: 15 tablet, Rfl: 0    levothyroxine (Synthroid) 50 mcg tablet, Take 1 tablet (50 mcg total) by mouth daily, Disp: 30 tablet, Rfl: 11    REVIEW OF SYSTEMS:  MSK: left ankle pain   Neuro: WNL   Pertinent items are otherwise noted in HPI.   A comprehensive review of systems was otherwise negative.    _____________________________________________________  PHYSICAL EXAMINATION:  Vital signs: /72   Pulse 85   Ht 5' 9" (1.753 m)   Wt 101 kg (222 lb)   BMI 32.78 kg/m²   General: No acute distress, awake and alert  Psychiatric: Mood and affect appear appropriate  HEENT: Trachea Midline, No torticollis, no apparent facial trauma  Cardiovascular: No audible murmurs; Extremities appear perfused  Pulmonary: No audible wheezing or stridor  Skin: No open lesions; see further details (if any) below      MUSCULOSKELETAL EXAMINATION:  Left ankle examination:  - Patient sitting comfortably in the office in no apparent distress   -Healing incision sites noted on the medial and lateral aspect of the ankle with healing fracture blisters noted throughout the ankle. Mild erythema present over the medial incision site. -Mild tenderness palpation noted over the incision sites. No other bony or soft tissue tenderness to palpation noted at this time.  -Limited range of motion of the ankle in all planes of motion secondary to pain  - NV intact    _____________________________________________________  STUDIES REVIEWED:  I personally reviewed the images and interpretation is as follows:  Left ankle XR 3 views:  Healing trimalleolar ankle fracture in acceptable alignment with hardware intact         PROCEDURES PERFORMED:  No procedures were performed at this time. Ashlie Thomas PA-C - assisting  Josep Cotto MD                        Portions of the record may have been created with voice recognition software. Occasional wrong word or "sound a like" substitutions may have occurred due to the inherent limitations of voice recognition software. Read the chart carefully and recognize, using context, where substitutions have occurred.

## 2023-11-21 ENCOUNTER — OFFICE VISIT (OUTPATIENT)
Dept: OBGYN CLINIC | Facility: CLINIC | Age: 73
End: 2023-11-21

## 2023-11-21 VITALS
SYSTOLIC BLOOD PRESSURE: 131 MMHG | HEIGHT: 69 IN | WEIGHT: 222 LBS | HEART RATE: 72 BPM | DIASTOLIC BLOOD PRESSURE: 68 MMHG | BODY MASS INDEX: 32.88 KG/M2

## 2023-11-21 DIAGNOSIS — Z87.81 S/P ORIF (OPEN REDUCTION INTERNAL FIXATION) FRACTURE: ICD-10-CM

## 2023-11-21 DIAGNOSIS — Z98.890 S/P ORIF (OPEN REDUCTION INTERNAL FIXATION) FRACTURE: ICD-10-CM

## 2023-11-21 DIAGNOSIS — S82.852D CLOSED TRIMALLEOLAR FRACTURE OF LEFT ANKLE WITH ROUTINE HEALING, SUBSEQUENT ENCOUNTER: Primary | ICD-10-CM

## 2023-11-21 PROCEDURE — 99024 POSTOP FOLLOW-UP VISIT: CPT | Performed by: ORTHOPAEDIC SURGERY

## 2023-11-21 NOTE — PROGRESS NOTES
Orthopaedics Office Visit - Post-op Patient Visit    ASSESSMENT/PLAN:    Assessment:   3 weeks s/p ORIF left trimalleolar ankle fracture    DOS 10/29/23   Resolved fracture blisters   Resolved erythema medially    Eschar present throughout lateral incision site but stable, no evidence of infection  No DVT PPX 2 weeks post operatively   Non-compliant with weight bearing restrictions   + tobacco use - 1.5-2 PPD smoker      Plan:   - Nonweightbearing left lower extremity  - Sutures removed in the office. Patient tolerated well.   - Okay To begin showering. Allow water to flow over the incision sites. Do not vigorously scrubbed or soak wounds until otherwise stated   - Continue to take Aspirin 81mg twice daily until otherwise stated   - Cleared for range of motion of the ankle at this time   - Podous boot provided for patient. Maintain as directed   - Over the counter analgesics as needed / directed   - Ice / heat as directed   - Please d/c tobacco use until otherwise stated. Failure to do so may result in wound healing complications, suboptimal surgical results, possible limb impairment, loss of limb. - Follow up 2 WEEKS for  wound evaluation       To Do Next Visit:  Evaluate incision sites     _____________________________________________________  CHIEF COMPLAINT:  Chief Complaint   Patient presents with    Left Ankle - Post-op         SUBJECTIVE:  Armando Holder is a 68 y.o. male who presents  3 weeks s/p ORIF left trimalleolar ankle fracture    DOS 10/29/23. Patient states that his ankle is doing well overall. Patient states he has minimal pain in the ankle currently. Patient states that he has been placing "some weight on the ankle while ambulating ". Patient has been ambulating with the use of a walker. Patient denies any new or worsening symptoms in his ankle. Patient has been taking his antibiotics as previously directed. Patient has been compliant with anticoagulation.   Patient offers no other complaints at this time.       SOCIAL HISTORY:  Social History     Tobacco Use    Smoking status: Every Day     Packs/day: 1.00     Years: 50.00     Total pack years: 50.00     Types: Cigarettes    Smokeless tobacco: Never    Tobacco comments:     down to 1/2 ppd   Substance Use Topics    Alcohol use: Never    Drug use: No       MEDICATIONS:    Current Outpatient Medications:     acetaminophen (TYLENOL) 325 mg tablet, Take 2 tablets (650 mg total) by mouth every 6 (six) hours as needed for mild pain, headaches or fever, Disp: , Rfl: 0    aspirin 81 mg chewable tablet, Chew 1 tablet (81 mg total) every 12 (twelve) hours for 28 days, Disp: 56 tablet, Rfl: 0    atorvastatin (LIPITOR) 10 mg tablet, TAKE ONE TABLET BY MOUTH EVERY DAY, Disp: 90 tablet, Rfl: 3    cephalexin (KEFLEX) 500 mg capsule, Take 1 capsule (500 mg total) by mouth every 12 (twelve) hours for 7 days, Disp: 14 capsule, Rfl: 0    [facility] Aspirin Adult Low Dose 81 MG chewable tablet, CHEW 1 TABLET BY MOUTH DAILY, Disp: 90 tablet, Rfl: 11    cyclobenzaprine (FLEXERIL) 5 mg tablet, TAKE ONE TABLET BY MOUTH DAILY AT BEDTIME, Disp: 30 tablet, Rfl: 0    levothyroxine 50 mcg tablet, Take 1 tablet (50 mcg total) by mouth daily in the early morning Do not start before October 31, 2023., Disp: 30 tablet, Rfl: 0    metoprolol tartrate (LOPRESSOR) 25 mg tablet, TAKE ONE TABLET BY MOUTH EVERY TWELVE HOURS, Disp: 180 tablet, Rfl: 3    multivitamin (THERAGRAN) TABS, Take 1 tablet by mouth daily, Disp: , Rfl:     nicotine (NICODERM CQ) 14 mg/24hr TD 24 hr patch, Place 1 patch on the skin over 24 hours daily Do not start before October 31, 2023., Disp: 28 patch, Rfl: 0    levothyroxine (Synthroid) 50 mcg tablet, Take 1 tablet (50 mcg total) by mouth daily, Disp: 30 tablet, Rfl: 11    senna (SENOKOT) 8.6 mg, Take 1 tablet (8.6 mg total) by mouth daily for 15 days Do not start before October 31, 2023., Disp: 15 tablet, Rfl: 0    REVIEW OF SYSTEMS:  MSK: left ankle pain Neuro: WNL   Pertinent items are otherwise noted in HPI. A comprehensive review of systems was otherwise negative.    _____________________________________________________  PHYSICAL EXAMINATION:  Vital signs: /68   Pulse 72   Ht 5' 9" (1.753 m)   Wt 101 kg (222 lb)   BMI 32.78 kg/m²   General: No acute distress, awake and alert  Psychiatric: Mood and affect appear appropriate  HEENT: Trachea Midline, No torticollis, no apparent facial trauma  Cardiovascular: No audible murmurs; Extremities appear perfused  Pulmonary: No audible wheezing or stridor  Skin: No open lesions; see further details (if any) below      MUSCULOSKELETAL EXAMINATION:  Left ankle examination:  - Patient sitting comfortably in the office in no apparent distress   - Healed incision sites noted to the medial lateral aspect of the ankle with no erythema or ecchymosis present. Eschar present throughout the lateral incision site. Resolving fracture blisters noted throughout the ankle. - No bony or soft tissue tenderness to palpation noted at this time. - Limited range of motion of the ankle all planes of motion with minimal pain  - NV intact    _____________________________________________________  STUDIES REVIEWED:  I personally reviewed the images and interpretation is as follows:  N/A       PROCEDURES PERFORMED:  Suture removal    Date/Time: 11/21/2023 10:45 AM    Performed by: Trevor Lal MD  Authorized by: Trevor Lal MD  Universal Protocol:  Consent: Verbal consent obtained. Risks and benefits: risks, benefits and alternatives were discussed  Consent given by: patient  Patient understanding: patient states understanding of the procedure being performed  Patient identity confirmed: verbally with patient      Patient location:  Bedside  Location:     Laterality:  Left    Location:  Lower extremity    Lower extremity location:  Ankle    Ankle location:  L ankle  Procedure details:      Tools used:  Suture removal kit    Wound appearance:  No sign(s) of infection, good wound healing and clean  Post-procedure details:     Post-removal:  Steri-Strips applied    Patient tolerance of procedure: Tolerated well, no immediate complications              Umer Kelly PA-C - assisting  Chante Castanon MD                                Portions of the record may have been created with voice recognition software. Occasional wrong word or "sound a like" substitutions may have occurred due to the inherent limitations of voice recognition software. Read the chart carefully and recognize, using context, where substitutions have occurred.

## 2023-11-21 NOTE — PATIENT INSTRUCTIONS
- Nonweightbearing left lower extremity  - Sutures removed in the office. Patient tolerated well.   - Okay To begin showering. Allow water to flow over the incision sites. Do not vigorously scrubbed or soak wounds until otherwise stated   - Continue to take Aspirin 81mg twice daily until otherwise stated   - Cleared for range of motion of the ankle at this time   - Podous boot provided for patient. Maintain as directed   - Over the counter analgesics as needed / directed   - Ice / heat as directed   - Please d/c tobacco use until otherwise stated. Failure to do so may result in wound healing complications, suboptimal surgical results, possible limb impairment, loss of limb.    - Follow up 2 WEEKS for  wound evaluation

## 2023-12-05 ENCOUNTER — OFFICE VISIT (OUTPATIENT)
Dept: OBGYN CLINIC | Facility: CLINIC | Age: 73
End: 2023-12-05

## 2023-12-05 VITALS — WEIGHT: 222 LBS | BODY MASS INDEX: 32.88 KG/M2 | HEIGHT: 69 IN

## 2023-12-05 DIAGNOSIS — Z87.81 S/P ORIF (OPEN REDUCTION INTERNAL FIXATION) FRACTURE: Primary | ICD-10-CM

## 2023-12-05 DIAGNOSIS — S82.852D CLOSED TRIMALLEOLAR FRACTURE OF LEFT ANKLE WITH ROUTINE HEALING, SUBSEQUENT ENCOUNTER: ICD-10-CM

## 2023-12-05 DIAGNOSIS — Z98.890 S/P ORIF (OPEN REDUCTION INTERNAL FIXATION) FRACTURE: Primary | ICD-10-CM

## 2023-12-05 PROCEDURE — 99024 POSTOP FOLLOW-UP VISIT: CPT | Performed by: ORTHOPAEDIC SURGERY

## 2023-12-05 NOTE — PROGRESS NOTES
Orthopaedics Office Visit - Post-op Patient Visit    ASSESSMENT/PLAN:    Assessment:   5 weeks s/p ORIF left trimalleolar ankle fracture    DOS 10/29/23   Resolved fracture blisters   Resolved erythema medially   Improved wound appearance medially and laterally  No DVT PPX 2 weeks post operatively   Non-compliant with weight bearing restrictions   + tobacco use - 1.5-2 PPD smoker    Plan:   Incision is healing well   He may start to use Vit. E lotion  Blisters have nearly resolved   Continue ASA 81 MG 2x a day for DVT prophylaxis   Continue NWB restrictions over the next week, in 1 weeks time may transition to WBAT in a cam walker boot    He was fit with a cam walker boot, to be worn next week when he is able to start WB  PT script was provided   Continue ASA 81 MG 2x a day for DVT prophylaxis for 1 additional week  Follow up in 3 weeks time for left ankle x-rays     To Do Next Visit:  X-ray left ankle     _____________________________________________________  CHIEF COMPLAINT:  Chief Complaint   Patient presents with    Left Ankle - Post-op         SUBJECTIVE:  Jourdan Brown is a 68 y.o. male who presents to the office approx. 5 weeks s/p above surgery. He is here today to a wound check. He has been wearing a Podous boot. He denies any pain but notes some stiffness. He states he has been compliant with weight bearing at this time.  He denies any new injuries and no motor or sensory deficits    SOCIAL HISTORY:  Social History     Tobacco Use    Smoking status: Every Day     Packs/day: 1.00     Years: 50.00     Total pack years: 50.00     Types: Cigarettes    Smokeless tobacco: Never    Tobacco comments:     down to 1/2 ppd   Substance Use Topics    Alcohol use: Never    Drug use: No       MEDICATIONS:    Current Outpatient Medications:     acetaminophen (TYLENOL) 325 mg tablet, Take 2 tablets (650 mg total) by mouth every 6 (six) hours as needed for mild pain, headaches or fever, Disp: , Rfl: 0    aspirin 81 mg chewable tablet, Chew 1 tablet (81 mg total) every 12 (twelve) hours for 28 days, Disp: 56 tablet, Rfl: 0    atorvastatin (LIPITOR) 10 mg tablet, TAKE ONE TABLET BY MOUTH EVERY DAY, Disp: 90 tablet, Rfl: 3    [facility] Aspirin Adult Low Dose 81 MG chewable tablet, CHEW 1 TABLET BY MOUTH DAILY, Disp: 90 tablet, Rfl: 11    cyclobenzaprine (FLEXERIL) 5 mg tablet, TAKE ONE TABLET BY MOUTH DAILY AT BEDTIME, Disp: 30 tablet, Rfl: 0    metoprolol tartrate (LOPRESSOR) 25 mg tablet, TAKE ONE TABLET BY MOUTH EVERY TWELVE HOURS, Disp: 180 tablet, Rfl: 3    multivitamin (THERAGRAN) TABS, Take 1 tablet by mouth daily, Disp: , Rfl:     nicotine (NICODERM CQ) 14 mg/24hr TD 24 hr patch, Place 1 patch on the skin over 24 hours daily Do not start before October 31, 2023., Disp: 28 patch, Rfl: 0    levothyroxine (Synthroid) 50 mcg tablet, Take 1 tablet (50 mcg total) by mouth daily, Disp: 30 tablet, Rfl: 11    levothyroxine 50 mcg tablet, Take 1 tablet (50 mcg total) by mouth daily in the early morning Do not start before October 31, 2023., Disp: 30 tablet, Rfl: 0    senna (SENOKOT) 8.6 mg, Take 1 tablet (8.6 mg total) by mouth daily for 15 days Do not start before October 31, 2023., Disp: 15 tablet, Rfl: 0    REVIEW OF SYSTEMS:  MSK: as noted in HPI  Neuro: WNL's  Pertinent items are otherwise noted in HPI. A comprehensive review of systems was otherwise negative.    _____________________________________________________  PHYSICAL EXAMINATION:  Vital signs: Ht 5' 9" (1.753 m)   Wt 101 kg (222 lb)   BMI 32.78 kg/m²   General: No acute distress, awake and alert  Psychiatric: Mood and affect appear appropriate  HEENT: Trachea Midline, No torticollis, no apparent facial trauma  Cardiovascular: No audible murmurs;  Extremities appear perfused  Pulmonary: No audible wheezing or stridor  Skin: No open lesions; see further details (if any) below    MUSCULOSKELETAL EXAMINATION:    Extremities:  Left ankle     No erythema or ecchymosis   Edema noted   Incision is healing well  Lateral eschar has improved   Able to wiggle toes   Ambulates with a walker   Extremity appears warm and well perfused     _____________________________________________________  STUDIES REVIEWED:  I personally reviewed the images and interpretation is as follows:   No new imaging to review       PROCEDURES PERFORMED:  Procedures    Scribe Attestation      I,:  Sol Pack am acting as a scribe while in the presence of the attending physician.:       I,:  Emilia Kyle MD personally performed the services described in this documentation    as scribed in my presence.:

## 2023-12-06 ENCOUNTER — EVALUATION (OUTPATIENT)
Dept: PHYSICAL THERAPY | Facility: CLINIC | Age: 73
End: 2023-12-06
Payer: MEDICARE

## 2023-12-06 DIAGNOSIS — Z87.81 S/P ORIF (OPEN REDUCTION INTERNAL FIXATION) FRACTURE: ICD-10-CM

## 2023-12-06 DIAGNOSIS — M25.572 ACUTE LEFT ANKLE PAIN: Primary | ICD-10-CM

## 2023-12-06 DIAGNOSIS — M25.472 EDEMA OF LEFT ANKLE: ICD-10-CM

## 2023-12-06 DIAGNOSIS — S82.852D CLOSED TRIMALLEOLAR FRACTURE OF LEFT ANKLE WITH ROUTINE HEALING, SUBSEQUENT ENCOUNTER: ICD-10-CM

## 2023-12-06 DIAGNOSIS — Z98.890 S/P ORIF (OPEN REDUCTION INTERNAL FIXATION) FRACTURE: ICD-10-CM

## 2023-12-06 PROCEDURE — 97161 PT EVAL LOW COMPLEX 20 MIN: CPT

## 2023-12-06 PROCEDURE — 97110 THERAPEUTIC EXERCISES: CPT

## 2023-12-06 NOTE — PROGRESS NOTES
PT Evaluation     Today's date: 2023  Patient name: Margy Branch  : 1950  MRN: [id number]  Referring provider: Mary Boyd MD  Dx:   Encounter Diagnosis     ICD-10-CM    1. Acute left ankle pain  M25.572       2. Closed trimalleolar fracture of left ankle with routine healing, subsequent encounter  S82.852D Ambulatory Referral to Physical Therapy      3. S/P ORIF (open reduction internal fixation) fracture  Z98.890 Ambulatory Referral to Physical Therapy    Z87.81       4. Edema of left ankle  M25.472           Start Time: 801  Stop Time: 3363  Total time in clinic (min): 36 minutes    Assessment  Assessment details: Pt is a 68 y.o. male presenting to PT services s/p L ankle ORIF on 10/30/23. Pt is NWB at this time, however, demonstrates TTWB with RW and podus boot. Pt will progress to WBAT in CAM boot beginning next week per referring surgeon. Pt has limited L ankle eversion > inversion > plantarflexion > dorsiflexion. Pt has impaired L ankle global strength. Pt has significant L ankle edema, PT will utilize vasopneumatic compression in future sessions. PT added ankle ABCs, great and lesser toe extension, toe abduction, ankle pumps, and hamstring stretch to pt's HEP, pt verbalized and demonstrated understanding of proper form. Pt is a good candidate for skilled physical therapy in order to improve L ankle stability and mobility, improve quality of gait, and return pt to OF. Impairments: abnormal gait, abnormal or restricted ROM, activity intolerance, impaired balance, impaired physical strength, lacks appropriate home exercise program, pain with function, safety issue and weight-bearing intolerance  Functional limitations: stairs, showering, ambulating  Goals  STG (4 weeks):  1. Pt will improve L ankle eversion AROM to be 10*  2. Pt will improve L ankle plantarflexion to be at least 50*  3. Pt will improve L ankle inversion strength to be at least 4+/5   4.  Pt will ambulate WBAT with CAM boot     LTG (8 weeks):  1. Pt will be independent in HEP  2. Pt will improve L ankle AROM to be WNL   3. Pt will improve L ankle global strength to be 5/5   4. Pt will ambulate without AD  5. Pt will be able to negotiate stairs with reciprocal pattern    Plan  Patient would benefit from: skilled physical therapy  Planned modality interventions: biofeedback, cryotherapy, TENS and unattended electrical stimulation  Other planned modality interventions: Vasopneumatic compression  Planned therapy interventions: IASTM, joint mobilization, kinesiology taping, manual therapy, massage, abdominal trunk stabilization, balance, nerve gliding, neuromuscular re-education, patient education, postural training, strengthening, stretching, therapeutic activities, therapeutic exercise, home exercise program, flexibility, functional ROM exercises and gait training  Frequency: 1-2x/week. Duration in weeks: 8  Plan of Care beginning date: 12/6/2023  Plan of Care expiration date: 2/2/2024        Subjective Evaluation    History of Present Illness  Mechanism of injury: Pt reports that he was walking through the garage and there was a puddle in front of the garage door and he stepped over it in into the wet grass and his foot slid toward the deck and the foot got caught on the deck and then his whole body came down on his ankle. He then went to the ER and they gave him lidocaine and set the ankle and did a temporary cast. Then he got surgery on 10/30/23. He has been NWB since then which has been awful. He admits he has been TTWB. He states he will be WBAT in CAM boot starting on Monday.    Patient Goals  Patient goals for therapy: increased strength, improved balance, increased motion and return to sport/leisure activities  Patient goal: "to walk right again"  Pain  No pain reported    Social Support  Steps to enter house: yes (2 steps, no hand rails)  Stairs in house: yes (7+7 steps, 1 hand rail)   Lives in: multiple-level home  Lives with: spouse (1 small dog)    Employment status: not working  Hand dominance: ambidextrous          Objective     Palpation     Additional Palpation Details  No TTP    Neurological Testing     Sensation     Ankle/Foot   Left Ankle/Foot   Diminished: light touch    Active Range of Motion   Left Ankle/Foot   Dorsiflexion (kf): 7 degrees   Plantar flexion: 37 degrees   Inversion: 10 degrees   Eversion: 6 degrees     Additional Active Range of Motion Details  Tightness with AROM     Strength/Myotome Testing     Left Ankle/Foot   Dorsiflexion: 4+  Plantar flexion: 4+  Inversion: 4-  Eversion: 4-    Swelling   Left Ankle/Foot   Figure 8: 63 cm    Right Ankle/Foot   Figure 8: 58 cm           Precautions: s/p L ankle ORIF 10/30/23, WBAT beginning 12/11/23, Hx MI, HTN, Hx cancer  Access Code: [email or web address]    POC expires Unit limit Auth Expiration date PT/OT/ST + Visit Limit?   2/2/24 BOMN 12/31/23 BOMN                           Visit/Unit Tracking  AUTH Status:  Date 12/6              Not required Used 1               Remaining                      Date 12/6            Re-Eval             FOTO             Manuals                                                                 Neuro Re-Ed                                                                                                        Ther Ex             Bike             Ankle ABC x3            HR/TR Sit x30            Toe Yoga X30 ea            Hamstring stretch Sit 30"x3                                                   Ther Activity                                       Gait Training                                       Modalities

## 2023-12-12 ENCOUNTER — OFFICE VISIT (OUTPATIENT)
Dept: PHYSICAL THERAPY | Facility: CLINIC | Age: 73
End: 2023-12-12
Payer: MEDICARE

## 2023-12-12 DIAGNOSIS — S82.852D CLOSED TRIMALLEOLAR FRACTURE OF LEFT ANKLE WITH ROUTINE HEALING, SUBSEQUENT ENCOUNTER: ICD-10-CM

## 2023-12-12 DIAGNOSIS — Z98.890 S/P ORIF (OPEN REDUCTION INTERNAL FIXATION) FRACTURE: ICD-10-CM

## 2023-12-12 DIAGNOSIS — Z87.81 S/P ORIF (OPEN REDUCTION INTERNAL FIXATION) FRACTURE: ICD-10-CM

## 2023-12-12 DIAGNOSIS — M25.472 EDEMA OF LEFT ANKLE: ICD-10-CM

## 2023-12-12 DIAGNOSIS — M25.572 ACUTE LEFT ANKLE PAIN: Primary | ICD-10-CM

## 2023-12-12 PROCEDURE — 97110 THERAPEUTIC EXERCISES: CPT

## 2023-12-12 PROCEDURE — 97116 GAIT TRAINING THERAPY: CPT

## 2023-12-12 NOTE — PROGRESS NOTES
Daily Note     Today's date: 2023  Patient name: Sulema Mckenzie  : 1950  MRN: [id number]  Referring provider: Sesar Morales MD  Dx:   Encounter Diagnosis     ICD-10-CM    1. Acute left ankle pain  M25.572       2. Closed trimalleolar fracture of left ankle with routine healing, subsequent encounter  S82.852D       3. S/P ORIF (open reduction internal fixation) fracture  Z98.890     Z87.81       4. Edema of left ankle  M25.472                      Subjective: Pt denies pain since WB on LLE. He has been using 1 crutch. Objective: See treatment diary below      Assessment: Adjusted crutch to non-affected side, as pt utilizing crutch in affected side. Able to demonstrate proper exercise form. Some hip compensation for ankle supination/pronation. Good tolerance to charted interventions. Focused on ROM. Patient would benefit from continued PT in order to improve L ankle ROM and strength for improved function during daily activities. Plan: Continue per plan of care.       Precautions: s/p L ankle ORIF 10/30/23, WBAT beginning 23, Hx MI, HTN, Hx cancer  Access Code: [email or web address]    POC expires Unit limit Auth Expiration date PT/OT/ST + Visit Limit?   24 BOMN 23 BOMN                           Visit/Unit Tracking  AUTH Status:  Date              Not required Used 1 2              Remaining                      Date            Re-Eval             FOTO             Manuals                                                                 Neuro Re-Ed                                                                                                        Ther Ex             Pt education  AF           Bike             Ankle PROM  AF           BAPS  x20           Ankle AROM  X20 ea           Ankle ABC x3            HR/TR Sit x30 Sit x20           Toe Yoga X30 ea X20 ea           Hamstring stretch Sit 30"x3                                                   Ther Activity                                       Gait Training             1 crutch & adjustment  AF                        Modalities             CP L ankle  10'

## 2023-12-15 ENCOUNTER — OFFICE VISIT (OUTPATIENT)
Dept: PHYSICAL THERAPY | Facility: CLINIC | Age: 73
End: 2023-12-15
Payer: MEDICARE

## 2023-12-15 DIAGNOSIS — M25.572 ACUTE LEFT ANKLE PAIN: Primary | ICD-10-CM

## 2023-12-15 DIAGNOSIS — S82.852D CLOSED TRIMALLEOLAR FRACTURE OF LEFT ANKLE WITH ROUTINE HEALING, SUBSEQUENT ENCOUNTER: ICD-10-CM

## 2023-12-15 DIAGNOSIS — M25.472 EDEMA OF LEFT ANKLE: ICD-10-CM

## 2023-12-15 DIAGNOSIS — Z87.81 S/P ORIF (OPEN REDUCTION INTERNAL FIXATION) FRACTURE: ICD-10-CM

## 2023-12-15 DIAGNOSIS — Z98.890 S/P ORIF (OPEN REDUCTION INTERNAL FIXATION) FRACTURE: ICD-10-CM

## 2023-12-15 PROCEDURE — 97110 THERAPEUTIC EXERCISES: CPT

## 2023-12-15 PROCEDURE — 97016 VASOPNEUMATIC DEVICE THERAPY: CPT

## 2023-12-15 NOTE — PROGRESS NOTES
Daily Note     Today's date: 12/15/2023  Patient name: Ana Balderas  : 1950  MRN: [id number]  Referring provider: Alexis Russell MD  Dx:   Encounter Diagnosis     ICD-10-CM    1. Acute left ankle pain  M25.572       2. Closed trimalleolar fracture of left ankle with routine healing, subsequent encounter  S82.852D       3. S/P ORIF (open reduction internal fixation) fracture  Z98.890     Z87.81       4. Edema of left ankle  M25.472           Start Time: 1145  Stop Time: 1231  Total time in clinic (min): 46 minutes    Subjective: Pt reports that he had some soreness after last session, but it wasn't bad. He states he has been doing well with walking with 1 crutch. Objective: See treatment diary below  Ankle figure 8 measurement pre GameReady: 62.5 cm  Ankle figure 8 measurement post GameReady: 61.5 cm      Assessment: PT added light strengthening in NWB, pt tolerated well without increase in pain. Pt responds favorably to vasopneumatic compression with decreased swelling and improved ROM. Pt has limited ankle PROM. Patient would benefit from continued PT in order to improve L ankle ROM and strength for improved function during daily activities. Plan: Continue per plan of care.       Precautions: s/p L ankle ORIF 10/30/23, WBAT beginning 23, Hx MI, HTN, Hx cancer  Access Code: [email or web address]    POC expires Unit limit Auth Expiration date PT/OT/ST + Visit Limit?   24 BOMN 23 BOMN                           Visit/Unit Tracking  AUTH Status:  Date 12/6 12/12 12/15            Not required Used 1 2 3             Remaining                      Date 12/6 12/12 12/15          Re-Eval             FOTO             Manuals             DF mob   SC GII                                                 Neuro Re-Ed                                                                                                        Ther Ex             Pt education  AF           Bike             Ankle PROM  AF SC BAPS  x20 X20 + inv/ev          Ankle AROM  X20 ea           Ankle ABC x3            HR/TR Sit x30 Sit x20 Sit x30          Toe Yoga X30 ea X20 ea X20 ea          Hamstring stretch Sit 30"x3            Ankle 4-way   YTB 3x5                                     Ther Activity                                       Gait Training             1 crutch & adjustment  AF                        Modalities             CP L ankle  10'           GameReady    10'

## 2023-12-19 ENCOUNTER — OFFICE VISIT (OUTPATIENT)
Dept: PHYSICAL THERAPY | Facility: CLINIC | Age: 73
End: 2023-12-19
Payer: MEDICARE

## 2023-12-19 DIAGNOSIS — Z98.890 S/P ORIF (OPEN REDUCTION INTERNAL FIXATION) FRACTURE: ICD-10-CM

## 2023-12-19 DIAGNOSIS — M25.472 EDEMA OF LEFT ANKLE: ICD-10-CM

## 2023-12-19 DIAGNOSIS — S82.852D CLOSED TRIMALLEOLAR FRACTURE OF LEFT ANKLE WITH ROUTINE HEALING, SUBSEQUENT ENCOUNTER: ICD-10-CM

## 2023-12-19 DIAGNOSIS — M25.572 ACUTE LEFT ANKLE PAIN: Primary | ICD-10-CM

## 2023-12-19 DIAGNOSIS — Z87.81 S/P ORIF (OPEN REDUCTION INTERNAL FIXATION) FRACTURE: ICD-10-CM

## 2023-12-19 PROCEDURE — 97110 THERAPEUTIC EXERCISES: CPT

## 2023-12-19 NOTE — PROGRESS NOTES
Daily Note     Today's date: 2023  Patient name: Jace Varghese  : 1950  MRN: [id number]  Referring provider: Myke Hamilton MD  Dx:   Encounter Diagnosis     ICD-10-CM    1. Acute left ankle pain  M25.572       2. Closed trimalleolar fracture of left ankle with routine healing, subsequent encounter  S82.852D       3. S/P ORIF (open reduction internal fixation) fracture  Z98.890     Z87.81       4. Edema of left ankle  M25.472           Start Time: 844  Stop Time: 934  Total time in clinic (min): 50 minutes    Subjective: Pt reports that he is doing well, he still uses his crutch while walking because he feels off balance with the CAM boot being taller than his sneaker. He states that he has been icing at home and has noticed that his swelling has greatly reduced.      Objective: See treatment diary below      Assessment: Pt benefits from use of recumbent bike to increase circulation to promote healing and increase global LE muscular endurance. Pt has improved intrinsic foot musculature neuromuscular control in comparison to previous sessions. Pt is limited by L hip pain while performing standing strengthening interventions. Pt is challenged with raised height of BAPS board for ROM interventions, inversion being the most difficult. Patient would benefit from continued PT in order to improve L ankle ROM and strength for improved function during daily activities.      Plan: Continue per plan of care.      Precautions: s/p L ankle ORIF 10/30/23, WBAT beginning 23, Hx MI, HTN, Hx cancer  Access Code: [email or web address]    POC expires Unit limit Auth Expiration date PT/OT/ST + Visit Limit?   24 BOMN 23 BOMN                           Visit/Unit Tracking  AUTH Status:  Date 12/6 12/12 12/15 12/19           Not required Used 1 2 3 4            Remaining                      Date 12/6 12/12 12/15 12/19         Re-Eval             FOTO             Manuals             DF mob   SC GII               "                                   Neuro Re-Ed             BioDex LOS    Static x4                                                                                        Ther Ex             Pt education  AF           Bike    6' in boot          Ankle PROM  AF SC          BAPS  x20 X20 + inv/ev X20 + inv/ev high lvl         Ankle AROM  X20 ea           Ankle ABC x3            HR/TR Sit x30 Sit x20 Sit x30 Sit x30          Toe Yoga X30 ea X20 ea X20 ea X30 ea         Hamstring stretch Sit 30\"x3            Ankle 4-way   YTB 3x5  YTB 3x5          Hip abduction     RTB 2x10                      Ther Activity                                       Gait Training             1 crutch & adjustment  AF                        Modalities             CP L ankle  10'           GameReady    10'                 "

## 2023-12-21 DIAGNOSIS — Z87.81 S/P ORIF (OPEN REDUCTION INTERNAL FIXATION) FRACTURE: Primary | ICD-10-CM

## 2023-12-21 DIAGNOSIS — Z98.890 S/P ORIF (OPEN REDUCTION INTERNAL FIXATION) FRACTURE: Primary | ICD-10-CM

## 2023-12-22 ENCOUNTER — OFFICE VISIT (OUTPATIENT)
Dept: PHYSICAL THERAPY | Facility: CLINIC | Age: 73
End: 2023-12-22
Payer: MEDICARE

## 2023-12-22 DIAGNOSIS — M25.572 ACUTE LEFT ANKLE PAIN: Primary | ICD-10-CM

## 2023-12-22 DIAGNOSIS — Z87.81 S/P ORIF (OPEN REDUCTION INTERNAL FIXATION) FRACTURE: ICD-10-CM

## 2023-12-22 DIAGNOSIS — Z98.890 S/P ORIF (OPEN REDUCTION INTERNAL FIXATION) FRACTURE: ICD-10-CM

## 2023-12-22 DIAGNOSIS — S82.852D CLOSED TRIMALLEOLAR FRACTURE OF LEFT ANKLE WITH ROUTINE HEALING, SUBSEQUENT ENCOUNTER: ICD-10-CM

## 2023-12-22 DIAGNOSIS — M25.472 EDEMA OF LEFT ANKLE: ICD-10-CM

## 2023-12-22 PROCEDURE — 97112 NEUROMUSCULAR REEDUCATION: CPT

## 2023-12-22 PROCEDURE — 97110 THERAPEUTIC EXERCISES: CPT

## 2023-12-22 NOTE — PROGRESS NOTES
Daily Note     Today's date: 2023  Patient name: Jace Varghese  : 1950  MRN: [id number]  Referring provider: Myke Hamilton MD  Dx:   Encounter Diagnosis     ICD-10-CM    1. Acute left ankle pain  M25.572       2. Closed trimalleolar fracture of left ankle with routine healing, subsequent encounter  S82.232D       3. S/P ORIF (open reduction internal fixation) fracture  Z98.890     Z87.81       4. Edema of left ankle  M25.472                      Subjective: Pt reports he is eager to get his boot off.  He offers no complaints of pain with his L ankle upon arrival.      Objective: See treatment diary below      Assessment: All standing exercises performed with CAM boot and without discomfort.  Presents with ROM WFL passively, however decreased INV/EV strength is present.  Patient completes charted exercises without c/o pain or discomfort.  Significantly improved motor control of intrinsic foot musculature.      Plan: Continue per plan of care.      Precautions: s/p L ankle ORIF 10/30/23, WBAT beginning 23, Hx MI, HTN, Hx cancer  Access Code: [email or web address]    POC expires Unit limit Auth Expiration date PT/OT/ST + Visit Limit?   24 BOMN 23 BOMN                           Visit/Unit Tracking  AUTH Status:  Date 12/6 12/12 12/15 12/19 12/22          Not required Used 1 2 3 4 5           Remaining                      Date 12/6 12/12 12/15 12/19 12/22        Re-Eval             FOTO     56/65        Manuals             DF mob   SC GII                                                 Neuro Re-Ed             BioDex LOS    Static x4  Static x4                                                                                      Ther Ex             Pt education  AF           Bike    6' in boot  6' in boot        Ankle PROM  AF SC          BAPS  x20 X20 + inv/ev X20 + inv/ev high lvl X20 + inv/ev high lvl        Ankle AROM  X20 ea           Ankle ABC x3            HR/TR Sit x30 Sit x20 Sit x30  "Sit x30  Sit 2x20        Toe Yoga X30 ea X20 ea X20 ea X30 ea X30 ea        Hamstring stretch Sit 30\"x3            Ankle 4-way   YTB 3x5  YTB 3x5  YTB 2x10        Hip abduction     RTB 2x10 YTB 2x10                     Ther Activity                                       Gait Training             1 crutch & adjustment  AF                        Modalities             CP L ankle  10'           GameReady    10'    10'               "

## 2023-12-26 ENCOUNTER — APPOINTMENT (OUTPATIENT)
Dept: RADIOLOGY | Facility: CLINIC | Age: 73
End: 2023-12-26
Payer: MEDICARE

## 2023-12-26 ENCOUNTER — OFFICE VISIT (OUTPATIENT)
Dept: OBGYN CLINIC | Facility: CLINIC | Age: 73
End: 2023-12-26

## 2023-12-26 VITALS — BODY MASS INDEX: 32.88 KG/M2 | HEIGHT: 69 IN | WEIGHT: 222 LBS

## 2023-12-26 DIAGNOSIS — Z87.81 S/P ORIF (OPEN REDUCTION INTERNAL FIXATION) FRACTURE: ICD-10-CM

## 2023-12-26 DIAGNOSIS — S82.852D CLOSED TRIMALLEOLAR FRACTURE OF LEFT ANKLE WITH ROUTINE HEALING, SUBSEQUENT ENCOUNTER: ICD-10-CM

## 2023-12-26 DIAGNOSIS — Z87.81 S/P ORIF (OPEN REDUCTION INTERNAL FIXATION) FRACTURE: Primary | ICD-10-CM

## 2023-12-26 DIAGNOSIS — Z98.890 S/P ORIF (OPEN REDUCTION INTERNAL FIXATION) FRACTURE: Primary | ICD-10-CM

## 2023-12-26 DIAGNOSIS — Z98.890 S/P ORIF (OPEN REDUCTION INTERNAL FIXATION) FRACTURE: ICD-10-CM

## 2023-12-26 PROCEDURE — 99024 POSTOP FOLLOW-UP VISIT: CPT | Performed by: ORTHOPAEDIC SURGERY

## 2023-12-26 PROCEDURE — 73610 X-RAY EXAM OF ANKLE: CPT

## 2023-12-26 NOTE — PROGRESS NOTES
Orthopaedics Office Visit - Post-op Patient Visit    ASSESSMENT/PLAN:    Assessment:   8 weeks s/p ORIF left trimalleolar ankle fracture    DOS 10/29/23   Resolved fracture blisters   Resolved erythema medially   Improved wound appearance medially and laterally  No DVT PPX 2 weeks post operatively   Non-compliant with weight bearing restrictions   + tobacco use - 1.5-2 PPD smoker  Big toe pain, likely arthritic flare     Plan:   X-rays were performed in the office and reviewed   We discussed bit toe pain is likely due to underlying arthritis, which may be flared from the use of the cam walker boot   He may wean from the cam walker boot at this time, PT will help with this   Continue PT, updated script was provided   Follow up in 2 months time with left ankle x-rays, will further evaluate big toe pain if this fails to improve     To Do Next Visit:  X-ray left ankle     _____________________________________________________  CHIEF COMPLAINT:  Chief Complaint   Patient presents with    Left Ankle - Post-op         SUBJECTIVE:  Jace Varghese is a 73 y.o. male who presents to the office approx. 8 weeks s/p above surgery. Overall Enrrique is doing well. He has josé miguel WBAT in a cam walker boot. He started PT. He notes pain to his big toe, more so if it is hyperextend. He takes a baby Aspirin at baseline. He is not having to take anything for pain control.      SOCIAL HISTORY:  Social History     Tobacco Use    Smoking status: Every Day     Current packs/day: 1.00     Average packs/day: 1 pack/day for 50.0 years (50.0 ttl pk-yrs)     Types: Cigarettes    Smokeless tobacco: Never    Tobacco comments:     down to 1/2 ppd   Substance Use Topics    Alcohol use: Never    Drug use: No       MEDICATIONS:    Current Outpatient Medications:     acetaminophen (TYLENOL) 325 mg tablet, Take 2 tablets (650 mg total) by mouth every 6 (six) hours as needed for mild pain, headaches or fever, Disp: , Rfl: 0    atorvastatin (LIPITOR) 10 mg  "tablet, TAKE ONE TABLET BY MOUTH EVERY DAY, Disp: 90 tablet, Rfl: 3    [facility] Aspirin Adult Low Dose 81 MG chewable tablet, CHEW 1 TABLET BY MOUTH DAILY, Disp: 90 tablet, Rfl: 11    cyclobenzaprine (FLEXERIL) 5 mg tablet, TAKE ONE TABLET BY MOUTH DAILY AT BEDTIME, Disp: 30 tablet, Rfl: 0    metoprolol tartrate (LOPRESSOR) 25 mg tablet, TAKE ONE TABLET BY MOUTH EVERY TWELVE HOURS, Disp: 180 tablet, Rfl: 3    multivitamin (THERAGRAN) TABS, Take 1 tablet by mouth daily, Disp: , Rfl:     nicotine (NICODERM CQ) 14 mg/24hr TD 24 hr patch, Place 1 patch on the skin over 24 hours daily Do not start before October 31, 2023., Disp: 28 patch, Rfl: 0    aspirin 81 mg chewable tablet, Chew 1 tablet (81 mg total) every 12 (twelve) hours for 28 days, Disp: 56 tablet, Rfl: 0    levothyroxine (Synthroid) 50 mcg tablet, Take 1 tablet (50 mcg total) by mouth daily, Disp: 30 tablet, Rfl: 11    levothyroxine 50 mcg tablet, Take 1 tablet (50 mcg total) by mouth daily in the early morning Do not start before October 31, 2023., Disp: 30 tablet, Rfl: 0    senna (SENOKOT) 8.6 mg, Take 1 tablet (8.6 mg total) by mouth daily for 15 days Do not start before October 31, 2023., Disp: 15 tablet, Rfl: 0    REVIEW OF SYSTEMS:  MSK: as noted in HPI  Neuro: WNL's  Pertinent items are otherwise noted in HPI.  A comprehensive review of systems was otherwise negative.    _____________________________________________________  PHYSICAL EXAMINATION:  Vital signs: Ht 5' 9\" (1.753 m)   Wt 101 kg (222 lb)   BMI 32.78 kg/m²   General: No acute distress, awake and alert  Psychiatric: Mood and affect appear appropriate  HEENT: Trachea Midline, No torticollis, no apparent facial trauma  Cardiovascular: No audible murmurs; Extremities appear perfused  Pulmonary: No audible wheezing or stridor  Skin: No open lesions; see further details (if any) below    MUSCULOSKELETAL EXAMINATION:    Extremities:  Left ankle     No erythema, ecchymosis or edema  Well healed " surgical incision   Well healed fracture blisters   Good ankle ROM without pain   Good big toe ROM  No bony or soft tissue tenderness   Extremity appears warm and well perfused     _____________________________________________________  STUDIES REVIEWED:  I personally reviewed the images and interpretation is as follows:  X-rays of the left ankle demonstrate healed trimalleolar ankle fracture with orthopedic hardware in good alignment and position.       PROCEDURES PERFORMED:  Procedures    Scribe Attestation      I,:  Candy Maharaj am acting as a scribe while in the presence of the attending physician.:       I,:  Myke Hamilton MD personally performed the services described in this documentation    as scribed in my presence.:

## 2023-12-27 ENCOUNTER — OFFICE VISIT (OUTPATIENT)
Dept: PHYSICAL THERAPY | Facility: CLINIC | Age: 73
End: 2023-12-27
Payer: MEDICARE

## 2023-12-27 DIAGNOSIS — Z98.890 S/P ORIF (OPEN REDUCTION INTERNAL FIXATION) FRACTURE: ICD-10-CM

## 2023-12-27 DIAGNOSIS — S82.852D CLOSED TRIMALLEOLAR FRACTURE OF LEFT ANKLE WITH ROUTINE HEALING, SUBSEQUENT ENCOUNTER: ICD-10-CM

## 2023-12-27 DIAGNOSIS — Z87.81 S/P ORIF (OPEN REDUCTION INTERNAL FIXATION) FRACTURE: ICD-10-CM

## 2023-12-27 PROCEDURE — 97110 THERAPEUTIC EXERCISES: CPT | Performed by: PHYSICAL THERAPIST

## 2023-12-27 PROCEDURE — 97112 NEUROMUSCULAR REEDUCATION: CPT | Performed by: PHYSICAL THERAPIST

## 2023-12-27 NOTE — PROGRESS NOTES
Daily Note     Today's date: 2023  Patient name: Jace Varghese  : 1950  MRN: [id number]  Referring provider: Myke Hamilton MD  Dx:   Encounter Diagnosis     ICD-10-CM    1. S/P ORIF (open reduction internal fixation) fracture  Z98.890 Ambulatory Referral to Physical Therapy    Z87.81       2. Closed trimalleolar fracture of left ankle with routine healing, subsequent encounter  S82.852D Ambulatory Referral to Physical Therapy                     Subjective: Patient has been weaning himself from his CAM boot today.  Notes that he has been out of his boot for most of the day today.        Objective: See treatment diary below      Assessment: Provided guidance to the patient regarding a weaning schedule for his CAM boot, advising to start with 2-4 hours out of the CAM boot and then gradually increase by 1-2 hours every other day.  Antalgic gait with lack of functional dorsiflexion present.  No complaints of pain post intervention.        Plan: Continue per plan of care.      Precautions: s/p L ankle ORIF 10/30/23, WBAT beginning 23, Hx MI, HTN, Hx cancer  Access Code: [email or web address]    POC expires Unit limit Auth Expiration date PT/OT/ST + Visit Limit?   24 BOMN 23 BOMN                           Visit/Unit Tracking  AUTH Status:  Date 12/6 12/12 12/15 12/19 12/22 12/27         Not required Used 1 2 3 4 5 6          Remaining                      Date 12/6 12/12 12/15 12/19 12/22 12/27       Re-Eval             FOTO     56/65        Manuals             DF mob   SC GII                                                 Neuro Re-Ed             BioDex LOS    Static x4  Static x4 Static 4x                                                                                     Ther Ex             Pt education  AF           Bike    6' in boot  6' in boot 6'       Ankle PROM  AF SC          BAPS  x20 X20 + inv/ev X20 + inv/ev high lvl X20 + inv/ev high lvl 20xea       Long sit gastroc stretch       "10\"X10       Ankle AROM  X20 ea           Ankle ABC x3            HR/TR Sit x30 Sit x20 Sit x30 Sit x30  Sit 2x20 Sit 2x20       Toe Yoga X30 ea X20 ea X20 ea X30 ea X30 ea 30x       Hamstring stretch Sit 30\"x3            Ankle 4-way   YTB 3x5  YTB 3x5  YTB 2x10 Ytb 20xea       Hip abduction     RTB 2x10 YTB 2x10 20xea       Standing hamstric curl      20x       Ther Activity                                       Gait Training             1 crutch & adjustment  AF                        Modalities             CP L ankle  10'           GameReady    10'    10' 10'                "

## 2023-12-29 ENCOUNTER — OFFICE VISIT (OUTPATIENT)
Dept: PHYSICAL THERAPY | Facility: CLINIC | Age: 73
End: 2023-12-29
Payer: MEDICARE

## 2023-12-29 DIAGNOSIS — S82.852D CLOSED TRIMALLEOLAR FRACTURE OF LEFT ANKLE WITH ROUTINE HEALING, SUBSEQUENT ENCOUNTER: ICD-10-CM

## 2023-12-29 DIAGNOSIS — Z87.81 S/P ORIF (OPEN REDUCTION INTERNAL FIXATION) FRACTURE: Primary | ICD-10-CM

## 2023-12-29 DIAGNOSIS — Z98.890 S/P ORIF (OPEN REDUCTION INTERNAL FIXATION) FRACTURE: Primary | ICD-10-CM

## 2023-12-29 DIAGNOSIS — M25.472 EDEMA OF LEFT ANKLE: ICD-10-CM

## 2023-12-29 DIAGNOSIS — M25.572 ACUTE LEFT ANKLE PAIN: ICD-10-CM

## 2023-12-29 PROCEDURE — 97110 THERAPEUTIC EXERCISES: CPT

## 2023-12-29 NOTE — PROGRESS NOTES
"Daily Note     Today's date: 2023  Patient name: Jace Varghese  : 1950  MRN: [id number]  Referring provider: Myke Hamilton MD  Dx:   Encounter Diagnosis     ICD-10-CM    1. S/P ORIF (open reduction internal fixation) fracture  Z98.890     Z87.81       2. Closed trimalleolar fracture of left ankle with routine healing, subsequent encounter  S82.852D       3. Acute left ankle pain  M25.572       4. Edema of left ankle  M25.472           Start Time: 838  Stop Time: 915  Total time in clinic (min): 37 minutes    Subjective: Pt reports that he has been feeling pretty good outside of his boot.       Objective: See treatment diary below      Assessment: Pt is easily fatigued with SL endurance. Pt has global LLE weakness and is challenged with standing strengthening. Pt continues to lack L ankle inversion mobility. Will continue to address ankle stability and neuromuscular control in future sessions.       Plan: Continue per plan of care.      Precautions: s/p L ankle ORIF 10/30/23, WBAT beginning 23, Hx MI, HTN, Hx cancer  Access Code: [email or web address]    POC expires Unit limit Auth Expiration date PT/OT/ST + Visit Limit?   24 BOMN 23 BOMN                           Visit/Unit Tracking  AUTH Status:  Date 12/6 12/12 12/15 12/19 12/22 12/27 12/29        Not required Used 1 2 3 4 5 6 7         Remaining                      Date 12/6 12/12 12/15 12/19 12/22 12/27 12/29      Re-Eval             FOTO     56/65        Manuals             DF mob   SC GII                                                 Neuro Re-Ed             BioDex LOS    Static x4  Static x4 Static 4x       SLS       Foam 15\"x4                                                                       Ther Ex             Pt education  AF           Bike    6' in boot  6' in boot 6' 6'      Ankle PROM  AF SC          BAPS  x20 X20 + inv/ev X20 + inv/ev high lvl X20 + inv/ev high lvl 20xea X30 + inv/ev high lvl      Long sit gastroc " "stretch      10\"X10       Ankle AROM  X20 ea           Ankle ABC x3            HR/TR Sit x30 Sit x20 Sit x30 Sit x30  Sit 2x20 Sit 2x20 Stand 2x10      Toe Yoga X30 ea X20 ea X20 ea X30 ea X30 ea 30x x30      Hamstring stretch Sit 30\"x3            Ankle 4-way   YTB 3x5  YTB 3x5  YTB 2x10 Ytb 20xea RTB x20 ea      Hip abduction     RTB 2x10 YTB 2x10 20xea RTB 2x10 + ext       Standing hamstric curl      20x       Ther Activity                                       Gait Training             1 crutch & adjustment  AF                        Modalities             CP L ankle  10'           GameReady    10'    10' 10'                "

## 2024-01-02 ENCOUNTER — OFFICE VISIT (OUTPATIENT)
Dept: PHYSICAL THERAPY | Facility: CLINIC | Age: 74
End: 2024-01-02
Payer: MEDICARE

## 2024-01-02 DIAGNOSIS — Z98.890 S/P ORIF (OPEN REDUCTION INTERNAL FIXATION) FRACTURE: Primary | ICD-10-CM

## 2024-01-02 DIAGNOSIS — S82.852D CLOSED TRIMALLEOLAR FRACTURE OF LEFT ANKLE WITH ROUTINE HEALING, SUBSEQUENT ENCOUNTER: ICD-10-CM

## 2024-01-02 DIAGNOSIS — M25.472 EDEMA OF LEFT ANKLE: ICD-10-CM

## 2024-01-02 DIAGNOSIS — Z87.81 S/P ORIF (OPEN REDUCTION INTERNAL FIXATION) FRACTURE: Primary | ICD-10-CM

## 2024-01-02 DIAGNOSIS — M25.572 ACUTE LEFT ANKLE PAIN: ICD-10-CM

## 2024-01-02 PROCEDURE — 97110 THERAPEUTIC EXERCISES: CPT

## 2024-01-02 PROCEDURE — 97112 NEUROMUSCULAR REEDUCATION: CPT

## 2024-01-02 NOTE — PROGRESS NOTES
"Daily Note     Today's date: 2024  Patient name: Jace Varghese  : 1950  MRN: [id number]  Referring provider: Myke Hamilton MD  Dx:   Encounter Diagnosis     ICD-10-CM    1. S/P ORIF (open reduction internal fixation) fracture  Z98.890     Z87.81       2. Closed trimalleolar fracture of left ankle with routine healing, subsequent encounter  S82.852D       3. Acute left ankle pain  M25.572       4. Edema of left ankle  M25.472           Start Time: 1100  Stop Time: 1145  Total time in clinic (min): 45 minutes    Subjective: Pt reports that he has been getting some random pain on the outside of his ankle when he does ankle inversion. Pt states that he has been totally out of his boot for a while now and it's going well.       Objective: See treatment diary below      Assessment: Slight antalgic gait present, however, PT suspects this is due to L hip pain rather than ankle deficits. Pt has improved SLS endurance but does require FTA to maintain balance. Will continue to challenge ankle and LLE strengthening, balance and proprioception, and ankle mobility in future sessions.       Plan: Continue per plan of care.      Precautions: s/p L ankle ORIF 10/30/23, WBAT beginning 23, Hx MI, HTN, Hx cancer  Access Code: [email or web address]    POC expires Unit limit Auth Expiration date PT/OT/ST + Visit Limit?   24 BOMN 23 BOMN                           Visit/Unit Tracking  AUTH Status:  Date 12/6 12/12 12/15 12/19 12/22 12/27 12/29 1/2       Not required Used 1 2 3 4 5 6 7 8        Remaining                      Date 12/6 12/12 12/15 12/19 12/22 12/27 12/29 1/2     Re-Eval             FOTO     56/65        Manuals             DF mob   SC GII                                                 Neuro Re-Ed             BioDex LOS    Static x4  Static x4 Static 4x  L11/10 x4     SLS       Foam 15\"x4 Foam 15\"x4                                                                      Ther Ex             Pt education " " AF           Bike    6' in boot  6' in boot 6' 6' 6'     Ankle PROM  AF SC          BAPS  x20 X20 + inv/ev X20 + inv/ev high lvl X20 + inv/ev high lvl 20xea X30 + inv/ev high lvl X20 + inv/ev high lvl     Long sit gastroc stretch      10\"X10  ProStretch 30\"x3     Ankle AROM  X20 ea           Ankle ABC x3            HR/TR Sit x30 Sit x20 Sit x30 Sit x30  Sit 2x20 Sit 2x20 Stand 2x10 Stand 3x10     Toe Yoga X30 ea X20 ea X20 ea X30 ea X30 ea 30x x30      Hamstring stretch Sit 30\"x3            Ankle 4-way   YTB 3x5  YTB 3x5  YTB 2x10 Ytb 20xea RTB x20 ea      Hip abduction     RTB 2x10 YTB 2x10 20xea RTB 2x10 + ext  GTB 2x10 +ext      Standing hamstric curl      20x  GTB 2x10     Leg press        95# 2x10     Ther Activity             Step up         BioDex 2x10                   Gait Training             1 crutch & adjustment  AF                        Modalities             CP L ankle  10'           GameReady    10'    10' 10'                "

## 2024-01-05 ENCOUNTER — OFFICE VISIT (OUTPATIENT)
Dept: PHYSICAL THERAPY | Facility: CLINIC | Age: 74
End: 2024-01-05
Payer: MEDICARE

## 2024-01-05 DIAGNOSIS — Z87.81 S/P ORIF (OPEN REDUCTION INTERNAL FIXATION) FRACTURE: Primary | ICD-10-CM

## 2024-01-05 DIAGNOSIS — S82.852D CLOSED TRIMALLEOLAR FRACTURE OF LEFT ANKLE WITH ROUTINE HEALING, SUBSEQUENT ENCOUNTER: ICD-10-CM

## 2024-01-05 DIAGNOSIS — M25.472 EDEMA OF LEFT ANKLE: ICD-10-CM

## 2024-01-05 DIAGNOSIS — Z98.890 S/P ORIF (OPEN REDUCTION INTERNAL FIXATION) FRACTURE: Primary | ICD-10-CM

## 2024-01-05 DIAGNOSIS — M25.572 ACUTE LEFT ANKLE PAIN: ICD-10-CM

## 2024-01-05 PROCEDURE — 97110 THERAPEUTIC EXERCISES: CPT

## 2024-01-05 PROCEDURE — 97112 NEUROMUSCULAR REEDUCATION: CPT
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
## 2024-01-09 ENCOUNTER — OFFICE VISIT (OUTPATIENT)
Dept: PHYSICAL THERAPY | Facility: CLINIC | Age: 74
End: 2024-01-09
Payer: MEDICARE

## 2024-01-09 DIAGNOSIS — S82.852D CLOSED TRIMALLEOLAR FRACTURE OF LEFT ANKLE WITH ROUTINE HEALING, SUBSEQUENT ENCOUNTER: ICD-10-CM

## 2024-01-09 DIAGNOSIS — M25.572 ACUTE LEFT ANKLE PAIN: ICD-10-CM

## 2024-01-09 DIAGNOSIS — M25.472 EDEMA OF LEFT ANKLE: ICD-10-CM

## 2024-01-09 DIAGNOSIS — Z87.81 S/P ORIF (OPEN REDUCTION INTERNAL FIXATION) FRACTURE: Primary | ICD-10-CM

## 2024-01-09 DIAGNOSIS — Z98.890 S/P ORIF (OPEN REDUCTION INTERNAL FIXATION) FRACTURE: Primary | ICD-10-CM

## 2024-01-09 PROCEDURE — 97530 THERAPEUTIC ACTIVITIES: CPT

## 2024-01-09 PROCEDURE — 97110 THERAPEUTIC EXERCISES: CPT
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
## 2024-01-12 ENCOUNTER — OFFICE VISIT (OUTPATIENT)
Dept: PHYSICAL THERAPY | Facility: CLINIC | Age: 74
End: 2024-01-12
Payer: MEDICARE

## 2024-01-12 DIAGNOSIS — M25.572 ACUTE LEFT ANKLE PAIN: ICD-10-CM

## 2024-01-12 DIAGNOSIS — Z98.890 S/P ORIF (OPEN REDUCTION INTERNAL FIXATION) FRACTURE: Primary | ICD-10-CM

## 2024-01-12 DIAGNOSIS — Z87.81 S/P ORIF (OPEN REDUCTION INTERNAL FIXATION) FRACTURE: Primary | ICD-10-CM

## 2024-01-12 DIAGNOSIS — M25.472 EDEMA OF LEFT ANKLE: ICD-10-CM

## 2024-01-12 DIAGNOSIS — S82.852D CLOSED TRIMALLEOLAR FRACTURE OF LEFT ANKLE WITH ROUTINE HEALING, SUBSEQUENT ENCOUNTER: ICD-10-CM

## 2024-01-12 PROCEDURE — 97112 NEUROMUSCULAR REEDUCATION: CPT

## 2024-01-12 PROCEDURE — 97110 THERAPEUTIC EXERCISES: CPT

## 2024-01-12 NOTE — PROGRESS NOTES
PT Discharge    Today's date: 2024  Patient name: Jace Varghese  : 1950  MRN: [id number]  Referring provider: Myke Hamilton MD  Dx:   Encounter Diagnosis     ICD-10-CM    1. S/P ORIF (open reduction internal fixation) fracture  Z98.890     Z87.81       2. Closed trimalleolar fracture of left ankle with routine healing, subsequent encounter  S82.852D       3. Acute left ankle pain  M25.572       4. Edema of left ankle  M25.472           Start Time: 845  Stop Time: 925  Total time in clinic (min): 40 minutes    Assessment  Assessment details: Pt is a 73 y.o. male presenting to PT services s/p L ankle ORIF on 10/30/23. Pt has been participating in PT for 5 weeks and has made improvement in regards to L ankle mobility, L ankle strength, quality of gait, resolution of pain with function, and decreased swelling. Pt remains limited more so by hip and great toe pain. PT and pt have discussed and agreed that pt has met maximum benefit of skilled physical therapy and will continue to benefit from independent performance of HEP. Pt was informed that if he has any questions or concerns, he is welcome to contact facility at any time. Pt is discharged from skilled physical therapy.   Functional limitations: stairs, showering, ambulating  Goals  STG (4 weeks):  1. Pt will improve L ankle eversion AROM to be 10* GOAL MET  2. Pt will improve L ankle plantarflexion to be at least 50* GOAL MET  3. Pt will improve L ankle inversion strength to be at least 4+/5 GOAL MET  4. Pt will ambulate WBAT with CAM boot GOAL MET    LTG (8 weeks):  1. Pt will be independent in HEP GOAL MET  2. Pt will improve L ankle AROM to be WNL GOAL MET  3. Pt will improve L ankle global strength to be 5/5 GOAL MET  4. Pt will ambulate without AD GOAL MET  5. Pt will be able to negotiate stairs with reciprocal pattern GOAL MET     Plan  Therapy options: independent HEP.  Planned therapy interventions: home exercise program  Treatment  "plan discussed with: patient        Subjective Evaluation    History of Present Illness  Mechanism of injury: Pt reports that he feels about 85-90% better since beginning PT. He states that everything has gotten better, except for going downstairs. He states when he goes down the stairs he has pain in his big toe. He states that he has had no pain in his ankle.   Patient Goals  Patient goals for therapy: increased strength, improved balance, increased motion and return to sport/leisure activities  Patient goal: \"to walk right again\"  Pain  No pain reported    Social Support  Steps to enter house: yes (2 steps, no hand rails)  Stairs in house: yes (7+7 steps, 1 hand rail)   Lives in: multiple-level home  Lives with: spouse (1 small dog)    Employment status: not working  Hand dominance: ambidextrous          Objective     Neurological Testing     Sensation     Ankle/Foot   Left Ankle/Foot   Diminished: light touch    Comments   Left light touch: improving.     Active Range of Motion   Left Ankle/Foot   Dorsiflexion (kf): 15 degrees   Plantar flexion: 58 degrees   Inversion: 20 degrees   Eversion: 22 degrees     Strength/Myotome Testing     Left Ankle/Foot   Dorsiflexion: 5  Plantar flexion: 5  Inversion: 5  Eversion: 5    Swelling   Left Ankle/Foot   Figure 8: 60.5 cm    Right Ankle/Foot   Figure 8: 58 cm           Precautions: s/p L ankle ORIF 10/30/23, WBAT beginning 12/11/23, Hx MI, HTN, Hx cancer  Access Code: [email or web address]    POC expires Unit limit Auth Expiration date PT/OT/ST + Visit Limit?   2/2/24 BOMN 12/31/23 BOMN                           Visit/Unit Tracking  AUTH Status:  Date 12/6 12/12 12/15 12/19 12/22 12/27 12/29 1/2 1/5 1/9 1/12    Not required Used 1 2 3 4 5 6 7 8 9 10 11     Remaining                      Date 1/12 12/12 12/15 12/19 12/22 12/27 12/29 1/2 1/5 1/9   Re-Eval SC            FOTO     56/65        Manuals             DF mob   SC GII                                                 Neuro Re-Ed  " "           BioDex LOS    Static x4  Static x4 Static 4x  L11/10 x4 L8 x4 L8 x4   SLS       Foam 15\"x4 Foam 15\"x4 Foam 30\"x3                                                                     Ther Ex             Pt education  AF           Bike 8'   6' in boot  6' in boot 6' 6' 6' 6' 6' L2   Ankle PROM  AF SC          BAPS  x20 X20 + inv/ev X20 + inv/ev high lvl X20 + inv/ev high lvl 20xea X30 + inv/ev high lvl X20 + inv/ev high lvl X30 + inv/ev high lvl X30 + inv/ev high lvl   Long sit gastroc stretch      10\"X10  ProStretch 30\"x3 Prostretch 30\"x3 Against 4\" step 10\"x10   Ankle AROM  X20 ea           Ankle ABC             HR/TR  Sit x20 Sit x30 Sit x30  Sit 2x20 Sit 2x20 Stand 2x10 Stand 3x10 Foam 2x10 Ecc 8\" biodex 1x6  1x7   Toe Yoga  X20 ea X20 ea X30 ea X30 ea 30x x30      Hamstring stretch 30\"x3 sit             Ankle 4-way   YTB 3x5  YTB 3x5  YTB 2x10 Ytb 20xea RTB x20 ea      Hip abduction  GTB 2x10 + ext   RTB 2x10 YTB 2x10 20xea RTB 2x10 + ext  GTB 2x10 +ext  GTB 2x10 + ext GTB 2x10 + ext   Standing hamstring curl      20x  GTB 2x10 GTB 2x10 GTB 2x10   Leg press        95# 2x10     Ther Activity             Step up         BioDex 2x10  NV    Step down fwd          4\"    x10   Gait Training             1 crutch & adjustment  AF                        Modalities             CP L ankle  10'           GameReady    10'    10' 10'           "

## 2024-01-16 ENCOUNTER — APPOINTMENT (OUTPATIENT)
Dept: PHYSICAL THERAPY | Facility: CLINIC | Age: 74
End: 2024-01-16
Payer: MEDICARE

## 2024-01-19 ENCOUNTER — APPOINTMENT (OUTPATIENT)
Dept: PHYSICAL THERAPY | Facility: CLINIC | Age: 74
End: 2024-01-19
Payer: MEDICARE

## 2024-01-23 ENCOUNTER — APPOINTMENT (OUTPATIENT)
Dept: PHYSICAL THERAPY | Facility: CLINIC | Age: 74
End: 2024-01-23
Payer: MEDICARE

## 2024-01-26 ENCOUNTER — APPOINTMENT (OUTPATIENT)
Dept: PHYSICAL THERAPY | Facility: CLINIC | Age: 74
End: 2024-01-26
Payer: MEDICARE

## 2024-01-30 ENCOUNTER — APPOINTMENT (OUTPATIENT)
Dept: PHYSICAL THERAPY | Facility: CLINIC | Age: 74
End: 2024-01-30
Payer: MEDICARE

## 2024-02-05 NOTE — PROGRESS NOTES
Cardiology Follow Up    Jace Varghese  1950  [id number]  [facility] [place]  [address]  [place] [id number] [phone]    1. Essential (primary) hypertension  Hepatic function panel    LDL cholesterol, direct    Lipid panel      2. Pure hypercholesterolemia  Hepatic function panel    LDL cholesterol, direct    Lipid panel      3. Coronary arteriosclerosis  Hepatic function panel    LDL cholesterol, direct    Lipid panel      4. S/P right coronary artery (RCA) stent placement  Hepatic function panel    LDL cholesterol, direct    Lipid panel      5. Tobacco abuse  Hepatic function panel    LDL cholesterol, direct    Lipid panel      6. Obesity, morbid (HCC)        7. Stage 3 chronic kidney disease, unspecified whether stage 3a or 3b CKD (HCC)          Interval History: Patient is here for cardiac follow-up. Patient had IMI with placement of a KATIANA in the RCA 12/2018. The other vessels had no significant disease.  Echocardiogram done 12/2019 demonstrated an LVEF of 55% with no RWMA.  He had no significant valve disease.  He is on DAPT and statin.  Lipid profile done 7/2022 demonstrated total cholesterol of 151 with an HDL of 37 and a calculated LDL of 89.  Patient is on atorvastatin.   Patient has had no chest pain or significant dyspnea.  His vital signs are stable today.  He broke his left ankle in October 2023.  This required surgery.  He completed his therapy.    Patient Active Problem List   Diagnosis   • ST elevation myocardial infarction involving right coronary artery (HCC)   • Tobacco abuse   • History of cancer tonsil   • S/P right coronary artery (RCA) stent placement   • Mixed hyperlipidemia   • Essential (primary) hypertension   • Stage 3 chronic kidney disease, unspecified whether stage 3a or 3b CKD (HCC)   • Family history of colon cancer in father   • Obesity, morbid (HCC)   • Closed trimalleolar fracture    • Hypothyroidism   • Closed trimalleolar fracture of left ankle with routine healing, subsequent encounter   • S/P ORIF (open reduction internal fixation) fracture     No past medical history on file.  Social History     Socioeconomic History   • Marital status: /Civil Union     Spouse name: Not on file   • Number of children: Not on file   • Years of education: Not on file   • Highest education level: Not on file   Occupational History   • Not on file   Tobacco Use   • Smoking status: Every Day     Current packs/day: 1.00     Average packs/day: 1 pack/day for 50.0 years (50.0 ttl pk-yrs)     Types: Cigarettes   • Smokeless tobacco: Never   • Tobacco comments:     down to 1/2 ppd   Substance and Sexual Activity   • Alcohol use: Never   • Drug use: No   • Sexual activity: Not on file   Other Topics Concern   • Not on file   Social History Narrative   • Not on file     Social Determinants of Health     Financial Resource Strain: Not on file   Food Insecurity: Not on file   Transportation Needs: Not on file   Physical Activity: Not on file   Stress: Not on file   Social Connections: Not on file   Intimate Partner Violence: Not on file   Housing Stability: Not on file      Family History   Problem Relation Age of Onset   • Heart disease Mother         84   • Colon cancer Father      Past Surgical History:   Procedure Laterality Date   • KNEE SURGERY     • ORIF TIBIA & FIBULA FRACTURES Left 10/29/2023    Procedure: OPEN REDUCTION W/ INTERNAL FIXATION (ORIF) ANKLE- ORIF Left ankle;  Surgeon: Myke Hamilton MD;  Location: [facility];  Service: Orthopedics       Current Outpatient Medications:   •  acetaminophen (TYLENOL) 325 mg tablet, Take 2 tablets (650 mg total) by mouth every 6 (six) hours as needed for mild pain, headaches or fever, Disp: , Rfl: 0  •  atorvastatin (LIPITOR) 10 mg tablet, TAKE ONE TABLET BY MOUTH EVERY DAY (Patient taking differently: Take 10 mg by mouth daily 10 mg every other day.),  "Disp: 90 tablet, Rfl: 3  •  [employer] Aspirin Adult Low Dose 81 MG chewable tablet, CHEW 1 TABLET BY MOUTH DAILY, Disp: 90 tablet, Rfl: 11  •  metoprolol tartrate (LOPRESSOR) 25 mg tablet, TAKE ONE TABLET BY MOUTH EVERY TWELVE HOURS, Disp: 180 tablet, Rfl: 3  •  multivitamin (THERAGRAN) TABS, Take 1 tablet by mouth daily, Disp: , Rfl:   •  nicotine (NICODERM CQ) 14 mg/24hr TD 24 hr patch, Place 1 patch on the skin over 24 hours daily Do not start before October 31, 2023., Disp: 28 patch, Rfl: 0  •  aspirin 81 mg chewable tablet, Chew 1 tablet (81 mg total) every 12 (twelve) hours for 28 days (Patient not taking: Reported on 2/15/2024), Disp: 56 tablet, Rfl: 0  •  cyclobenzaprine (FLEXERIL) 5 mg tablet, TAKE ONE TABLET BY MOUTH DAILY AT BEDTIME (Patient not taking: Reported on 2/15/2024), Disp: 30 tablet, Rfl: 0  •  levothyroxine (Synthroid) 50 mcg tablet, Take 1 tablet (50 mcg total) by mouth daily, Disp: 30 tablet, Rfl: 11  •  levothyroxine 50 mcg tablet, Take 1 tablet (50 mcg total) by mouth daily in the early morning Do not start before October 31, 2023. (Patient not taking: Reported on 2/15/2024), Disp: 30 tablet, Rfl: 0  •  senna (SENOKOT) 8.6 mg, Take 1 tablet (8.6 mg total) by mouth daily for 15 days Do not start before October 31, 2023. (Patient not taking: Reported on 2/15/2024), Disp: 15 tablet, Rfl: 0  No Known Allergies    Labs:not applicable  Imaging: No results found.    Review of Systems:  Review of Systems   All other systems reviewed and are negative.      Physical Exam:  /80 (BP Location: Left arm, Patient Position: Sitting, Cuff Size: Standard)   Pulse 66   Ht 5' 9\" (1.753 m)   Wt 112 kg (245 lb 14.4 oz)   SpO2 97%   BMI 36.31 kg/m²   Physical Exam  Vitals reviewed.   Constitutional:       Appearance: He is well-developed.   HENT:      Head: Normocephalic and atraumatic.   Cardiovascular:      Rate and Rhythm: Normal rate.      Heart sounds: Normal heart sounds.   Pulmonary:      Effort: " Pulmonary effort is normal.      Breath sounds: Normal breath sounds.   Musculoskeletal:      Cervical back: Normal range of motion.   Skin:     General: Skin is warm and dry.   Neurological:      Mental Status: He is alert and oriented to person, place, and time.         Discussion/Summary:I will continue the patient's present medical regimen.  The patient appears well compensated.  I have asked the patient to call if there is a problem in the interim otherwise I will see the patient in six months time.

## 2024-02-15 ENCOUNTER — OFFICE VISIT (OUTPATIENT)
Dept: CARDIOLOGY CLINIC | Facility: CLINIC | Age: 74
End: 2024-02-15
Payer: MEDICARE

## 2024-02-15 VITALS
DIASTOLIC BLOOD PRESSURE: 80 MMHG | HEIGHT: 69 IN | OXYGEN SATURATION: 97 % | HEART RATE: 66 BPM | SYSTOLIC BLOOD PRESSURE: 130 MMHG | WEIGHT: 245.9 LBS | BODY MASS INDEX: 36.42 KG/M2

## 2024-02-15 DIAGNOSIS — E66.01 OBESITY, MORBID (HCC): ICD-10-CM

## 2024-02-15 DIAGNOSIS — I10 ESSENTIAL (PRIMARY) HYPERTENSION: Primary | ICD-10-CM

## 2024-02-15 DIAGNOSIS — N18.30 STAGE 3 CHRONIC KIDNEY DISEASE, UNSPECIFIED WHETHER STAGE 3A OR 3B CKD (HCC): ICD-10-CM

## 2024-02-15 DIAGNOSIS — Z72.0 TOBACCO ABUSE: ICD-10-CM

## 2024-02-15 DIAGNOSIS — E78.00 PURE HYPERCHOLESTEROLEMIA: ICD-10-CM

## 2024-02-15 DIAGNOSIS — Z95.5 S/P RIGHT CORONARY ARTERY (RCA) STENT PLACEMENT: ICD-10-CM

## 2024-02-15 DIAGNOSIS — I25.10 CORONARY ARTERIOSCLEROSIS: ICD-10-CM

## 2024-02-15 PROCEDURE — 99214 OFFICE O/P EST MOD 30 MIN: CPT | Performed by: INTERNAL MEDICINE

## 2024-02-15 NOTE — PATIENT INSTRUCTIONS
I will continue the patient's present medical regimen.  The patient appears well compensated.  I have asked the patient to call if there is a problem in the interim otherwise I will see the patient in six months time.  Check blood work at your convenience.

## 2024-02-23 DIAGNOSIS — Z87.81 S/P ORIF (OPEN REDUCTION INTERNAL FIXATION) FRACTURE: Primary | ICD-10-CM

## 2024-02-23 DIAGNOSIS — Z98.890 S/P ORIF (OPEN REDUCTION INTERNAL FIXATION) FRACTURE: Primary | ICD-10-CM

## 2024-02-27 ENCOUNTER — OFFICE VISIT (OUTPATIENT)
Dept: OBGYN CLINIC | Facility: CLINIC | Age: 74
End: 2024-02-27
Payer: MEDICARE

## 2024-02-27 ENCOUNTER — APPOINTMENT (OUTPATIENT)
Dept: RADIOLOGY | Facility: CLINIC | Age: 74
End: 2024-02-27
Payer: MEDICARE

## 2024-02-27 VITALS
HEART RATE: 77 BPM | WEIGHT: 244.2 LBS | DIASTOLIC BLOOD PRESSURE: 77 MMHG | BODY MASS INDEX: 36.17 KG/M2 | SYSTOLIC BLOOD PRESSURE: 132 MMHG | HEIGHT: 69 IN

## 2024-02-27 DIAGNOSIS — Z98.890 S/P ORIF (OPEN REDUCTION INTERNAL FIXATION) FRACTURE: ICD-10-CM

## 2024-02-27 DIAGNOSIS — Z87.81 S/P ORIF (OPEN REDUCTION INTERNAL FIXATION) FRACTURE: Primary | ICD-10-CM

## 2024-02-27 DIAGNOSIS — Z98.890 S/P ORIF (OPEN REDUCTION INTERNAL FIXATION) FRACTURE: Primary | ICD-10-CM

## 2024-02-27 DIAGNOSIS — S82.852D CLOSED TRIMALLEOLAR FRACTURE OF LEFT ANKLE WITH ROUTINE HEALING, SUBSEQUENT ENCOUNTER: ICD-10-CM

## 2024-02-27 DIAGNOSIS — Z87.81 S/P ORIF (OPEN REDUCTION INTERNAL FIXATION) FRACTURE: ICD-10-CM

## 2024-02-27 PROCEDURE — 73610 X-RAY EXAM OF ANKLE: CPT

## 2024-02-27 PROCEDURE — 99213 OFFICE O/P EST LOW 20 MIN: CPT | Performed by: ORTHOPAEDIC SURGERY

## 2024-02-27 NOTE — PATIENT INSTRUCTIONS
- Weight bearing as tolerated left lower extremity   - Over the counter analgesics as needed / directed   - Ice / heat as directed   - Follow up 6 months with repeat xr

## 2024-02-27 NOTE — PROGRESS NOTES
Orthopaedics Office Visit - Post-op Patient Visit    ASSESSMENT/PLAN:    Assessment:   4 months s/p ORIF left trimalleolar ankle fracture    DOS 10/29/23   No DVT PPX 2 weeks post operatively   Non-compliant with weight bearing restrictions   + tobacco use - 1.5-2 PPD smoker  Progressing well overall, no complaints  - pain free today      Plan:   - Weight bearing as tolerated left lower extremity   - Over the counter analgesics as needed / directed   - Ice / heat as directed   - Follow up 6 months with repeat xr       To Do Next Visit:  XR left ankle , likely final visit    _____________________________________________________  CHIEF COMPLAINT:  Chief Complaint   Patient presents with    Left Ankle - Follow-up         SUBJECTIVE:  Jace Varghese is a 73 y.o. male who presents  4 months s/p ORIF left trimalleolar ankle fracture    DOS 10/29/23.  Patient states that his ankle is doing well overall.  Patient states that he has no pain in the ankle patient states he has returned to all physical activities with no complaints.  Patient denies any numbness or tingling the leg.  Patient has not been taking any pain medication.  Patient does continue to smoke.  Patient offers no other complaints at this time    SOCIAL HISTORY:  Social History     Tobacco Use    Smoking status: Every Day     Current packs/day: 1.00     Average packs/day: 1 pack/day for 50.0 years (50.0 ttl pk-yrs)     Types: Cigarettes    Smokeless tobacco: Never    Tobacco comments:     down to 1/2 ppd   Substance Use Topics    Alcohol use: Never    Drug use: No       MEDICATIONS:    Current Outpatient Medications:     acetaminophen (TYLENOL) 325 mg tablet, Take 2 tablets (650 mg total) by mouth every 6 (six) hours as needed for mild pain, headaches or fever, Disp: , Rfl: 0    atorvastatin (LIPITOR) 10 mg tablet, TAKE ONE TABLET BY MOUTH EVERY DAY (Patient taking differently: Take 10 mg by mouth daily 10 mg every other day.), Disp: 90 tablet, Rfl: 3     "[employer] Aspirin Adult Low Dose 81 MG chewable tablet, CHEW 1 TABLET BY MOUTH DAILY, Disp: 90 tablet, Rfl: 11    metoprolol tartrate (LOPRESSOR) 25 mg tablet, TAKE ONE TABLET BY MOUTH EVERY TWELVE HOURS, Disp: 180 tablet, Rfl: 3    multivitamin (THERAGRAN) TABS, Take 1 tablet by mouth daily, Disp: , Rfl:     nicotine (NICODERM CQ) 14 mg/24hr TD 24 hr patch, Place 1 patch on the skin over 24 hours daily Do not start before October 31, 2023., Disp: 28 patch, Rfl: 0    aspirin 81 mg chewable tablet, Chew 1 tablet (81 mg total) every 12 (twelve) hours for 28 days (Patient not taking: Reported on 2/15/2024), Disp: 56 tablet, Rfl: 0    cyclobenzaprine (FLEXERIL) 5 mg tablet, TAKE ONE TABLET BY MOUTH DAILY AT BEDTIME (Patient not taking: Reported on 2/15/2024), Disp: 30 tablet, Rfl: 0    levothyroxine (Synthroid) 50 mcg tablet, Take 1 tablet (50 mcg total) by mouth daily, Disp: 30 tablet, Rfl: 11    levothyroxine 50 mcg tablet, Take 1 tablet (50 mcg total) by mouth daily in the early morning Do not start before October 31, 2023. (Patient not taking: Reported on 2/15/2024), Disp: 30 tablet, Rfl: 0    senna (SENOKOT) 8.6 mg, Take 1 tablet (8.6 mg total) by mouth daily for 15 days Do not start before October 31, 2023. (Patient not taking: Reported on 2/15/2024), Disp: 15 tablet, Rfl: 0    REVIEW OF SYSTEMS:  MSK: WNL  Neuro: WNL   Pertinent items are otherwise noted in HPI.  A comprehensive review of systems was otherwise negative.    _____________________________________________________  PHYSICAL EXAMINATION:  Vital signs: /77   Pulse 77   Ht 5' 9\" (1.753 m)   Wt 111 kg (244 lb 3.2 oz)   BMI 36.06 kg/m²   General: No acute distress, awake and alert  Psychiatric: Mood and affect appear appropriate  HEENT: Trachea Midline, No torticollis, no apparent facial trauma  Cardiovascular: No audible murmurs; Extremities appear perfused  Pulmonary: No audible wheezing or stridor  Skin: No open lesions; see further details (if any) " "below      MUSCULOSKELETAL EXAMINATION:  Left ankle examination:  - Patient sitting comfortably in the office in no apparent distress   - Healed incision sites noted  throughout the ankle with no erythema or ecchymosis present.  - No bony or soft tissue tenderness palpation noted at this time.   -Full range of motion of the ankle present with no pain  - NV intact    _____________________________________________________  STUDIES REVIEWED:  I personally reviewed the images and interpretation is as follows:  Left ankle XR 3 views:  Healed trimalleolar ankle fracture in acceptable position with hardware grossly intact        PROCEDURES PERFORMED:  No procedures were performed at this time.               Kd Wilson PA-C - assisting  Myke Hamilton MD                          Portions of the record may have been created with voice recognition software.  Occasional wrong word or \"sound a like\" substitutions may have occurred due to the inherent limitations of voice recognition software.  Read the chart carefully and recognize, using context, where substitutions have occurred.      "

## 2024-04-29 ENCOUNTER — TELEPHONE (OUTPATIENT)
Dept: FAMILY MEDICINE CLINIC | Facility: CLINIC | Age: 74
End: 2024-04-29

## 2024-05-29 ENCOUNTER — RA CDI HCC (OUTPATIENT)
Dept: OTHER | Facility: HOSPITAL | Age: 74
End: 2024-05-29

## 2024-05-29 PROBLEM — N18.30 HYPERTENSIVE KIDNEY DISEASE WITH CHRONIC KIDNEY DISEASE STAGE III (HCC): Status: ACTIVE | Noted: 2024-05-29

## 2024-05-29 PROBLEM — I12.9 HYPERTENSIVE KIDNEY DISEASE WITH CHRONIC KIDNEY DISEASE STAGE III (HCC): Status: ACTIVE | Noted: 2024-05-29

## 2024-06-05 ENCOUNTER — OFFICE VISIT (OUTPATIENT)
Dept: FAMILY MEDICINE CLINIC | Facility: CLINIC | Age: 74
End: 2024-06-05
Payer: MEDICARE

## 2024-06-05 VITALS
WEIGHT: 241 LBS | DIASTOLIC BLOOD PRESSURE: 70 MMHG | HEIGHT: 69 IN | SYSTOLIC BLOOD PRESSURE: 116 MMHG | BODY MASS INDEX: 35.7 KG/M2 | OXYGEN SATURATION: 99 % | TEMPERATURE: 97.6 F | HEART RATE: 63 BPM

## 2024-06-05 DIAGNOSIS — E03.9 HYPOTHYROIDISM, UNSPECIFIED TYPE: ICD-10-CM

## 2024-06-05 DIAGNOSIS — Z95.5 S/P RIGHT CORONARY ARTERY (RCA) STENT PLACEMENT: ICD-10-CM

## 2024-06-05 DIAGNOSIS — Z87.81 S/P ORIF (OPEN REDUCTION INTERNAL FIXATION) FRACTURE: ICD-10-CM

## 2024-06-05 DIAGNOSIS — N18.30 HYPERTENSIVE KIDNEY DISEASE WITH STAGE 3 CHRONIC KIDNEY DISEASE, UNSPECIFIED WHETHER STAGE 3A OR 3B CKD (HCC): ICD-10-CM

## 2024-06-05 DIAGNOSIS — F17.210 SMOKING GREATER THAN 20 PACK YEARS: ICD-10-CM

## 2024-06-05 DIAGNOSIS — Z12.5 SCREENING FOR PROSTATE CANCER: ICD-10-CM

## 2024-06-05 DIAGNOSIS — I10 ESSENTIAL (PRIMARY) HYPERTENSION: Primary | ICD-10-CM

## 2024-06-05 DIAGNOSIS — N18.30 STAGE 3 CHRONIC KIDNEY DISEASE, UNSPECIFIED WHETHER STAGE 3A OR 3B CKD (HCC): ICD-10-CM

## 2024-06-05 DIAGNOSIS — Z12.12 SCREENING FOR COLORECTAL CANCER: ICD-10-CM

## 2024-06-05 DIAGNOSIS — Z00.00 ENCOUNTER FOR ANNUAL WELLNESS VISIT (AWV) IN MEDICARE PATIENT: ICD-10-CM

## 2024-06-05 DIAGNOSIS — Z83.719 FAMILY HISTORY OF POLYPS IN THE COLON: ICD-10-CM

## 2024-06-05 DIAGNOSIS — E78.2 MIXED HYPERLIPIDEMIA: ICD-10-CM

## 2024-06-05 DIAGNOSIS — Z13.1 SCREENING FOR DIABETES MELLITUS: ICD-10-CM

## 2024-06-05 DIAGNOSIS — Z12.11 SCREENING FOR COLORECTAL CANCER: ICD-10-CM

## 2024-06-05 DIAGNOSIS — Z98.890 S/P ORIF (OPEN REDUCTION INTERNAL FIXATION) FRACTURE: ICD-10-CM

## 2024-06-05 DIAGNOSIS — R25.2 LEG CRAMPING: ICD-10-CM

## 2024-06-05 DIAGNOSIS — I12.9 HYPERTENSIVE KIDNEY DISEASE WITH STAGE 3 CHRONIC KIDNEY DISEASE, UNSPECIFIED WHETHER STAGE 3A OR 3B CKD (HCC): ICD-10-CM

## 2024-06-05 DIAGNOSIS — Z91.89 HIGH RISK FOR COLON CANCER: ICD-10-CM

## 2024-06-05 PROBLEM — I21.11 ST ELEVATION MYOCARDIAL INFARCTION INVOLVING RIGHT CORONARY ARTERY (HCC): Status: RESOLVED | Noted: 2018-12-30 | Resolved: 2024-06-05

## 2024-06-05 PROCEDURE — G0438 PPPS, INITIAL VISIT: HCPCS | Performed by: STUDENT IN AN ORGANIZED HEALTH CARE EDUCATION/TRAINING PROGRAM

## 2024-06-05 PROCEDURE — 99214 OFFICE O/P EST MOD 30 MIN: CPT | Performed by: STUDENT IN AN ORGANIZED HEALTH CARE EDUCATION/TRAINING PROGRAM

## 2024-06-05 NOTE — PATIENT INSTRUCTIONS
Medicare Preventive Visit Patient Instructions  Thank you for completing your Welcome to Medicare Visit or Medicare Annual Wellness Visit today. Your next wellness visit will be due in one year (6/6/2025).  The screening/preventive services that you may require over the next 5-10 years are detailed below. Some tests may not apply to you based off risk factors and/or age. Screening tests ordered at today's visit but not completed yet may show as past due. Also, please note that scanned in results may not display below.  Preventive Screenings:  Service Recommendations Previous Testing/Comments   Colorectal Cancer Screening  Colonoscopy    Fecal Occult Blood Test (FOBT)/Fecal Immunochemical Test (FIT)  Fecal DNA/Cologuard Test  Flexible Sigmoidoscopy Age: 45-75 years old   Colonoscopy: every 10 years (May be performed more frequently if at higher risk)  OR  FOBT/FIT: every 1 year  OR  Cologuard: every 3 years  OR  Sigmoidoscopy: every 5 years  Screening may be recommended earlier than age 45 if at higher risk for colorectal cancer. Also, an individualized decision between you and your healthcare provider will decide whether screening between the ages of 76-85 would be appropriate. Colonoscopy: Not on file  FOBT/FIT: Not on file  Cologuard: Not on file  Sigmoidoscopy: Not on file          Prostate Cancer Screening Individualized decision between patient and health care provider in men between ages of 55-69   Medicare will cover every 12 months beginning on the day after your 50th birthday PSA: 0.8 ng/mL           Hepatitis C Screening Once for adults born between 1945 and 1965  More frequently in patients at high risk for Hepatitis C Hep C Antibody: Not on file    Screening Current   Diabetes Screening 1-2 times per year if you're at risk for diabetes or have pre-diabetes Fasting glucose: 101 mg/dL (8/15/2022)  A1C: No results in last 5 years (No results in last 5 years)  Screening Current   Cholesterol Screening Once  every 5 years if you don't have a lipid disorder. May order more often based on risk factors. Lipid panel: 07/12/2022  Screening Not Indicated  History Lipid Disorder      Other Preventive Screenings Covered by Medicare:  Abdominal Aortic Aneurysm (AAA) Screening: covered once if your at risk. You're considered to be at risk if you have a family history of AAA or a male between the age of 65-75 who smoking at least 100 cigarettes in your lifetime.  Lung Cancer Screening: covers low dose CT scan once per year if you meet all of the following conditions: (1) Age 55-77; (2) No signs or symptoms of lung cancer; (3) Current smoker or have quit smoking within the last 15 years; (4) You have a tobacco smoking history of at least 20 pack years (packs per day x number of years you smoked); (5) You get a written order from a healthcare provider.  Glaucoma Screening: covered annually if you're considered high risk: (1) You have diabetes OR (2) Family history of glaucoma OR (3)  aged 50 and older OR (4)  American aged 65 and older  Osteoporosis Screening: covered every 2 years if you meet one of the following conditions: (1) Have a vertebral abnormality; (2) On glucocorticoid therapy for more than 3 months; (3) Have primary hyperparathyroidism; (4) On osteoporosis medications and need to assess response to drug therapy.  HIV Screening: covered annually if you're between the age of 15-65. Also covered annually if you are younger than 15 and older than 65 with risk factors for HIV infection. For pregnant patients, it is covered up to 3 times per pregnancy.    Immunizations:  Immunization Recommendations   Influenza Vaccine Annual influenza vaccination during flu season is recommended for all persons aged >= 6 months who do not have contraindications   Pneumococcal Vaccine   * Pneumococcal conjugate vaccine = PCV13 (Prevnar 13), PCV15 (Vaxneuvance), PCV20 (Prevnar 20)  * Pneumococcal polysaccharide vaccine  = PPSV23 (Pneumovax) Adults 19-65 yo with certain risk factors or if 65+ yo  If never received any pneumonia vaccine: recommend Prevnar 20 (PCV20)  Give PCV20 if previously received 1 dose of PCV13 or PPSV23   Hepatitis B Vaccine 3 dose series if at intermediate or high risk (ex: diabetes, end stage renal disease, liver disease)   Respiratory syncytial virus (RSV) Vaccine - COVERED BY MEDICARE PART D  * RSVPreF3 (Arexvy) CDC recommends that adults 60 years of age and older may receive a single dose of RSV vaccine using shared clinical decision-making (SCDM)   Tetanus (Td) Vaccine - COST NOT COVERED BY MEDICARE PART B Following completion of primary series, a booster dose should be given every 10 years to maintain immunity against tetanus. Td may also be given as tetanus wound prophylaxis.   Tdap Vaccine - COST NOT COVERED BY MEDICARE PART B Recommended at least once for all adults. For pregnant patients, recommended with each pregnancy.   Shingles Vaccine (Shingrix) - COST NOT COVERED BY MEDICARE PART B  2 shot series recommended in those 19 years and older who have or will have weakened immune systems or those 50 years and older     Health Maintenance Due:      Topic Date Due   • Colorectal Cancer Screening  Never done   • Lung Cancer Screening  Never done   • Hepatitis C Screening  Completed     Immunizations Due:      Topic Date Due   • Pneumococcal Vaccine: 65+ Years (1 of 2 - PCV) Never done   • COVID-19 Vaccine (4 - 2023-24 season) 09/01/2023   • Influenza Vaccine (Season Ended) 09/01/2024     Advance Directives   What are advance directives?  Advance directives are legal documents that state your wishes and plans for medical care. These plans are made ahead of time in case you lose your ability to make decisions for yourself. Advance directives can apply to any medical decision, such as the treatments you want, and if you want to donate organs.   What are the types of advance directives?  There are many types  of advance directives, and each state has rules about how to use them. You may choose a combination of any of the following:  Living will:  This is a written record of the treatment you want. You can also choose which treatments you do not want, which to limit, and which to stop at a certain time. This includes surgery, medicine, IV fluid, and tube feedings.   Durable power of  for healthcare (DPAHC):  This is a written record that states who you want to make healthcare choices for you when you are unable to make them for yourself. This person, called a proxy, is usually a family member or a friend. You may choose more than 1 proxy.  Do not resuscitate (DNR) order:  A DNR order is used in case your heart stops beating or you stop breathing. It is a request not to have certain forms of treatment, such as CPR. A DNR order may be included in other types of advance directives.  Medical directive:  This covers the care that you want if you are in a coma, near death, or unable to make decisions for yourself. You can list the treatments you want for each condition. Treatment may include pain medicine, surgery, blood transfusions, dialysis, IV or tube feedings, and a ventilator (breathing machine).  Values history:  This document has questions about your views, beliefs, and how you feel and think about life. This information can help others choose the care that you would choose.  Why are advance directives important?  An advance directive helps you control your care. Although spoken wishes may be used, it is better to have your wishes written down. Spoken wishes can be misunderstood, or not followed. Treatments may be given even if you do not want them. An advance directive may make it easier for your family to make difficult choices about your care.   Fall Prevention    Fall prevention  includes ways to make your home and other areas safer. It also includes ways you can move more carefully to prevent a fall. Health  conditions that cause changes in your blood pressure, vision, or muscle strength and coordination may increase your risk for falls. Medicines may also increase your risk for falls if they make you dizzy, weak, or sleepy.   Fall prevention tips:   Stand or sit up slowly.    Use assistive devices as directed.    Wear shoes that fit well and have soles that .    Wear a personal alarm.    Stay active.    Manage your medical conditions.    Home Safety Tips:  Add items to prevent falls in the bathroom.    Keep paths clear.    Install bright lights in your home.    Keep items you use often on shelves within reach.    Paint or place reflective tape on the edges of your stairs.    Cigarette Smoking and Your Health   Risks to your health if you smoke:  Nicotine and other chemicals found in tobacco damage every cell in your body. Even if you are a light smoker, you have an increased risk for cancer, heart disease, and lung disease. If you are pregnant or have diabetes, smoking increases your risk for complications.   Benefits to your health if you stop smoking:   You decrease respiratory symptoms such as coughing, wheezing, and shortness of breath.   You reduce your risk for cancers of the lung, mouth, throat, kidney, bladder, pancreas, stomach, and cervix. If you already have cancer, you increase the benefits of chemotherapy. You also reduce your risk for cancer returning or a second cancer from developing.   You reduce your risk for heart disease, blood clots, heart attack, and stroke.   You reduce your risk for lung infections, and diseases such as pneumonia, asthma, chronic bronchitis, and emphysema.  Your circulation improves. More oxygen can be delivered to your body. If you have diabetes, you lower your risk for complications, such as kidney, artery, and eye diseases. You also lower your risk for nerve damage. Nerve damage can lead to amputations, poor vision, and blindness.  You improve your body's ability to heal  and to fight infections.  For more information and support to stop smoking:   Smoke[employer]  Phone: 8- 398 - 598-4517  Web Address: www.[employer]  Weight Management   Why it is important to manage your weight:  Being overweight increases your risk of health conditions such as heart disease, high blood pressure, type 2 diabetes, and certain types of cancer. It can also increase your risk for osteoarthritis, sleep apnea, and other respiratory problems. Aim for a slow, steady weight loss. Even a small amount of weight loss can lower your risk of health problems.  How to lose weight safely:  A safe and healthy way to lose weight is to eat fewer calories and get regular exercise. You can lose up about 1 pound a week by decreasing the number of calories you eat by 500 calories each day.   Healthy meal plan for weight management:  A healthy meal plan includes a variety of foods, contains fewer calories, and helps you stay healthy. A healthy meal plan includes the following:  Eat whole-grain foods more often.  A healthy meal plan should contain fiber. Fiber is the part of grains, fruits, and vegetables that is not broken down by your body. Whole-grain foods are healthy and provide extra fiber in your diet. Some examples of whole-grain foods are whole-wheat breads and pastas, oatmeal, brown rice, and bulgur.  Eat a variety of vegetables every day.  Include dark, leafy greens such as spinach, kale, toby greens, and mustard greens. Eat yellow and orange vegetables such as carrots, sweet potatoes, and winter squash.   Eat a variety of fruits every day.  Choose fresh or canned fruit (canned in its own juice or light syrup) instead of juice. Fruit juice has very little or no fiber.  Eat low-fat dairy foods.  Drink fat-free (skim) milk or 1% milk. Eat fat-free yogurt and low-fat cottage cheese. Try low-fat cheeses such as mozzarella and other reduced-fat cheeses.  Choose meat and other protein foods that are low in fat.   Choose beans or other legumes such as split peas or lentils. Choose fish, skinless poultry (chicken or turkey), or lean cuts of red meat (beef or pork). Before you cook meat or poultry, cut off any visible fat.   Use less fat and oil.  Try baking foods instead of frying them. Add less fat, such as margarine, sour cream, regular salad dressing and mayonnaise to foods. Eat fewer high-fat foods. Some examples of high-fat foods include french fries, doughnuts, ice cream, and cakes.  Eat fewer sweets.  Limit foods and drinks that are high in sugar. This includes candy, cookies, regular soda, and sweetened drinks.  Exercise:  Exercise at least 30 minutes per day on most days of the week. Some examples of exercise include walking, biking, dancing, and swimming. You can also fit in more physical activity by taking the stairs instead of the elevator or parking farther away from stores. Ask your healthcare provider about the best exercise plan for you.      © Copyright [employer] 2018 Information is for End User's use only and may not be sold, redistributed or otherwise used for commercial purposes. All illustrations and images included in CareNotes® are the copyrighted property of [employer] or "[employer]"    Medicare Preventive Visit Patient Instructions  Thank you for completing your Welcome to Medicare Visit or Medicare Annual Wellness Visit today. Your next wellness visit will be due in one year (6/6/2025).  The screening/preventive services that you may require over the next 5-10 years are detailed below. Some tests may not apply to you based off risk factors and/or age. Screening tests ordered at today's visit but not completed yet may show as past due. Also, please note that scanned in results may not display below.  Preventive Screenings:  Service Recommendations Previous Testing/Comments   Colorectal Cancer Screening  Colonoscopy    Fecal Occult Blood Test (FOBT)/Fecal Immunochemical Test (FIT)  Fecal  DNA/Cologuard Test  Flexible Sigmoidoscopy Age: 45-75 years old   Colonoscopy: every 10 years (May be performed more frequently if at higher risk)  OR  FOBT/FIT: every 1 year  OR  Cologuard: every 3 years  OR  Sigmoidoscopy: every 5 years  Screening may be recommended earlier than age 45 if at higher risk for colorectal cancer. Also, an individualized decision between you and your healthcare provider will decide whether screening between the ages of 76-85 would be appropriate. Colonoscopy: Not on file  FOBT/FIT: Not on file  Cologuard: Not on file  Sigmoidoscopy: Not on file          Prostate Cancer Screening Individualized decision between patient and health care provider in men between ages of 55-69   Medicare will cover every 12 months beginning on the day after your 50th birthday PSA: 0.8 ng/mL           Hepatitis C Screening Once for adults born between 1945 and 1965  More frequently in patients at high risk for Hepatitis C Hep C Antibody: Not on file    Screening Current   Diabetes Screening 1-2 times per year if you're at risk for diabetes or have pre-diabetes Fasting glucose: 101 mg/dL (8/15/2022)  A1C: No results in last 5 years (No results in last 5 years)  Screening Current   Cholesterol Screening Once every 5 years if you don't have a lipid disorder. May order more often based on risk factors. Lipid panel: 07/12/2022  Screening Not Indicated  History Lipid Disorder      Other Preventive Screenings Covered by Medicare:  Abdominal Aortic Aneurysm (AAA) Screening: covered once if your at risk. You're considered to be at risk if you have a family history of AAA or a male between the age of 65-75 who smoking at least 100 cigarettes in your lifetime.  Lung Cancer Screening: covers low dose CT scan once per year if you meet all of the following conditions: (1) Age 55-77; (2) No signs or symptoms of lung cancer; (3) Current smoker or have quit smoking within the last 15 years; (4) You have a tobacco smoking  history of at least 20 pack years (packs per day x number of years you smoked); (5) You get a written order from a healthcare provider.  Glaucoma Screening: covered annually if you're considered high risk: (1) You have diabetes OR (2) Family history of glaucoma OR (3)  aged 50 and older OR (4)  American aged 65 and older  Osteoporosis Screening: covered every 2 years if you meet one of the following conditions: (1) Have a vertebral abnormality; (2) On glucocorticoid therapy for more than 3 months; (3) Have primary hyperparathyroidism; (4) On osteoporosis medications and need to assess response to drug therapy.  HIV Screening: covered annually if you're between the age of 15-65. Also covered annually if you are younger than 15 and older than 65 with risk factors for HIV infection. For pregnant patients, it is covered up to 3 times per pregnancy.    Immunizations:  Immunization Recommendations   Influenza Vaccine Annual influenza vaccination during flu season is recommended for all persons aged >= 6 months who do not have contraindications   Pneumococcal Vaccine   * Pneumococcal conjugate vaccine = PCV13 (Prevnar 13), PCV15 (Vaxneuvance), PCV20 (Prevnar 20)  * Pneumococcal polysaccharide vaccine = PPSV23 (Pneumovax) Adults 19-65 yo with certain risk factors or if 65+ yo  If never received any pneumonia vaccine: recommend Prevnar 20 (PCV20)  Give PCV20 if previously received 1 dose of PCV13 or PPSV23   Hepatitis B Vaccine 3 dose series if at intermediate or high risk (ex: diabetes, end stage renal disease, liver disease)   Respiratory syncytial virus (RSV) Vaccine - COVERED BY MEDICARE PART D  * RSVPreF3 (Arexvy) CDC recommends that adults 60 years of age and older may receive a single dose of RSV vaccine using shared clinical decision-making (SCDM)   Tetanus (Td) Vaccine - COST NOT COVERED BY MEDICARE PART B Following completion of primary series, a booster dose should be given every 10 years  to maintain immunity against tetanus. Td may also be given as tetanus wound prophylaxis.   Tdap Vaccine - COST NOT COVERED BY MEDICARE PART B Recommended at least once for all adults. For pregnant patients, recommended with each pregnancy.   Shingles Vaccine (Shingrix) - COST NOT COVERED BY MEDICARE PART B  2 shot series recommended in those 19 years and older who have or will have weakened immune systems or those 50 years and older     Health Maintenance Due:      Topic Date Due   • Colorectal Cancer Screening  Never done   • Lung Cancer Screening  Never done   • Hepatitis C Screening  Completed     Immunizations Due:      Topic Date Due   • Pneumococcal Vaccine: 65+ Years (1 of 2 - PCV) Never done   • COVID-19 Vaccine (4 - 2023-24 season) 09/01/2023   • Influenza Vaccine (Season Ended) 09/01/2024     Advance Directives   What are advance directives?  Advance directives are legal documents that state your wishes and plans for medical care. These plans are made ahead of time in case you lose your ability to make decisions for yourself. Advance directives can apply to any medical decision, such as the treatments you want, and if you want to donate organs.   What are the types of advance directives?  There are many types of advance directives, and each state has rules about how to use them. You may choose a combination of any of the following:  Living will:  This is a written record of the treatment you want. You can also choose which treatments you do not want, which to limit, and which to stop at a certain time. This includes surgery, medicine, IV fluid, and tube feedings.   Durable power of  for healthcare (DPAHC):  This is a written record that states who you want to make healthcare choices for you when you are unable to make them for yourself. This person, called a proxy, is usually a family member or a friend. You may choose more than 1 proxy.  Do not resuscitate (DNR) order:  A DNR order is used in case  your heart stops beating or you stop breathing. It is a request not to have certain forms of treatment, such as CPR. A DNR order may be included in other types of advance directives.  Medical directive:  This covers the care that you want if you are in a coma, near death, or unable to make decisions for yourself. You can list the treatments you want for each condition. Treatment may include pain medicine, surgery, blood transfusions, dialysis, IV or tube feedings, and a ventilator (breathing machine).  Values history:  This document has questions about your views, beliefs, and how you feel and think about life. This information can help others choose the care that you would choose.  Why are advance directives important?  An advance directive helps you control your care. Although spoken wishes may be used, it is better to have your wishes written down. Spoken wishes can be misunderstood, or not followed. Treatments may be given even if you do not want them. An advance directive may make it easier for your family to make difficult choices about your care.   Fall Prevention    Fall prevention  includes ways to make your home and other areas safer. It also includes ways you can move more carefully to prevent a fall. Health conditions that cause changes in your blood pressure, vision, or muscle strength and coordination may increase your risk for falls. Medicines may also increase your risk for falls if they make you dizzy, weak, or sleepy.   Fall prevention tips:   Stand or sit up slowly.    Use assistive devices as directed.    Wear shoes that fit well and have soles that .    Wear a personal alarm.    Stay active.    Manage your medical conditions.    Home Safety Tips:  Add items to prevent falls in the bathroom.    Keep paths clear.    Install bright lights in your home.    Keep items you use often on shelves within reach.    Paint or place reflective tape on the edges of your stairs.    Cigarette Smoking and  Your Health   Risks to your health if you smoke:  Nicotine and other chemicals found in tobacco damage every cell in your body. Even if you are a light smoker, you have an increased risk for cancer, heart disease, and lung disease. If you are pregnant or have diabetes, smoking increases your risk for complications.   Benefits to your health if you stop smoking:   You decrease respiratory symptoms such as coughing, wheezing, and shortness of breath.   You reduce your risk for cancers of the lung, mouth, throat, kidney, bladder, pancreas, stomach, and cervix. If you already have cancer, you increase the benefits of chemotherapy. You also reduce your risk for cancer returning or a second cancer from developing.   You reduce your risk for heart disease, blood clots, heart attack, and stroke.   You reduce your risk for lung infections, and diseases such as pneumonia, asthma, chronic bronchitis, and emphysema.  Your circulation improves. More oxygen can be delivered to your body. If you have diabetes, you lower your risk for complications, such as kidney, artery, and eye diseases. You also lower your risk for nerve damage. Nerve damage can lead to amputations, poor vision, and blindness.  You improve your body's ability to heal and to fight infections.  For more information and support to stop smoking:   [employer]  Phone: 9- 115 - 376-4250  Web Address: www.[employer]  Weight Management   Why it is important to manage your weight:  Being overweight increases your risk of health conditions such as heart disease, high blood pressure, type 2 diabetes, and certain types of cancer. It can also increase your risk for osteoarthritis, sleep apnea, and other respiratory problems. Aim for a slow, steady weight loss. Even a small amount of weight loss can lower your risk of health problems.  How to lose weight safely:  A safe and healthy way to lose weight is to eat fewer calories and get regular exercise. You can lose up  about 1 pound a week by decreasing the number of calories you eat by 500 calories each day.   Healthy meal plan for weight management:  A healthy meal plan includes a variety of foods, contains fewer calories, and helps you stay healthy. A healthy meal plan includes the following:  Eat whole-grain foods more often.  A healthy meal plan should contain fiber. Fiber is the part of grains, fruits, and vegetables that is not broken down by your body. Whole-grain foods are healthy and provide extra fiber in your diet. Some examples of whole-grain foods are whole-wheat breads and pastas, oatmeal, brown rice, and bulgur.  Eat a variety of vegetables every day.  Include dark, leafy greens such as spinach, kale, toby greens, and mustard greens. Eat yellow and orange vegetables such as carrots, sweet potatoes, and winter squash.   Eat a variety of fruits every day.  Choose fresh or canned fruit (canned in its own juice or light syrup) instead of juice. Fruit juice has very little or no fiber.  Eat low-fat dairy foods.  Drink fat-free (skim) milk or 1% milk. Eat fat-free yogurt and low-fat cottage cheese. Try low-fat cheeses such as mozzarella and other reduced-fat cheeses.  Choose meat and other protein foods that are low in fat.  Choose beans or other legumes such as split peas or lentils. Choose fish, skinless poultry (chicken or turkey), or lean cuts of red meat (beef or pork). Before you cook meat or poultry, cut off any visible fat.   Use less fat and oil.  Try baking foods instead of frying them. Add less fat, such as margarine, sour cream, regular salad dressing and mayonnaise to foods. Eat fewer high-fat foods. Some examples of high-fat foods include french fries, doughnuts, ice cream, and cakes.  Eat fewer sweets.  Limit foods and drinks that are high in sugar. This includes candy, cookies, regular soda, and sweetened drinks.  Exercise:  Exercise at least 30 minutes per day on most days of the week. Some examples  of exercise include walking, biking, dancing, and swimming. You can also fit in more physical activity by taking the stairs instead of the elevator or parking farther away from stores. Ask your healthcare provider about the best exercise plan for you.      © Copyright [employer] 2018 Information is for End User's use only and may not be sold, redistributed or otherwise used for commercial purposes. All illustrations and images included in CareNotes® are the copyrighted property of [employer] Inc. or [employer]

## 2024-06-05 NOTE — PROGRESS NOTES
Ambulatory Visit  Name: Jace Varghese      : 1950      MRN: [id number]  Encounter Provider: Baltazar Naidu MD  Encounter Date: 2024   Encounter department: [facility] [address]    Assessment & Plan   1. Essential (primary) hypertension  2. Hypothyroidism, unspecified type  -     TSH + Free T4; Future  3. Hypertensive kidney disease with stage 3 chronic kidney disease, unspecified whether stage 3a or 3b CKD (HCC)  4. Stage 3 chronic kidney disease, unspecified whether stage 3a or 3b CKD (HCC)  -     TSH + Free T4; Future  -     Comprehensive metabolic panel; Future  -     Vitamin D 25 hydroxy; Future  5. Mixed hyperlipidemia  6. S/P right coronary artery (RCA) stent placement  7. S/P ORIF (open reduction internal fixation) fracture  8. Leg cramping  -     VAS CAREN with exercise study; Future; Expected date: 2024  -     Vitamin D 25 hydroxy; Future  9. Screening for diabetes mellitus  -     Hemoglobin A1C; Future  10. Encounter for annual wellness visit (AWV) in Medicare patient  11. Screening for colorectal cancer  -     Ambulatory referral to Colorectal Surgery; Future  12. High risk for colon cancer  -     Ambulatory referral to Colorectal Surgery; Future  13. Family history of polyps in the colon  -     Ambulatory referral to Colorectal Surgery; Future  14. Smoking greater than 20 pack years  -     CT lung screening program; Future; Expected date: 2024  15. Screening for prostate cancer  -     PSA, Total Screen; Future       Concern for PVD, will get CAREN.    Preventive health issues were discussed with patient, and age appropriate screening tests were ordered as noted in patient's After Visit Summary. Personalized health advice and appropriate referrals for health education or preventive services given if needed, as noted in patient's After Visit Summary.    History of Present Illness     Having some issues    Broke ankle in October    Feels  like his legs are burning, excess build up of lactic acid after 50 feet. Will need to rest.  Started in February. Was a big runner in the past. Strength is the same       Patient Care Team:  Baltazar Naidu MD as PCP - General (Family Medicine)    Review of Systems   Constitutional:  Negative for chills, fatigue and fever.   HENT:  Negative for rhinorrhea and sore throat.    Eyes:  Negative for visual disturbance.   Respiratory:  Negative for cough and shortness of breath.    Cardiovascular:  Negative for chest pain and palpitations.   Gastrointestinal:  Negative for abdominal pain, constipation, diarrhea, nausea and vomiting.   Genitourinary:  Negative for difficulty urinating, dysuria and frequency.   Musculoskeletal:  Negative for arthralgias and myalgias.   Skin:  Negative for color change and rash.   Neurological:  Negative for weakness and headaches.     Medical History Reviewed by provider this encounter:  Tobacco  Allergies  Meds  Problems  Med Hx  Surg Hx  Fam Hx       Annual Wellness Visit Questionnaire   Jace is here for his Subsequent Wellness visit.     Health Risk Assessment:   Patient rates overall health as fair. Patient feels that their physical health rating is slightly worse. Patient is satisfied with their life. Eyesight was rated as slightly worse. Hearing was rated as same. Patient feels that their emotional and mental health rating is same. Patients states they are never, rarely angry. Patient states they are often unusually tired/fatigued. Pain experienced in the last 7 days has been none. Patient states that he has experienced weight loss or gain in last 6 months.     Depression Screening:   PHQ-2 Score: 0      Fall Risk Screening:   In the past year, patient has experienced: history of falling in past year    Number of falls: 1  Injured during fall?: Yes    Feels unsteady when standing or walking?: No    Worried about falling?: No      Home Safety:  Patient does not have trouble  with stairs inside or outside of their home. Patient has working smoke alarms and has working carbon monoxide detector. Home safety hazards include: none.     Nutrition:   Current diet is Regular.     Medications:   Patient is currently taking over-the-counter supplements. OTC medications include: see medication list. Patient is able to manage medications.     Activities of Daily Living (ADLs)/Instrumental Activities of Daily Living (IADLs):   Walk and transfer into and out of bed and chair?: Yes  Dress and groom yourself?: Yes    Bathe or shower yourself?: Yes    Feed yourself? Yes  Do your laundry/housekeeping?: Yes  Manage your money, pay your bills and track your expenses?: Yes  Make your own meals?: Yes    Do your own shopping?: Yes    Previous Hospitalizations:   Any hospitalizations or ED visits within the last 12 months?: Yes    How many hospitalizations have you had in the last year?: 1-2    Advance Care Planning:   Living will: No    Durable POA for healthcare: No    Advanced directive: No      PREVENTIVE SCREENINGS      Cardiovascular Screening:    General: Screening Not Indicated and History Lipid Disorder      Diabetes Screening:     General: Screening Current      Abdominal Aortic Aneurysm (AAA) Screening:    Risk factors include: age between 65-74 yo and tobacco use        Hepatitis C Screening:    General: Screening Current    Screening, Brief Intervention, and Referral to Treatment (SBIRT)    Screening  Typical number of drinks in a day: 0  Typical number of drinks in a week: 0  Interpretation: Low risk drinking behavior.    Single Item Drug Screening:  How often have you used an illegal drug (including marijuana) or a prescription medication for non-medical reasons in the past year? never    Single Item Drug Screen Score: 0  Interpretation: Negative screen for possible drug use disorder    Social Determinants of Health     Food Insecurity: No Food Insecurity (6/5/2024)    Hunger Vital Sign    •  "Worried About Running Out of Food in the Last Year: Never true    • Ran Out of Food in the Last Year: Never true   Transportation Needs: No Transportation Needs (6/5/2024)    PRAPARE - Transportation    • Lack of Transportation (Medical): No    • Lack of Transportation (Non-Medical): No   Housing Stability: Low Risk  (6/5/2024)    Housing Stability Vital Sign    • Unable to Pay for Housing in the Last Year: No    • Number of Times Moved in the Last Year: 1    • Homeless in the Last Year: No   Utilities: Not At Risk (6/5/2024)    [facility] Utilities    • Threatened with loss of utilities: No     No results found.    Objective     /70 (BP Location: Left arm, Patient Position: Sitting, Cuff Size: Large)   Pulse 63   Temp 97.6 °F (36.4 °C) (Tympanic)   Ht 5' 9\" (1.753 m)   Wt 109 kg (241 lb)   SpO2 99%   BMI 35.59 kg/m²     Physical Exam  Constitutional:       General: He is not in acute distress.     Appearance: Normal appearance. He is not ill-appearing.   HENT:      Head: Normocephalic and atraumatic.      Right Ear: Tympanic membrane, ear canal and external ear normal.      Left Ear: Tympanic membrane, ear canal and external ear normal.      Nose: Nose normal.      Mouth/Throat:      Mouth: Mucous membranes are moist.      Pharynx: Oropharynx is clear. No oropharyngeal exudate or posterior oropharyngeal erythema.   Eyes:      General: No scleral icterus.        Right eye: No discharge.         Left eye: No discharge.      Extraocular Movements: Extraocular movements intact.      Conjunctiva/sclera: Conjunctivae normal.      Pupils: Pupils are equal, round, and reactive to light.   Cardiovascular:      Rate and Rhythm: Normal rate and regular rhythm.      Pulses: Normal pulses.      Heart sounds: Normal heart sounds. No murmur heard.  Pulmonary:      Effort: Pulmonary effort is normal. No respiratory distress.      Breath sounds: Normal breath sounds.   Abdominal:      General: Bowel sounds are normal.      " Palpations: Abdomen is soft.      Tenderness: There is no abdominal tenderness.   Musculoskeletal:         General: Normal range of motion.      Cervical back: Normal range of motion and neck supple.   Lymphadenopathy:      Cervical: No cervical adenopathy.   Skin:     General: Skin is warm and dry.      Capillary Refill: Capillary refill takes less than 2 seconds.   Neurological:      General: No focal deficit present.      Mental Status: He is alert and oriented to person, place, and time. Mental status is at baseline.      Cranial Nerves: No cranial nerve deficit.   Psychiatric:         Mood and Affect: Mood normal.

## 2024-06-21 ENCOUNTER — PREP FOR PROCEDURE (OUTPATIENT)
Age: 74
End: 2024-06-21

## 2024-06-21 ENCOUNTER — TELEPHONE (OUTPATIENT)
Age: 74
End: 2024-06-21

## 2024-06-21 DIAGNOSIS — Z12.11 SPECIAL SCREENING FOR MALIGNANT NEOPLASMS, COLON: Primary | ICD-10-CM

## 2024-06-21 NOTE — TELEPHONE ENCOUNTER
06/21/24  Screened by: Maria L Robles MA    Referring Provider DR ALY    Pre- Screening:     There is no height or weight on file to calculate BMI.  Has patient been referred for a routine screening Colonoscopy? yes  Is the patient between 45-75 years old? yes      Previous Colonoscopy yes   If yes:    Date: APPROX 25 YRS AGO    Facility: [place]    Reason: SCREENING      Does the patient want to see a Gastroenterologist prior to their procedure OR are they having any GI symptoms? no    Has the patient been hospitalized or had abdominal surgery in the past 6 months? no    Does the patient use supplemental oxygen? no    Does the patient take Coumadin, Lovenox, Plavix, Elliquis, Xarelto, or other blood thinning medication? NO    Has the patient had a stroke, cardiac event, or stent placed in the past year? no      If patient is between 45yrs - 49yrs, please advise patient that we will have to confirm benefits & coverage with their insurance company for a routine screening colonoscopy.

## 2024-06-21 NOTE — TELEPHONE ENCOUNTER
Scheduled date of colonoscopy (as of today): 06/27/2024  Physician performing colonoscopy: DR ALICEA   Location of colonoscopy: [place]  Bowel prep reviewed with patient: MIRALAX/DULCOLAX  Instructions reviewed with patient by: Maria L via telephone. Procedure directions sent via [employer].  Clearances: n/a

## 2024-06-25 ENCOUNTER — HOSPITAL ENCOUNTER (OUTPATIENT)
Dept: VASCULAR ULTRASOUND | Facility: HOSPITAL | Age: 74
Discharge: HOME | End: 2024-06-25
Payer: MEDICARE

## 2024-06-25 DIAGNOSIS — R25.2 LEG CRAMPING: ICD-10-CM

## 2024-06-25 PROCEDURE — 93924 LWR XTR VASC STDY BILAT: CPT | Performed by: INTERNAL MEDICINE

## 2024-06-25 PROCEDURE — 93924 LWR XTR VASC STDY BILAT: CPT

## 2024-06-27 ENCOUNTER — HOSPITAL ENCOUNTER (OUTPATIENT)
Dept: GASTROENTEROLOGY | Facility: HOSPITAL | Age: 74
Setting detail: OUTPATIENT SURGERY
End: 2024-06-27
Attending: COLON & RECTAL SURGERY
Payer: MEDICARE

## 2024-06-27 ENCOUNTER — ANESTHESIA EVENT (OUTPATIENT)
Dept: GASTROENTEROLOGY | Facility: HOSPITAL | Age: 74
End: 2024-06-27

## 2024-06-27 ENCOUNTER — ANESTHESIA (OUTPATIENT)
Dept: GASTROENTEROLOGY | Facility: HOSPITAL | Age: 74
End: 2024-06-27

## 2024-06-27 VITALS
SYSTOLIC BLOOD PRESSURE: 112 MMHG | DIASTOLIC BLOOD PRESSURE: 74 MMHG | HEIGHT: 69 IN | RESPIRATION RATE: 20 BRPM | WEIGHT: 237.66 LBS | HEART RATE: 67 BPM | TEMPERATURE: 98.7 F | OXYGEN SATURATION: 91 % | BODY MASS INDEX: 35.2 KG/M2

## 2024-06-27 DIAGNOSIS — K63.89 MASS OF COLON: Primary | ICD-10-CM

## 2024-06-27 DIAGNOSIS — Z12.11 SPECIAL SCREENING FOR MALIGNANT NEOPLASMS, COLON: ICD-10-CM

## 2024-06-27 PROBLEM — I25.10 CAD (CORONARY ARTERY DISEASE): Status: ACTIVE | Noted: 2024-06-27

## 2024-06-27 PROCEDURE — 45380 COLONOSCOPY AND BIOPSY: CPT | Performed by: COLON & RECTAL SURGERY

## 2024-06-27 PROCEDURE — 88305 TISSUE EXAM BY PATHOLOGIST: CPT | Performed by: PATHOLOGY

## 2024-06-27 PROCEDURE — 45381 COLONOSCOPY SUBMUCOUS NJX: CPT | Performed by: COLON & RECTAL SURGERY

## 2024-06-27 PROCEDURE — 88341 IMHCHEM/IMCYTCHM EA ADD ANTB: CPT | Performed by: PATHOLOGY

## 2024-06-27 PROCEDURE — 88342 IMHCHEM/IMCYTCHM 1ST ANTB: CPT | Performed by: PATHOLOGY

## 2024-06-27 RX ORDER — LIDOCAINE HYDROCHLORIDE 10 MG/ML
INJECTION, SOLUTION EPIDURAL; INFILTRATION; INTRACAUDAL; PERINEURAL AS NEEDED
Status: DISCONTINUED | OUTPATIENT
Start: 2024-06-27 | End: 2024-06-27

## 2024-06-27 RX ORDER — PROPOFOL 10 MG/ML
INJECTION, EMULSION INTRAVENOUS AS NEEDED
Status: DISCONTINUED | OUTPATIENT
Start: 2024-06-27 | End: 2024-06-27

## 2024-06-27 RX ORDER — SODIUM CHLORIDE, SODIUM LACTATE, POTASSIUM CHLORIDE, CALCIUM CHLORIDE 600; 310; 30; 20 MG/100ML; MG/100ML; MG/100ML; MG/100ML
INJECTION, SOLUTION INTRAVENOUS CONTINUOUS PRN
Status: DISCONTINUED | OUTPATIENT
Start: 2024-06-27 | End: 2024-06-27

## 2024-06-27 RX ORDER — EPHEDRINE SULFATE 50 MG/ML
INJECTION INTRAVENOUS AS NEEDED
Status: DISCONTINUED | OUTPATIENT
Start: 2024-06-27 | End: 2024-06-27

## 2024-06-27 RX ADMIN — PROPOFOL 20 MG: 10 INJECTION, EMULSION INTRAVENOUS at 10:20

## 2024-06-27 RX ADMIN — PROPOFOL 10 MG: 10 INJECTION, EMULSION INTRAVENOUS at 10:15

## 2024-06-27 RX ADMIN — PROPOFOL 10 MG: 10 INJECTION, EMULSION INTRAVENOUS at 10:31

## 2024-06-27 RX ADMIN — SODIUM CHLORIDE, SODIUM LACTATE, POTASSIUM CHLORIDE, AND CALCIUM CHLORIDE: .6; .31; .03; .02 INJECTION, SOLUTION INTRAVENOUS at 09:17

## 2024-06-27 RX ADMIN — PROPOFOL 20 MG: 10 INJECTION, EMULSION INTRAVENOUS at 09:59

## 2024-06-27 RX ADMIN — EPHEDRINE SULFATE 5 MG: 50 INJECTION, SOLUTION INTRAVENOUS at 10:30

## 2024-06-27 RX ADMIN — LIDOCAINE HYDROCHLORIDE 50 MG: 10 INJECTION, SOLUTION EPIDURAL; INFILTRATION; INTRACAUDAL; PERINEURAL at 09:47

## 2024-06-27 RX ADMIN — PROPOFOL 10 MG: 10 INJECTION, EMULSION INTRAVENOUS at 10:16

## 2024-06-27 RX ADMIN — PROPOFOL 10 MG: 10 INJECTION, EMULSION INTRAVENOUS at 10:12

## 2024-06-27 RX ADMIN — PROPOFOL 20 MG: 10 INJECTION, EMULSION INTRAVENOUS at 10:26

## 2024-06-27 RX ADMIN — PROPOFOL 40 MG: 10 INJECTION, EMULSION INTRAVENOUS at 09:55

## 2024-06-27 RX ADMIN — PROPOFOL 10 MG: 10 INJECTION, EMULSION INTRAVENOUS at 10:23

## 2024-06-27 RX ADMIN — PROPOFOL 60 MG: 10 INJECTION, EMULSION INTRAVENOUS at 09:52

## 2024-06-27 RX ADMIN — PROPOFOL 20 MG: 10 INJECTION, EMULSION INTRAVENOUS at 10:05

## 2024-06-27 NOTE — ANESTHESIA PREPROCEDURE EVALUATION
Procedure:  COLONOSCOPY    Relevant Problems   ANESTHESIA (within normal limits)      CARDIO   (+) CAD (coronary artery disease)   (+) Essential (primary) hypertension   (+) Mixed hyperlipidemia      ENDO   (+) Hypothyroidism      /RENAL   (+) Hypertensive kidney disease with chronic kidney disease stage III (HCC)   (+) Stage 3 chronic kidney disease, unspecified whether stage 3a or 3b CKD (HCC)      PULMONARY (within normal limits)        Physical Exam    Airway    Mallampati score: II         Dental    lower dentures and upper dentures    Cardiovascular  Cardiovascular exam normal    Pulmonary  Pulmonary exam normal     Other Findings        Anesthesia Plan  ASA Score- 3     Anesthesia Type- IV sedation with anesthesia with ASA Monitors.         Additional Monitors:     Airway Plan:            Plan Factors-Exercise tolerance (METS): >4 METS.    Chart reviewed.   Existing labs reviewed. Patient summary reviewed.          There is medical exclusion for perioperative obstructive sleep apnea risk education.        Induction- intravenous.    Postoperative Plan-     Perioperative Resuscitation Plan - Level 1 - Full Code.       Informed Consent- Anesthetic plan and risks discussed with patient.  I personally reviewed this patient with the CRNA. Discussed and agreed on the Anesthesia Plan with the CRNA..

## 2024-06-27 NOTE — INTERVAL H&P NOTE
H&P reviewed. After examining the patient I find no changes in the patients condition since the H&P had been written.    Vitals:    06/27/24 0846   BP: 123/79   Pulse: 68   Resp: 16   Temp: 97.5 °F (36.4 °C)   SpO2: 97%

## 2024-06-27 NOTE — ANESTHESIA POSTPROCEDURE EVALUATION
Post-Op Assessment Note    CV Status:  Stable  Pain Score: 0    Pain management: adequate       Mental Status:  Alert and awake   Hydration Status:  Stable   PONV Controlled:  None   Airway Patency:  Patent and adequate     Post Op Vitals Reviewed: Yes    No anethesia notable event occurred.    Staff: Anesthesiologist, CRNA

## 2024-06-27 NOTE — H&P
History and Physical -  [facility]  Jace Varghese 73 y.o. male MRN: [id number]                  HPI: Jace Varghese is a 73 y.o. year old male who presents for age indicated screening colonoscopy.  Patient with positive family history colon cancer father.  Patient's last colonoscopy greater than 25 years ago      REVIEW OF SYSTEMS: Per the HPI, and otherwise unremarkable.    Historical Information   History reviewed. No pertinent past medical history.  Past Surgical History:   Procedure Laterality Date    ANKLE FUSION Left 10/10/2023    KNEE SURGERY      ORIF TIBIA & FIBULA FRACTURES Left 10/29/2023    Procedure: OPEN REDUCTION W/ INTERNAL FIXATION (ORIF) ANKLE- ORIF Left ankle;  Surgeon: Myke Hamilton MD;  Location: [facility];  Service: Orthopedics     Social History   Social History     Substance and Sexual Activity   Alcohol Use Never     Social History     Substance and Sexual Activity   Drug Use No     Social History     Tobacco Use   Smoking Status Every Day    Current packs/day: 1.00    Average packs/day: 1 pack/day for 52.4 years (52.4 ttl pk-yrs)    Types: Cigarettes    Start date: 2/8/1972   Smokeless Tobacco Never   Tobacco Comments    down to 1/2 ppd     Family History   Problem Relation Age of Onset    Heart disease Mother         84    Colon cancer Father        Meds/Allergies     Not in a hospital admission.    No Known Allergies    Objective     There were no vitals taken for this visit.      PHYSICAL EXAM    Gen: NAD  CV: RRR  CHEST: Clear  ABD: soft, NT/ND  EXT: no edema  Neuro: AAO      ASSESSMENT/PLAN:  This is a 73 y.o. year old male here for indicated screening colonoscopy    PLAN:   Procedure: Colonoscopy

## 2024-07-02 PROCEDURE — 88305 TISSUE EXAM BY PATHOLOGIST: CPT | Performed by: PATHOLOGY

## 2024-07-02 PROCEDURE — 88341 IMHCHEM/IMCYTCHM EA ADD ANTB: CPT | Performed by: PATHOLOGY

## 2024-07-02 PROCEDURE — 88342 IMHCHEM/IMCYTCHM 1ST ANTB: CPT | Performed by: PATHOLOGY

## 2024-07-09 ENCOUNTER — OFFICE VISIT (OUTPATIENT)
Age: 74
End: 2024-07-09
Payer: MEDICARE

## 2024-07-09 VITALS
BODY MASS INDEX: 35.1 KG/M2 | WEIGHT: 237 LBS | HEIGHT: 69 IN | HEART RATE: 57 BPM | OXYGEN SATURATION: 97 % | SYSTOLIC BLOOD PRESSURE: 116 MMHG | DIASTOLIC BLOOD PRESSURE: 75 MMHG

## 2024-07-09 DIAGNOSIS — I12.9 HYPERTENSIVE KIDNEY DISEASE WITH STAGE 3 CHRONIC KIDNEY DISEASE, UNSPECIFIED WHETHER STAGE 3A OR 3B CKD (HCC): ICD-10-CM

## 2024-07-09 DIAGNOSIS — C18.2 MALIGNANT NEOPLASM OF ASCENDING COLON (HCC): ICD-10-CM

## 2024-07-09 DIAGNOSIS — Z80.0 FAMILY HISTORY OF COLON CANCER IN FATHER: Primary | ICD-10-CM

## 2024-07-09 DIAGNOSIS — Z85.818 HISTORY OF CANCER TONSIL: ICD-10-CM

## 2024-07-09 DIAGNOSIS — N18.30 HYPERTENSIVE KIDNEY DISEASE WITH STAGE 3 CHRONIC KIDNEY DISEASE, UNSPECIFIED WHETHER STAGE 3A OR 3B CKD (HCC): ICD-10-CM

## 2024-07-09 DIAGNOSIS — N18.30 STAGE 3 CHRONIC KIDNEY DISEASE, UNSPECIFIED WHETHER STAGE 3A OR 3B CKD (HCC): ICD-10-CM

## 2024-07-09 DIAGNOSIS — E03.9 HYPOTHYROIDISM, UNSPECIFIED TYPE: ICD-10-CM

## 2024-07-09 DIAGNOSIS — Z72.0 TOBACCO ABUSE: ICD-10-CM

## 2024-07-09 PROCEDURE — 99215 OFFICE O/P EST HI 40 MIN: CPT | Performed by: COLON & RECTAL SURGERY

## 2024-07-09 NOTE — PROGRESS NOTES
Ambulatory Visit  Name: Jace Varghese      : 1950      MRN: [id number]  Encounter Provider: Lionel Eason MD  Encounter Date: 2024   Encounter department: [facility] [place]    Assessment & Plan   1. Family history of colon cancer in father  2. Malignant neoplasm of ascending colon (HCC)  -     CEA; Future  -     CT abdomen pelvis w contrast; Future; Expected date: 2024  -     CBC and differential; Future  -     ECG 12 lead; Future  -     UA (URINE) with reflex to Scope; Future  3. Stage 3 chronic kidney disease, unspecified whether stage 3a or 3b CKD (HCC)  4. Hypothyroidism, unspecified type  5. History of cancer tonsil  6. Tobacco abuse  7. Hypertensive kidney disease with stage 3 chronic kidney disease, unspecified whether stage 3a or 3b CKD (HCC)  8.  The patient has been referred to Dr. Miranda for colon resectional therapy.  Patient's appointment scheduled for   9..  Patient with a history of coronary artery disease status post stenting has been advised to call his cardiologist for preoperative evaluation and clearance for planned colectomy    10.  Staging workup in invasive adenocarcinoma right colon.  Patient with family history colon cancer        History of Present Illness     Jace Varghese is a 73 y.o. male who presents for review of his colonoscopic biopsy of his right colon mass which unfortunately reveals an invasive adenocarcinoma.    Review of Systems   Constitutional:  Positive for activity change and fatigue. Negative for chills and fever.   HENT:  Negative for ear pain and sore throat.    Eyes:  Negative for pain and visual disturbance.   Respiratory:  Negative for cough and shortness of breath.    Cardiovascular:  Negative for chest pain and palpitations.   Gastrointestinal:  Negative for abdominal pain and vomiting.   Genitourinary:  Negative for dysuria and hematuria.   Musculoskeletal:  Negative for arthralgias and  "back pain.   Skin:  Negative for color change and rash.   Neurological:  Negative for seizures and syncope.   All other systems reviewed and are negative.    Medical History Reviewed by provider this encounter:  Tobacco  Allergies  Meds  Problems  Med Hx  Surg Hx  Fam Hx       Objective     /75   Pulse 57   Ht 5' 9\" (1.753 m)   Wt 108 kg (237 lb)   SpO2 97%   BMI 35.00 kg/m²     Physical Exam  Vitals and nursing note reviewed.   Constitutional:       General: He is not in acute distress.     Appearance: He is well-developed. He is obese.   HENT:      Head: Normocephalic and atraumatic.   Eyes:      Conjunctiva/sclera: Conjunctivae normal.   Cardiovascular:      Rate and Rhythm: Normal rate and regular rhythm.      Heart sounds: No murmur heard.  Pulmonary:      Effort: Pulmonary effort is normal. No respiratory distress.      Breath sounds: Normal breath sounds.   Abdominal:      Palpations: Abdomen is soft.      Tenderness: There is no abdominal tenderness.   Musculoskeletal:         General: No swelling.      Cervical back: Neck supple.   Skin:     General: Skin is warm and dry.      Capillary Refill: Capillary refill takes less than 2 seconds.   Neurological:      Mental Status: He is alert.   Psychiatric:         Mood and Affect: Mood normal.       Administrative Statements   I have spent a total time of 45 minutes in caring for this patient on the day of the visit/encounter including Prognosis, Risks and benefits of tx options, Patient and family education, Risk factor reductions, Impressions, Counseling / Coordination of care, Documenting in the medical record, Obtaining or reviewing history  , and Communicating with other healthcare professionals .        "

## 2024-07-10 ENCOUNTER — APPOINTMENT (OUTPATIENT)
Dept: LAB | Facility: HOSPITAL | Age: 74
End: 2024-07-10
Payer: MEDICARE

## 2024-07-10 ENCOUNTER — HOSPITAL ENCOUNTER (OUTPATIENT)
Dept: CT IMAGING | Facility: HOSPITAL | Age: 74
Discharge: HOME | End: 2024-07-10
Payer: MEDICARE

## 2024-07-10 DIAGNOSIS — Z72.0 TOBACCO ABUSE: ICD-10-CM

## 2024-07-10 DIAGNOSIS — I10 ESSENTIAL (PRIMARY) HYPERTENSION: ICD-10-CM

## 2024-07-10 DIAGNOSIS — I25.10 CORONARY ARTERIOSCLEROSIS: ICD-10-CM

## 2024-07-10 DIAGNOSIS — E03.9 HYPOTHYROIDISM, UNSPECIFIED TYPE: ICD-10-CM

## 2024-07-10 DIAGNOSIS — Z12.5 SCREENING FOR PROSTATE CANCER: ICD-10-CM

## 2024-07-10 DIAGNOSIS — Z95.5 S/P RIGHT CORONARY ARTERY (RCA) STENT PLACEMENT: ICD-10-CM

## 2024-07-10 DIAGNOSIS — R25.2 LEG CRAMPING: ICD-10-CM

## 2024-07-10 DIAGNOSIS — F17.210 SMOKING GREATER THAN 20 PACK YEARS: ICD-10-CM

## 2024-07-10 DIAGNOSIS — Z13.1 SCREENING FOR DIABETES MELLITUS: ICD-10-CM

## 2024-07-10 DIAGNOSIS — C18.2 MALIGNANT NEOPLASM OF ASCENDING COLON (HCC): ICD-10-CM

## 2024-07-10 DIAGNOSIS — N18.30 STAGE 3 CHRONIC KIDNEY DISEASE, UNSPECIFIED WHETHER STAGE 3A OR 3B CKD (HCC): ICD-10-CM

## 2024-07-10 DIAGNOSIS — E78.00 PURE HYPERCHOLESTEROLEMIA: ICD-10-CM

## 2024-07-10 LAB
25(OH)D3 SERPL-MCNC: 36.3 NG/ML (ref 30–100)
ALBUMIN SERPL BCG-MCNC: 4.6 G/DL (ref 3.5–5)
ALP SERPL-CCNC: 63 U/L (ref 34–104)
ALT SERPL W P-5'-P-CCNC: 26 U/L (ref 7–52)
ANION GAP SERPL CALCULATED.3IONS-SCNC: 8 MMOL/L (ref 4–13)
AST SERPL W P-5'-P-CCNC: 99 U/L (ref 13–39)
BASOPHILS # BLD AUTO: 0.09 THOUSANDS/ÂΜL (ref 0–0.1)
BASOPHILS NFR BLD AUTO: 1 % (ref 0–1)
BILIRUB DIRECT SERPL-MCNC: 0.07 MG/DL (ref 0–0.2)
BILIRUB SERPL-MCNC: 0.42 MG/DL (ref 0.2–1)
BILIRUB UR QL STRIP: NEGATIVE
BUN SERPL-MCNC: 24 MG/DL (ref 5–25)
CALCIUM SERPL-MCNC: 9.4 MG/DL (ref 8.4–10.2)
CEA SERPL-MCNC: 10.8 NG/ML (ref 0–3)
CHLORIDE SERPL-SCNC: 94 MMOL/L (ref 96–108)
CHOLEST SERPL-MCNC: 156 MG/DL
CLARITY UR: CLEAR
CO2 SERPL-SCNC: 29 MMOL/L (ref 21–32)
COLOR UR: NORMAL
CREAT SERPL-MCNC: 1.53 MG/DL (ref 0.6–1.3)
EOSINOPHIL # BLD AUTO: 0.34 THOUSAND/ÂΜL (ref 0–0.61)
EOSINOPHIL NFR BLD AUTO: 5 % (ref 0–6)
ERYTHROCYTE [DISTWIDTH] IN BLOOD BY AUTOMATED COUNT: 14.4 % (ref 11.6–15.1)
EST. AVERAGE GLUCOSE BLD GHB EST-MCNC: 140 MG/DL
GFR SERPL CREATININE-BSD FRML MDRD: 44 ML/MIN/1.73SQ M
GLUCOSE P FAST SERPL-MCNC: 90 MG/DL (ref 65–99)
GLUCOSE UR STRIP-MCNC: NEGATIVE MG/DL
HBA1C MFR BLD: 6.5 %
HCT VFR BLD AUTO: 34.5 % (ref 36.5–49.3)
HDLC SERPL-MCNC: 42 MG/DL
HGB BLD-MCNC: 11.4 G/DL (ref 12–17)
HGB UR QL STRIP.AUTO: NEGATIVE
IMM GRANULOCYTES # BLD AUTO: 0.01 THOUSAND/UL (ref 0–0.2)
IMM GRANULOCYTES NFR BLD AUTO: 0 % (ref 0–2)
KETONES UR STRIP-MCNC: NEGATIVE MG/DL
LDLC SERPL CALC-MCNC: 92 MG/DL (ref 0–100)
LDLC SERPL DIRECT ASSAY-MCNC: 95 MG/DL (ref 0–100)
LEUKOCYTE ESTERASE UR QL STRIP: NEGATIVE
LYMPHOCYTES # BLD AUTO: 1.53 THOUSANDS/ÂΜL (ref 0.6–4.47)
LYMPHOCYTES NFR BLD AUTO: 22 % (ref 14–44)
MCH RBC QN AUTO: 30.4 PG (ref 26.8–34.3)
MCHC RBC AUTO-ENTMCNC: 33 G/DL (ref 31.4–37.4)
MCV RBC AUTO: 92 FL (ref 82–98)
MONOCYTES # BLD AUTO: 0.41 THOUSAND/ÂΜL (ref 0.17–1.22)
MONOCYTES NFR BLD AUTO: 6 % (ref 4–12)
NEUTROPHILS # BLD AUTO: 4.67 THOUSANDS/ÂΜL (ref 1.85–7.62)
NEUTS SEG NFR BLD AUTO: 66 % (ref 43–75)
NITRITE UR QL STRIP: NEGATIVE
NONHDLC SERPL-MCNC: 114 MG/DL
NRBC BLD AUTO-RTO: 0 /100 WBCS
PH UR STRIP.AUTO: 6 [PH]
PLATELET # BLD AUTO: 251 THOUSANDS/UL (ref 149–390)
PMV BLD AUTO: 9.2 FL (ref 8.9–12.7)
POTASSIUM SERPL-SCNC: 4.6 MMOL/L (ref 3.5–5.3)
PROT SERPL-MCNC: 7.9 G/DL (ref 6.4–8.4)
PROT UR STRIP-MCNC: NEGATIVE MG/DL
PSA SERPL-MCNC: 0.52 NG/ML (ref 0–4)
RBC # BLD AUTO: 3.75 MILLION/UL (ref 3.88–5.62)
SODIUM SERPL-SCNC: 131 MMOL/L (ref 135–147)
SP GR UR STRIP.AUTO: 1.01 (ref 1–1.03)
T4 FREE SERPL-MCNC: <0.25 NG/DL (ref 0.61–1.12)
TRIGL SERPL-MCNC: 112 MG/DL
TSH SERPL DL<=0.05 MIU/L-ACNC: 101.93 UIU/ML (ref 0.45–4.5)
UROBILINOGEN UR STRIP-ACNC: <2 MG/DL
WBC # BLD AUTO: 7.05 THOUSAND/UL (ref 4.31–10.16)

## 2024-07-10 PROCEDURE — 36415 COLL VENOUS BLD VENIPUNCTURE: CPT

## 2024-07-10 PROCEDURE — 83721 ASSAY OF BLOOD LIPOPROTEIN: CPT

## 2024-07-10 PROCEDURE — 83036 HEMOGLOBIN GLYCOSYLATED A1C: CPT

## 2024-07-10 PROCEDURE — 81003 URINALYSIS AUTO W/O SCOPE: CPT

## 2024-07-10 PROCEDURE — 80053 COMPREHEN METABOLIC PANEL: CPT

## 2024-07-10 PROCEDURE — 84439 ASSAY OF FREE THYROXINE: CPT

## 2024-07-10 PROCEDURE — 82306 VITAMIN D 25 HYDROXY: CPT

## 2024-07-10 PROCEDURE — 80061 LIPID PANEL: CPT

## 2024-07-10 PROCEDURE — 71271 CT THORAX LUNG CANCER SCR C-: CPT

## 2024-07-10 PROCEDURE — 84443 ASSAY THYROID STIM HORMONE: CPT

## 2024-07-10 PROCEDURE — 82248 BILIRUBIN DIRECT: CPT

## 2024-07-10 PROCEDURE — 85025 COMPLETE CBC W/AUTO DIFF WBC: CPT

## 2024-07-10 PROCEDURE — G0103 PSA SCREENING: HCPCS

## 2024-07-10 PROCEDURE — 82378 CARCINOEMBRYONIC ANTIGEN: CPT

## 2024-07-12 ENCOUNTER — TELEPHONE (OUTPATIENT)
Age: 74
End: 2024-07-12

## 2024-07-12 ENCOUNTER — TELEPHONE (OUTPATIENT)
Dept: FAMILY MEDICINE CLINIC | Facility: CLINIC | Age: 74
End: 2024-07-12

## 2024-07-12 DIAGNOSIS — Z72.0 TOBACCO ABUSE: ICD-10-CM

## 2024-07-12 DIAGNOSIS — E03.9 HYPOTHYROIDISM, UNSPECIFIED TYPE: Primary | ICD-10-CM

## 2024-07-12 DIAGNOSIS — R93.89 ABNORMAL CHEST CT: Primary | ICD-10-CM

## 2024-07-12 RX ORDER — LEVOTHYROXINE SODIUM 0.15 MG/1
150 TABLET ORAL DAILY
Qty: 90 TABLET | Refills: 1 | Status: SHIPPED | OUTPATIENT
Start: 2024-07-12 | End: 2025-07-12

## 2024-07-12 NOTE — TELEPHONE ENCOUNTER
----- Message from Lupe DRAKE sent at 7/11/2024  1:59 PM EDT -----  Patient notified of results. He stated that he has not taken his Levothyroxine in over a year as it wasn't renewed. Is this something you would like to start him on again? Verified [facility] as [facility]

## 2024-07-15 ENCOUNTER — OFFICE VISIT (OUTPATIENT)
Dept: LAB | Facility: HOSPITAL | Age: 74
End: 2024-07-15
Attending: COLON & RECTAL SURGERY
Payer: MEDICARE

## 2024-07-15 DIAGNOSIS — C18.2 MALIGNANT NEOPLASM OF ASCENDING COLON (HCC): ICD-10-CM

## 2024-07-15 LAB
ATRIAL RATE: 64 BPM
P AXIS: 49 DEGREES
PR INTERVAL: 170 MS
QRS AXIS: 41 DEGREES
QRSD INTERVAL: 94 MS
QT INTERVAL: 436 MS
QTC INTERVAL: 449 MS
T WAVE AXIS: 48 DEGREES
VENTRICULAR RATE: 64 BPM

## 2024-07-15 PROCEDURE — 93010 ELECTROCARDIOGRAM REPORT: CPT | Performed by: INTERNAL MEDICINE

## 2024-07-15 PROCEDURE — 93005 ELECTROCARDIOGRAM TRACING: CPT

## 2024-07-18 ENCOUNTER — CONSULT (OUTPATIENT)
Dept: SURGERY | Facility: CLINIC | Age: 74
End: 2024-07-18
Payer: MEDICARE

## 2024-07-18 VITALS
WEIGHT: 240.2 LBS | HEART RATE: 75 BPM | BODY MASS INDEX: 35.58 KG/M2 | RESPIRATION RATE: 18 BRPM | OXYGEN SATURATION: 96 % | DIASTOLIC BLOOD PRESSURE: 80 MMHG | SYSTOLIC BLOOD PRESSURE: 142 MMHG | HEIGHT: 69 IN | TEMPERATURE: 97.9 F

## 2024-07-18 DIAGNOSIS — E78.01 FAMILIAL HYPERCHOLESTEROLEMIA: ICD-10-CM

## 2024-07-18 DIAGNOSIS — K63.89 MASS OF COLON: ICD-10-CM

## 2024-07-18 DIAGNOSIS — C18.2 MALIGNANT NEOPLASM OF ASCENDING COLON (HCC): Primary | ICD-10-CM

## 2024-07-18 PROCEDURE — 99204 OFFICE O/P NEW MOD 45 MIN: CPT | Performed by: SURGERY

## 2024-07-18 RX ORDER — CHLORHEXIDINE GLUCONATE ORAL RINSE 1.2 MG/ML
15 SOLUTION DENTAL ONCE
OUTPATIENT
Start: 2024-07-18 | End: 2024-07-18

## 2024-07-18 RX ORDER — ERYTHROMYCIN 500 MG/1
TABLET, COATED ORAL
Qty: 6 TABLET | Refills: 0 | Status: SHIPPED | OUTPATIENT
Start: 2024-07-18 | End: 2024-08-17

## 2024-07-18 RX ORDER — SODIUM CHLORIDE, SODIUM LACTATE, POTASSIUM CHLORIDE, CALCIUM CHLORIDE 600; 310; 30; 20 MG/100ML; MG/100ML; MG/100ML; MG/100ML
125 INJECTION, SOLUTION INTRAVENOUS CONTINUOUS
OUTPATIENT
Start: 2024-07-18

## 2024-07-18 RX ORDER — HEPARIN SODIUM 5000 [USP'U]/ML
5000 INJECTION, SOLUTION INTRAVENOUS; SUBCUTANEOUS ONCE
OUTPATIENT
Start: 2024-07-18 | End: 2024-07-18

## 2024-07-18 RX ORDER — NEOMYCIN SULFATE 500 MG/1
TABLET ORAL
Qty: 6 TABLET | Refills: 0 | Status: SHIPPED | OUTPATIENT
Start: 2024-07-18 | End: 2024-08-19

## 2024-07-18 NOTE — PROGRESS NOTES
Consult- General Surgery   Jace Varghese 73 y.o. male MRN: [id number]  Unit/Bed#:  Encounter: [id number]    Assessment & Plan     Assessment:  Invasive adenocarcinoma of the ascending colon  History of CAD, status post stent not on anticoagulation  History of hypothyroidism  History hyperlipidemia  History of hypertension  History of tonsillar cancer, status post right radical neck  Plan:  In light of the colonoscopy and biopsy findings I advised the patient to undergo laparoscopic right hemicolectomy, possible open, possible ileostomy under general anesthesia in the near future.  The patient will require cardiac clearance and optimization clinic consultation prior to surgery.  I discussed the operative procedure, risk, benefits and alternatives, but not limited to bleeding, infection after surgery, recurrence, injury to adjacent organs, need for furher operations, loss of time from work, the patient understood and agreed to proceed with the above surgery.    History of Present Illness     HPI:  Jace Varghese is a 73 y.o. male who presents to my office accompanied by his wife for evaluation of adenocarcinoma of the ascending colon.  The patient had colonoscopy on June 21 which revealed ascending colon tumor, biopsy consistent with invasive adenocarcinoma.  The patient is a scheduled for CT scan of the abdomen and pelvis with contrast tomorrow.  CEA level was elevated.  He denies having any abdominal pain, nausea, vomiting, diarrhea, constipation, blood in the stools or mucus in stool.  The patient was referred to us for surgical valuation.    Review of Systems  The rest of the review of system total of 10 were negative except for the HPI.    Historical Information   Past Medical History:   Diagnosis Date    Coronary artery disease     Disease of thyroid gland     Hyperlipidemia     Hypertension     Myocardial infarction (HCC)      Past Surgical History:   Procedure Laterality Date    ANKLE FUSION Left  10/10/2023    CHOLECYSTECTOMY      COLONOSCOPY      CORONARY ANGIOPLASTY WITH STENT PLACEMENT      KNEE SURGERY Left     NECK SURGERY Right     ORIF TIBIA & FIBULA FRACTURES Left 10/29/2023    Procedure: OPEN REDUCTION W/ INTERNAL FIXATION (ORIF) ANKLE- ORIF Left ankle;  Surgeon: Myke Hamilton MD;  Location: [facility] OR;  Service: Orthopedics    TONSILLECTOMY      WISDOM TOOTH EXTRACTION       Social History   Social History     Substance and Sexual Activity   Alcohol Use Yes    Alcohol/week: 1.0 standard drink of alcohol    Types: 1 Standard drinks or equivalent per week     Social History     Substance and Sexual Activity   Drug Use No     Social History     Tobacco Use   Smoking Status Every Day    Current packs/day: 0.50    Average packs/day: 1 pack/day for 54.4 years (53.7 ttl pk-yrs)    Types: Cigarettes    Start date: 2/8/1970    Passive exposure: Past   Smokeless Tobacco Never   Tobacco Comments    down to 1/2 ppd     Family History: non-contributory    Meds/Allergies   all medications and allergies reviewed     Current Outpatient Medications:     aspirin 81 mg chewable tablet, Chew 1 tablet (81 mg total) every 12 (twelve) hours for 28 days, Disp: 56 tablet, Rfl: 0    atorvastatin (LIPITOR) 10 mg tablet, TAKE ONE TABLET BY MOUTH EVERY DAY, Disp: 90 tablet, Rfl: 3    levothyroxine (Synthroid) 150 mcg tablet, Take 1 tablet (150 mcg total) by mouth daily, Disp: 90 tablet, Rfl: 1    metoprolol tartrate (LOPRESSOR) 25 mg tablet, TAKE ONE TABLET BY MOUTH EVERY TWELVE HOURS, Disp: 180 tablet, Rfl: 3    multivitamin (THERAGRAN) TABS, Take 1 tablet by mouth daily, Disp: , Rfl:     nicotine (NICODERM CQ) 14 mg/24hr TD 24 hr patch, Place 1 patch on the skin over 24 hours daily Do not start before October 31, 2023. (Patient not taking: Reported on 6/5/2024), Disp: 28 patch, Rfl: 0  No Known Allergies    Objective     Current Vitals:   Blood Pressure: 142/80 (07/18/24 1147)  Pulse: 75 (07/18/24 1147)  Temperature:  "97.9 °F (36.6 °C) (07/18/24 1147)  Respirations: 18 (07/18/24 1147)  Height: 5' 9\" (175.3 cm) (07/18/24 1147)  Weight - Scale: 109 kg (240 lb 3.2 oz) (07/18/24 1147)  SpO2: 96 % (07/18/24 1147)      Invasive Devices       None                   Physical Exam  Vitals and nursing note reviewed.   Constitutional:       General: He is not in acute distress.     Appearance: He is obese.   Cardiovascular:      Rate and Rhythm: Normal rate and regular rhythm.      Heart sounds: No murmur heard.  Pulmonary:      Effort: No respiratory distress.      Breath sounds: Normal breath sounds.   Abdominal:      Comments: Abdomen is obese, soft, nondistended and nontender.  There is a right subcostal surgical scar from prior open cholecystectomy.  There is no mass palpable visceromegaly noted.   Skin:     General: Skin is warm.      Coloration: Skin is not jaundiced.      Findings: No erythema or rash.   Neurological:      Mental Status: He is alert and oriented to person, place, and time.      Cranial Nerves: No cranial nerve deficit.   Psychiatric:         Mood and Affect: Mood normal.         Behavior: Behavior normal.     Lab Results: I have personally reviewed pertinent lab results.    Imaging: I have personally reviewed pertinent reports.   and I have personally reviewed pertinent films in PACS  Procedure: CT lung screening program    Result Date: 7/12/2024  Narrative: CT CHEST LUNG CANCER SCREENING WITHOUT IV CONTRAST INDICATION: F17.210: Nicotine dependence, cigarettes, uncomplicated. COMPARISON: None. TECHNIQUE: Unenhanced CT examination of the chest was performed utilizing a low dose protocol. Multiplanar 2D reformatted images were created from the source data. Radiation dose length product (DLP) for this visit:  163 mGy-cm . This examination, like all CT scans performed in the [facility], was performed utilizing techniques to minimize radiation dose exposure, including the use of iterative reconstruction " and automated exposure control. FINDINGS: LUNGS: Mild emphysema. Right greater than left apical pleural-parenchymal scarring. There is a 5 mm right upper lobe perifissural solid nodule (series 3 image 57). Scattered small groundglass opacities in both upper lobes, predominantly in a subpleural location. Small subpleural consolidation in the right lower lobe measuring 1.7 cm (series 3 image 59). There is no tracheal or endobronchial lesion. PLEURA: Unremarkable. HEART/GREAT VESSELS: Atherosclerotic aortic and coronary artery calcification is noted. Heart is otherwise unremarkable. No thoracic aortic aneurysm. MEDIASTINUM AND JIMENEZ: Unremarkable. CHEST WALL AND LOWER NECK: Unremarkable. VISUALIZED STRUCTURES IN THE UPPER ABDOMEN: Cholecystectomy. OSSEOUS STRUCTURES: No acute fracture or destructive osseous lesion.     Impression: Scattered small groundglass opacities in both upper lobes in a predominantly subpleural location, which may be infectious/inflammatory or could represent early changes of pulmonary fibrosis. Small subpleural consolidation in the right lower lobe measuring 1.7 cm, which may also be infectious/inflammatory or atelectatic.   Lung-RADS 0, Findings suggestive of inflammatory or infectious process. Follow-up LDCT in 1 to 3 months. The study was marked in EPIC for significant notification. Workstation performed: [id number]     Procedure: Colonoscopy    Addendum Date: 7/5/2024 Addendum:    [facility] Endoscopy [address] [place] [id number] [phone] DATE OF SERVICE: 6/27/24 PHYSICIAN(S): Attending: Lionel Eason MD Fellow: No Staff Documented INDICATION: Special screening for malignant neoplasms, colon Family history colon cancer father POST-OP DIAGNOSIS: See the impression below. HISTORY: Prior colonoscopy: More than 10 years ago. BOWEL PREPARATION: Miralax/Dulcolax PREPROCEDURE: Informed consent was obtained for the procedure, including sedation. Risks including  but not limited to bleeding, infection, perforation, adverse drug reaction and aspiration were explained in detail. Also explained about less than 100% sensitivity with the exam and other alternatives. The patient was placed in the left lateral decubitus position. Procedure: Colonoscopy DETAILS OF PROCEDURE: Patient was taken to the procedure room where a time out was performed to confirm correct patient and correct procedure. The patient underwent monitored anesthesia care, which was administered by an anesthesia professional. The patient's blood pressure, ECG, ETCO2, heart rate, level of consciousness, oxygen and respirations were monitored throughout the procedure. A digital rectal exam was performed. The scope was introduced through the anus and advanced to the cecum. Retroflexion was performed in the rectum. The quality of bowel preparation was evaluated using the [place] Bowel Preparation Scale with scores of: right colon = 2, transverse colon = 2, left colon = 2. The total BBPS score was 6. Bowel prep was adequate. The patient experienced no blood loss. The procedure was not difficult. The patient tolerated the procedure well. There were no apparent adverse events. ANESTHESIA INFORMATION: ASA: III Anesthesia Type: IV Sedation with Anesthesia MEDICATIONS: No administrations occurring from 0945 to 1032 on 06/27/24 FINDINGS: The ileocecal valve, cecum, splenic flexure, descending colon, rectosigmoid and rectum appeared normal. Fungating, invasive and ulcerated mass (traversable) in the mid ascending colon, covering one half of the circumference; there was stigmata of recent hemorrhage; performed cold forceps biopsy with partial removal; tattooed 5 cm distal to the finding with Endo spot Multiple small and medium localized diverticula containing stool in the sigmoid colon; no bleeding was identified One sessile and adenomatous-appearing polyp measuring 20 mm or greater in the proximal ascending colon EVENTS:  Procedure Events Event Event Time ENDO CECUM REACHED 6/27/2024 10:15 AM ENDO SCOPE OUT TIME 6/27/2024 10:31 AM SPECIMENS: ID Type Source Tests Collected by Time Destination 1 : mid ascending Tissue Mass TISSUE EXAM Lionel Eason MD 6/27/2024 10:18 AM  EQUIPMENT: Colonoscope -[id number]-[id number] IMPRESSION: The ileocecal valve, cecum, splenic flexure, descending colon, rectosigmoid and rectum appeared normal. Fungating, invasive and ulcerated mass in the mid ascending colon, covering one half of the circumference; there was stigmata of recent hemorrhage; performed cold forceps biopsy with partial removal; tattooed 5 cm distal to the finding Localized diverticulosis containing stool in the sigmoid colon One polyp measuring 20 mm or greater in the proximal ascending colon RECOMMENDATION: Repeat colonoscopy in 1 year, due: 6/27/2025 Personal history of colon cancer    Lionel Eason MD     Result Date: 7/5/2024  Narrative: Table formatting from the original result was not included.  [facility] [address] [facility] [address] [phone] DATE OF SERVICE: 6/27/24 PHYSICIAN(S): Attending: Lionel Eason MD Fellow: No Staff Documented INDICATION: Special screening for malignant neoplasms, colon Family history colon cancer father POST-OP DIAGNOSIS: See the impression below. HISTORY: Prior colonoscopy: More than 10 years ago. BOWEL PREPARATION: Miralax/Dulcolax PREPROCEDURE: Informed consent was obtained for the procedure, including sedation. Risks including but not limited to bleeding, infection, perforation, adverse drug reaction and aspiration were explained in detail. Also explained about less than 100% sensitivity with the exam and other alternatives. The patient was placed in the left lateral decubitus position. Procedure: Colonoscopy DETAILS OF PROCEDURE: Patient was taken to the procedure room where a time out was performed to confirm correct patient and correct procedure. The  patient underwent monitored anesthesia care, which was administered by an anesthesia professional. The patient's blood pressure, ECG, ETCO2, heart rate, level of consciousness, oxygen and respirations were monitored throughout the procedure. A digital rectal exam was performed. The scope was introduced through the anus and advanced to the cecum. Retroflexion was performed in the rectum. The quality of bowel preparation was evaluated using the [place] Bowel Preparation Scale with scores of: right colon = 2, transverse colon = 2, left colon = 2. The total BBPS score was 6. Bowel prep was adequate. The patient experienced no blood loss. The procedure was not difficult. The patient tolerated the procedure well. There were no apparent adverse events. ANESTHESIA INFORMATION: ASA: III Anesthesia Type: IV Sedation with Anesthesia MEDICATIONS: No administrations occurring from 0945 to 1032 on 06/27/24 FINDINGS: The ileocecal valve, cecum, splenic flexure, descending colon, rectosigmoid and rectum appeared normal. Fungating, invasive and ulcerated mass (traversable) in the mid ascending colon, covering one half of the circumference; there was stigmata of recent hemorrhage; performed cold forceps biopsy with partial removal; tattooed 5 cm distal to the finding with Endo spot Multiple small and medium localized diverticula containing stool in the sigmoid colon; no bleeding was identified One sessile and adenomatous-appearing polyp measuring 20 mm or greater in the proximal ascending colon EVENTS: Procedure Events Event Event Time ENDO CECUM REACHED 6/27/2024 10:15 AM ENDO SCOPE OUT TIME 6/27/2024 10:31 AM SPECIMENS: ID Type Source Tests Collected by Time Destination 1 : mid ascending Tissue Mass TISSUE EXAM Lionel Eason MD 6/27/2024 10:18 AM  EQUIPMENT: Colonoscope -[id number]     Impression: The ileocecal valve, cecum, splenic flexure, descending colon, rectosigmoid and rectum appeared normal. Fungating, invasive and  ulcerated mass in the mid ascending colon, covering one half of the circumference; there was stigmata of recent hemorrhage; performed cold forceps biopsy with partial removal; tattooed 5 cm distal to the finding Localized diverticulosis containing stool in the sigmoid colon One polyp measuring 20 mm or greater in the proximal ascending colon RECOMMENDATION: Await pathology results   Lionel Eason MD     Procedure: VAS CAREN with exercise study    Result Date: 6/25/2024  Narrative:  [facility] REPORT CLINICAL: Indications:  Patient presents with bilateral leg cramping after walking less than a block x few months. Operative History: No cardiovascular surgeries Risk Factors The patient has history of smoking (current) 0.5 ppd.  FINDINGS:  Segment       Right     Left                        Pressure  Pressure  Ankle              167       207  Ant. Tibial        167       166  Post. Tibial       163       179  Metatarsal         159       207  Great Toe          125       119     CONCLUSION:  Impression RIGHT LOWER LIMB PRE EXERCISE Ankle/Brachial index: 1.14, which is in the normal range. Metatarsal pressure of 159 mmHg. Great toe pressure of 125 mmHg, within healing range. PVR/ PPG tracings are normal.  LEFT LOWER LIMB PRE EXERCISE: Ankle/Brachial index: 1.22, which is in the normal range. Metatarsal pressure of 207 mmHg. Great toe pressure of 119 mmHg, within healing range. PVR/ PPG tracings are normal.  POST EXERCISE ANKLE/BRACHIAL INDEX: After 2 minutes of Toe Raises, there is no a decrease in the CAREN bilaterally. On the right, the Post exercise CAREN's were found to be: 1min: 1.06  which is in the normal range.  On the Left, the Post exercise CAREN's were found to be: 1min: 1.17 which is in the normal range.  SIGNATURE: Electronically Signed by: HERRERA NGUYỄN MD [facility] on 2024-06-25 03:00:31 PM

## 2024-07-19 ENCOUNTER — HOSPITAL ENCOUNTER (OUTPATIENT)
Dept: CT IMAGING | Facility: CLINIC | Age: 74
Discharge: HOME | End: 2024-07-19
Payer: MEDICARE

## 2024-07-19 DIAGNOSIS — C18.2 MALIGNANT NEOPLASM OF ASCENDING COLON (HCC): ICD-10-CM

## 2024-07-19 PROCEDURE — 74177 CT ABD & PELVIS W/CONTRAST: CPT

## 2024-07-19 RX ADMIN — IOHEXOL 100 ML: 350 INJECTION, SOLUTION INTRAVENOUS at 10:18

## 2024-07-22 ENCOUNTER — TELEPHONE (OUTPATIENT)
Dept: ANESTHESIOLOGY | Facility: CLINIC | Age: 74
End: 2024-07-22

## 2024-07-22 ENCOUNTER — APPOINTMENT (OUTPATIENT)
Dept: LAB | Facility: HOSPITAL | Age: 74
End: 2024-07-22
Payer: MEDICARE

## 2024-07-22 DIAGNOSIS — Z01.818 PRE-OP EXAM: ICD-10-CM

## 2024-07-22 DIAGNOSIS — C18.2 MALIGNANT NEOPLASM OF ASCENDING COLON (HCC): ICD-10-CM

## 2024-07-22 DIAGNOSIS — Z91.89 AT HIGH RISK FOR KIDNEY INJURY: Primary | ICD-10-CM

## 2024-07-22 LAB
ABO GROUP BLD: NORMAL
BLD GP AB SCN SERPL QL: NEGATIVE
PREALB SERPL-MCNC: 19.8 MG/DL (ref 17–34)
RH BLD: POSITIVE
SPECIMEN EXPIRATION DATE: NORMAL

## 2024-07-22 PROCEDURE — 86900 BLOOD TYPING SEROLOGIC ABO: CPT

## 2024-07-22 PROCEDURE — 36415 COLL VENOUS BLD VENIPUNCTURE: CPT

## 2024-07-22 PROCEDURE — 86901 BLOOD TYPING SEROLOGIC RH(D): CPT

## 2024-07-22 PROCEDURE — 86850 RBC ANTIBODY SCREEN: CPT

## 2024-07-22 PROCEDURE — 87081 CULTURE SCREEN ONLY: CPT

## 2024-07-22 PROCEDURE — 84134 ASSAY OF PREALBUMIN: CPT

## 2024-07-23 ENCOUNTER — TELEPHONE (OUTPATIENT)
Dept: SURGERY | Facility: CLINIC | Age: 74
End: 2024-07-23

## 2024-07-23 LAB — MRSA NOSE QL CULT: NORMAL

## 2024-07-23 NOTE — TELEPHONE ENCOUNTER
Spoke with pt to see what questions he had in regards to the rep according to the nurse he spoke with. Pt stated he has no questions he has all the instructions. His question is in regards to the Ct scan done on 719/24. It mentions Infrarenal aortic aneurysm measuring 4 cm. Pt wants to know if that is something he needs to worry about or that needs to be taking care of right away?    Please advise, thank you.

## 2024-07-31 ENCOUNTER — OFFICE VISIT (OUTPATIENT)
Dept: CARDIOLOGY CLINIC | Facility: CLINIC | Age: 74
End: 2024-07-31
Payer: MEDICARE

## 2024-07-31 VITALS
SYSTOLIC BLOOD PRESSURE: 114 MMHG | HEART RATE: 68 BPM | HEIGHT: 69 IN | DIASTOLIC BLOOD PRESSURE: 68 MMHG | BODY MASS INDEX: 35.25 KG/M2 | WEIGHT: 238 LBS

## 2024-07-31 DIAGNOSIS — I25.10 CORONARY ARTERY DISEASE INVOLVING NATIVE CORONARY ARTERY OF NATIVE HEART WITHOUT ANGINA PECTORIS: ICD-10-CM

## 2024-07-31 DIAGNOSIS — I10 ESSENTIAL (PRIMARY) HYPERTENSION: Primary | ICD-10-CM

## 2024-07-31 DIAGNOSIS — E78.2 MIXED HYPERLIPIDEMIA: ICD-10-CM

## 2024-07-31 DIAGNOSIS — Z01.810 PREOP CARDIOVASCULAR EXAM: ICD-10-CM

## 2024-07-31 DIAGNOSIS — Z95.5 S/P RIGHT CORONARY ARTERY (RCA) STENT PLACEMENT: ICD-10-CM

## 2024-07-31 DIAGNOSIS — I71.43 ANEURYSM OF INFRARENAL ABDOMINAL AORTA (HCC): ICD-10-CM

## 2024-07-31 PROCEDURE — 99214 OFFICE O/P EST MOD 30 MIN: CPT | Performed by: INTERNAL MEDICINE

## 2024-07-31 NOTE — PROGRESS NOTES
Cardiology Follow Up    Jace Varghese  1950  [id number]  [facility] CARDIAC CATH LAB  [address] [facility] [address]  [phone]    1. Essential (primary) hypertension        2. Coronary artery disease involving native coronary artery of native heart without angina pectoris        3. S/P right coronary artery (RCA) stent placement        4. Mixed hyperlipidemia        5. Preop cardiovascular exam            Interval History: Cardiology evaluation for preoperative cardiac clearance, the patient was diagnosed with adenocarcinoma of the ascending colon, scheduled to have a laparoscopically colectomy, possibly conversion to open resection.  Patient has not been seen by myself previously, he does have a cardiac history, he is currently having no cardiac symptoms, chest pain or dyspnea.  No bleeding issues on chronic aspirin therapy.  Recent EKG personally reviewed revealed normal sinus rhythm old inferior infarct.  No acute changes.  Patient is lifelong smoker, he states he quit about 2 weeks ago, I congratulated for his efforts.  And is strongly encouraged him to continue absolute cessation.  Lipids recently checked total cholesterol 156, HDL 42, direct LDL of 95, slightly suboptimal on low intensity statin therapy.    Patient Active Problem List   Diagnosis    Tobacco abuse    History of cancer tonsil    S/P right coronary artery (RCA) stent placement    Mixed hyperlipidemia    Essential (primary) hypertension    Stage 3 chronic kidney disease, unspecified whether stage 3a or 3b CKD (HCC)    Family history of colon cancer in father    Obesity, morbid (HCC)    Closed trimalleolar fracture    Hypothyroidism    Closed trimalleolar fracture of left ankle with routine healing, subsequent encounter    S/P ORIF (open reduction internal fixation) fracture    Hypertensive kidney disease with chronic kidney disease stage III (HCC)    CAD  (coronary artery disease)    Preop cardiovascular exam     Past Medical History:   Diagnosis Date    Coronary artery disease     Disease of thyroid gland     Hyperlipidemia     Hypertension     Myocardial infarction (HCC)      Social History     Socioeconomic History    Marital status: /Civil Union     Spouse name: Not on file    Number of children: Not on file    Years of education: Not on file    Highest education level: Not on file   Occupational History    Not on file   Tobacco Use    Smoking status: Former     Current packs/day: 0.00     Average packs/day: 1 pack/day for 54.4 years (53.7 ttl pk-yrs)     Types: Cigarettes     Start date: 1970     Quit date: 2024     Years since quittin.0     Passive exposure: Past    Smokeless tobacco: Never    Tobacco comments:     down to 2 ppd   Vaping Use    Vaping status: Never Used   Substance and Sexual Activity    Alcohol use: Yes     Alcohol/week: 1.0 standard drink of alcohol     Types: 1 Standard drinks or equivalent per week     Comment: occasionally    Drug use: No    Sexual activity: Not on file   Other Topics Concern    Not on file   Social History Narrative    Not on file     Social Determinants of Health     Financial Resource Strain: Not on file   Food Insecurity: No Food Insecurity (2024)    Hunger Vital Sign     Worried About Running Out of Food in the Last Year: Never true     Ran Out of Food in the Last Year: Never true   Transportation Needs: No Transportation Needs (2024)    PRAPARE - Transportation     Lack of Transportation (Medical): No     Lack of Transportation (Non-Medical): No   Physical Activity: Not on file   Stress: Not on file   Social Connections: Not on file   Intimate Partner Violence: Not on file   Housing Stability: Low Risk  (2024)    Housing Stability Vital Sign     Unable to Pay for Housing in the Last Year: No     Number of Times Moved in the Last Year: 1     Homeless in the Last Year: No      Family  History   Problem Relation Age of Onset    Heart disease Mother         84    Colon cancer Father      Past Surgical History:   Procedure Laterality Date    ANKLE FUSION Left 10/10/2023    CHOLECYSTECTOMY      COLONOSCOPY      CORONARY ANGIOPLASTY WITH STENT PLACEMENT      KNEE SURGERY Left     NECK SURGERY Right     ORIF TIBIA & FIBULA FRACTURES Left 10/29/2023    Procedure: OPEN REDUCTION W/ INTERNAL FIXATION (ORIF) ANKLE- ORIF Left ankle;  Surgeon: Myke Hamilton MD;  Location: [facility];  Service: Orthopedics    TONSILLECTOMY      WISDOM TOOTH EXTRACTION         Current Outpatient Medications:     aspirin 81 mg chewable tablet, Chew 1 tablet (81 mg total) every 12 (twelve) hours for 28 days (Patient taking differently: Chew 81 mg daily), Disp: 56 tablet, Rfl: 0    atorvastatin (LIPITOR) 10 mg tablet, TAKE ONE TABLET BY MOUTH EVERY DAY (Patient taking differently: Take 10 mg by mouth every other day), Disp: 90 tablet, Rfl: 3    erythromycin base (E-MYCIN) 500 MG tablet, Take 2 tablets 1:00 p.m., 2:00 p.m. And 11:00 p.m. day before surgery, Disp: 6 tablet, Rfl: 0    levothyroxine (Synthroid) 150 mcg tablet, Take 1 tablet (150 mcg total) by mouth daily, Disp: 90 tablet, Rfl: 1    metoprolol tartrate (LOPRESSOR) 25 mg tablet, TAKE ONE TABLET BY MOUTH EVERY TWELVE HOURS, Disp: 180 tablet, Rfl: 3    neomycin (MYCIFRADIN) 500 mg tablet, Take 2 tablets at 1 p.m., 2 p.m. and 11:00 p.m. Day before surgery, Disp: 6 tablet, Rfl: 0    nicotine (NICODERM CQ) 14 mg/24hr TD 24 hr patch, Place 1 patch on the skin over 24 hours daily Do not start before October 31, 2023., Disp: 28 patch, Rfl: 0    multivitamin (THERAGRAN) TABS, Take 1 tablet by mouth daily (Patient not taking: Reported on 7/31/2024), Disp: , Rfl:   No Known Allergies    Labs:  Appointment on 07/22/2024   Component Date Value    Prealbumin 07/22/2024 19.8     MRSA Culture Only 07/22/2024 No Methicillin Resistant Staphlyococcus aureus (MRSA) isolated     ABO  Grouping 07/22/2024 A     Rh Factor 07/22/2024 Positive     Antibody Screen 07/22/2024 Negative     Specimen Expiration Date 07/22/2024 [id number]    Office Visit on 07/15/2024   Component Date Value    Ventricular Rate 07/15/2024 64     Atrial Rate 07/15/2024 64     MT Interval 07/15/2024 170     QRSD Interval 07/15/2024 94     QT Interval 07/15/2024 436     QTC Interval 07/15/2024 449     P Axis 07/15/2024 49     QRS Axis 07/15/2024 41     T Wave [place] 07/15/2024 48    Appointment on 07/10/2024   Component Date Value    LDL Direct 07/10/2024 95     Cholesterol 07/10/2024 156     Triglycerides 07/10/2024 112     HDL, Direct 07/10/2024 42     LDL Calculated 07/10/2024 92     Non-HDL-Chol (CHOL-HDL) 07/10/2024 114     Sodium 07/10/2024 131 (L)     Potassium 07/10/2024 4.6     Chloride 07/10/2024 94 (L)     CO2 07/10/2024 29     ANION GAP 07/10/2024 8     BUN 07/10/2024 24     Creatinine 07/10/2024 1.53 (H)     Glucose, Fasting 07/10/2024 90     Calcium 07/10/2024 9.4     AST 07/10/2024 99 (H)     ALT 07/10/2024 26     Alkaline Phosphatase 07/10/2024 63     Total Protein 07/10/2024 7.9     Albumin 07/10/2024 4.6     Total Bilirubin 07/10/2024 0.42     eGFR 07/10/2024 44     Hemoglobin A1C 07/10/2024 6.5 (H)     EAG 07/10/2024 140     Vit D, 25-Hydroxy 07/10/2024 36.3     PSA 07/10/2024 0.521     CEA 07/10/2024 10.8 (H)     WBC 07/10/2024 7.05     RBC 07/10/2024 3.75 (L)     Hemoglobin 07/10/2024 11.4 (L)     Hematocrit 07/10/2024 34.5 (L)     MCV 07/10/2024 92     MCH 07/10/2024 30.4     MCHC 07/10/2024 33.0     RDW 07/10/2024 14.4     MPV 07/10/2024 9.2     Platelets 07/10/2024 251     nRBC 07/10/2024 0     Segmented % 07/10/2024 66     Immature Grans % 07/10/2024 0     Lymphocytes % 07/10/2024 22     Monocytes % 07/10/2024 6     Eosinophils Relative 07/10/2024 5     Basophils Relative 07/10/2024 1     Absolute Neutrophils 07/10/2024 4.67     Absolute Immature Grans 07/10/2024 0.01     Absolute Lymphocytes 07/10/2024  1.53     Absolute Monocytes 07/10/2024 0.41     Eosinophils Absolute 07/10/2024 0.34     Basophils Absolute 07/10/2024 0.09     Color, UA 07/10/2024 Light Yellow     Clarity, UA 07/10/2024 Clear     Specific Gravity, UA 07/10/2024 1.010     pH, UA 07/10/2024 6.0     Leukocytes, UA 07/10/2024 Negative     Nitrite, UA 07/10/2024 Negative     Protein, UA 07/10/2024 Negative     Glucose, UA 07/10/2024 Negative     Ketones, UA 07/10/2024 Negative     Urobilinogen, UA 07/10/2024 <2.0     Bilirubin, UA 07/10/2024 Negative     Occult Blood, UA 07/10/2024 Negative     TSH 3RD GENERATON 07/10/2024 101.929 (H)     Free T4 07/10/2024 <0.25 (L)     Bilirubin, Direct 07/10/2024 0.07    Hospital Outpatient Visit on 06/27/2024   Component Date Value    Case Report 06/27/2024                      Value:Surgical Pathology Report                         Case: [id number]                                  Authorizing Provider:  Lionel Eason MD       Collected:           06/27/2024 1018              Ordering Location:      [facility]       Received:            06/27/2024 [address] [place] [address] Endoscopy                                                             Pathologist:           Mono Solomon MD                                                          Specimen:    Colon, mid ascending                                                                       Addendum 2 06/27/2024                      Value:MLH1 Promoter Region Methylation Assay    MLH1 PROMOTER REGION METHYLATION ASSAY: INCONCLUSIVE, SEE COMMENT    Comment:  The amount of methylation detected is at or near 10%, which is the cutoff value for defining positive and negative in this assay. Please submit another block, if feasible, for further investigation.      [facility] Specimen ID: [id number] on block A1  Evaluator:  TONJA Staton MD    Comment from [facility]: There is only one block, A1, in the current specimen available for  testing. RECOMMEND FURTHER TESTING IF ADDITIONAL TISSUE BECOMES AVAILABLE (SUCH AS IN RESECTION SPECIMEN).     INTERPRETIVE INFORMATION  Determination of the methylation status of the hMLH1 gene promoter in patients with microsatellite instable (MSI-H) colorectal, endometrial, and other carcinomas helps in discriminating sporadic from hereditary non-polyposis colon cancer (HNPCC or Lynchsyndrome). The hMLH1 gene is located at chromosome [id number] and is part of the DNA mismatch-repair system;                           epigenetic, nongermline methylation of its promoter region is one of the possible mechanisms for transcription silencing. The final result of thisaberration, MSI-H phenotype, is functionally similar to the presence of loss-of-function germline mutation in the coding region of the gene, identifiable in patients with HNPCC. Mutations of the BRAF gene (such as V600F) do not participate in carcinogenesis in HNPCC- associated MSI-H patients; however, they are frequently detected in sporadic MSI-H cancers.    The results of the assay should be interpreted in conjunction with relevant clinical, laboratory and demographic data, since a subset of HNPCC patients also demonstrates methylation of the hMLH1 promoter (“epimutation”), and low levels of methylation, as well as changes outside of the tested region, cannot be detected with the current test.    Methylation levels above 10% are reported as positive. The lower limit of detection of this assay is 5% (e.g., 5% tumor in block).        Addendum 06/27/2024                      Value:[id number]  RESULTS OF IMMUNOHISTOCHEMICAL ANALYSIS FOR MISMATCH REPAIR PROTEIN LOSS    INTERPRETATION (block A1): Loss of nuclear expression of MLH1 and PMS2, see comment.    Comment:   Testing for methylation of the MLH1 promoter and / or mutation of BRAF is indicated (the presence of a BRAF V600E mutation and / or MLH1 methylation suggests that the tumor is sporadic and  germline evaluation is probably not indicated; absence of both MLH1 methylation and of BRAF V600E mutation suggests the possibility of Garber syndrome and sequencing and / or large deletion / duplication testing of germline MLH1 may be indicated)  MLH1 promotor methylation has been requested on block A1, and the results will be issued in another addendum.      RESULTS:  Antibody Clone  Description   Results  MLH1  M1         Mismatch repair protein Loss of nuclear expression  MSH2  [id number] Mismatch repair protein Intact nuclear expression  MSH6  SP93  Mismatch repair protein Intact nuclear expression  PMS2                            A16        Mismatch repair protein Loss of nuclear expression    Note: A positive control for each antibody have been reviewed and accepted.    These tests were developed and their performance characteristics determined by [facility].  They may not be cleared or approved by the U.S. Food and Drug Administration.  The FDA determined that such clearance or approval is not necessary.  These tests are used for clinical purposes.  They should not be regarded as investigational or for research.  This laboratory has been approved by [place] [address], designated as a high-complexity laboratory and is qualified to perform these tests.       Final Diagnosis 06/27/2024                      Value:A. Colon, mid ascending, biopsy:  -Invasive adenocarcinoma.        Note 06/27/2024                      Value:Immunohistochemical stains performed with proper controls show the tumor to be partially and weakly positive for CK20 with diffuse positivity for CDX2 and negative for CK7, supporting the diagnosis and compatible with colonic origin.    Additional MMR testing by IHC will be performed and reported as an addendum.    The findings were communicated by epic secure chat message to Dr. Eason on 7/2/2024 at 0844.      Additional Information 06/27/2024                       "Value:All reported additional testing was performed with appropriately reactive controls.  These tests were developed and their performance characteristics determined by [facility] or appropriate performing facility, though some tests may be performed on tissues which have not been validated for performance characteristics (such as staining performed on alcohol exposed cell blocks and decalcified tissues).  Results should be interpreted with caution and in the context of the patients’ clinical condition. These tests may not be cleared or approved by the U.S. Food and Drug Administration, though the FDA has determined that such clearance or approval is not necessary. These tests are used for clinical purposes and they should not be regarded as investigational or for research. This laboratory has been approved by [facility], designated as a high-complexity laboratory and is qualified to perform these tests.  .      Synoptic Checklist 06/27/2024                      Value:                            COLON/RECTUM POLYP FORM - GI - All Specimens                                                                                     :    Adenocarcinoma      Gross Description 06/27/2024                      Value:A. The specimen is received in formalin, labeled with the patient's name and hospital number, and is designated \" mid ascending\".  The specimen consists of multiple tan soft tissue fragments, ranging in size from minute to 0.5 cm.  Entirely submitted. Screened cassette.    Note: The estimated total formalin fixation time based upon information provided by the submitting clinician and the standard processing schedule is under 72.0 hours.  RRavotti      Clinical Information 06/27/2024                      Value:Cold bx r/o malignancy     Imaging: CT abdomen pelvis w contrast    Result Date: 7/20/2024  Narrative: CT ABDOMEN AND PELVIS WITH IV CONTRAST INDICATION:   Malignant neoplasm of ascending colon. "  COMPARISON:  None. TECHNIQUE:  CT examination of the abdomen and pelvis was performed. Multiplanar 2D reformatted images were created from the source data. This examination, like all CT scans performed in the [facility], was performed utilizing techniques to minimize radiation dose exposure, including the use of iterative reconstruction and automated exposure control. Radiation dose length product (DLP) for this visit: IV Contrast:  iohexol (OMNIPAQUE) 350 MG/ML injection (SINGLE-DOSE) 100 mL - Enteric Contrast:  Enteric contrast was administered. FINDINGS: ABDOMEN LOWER CHEST: No clinically significant abnormality in the visualized lower chest. LIVER/BILIARY TREE: Normal size liver with rim-enhancing lesions. Tiny left hepatic lobe hypodensities suggestive of cysts. GALLBLADDER: Post cholecystectomy. SPLEEN: Unremarkable. PANCREAS: Unremarkable. ADRENAL GLANDS: Slightly nodular right adrenal gland. Normal size left adrenal gland.. KIDNEYS/URETERS: Unremarkable. No hydronephrosis. STOMACH AND BOWEL: Colonic diverticulosis without findings of acute diverticulitis. Irregular wall thickening and enhancement involving the mid ascending colon measuring approximately 4 cm in length craniocaudally best seen on image 79 series 702 correlating to the known colonic neoplasm. APPENDIX: No findings to suggest appendicitis. ABDOMINOPELVIC CAVITY: No ascites. No pneumoperitoneum. No lymphadenopathy. VESSELS: Diffuse aortoiliac atherosclerotic disease with infrarenal aortic aneurysm measuring 4 cm. PELVIS REPRODUCTIVE ORGANS: Unremarkable for patient's age. URINARY BLADDER: Unremarkable. ABDOMINAL WALL/INGUINAL REGIONS: Small fat-containing umbilical hernia. Small fat-containing inguinal hernias BONES: No acute fracture or suspicious osseous lesion. Spinal degenerative changes.     Impression: 1. Irregular wall thickening and enhancement involving the mid ascending colon measuring 4 cm in length correlating to  known colonic neoplasm. No enlarged para-aortic or pelvic lymph nodes. No evidence of hepatic metastatic disease. No enlarged pericolic  lymph nodes. 2. Colonic diverticulosis. 3. Infrarenal aortic aneurysm measures 4 cm. Electronically signed: 07/20/2024 08:40 AM Jp Weiner MD    CT lung screening program    Result Date: 7/12/2024  Narrative: CT CHEST LUNG CANCER SCREENING WITHOUT IV CONTRAST INDICATION: F17.210: Nicotine dependence, cigarettes, uncomplicated. COMPARISON: None. TECHNIQUE: Unenhanced CT examination of the chest was performed utilizing a low dose protocol. Multiplanar 2D reformatted images were created from the source data. Radiation dose length product (DLP) for this visit:  163 mGy-cm . This examination, like all CT scans performed [facility], was performed utilizing techniques to minimize radiation dose exposure, including the use of iterative reconstruction and automated exposure control. FINDINGS: LUNGS: Mild emphysema. Right greater than left apical pleural-parenchymal scarring. There is a 5 mm right upper lobe perifissural solid nodule (series 3 image 57). Scattered small groundglass opacities in both upper lobes, predominantly in a subpleural location. Small subpleural consolidation in the right lower lobe measuring 1.7 cm (series 3 image 59). There is no tracheal or endobronchial lesion. PLEURA: Unremarkable. HEART/GREAT VESSELS: Atherosclerotic aortic and coronary artery calcification is noted. Heart is otherwise unremarkable. No thoracic aortic aneurysm. MEDIASTINUM AND JIMENEZ: Unremarkable. CHEST WALL AND LOWER NECK: Unremarkable. VISUALIZED STRUCTURES IN THE UPPER ABDOMEN: Cholecystectomy. OSSEOUS STRUCTURES: No acute fracture or destructive osseous lesion.     Impression: Scattered small groundglass opacities in both upper lobes in a predominantly subpleural location, which may be infectious/inflammatory or could represent early changes of pulmonary fibrosis.  Small subpleural consolidation in the right lower lobe measuring 1.7 cm, which may also be infectious/inflammatory or atelectatic.   Lung-RADS 0, Findings suggestive of inflammatory or infectious process. Follow-up LDCT in 1 to 3 months. The study was marked in EPIC for significant notification. Workstation performed: [id number]       Review of Systems:  Review of Systems   Constitutional:  Negative for activity change and fatigue.   HENT:  Negative for nosebleeds.    Respiratory:  Negative for apnea, shortness of breath, wheezing and stridor.    Cardiovascular:  Negative for chest pain, palpitations and leg swelling.   Gastrointestinal:  Negative for abdominal pain, anal bleeding and blood in stool.   Endocrine: Negative for cold intolerance.   Genitourinary:  Negative for hematuria.   Musculoskeletal:  Negative for arthralgias, gait problem and myalgias.   Skin:  Negative for pallor and rash.   Allergic/Immunologic: Negative for immunocompromised state.   Neurological:  Negative for syncope.   Hematological:  Does not bruise/bleed easily.   Psychiatric/Behavioral:  Negative for sleep disturbance. The patient is not nervous/anxious.        Physical Exam:  Physical Exam  Vitals reviewed.   Constitutional:       General: He is not in acute distress.     Appearance: Normal appearance. He is obese. He is not ill-appearing, toxic-appearing or diaphoretic.   Eyes:      General: No scleral icterus.  Neck:      Vascular: No carotid bruit.   Cardiovascular:      Rate and Rhythm: Normal rate and regular rhythm.      Pulses: Normal pulses.      Heart sounds: Normal heart sounds. No murmur heard.     No friction rub. No gallop.   Pulmonary:      Effort: Pulmonary effort is normal. No respiratory distress.      Breath sounds: Normal breath sounds. No stridor. No wheezing, rhonchi or rales.   Musculoskeletal:      Right lower leg: No edema.      Left lower leg: No edema.   Skin:     General: Skin is warm and dry.       Capillary Refill: Capillary refill takes less than 2 seconds.      Coloration: Skin is not jaundiced or pale.      Findings: No bruising or erythema.   Neurological:      Mental Status: He is alert and oriented to person, place, and time.   Psychiatric:         Mood and Affect: Mood normal.         Discussion/Summary: Coronary artery disease, history of an inferior infarct on 12/18, status post PTCA/drug-eluting stent.  Follow-up echocardiogram 2019 revealed normal left ventricular systolic function and no valvular abnormalities.  EKG is unchanged.  Lipids slightly suboptimal on low intense statin therapy.  Favor increasing the dose.  Incidental 40 mm infrarenal abdominal aortic aneurysm on CAT scan.  Vascular evaluation has been requested as well.  Low risk nonvascular surgery, patient is cleared for surgery, okay to withhold antiplatelet therapy 1 week prior.    This note was completed in part utilizing [employer] direct voice recognition software.   Grammatical errors, random word insertion, spelling mistakes, and incomplete sentences may be an occasional consequence of the system secondary to software limitations, ambient noise and hardware issues. At the time of dictation, efforts were made to edit, clarify and /or correct errors.  Please read the chart carefully and recognize, using context, where substitutions have occurred.  If you have any questions or concerns about the context, text or information contained within the body of this dictation, please contact myself, the provider, for further clarification.

## 2024-08-01 ENCOUNTER — TELEPHONE (OUTPATIENT)
Dept: SURGERY | Facility: CLINIC | Age: 74
End: 2024-08-01

## 2024-08-01 NOTE — TELEPHONE ENCOUNTER
Pt called back and the test came back negative. He is not showing any symptoms. But needs to take the test again in 48 hours. Informed him I need to contact the OR to speak with them to verify the guidelines. I will call him back once I speak with them and Dr. Miranda.Pt voiced understanding.

## 2024-08-01 NOTE — TELEPHONE ENCOUNTER
Returned pt call and he informed me that his wife tested positive for COVID. He has been exposed to covid. Wife is showing symptoms, sore throat and fever. He will take an at home test and call me back.      Spoke with Dr. Miranda and informed him. Stated to contact the hospital once he calls back with the results to just very the guidelines.

## 2024-08-01 NOTE — TELEPHONE ENCOUNTER
Spoke with pt. Informed him for his safety it is best to cancel the surgery and reschedule for a later date. PT understood and He stated he will call next week to give update.

## 2024-08-05 ENCOUNTER — TELEPHONE (OUTPATIENT)
Dept: SURGERY | Facility: CLINIC | Age: 74
End: 2024-08-05

## 2024-08-05 NOTE — TELEPHONE ENCOUNTER
Returning pt called. He did test positive for COVID on Sunday. Symptoms right now I just runny nose and congestion no fever. Told pt that he would need to see PCP before we can schedule again the surgery. It would be from the last day he had symptoms. Pt voiced understanding.

## 2024-08-15 ENCOUNTER — CLINICAL SUPPORT (OUTPATIENT)
Dept: FAMILY MEDICINE CLINIC | Facility: CLINIC | Age: 74
End: 2024-08-15
Payer: MEDICARE

## 2024-08-15 DIAGNOSIS — U07.1 COVID-19: Primary | ICD-10-CM

## 2024-08-15 LAB
SARS-COV-2 AG UPPER RESP QL IA: NEGATIVE
VALID CONTROL: NORMAL

## 2024-08-15 PROCEDURE — 87811 SARS-COV-2 COVID19 W/OPTIC: CPT

## 2024-08-21 ENCOUNTER — ANESTHESIA EVENT (OUTPATIENT)
Dept: PERIOP | Facility: HOSPITAL | Age: 74
DRG: 330 | End: 2024-08-21
Payer: MEDICARE

## 2024-08-21 DIAGNOSIS — I12.9 HYPERTENSIVE KIDNEY DISEASE WITH STAGE 3 CHRONIC KIDNEY DISEASE, UNSPECIFIED WHETHER STAGE 3A OR 3B CKD (HCC): Primary | ICD-10-CM

## 2024-08-21 DIAGNOSIS — N18.30 HYPERTENSIVE KIDNEY DISEASE WITH STAGE 3 CHRONIC KIDNEY DISEASE, UNSPECIFIED WHETHER STAGE 3A OR 3B CKD (HCC): Primary | ICD-10-CM

## 2024-08-21 DIAGNOSIS — Z91.89 RISK FACTORS FOR OBSTRUCTIVE SLEEP APNEA: ICD-10-CM

## 2024-09-04 RX ORDER — ERYTHROMYCIN 500 MG/1
TABLET, COATED ORAL
Status: ON HOLD | COMMUNITY
End: 2024-09-23 | Stop reason: ALTCHOICE

## 2024-09-04 RX ORDER — NEOMYCIN SULFATE 500 MG/1
TABLET ORAL
Status: ON HOLD | COMMUNITY
End: 2024-09-23 | Stop reason: ALTCHOICE

## 2024-09-04 NOTE — PRE-PROCEDURE INSTRUCTIONS
Pre-Surgery Instructions:   Medication Instructions    aspirin 81 mg chewable tablet Instructions provided by MD- EBONY states to hold 7 days prior to procedure, however, pt states that Dr. Miranda said it was ok to continue- will send msg to Dr. Miranda's office to clarify    erythromycin base (E-MYCIN) 500 MG tablet Instructions provided by MD    levothyroxine (Synthroid) 150 mcg tablet Take day of surgery.    metoprolol tartrate (LOPRESSOR) 25 mg tablet Take day of surgery.    multivitamin (THERAGRAN) TABS Stop taking 7 days prior to surgery.    neomycin (MYCIFRADIN) 500 mg tablet Instructions provided by MD    nicotine (NICODERM CQ) 14 mg/24hr TD 24 hr patch Stop taking 1 day prior to surgery.   Medication instructions for day surgery reviewed. Please use only a sip of water to take your instructed medications. Avoid all over the counter vitamins, supplements and NSAIDS for one week prior to surgery per anesthesia guidelines. Tylenol is ok to take as needed.     You will receive a call one business day prior to surgery with an arrival time and hospital directions. If your surgery is scheduled on a Monday, the hospital will be calling you on the Friday prior to your surgery. If you have not heard from anyone by 8pm, please call the hospital supervisor through the hospital  at [phone]. ([place] [phone] or [place] [phone]).    Do not eat or drink anything after midnight the night before your surgery, including candy, mints, lifesavers, or chewing gum. Do not drink alcohol 24hrs before your surgery. Try not to smoke at least 24hrs before your surgery.       Follow the pre surgery showering instructions as listed in the “My Surgical Experience Booklet” or otherwise provided by your surgeon's office. Do not use a blade to shave the surgical area 1 week before surgery. It is okay to use a clean electric clippers up to 24 hours before surgery. Do not apply any lotions, creams, including makeup,  "cologne, deodorant, or perfumes after showering on the day of your surgery. Do not use dry shampoo, hair spray, hair gel, or any type of hair products.     No contact lenses, eye make-up, or artificial eyelashes. Remove nail polish, including gel polish, and any artificial, gel, or acrylic nails if possible. Remove all jewelry including rings and body piercing jewelry.     Wear causal clothing that is easy to take on and off. Consider your type of surgery.    Keep any valuables, jewelry, piercings at home. Please bring any specially ordered equipment (sling, braces) if indicated.    Arrange for a responsible person to drive you to and from the hospital on the day of your surgery. Please confirm the visitor policy for the day of your procedure when you receive your phone call with an arrival time.     Call the surgeon's office with any new illnesses, exposures, or additional questions prior to surgery.    Please reference your “My Surgical Experience Booklet” for additional information to prepare for your upcoming surgery.     Has materials and instructions for bowel prep.    See Geriatric Assessment below...  Cognitive Assessment:   CAM:   TUG <15 sec:  Falls (last 6 months): 1 fall in past year  Hand  score:  -Attempt 1:  -Attempt 2:  -Attempt 3:  Elias Total Score: 21  PHQ- 9 Depression Scale:0  Nutrition Assessment Score:14  METS: 7.24  Incentive Spirometry Level:   Health goals:  -What are your overall health goals? (quit smoking, wt. loss, rest, decrease stress)  \"To lose weight\"  -What brings you strength? (family, friends, Caodaism, health)  \"My grandchildren\"  -What activities are important to you? (exercise, reading, travel, work)  \"Fishing and computer programming\"     "

## 2024-09-23 ENCOUNTER — HOSPITAL ENCOUNTER (INPATIENT)
Facility: HOSPITAL | Age: 74
LOS: 3 days | Discharge: HOME | DRG: 330 | End: 2024-09-26
Attending: SURGERY | Admitting: SURGERY
Payer: MEDICARE

## 2024-09-23 ENCOUNTER — ANESTHESIA (OUTPATIENT)
Dept: PERIOP | Facility: HOSPITAL | Age: 74
DRG: 330 | End: 2024-09-23
Payer: MEDICARE

## 2024-09-23 DIAGNOSIS — I12.9 HYPERTENSIVE KIDNEY DISEASE WITH STAGE 3 CHRONIC KIDNEY DISEASE, UNSPECIFIED WHETHER STAGE 3A OR 3B CKD (HCC): ICD-10-CM

## 2024-09-23 DIAGNOSIS — Z72.0 TOBACCO ABUSE: ICD-10-CM

## 2024-09-23 DIAGNOSIS — C18.2 MALIGNANT NEOPLASM OF ASCENDING COLON (HCC): Primary | ICD-10-CM

## 2024-09-23 DIAGNOSIS — E03.9 HYPOTHYROIDISM, UNSPECIFIED TYPE: ICD-10-CM

## 2024-09-23 DIAGNOSIS — I10 ESSENTIAL (PRIMARY) HYPERTENSION: ICD-10-CM

## 2024-09-23 DIAGNOSIS — I10 PRIMARY HYPERTENSION: ICD-10-CM

## 2024-09-23 DIAGNOSIS — I25.10 CORONARY ARTERY DISEASE INVOLVING NATIVE CORONARY ARTERY OF NATIVE HEART WITHOUT ANGINA PECTORIS: ICD-10-CM

## 2024-09-23 DIAGNOSIS — E78.2 MIXED HYPERLIPIDEMIA: ICD-10-CM

## 2024-09-23 DIAGNOSIS — N18.30 HYPERTENSIVE KIDNEY DISEASE WITH STAGE 3 CHRONIC KIDNEY DISEASE, UNSPECIFIED WHETHER STAGE 3A OR 3B CKD (HCC): ICD-10-CM

## 2024-09-23 DIAGNOSIS — E78.01 FAMILIAL HYPERCHOLESTEROLEMIA: ICD-10-CM

## 2024-09-23 LAB
ABO GROUP BLD: NORMAL
BLD GP AB SCN SERPL QL: NEGATIVE
GLUCOSE SERPL-MCNC: 116 MG/DL (ref 65–140)
RH BLD: POSITIVE
SPECIMEN EXPIRATION DATE: NORMAL

## 2024-09-23 PROCEDURE — 0DJD4ZZ INSPECTION OF LOWER INTESTINAL TRACT, PERCUTANEOUS ENDOSCOPIC APPROACH: ICD-10-PCS | Performed by: SURGERY

## 2024-09-23 PROCEDURE — 0DNU0ZZ RELEASE OMENTUM, OPEN APPROACH: ICD-10-PCS | Performed by: SURGERY

## 2024-09-23 PROCEDURE — C1765 ADHESION BARRIER: HCPCS | Performed by: SURGERY

## 2024-09-23 PROCEDURE — NC001 PR NO CHARGE: Performed by: PHYSICIAN ASSISTANT

## 2024-09-23 PROCEDURE — 44160 REMOVAL OF COLON: CPT | Performed by: SURGERY

## 2024-09-23 PROCEDURE — 82948 REAGENT STRIP/BLOOD GLUCOSE: CPT

## 2024-09-23 PROCEDURE — 0DNW0ZZ RELEASE PERITONEUM, OPEN APPROACH: ICD-10-PCS | Performed by: SURGERY

## 2024-09-23 PROCEDURE — 44160 REMOVAL OF COLON: CPT | Performed by: PHYSICIAN ASSISTANT

## 2024-09-23 PROCEDURE — 0DTF0ZZ RESECTION OF RIGHT LARGE INTESTINE, OPEN APPROACH: ICD-10-PCS | Performed by: SURGERY

## 2024-09-23 PROCEDURE — 86901 BLOOD TYPING SEROLOGIC RH(D): CPT | Performed by: ANESTHESIOLOGY

## 2024-09-23 PROCEDURE — 86900 BLOOD TYPING SEROLOGIC ABO: CPT | Performed by: ANESTHESIOLOGY

## 2024-09-23 PROCEDURE — C9290 INJ, BUPIVACAINE LIPOSOME: HCPCS | Performed by: ANESTHESIOLOGY

## 2024-09-23 PROCEDURE — NC001 PR NO CHARGE: Performed by: SURGERY

## 2024-09-23 PROCEDURE — 88309 TISSUE EXAM BY PATHOLOGIST: CPT | Performed by: PATHOLOGY

## 2024-09-23 PROCEDURE — 86850 RBC ANTIBODY SCREEN: CPT | Performed by: ANESTHESIOLOGY

## 2024-09-23 RX ORDER — NICOTINE 21 MG/24HR
1 PATCH, TRANSDERMAL 24 HOURS TRANSDERMAL DAILY
Status: DISCONTINUED | OUTPATIENT
Start: 2024-09-23 | End: 2024-09-23

## 2024-09-23 RX ORDER — OXYCODONE HYDROCHLORIDE 10 MG/1
10 TABLET ORAL EVERY 4 HOURS PRN
Status: DISCONTINUED | OUTPATIENT
Start: 2024-09-23 | End: 2024-09-26 | Stop reason: HOSPADM

## 2024-09-23 RX ORDER — NICOTINE 21 MG/24HR
1 PATCH, TRANSDERMAL 24 HOURS TRANSDERMAL DAILY
Status: DISCONTINUED | OUTPATIENT
Start: 2024-09-23 | End: 2024-09-26 | Stop reason: HOSPADM

## 2024-09-23 RX ORDER — METRONIDAZOLE 500 MG/1
500 TABLET ORAL EVERY 8 HOURS SCHEDULED
Status: COMPLETED | OUTPATIENT
Start: 2024-09-23 | End: 2024-09-24

## 2024-09-23 RX ORDER — SODIUM CHLORIDE, SODIUM LACTATE, POTASSIUM CHLORIDE, CALCIUM CHLORIDE 600; 310; 30; 20 MG/100ML; MG/100ML; MG/100ML; MG/100ML
100 INJECTION, SOLUTION INTRAVENOUS CONTINUOUS
Status: DISCONTINUED | OUTPATIENT
Start: 2024-09-23 | End: 2024-09-26 | Stop reason: HOSPADM

## 2024-09-23 RX ORDER — METOPROLOL TARTRATE 25 MG/1
25 TABLET, FILM COATED ORAL EVERY 12 HOURS
Status: DISCONTINUED | OUTPATIENT
Start: 2024-09-23 | End: 2024-09-26 | Stop reason: HOSPADM

## 2024-09-23 RX ORDER — BUPIVACAINE HYDROCHLORIDE 2.5 MG/ML
INJECTION, SOLUTION EPIDURAL; INFILTRATION; INTRACAUDAL
Status: COMPLETED | OUTPATIENT
Start: 2024-09-23 | End: 2024-09-23

## 2024-09-23 RX ORDER — ROCURONIUM BROMIDE 10 MG/ML
INJECTION, SOLUTION INTRAVENOUS AS NEEDED
Status: DISCONTINUED | OUTPATIENT
Start: 2024-09-23 | End: 2024-09-23

## 2024-09-23 RX ORDER — LEVOTHYROXINE SODIUM 150 UG/1
150 TABLET ORAL DAILY
Status: DISCONTINUED | OUTPATIENT
Start: 2024-09-23 | End: 2024-09-23

## 2024-09-23 RX ORDER — ONDANSETRON 2 MG/ML
INJECTION INTRAMUSCULAR; INTRAVENOUS AS NEEDED
Status: DISCONTINUED | OUTPATIENT
Start: 2024-09-23 | End: 2024-09-23

## 2024-09-23 RX ORDER — PROPOFOL 10 MG/ML
INJECTION, EMULSION INTRAVENOUS AS NEEDED
Status: DISCONTINUED | OUTPATIENT
Start: 2024-09-23 | End: 2024-09-23

## 2024-09-23 RX ORDER — FENTANYL CITRATE 50 UG/ML
INJECTION, SOLUTION INTRAMUSCULAR; INTRAVENOUS AS NEEDED
Status: DISCONTINUED | OUTPATIENT
Start: 2024-09-23 | End: 2024-09-23

## 2024-09-23 RX ORDER — ONDANSETRON 2 MG/ML
4 INJECTION INTRAMUSCULAR; INTRAVENOUS EVERY 6 HOURS PRN
Status: DISCONTINUED | OUTPATIENT
Start: 2024-09-23 | End: 2024-09-26 | Stop reason: HOSPADM

## 2024-09-23 RX ORDER — BACITRACIN, NEOMYCIN, POLYMYXIN B 400; 3.5; 5 [USP'U]/G; MG/G; [USP'U]/G
1 OINTMENT TOPICAL 2 TIMES DAILY
Status: DISCONTINUED | OUTPATIENT
Start: 2024-09-23 | End: 2024-09-25

## 2024-09-23 RX ORDER — CEFAZOLIN SODIUM 1 G/50ML
1000 SOLUTION INTRAVENOUS EVERY 8 HOURS
Status: DISPENSED | OUTPATIENT
Start: 2024-09-23 | End: 2024-09-24

## 2024-09-23 RX ORDER — CHLORHEXIDINE GLUCONATE ORAL RINSE 1.2 MG/ML
15 SOLUTION DENTAL ONCE
Status: COMPLETED | OUTPATIENT
Start: 2024-09-23 | End: 2024-09-23

## 2024-09-23 RX ORDER — CEFAZOLIN SODIUM 2 G/50ML
2000 SOLUTION INTRAVENOUS ONCE
Status: COMPLETED | OUTPATIENT
Start: 2024-09-23 | End: 2024-09-24

## 2024-09-23 RX ORDER — MIDAZOLAM HYDROCHLORIDE 2 MG/2ML
INJECTION, SOLUTION INTRAMUSCULAR; INTRAVENOUS AS NEEDED
Status: DISCONTINUED | OUTPATIENT
Start: 2024-09-23 | End: 2024-09-23

## 2024-09-23 RX ORDER — HYDROMORPHONE HCL/PF 1 MG/ML
0.5 SYRINGE (ML) INJECTION
Status: DISCONTINUED | OUTPATIENT
Start: 2024-09-23 | End: 2024-09-23 | Stop reason: HOSPADM

## 2024-09-23 RX ORDER — FENTANYL CITRATE/PF 50 MCG/ML
50 SYRINGE (ML) INJECTION
Status: COMPLETED | OUTPATIENT
Start: 2024-09-23 | End: 2024-09-23

## 2024-09-23 RX ORDER — ACETAMINOPHEN 325 MG/1
650 TABLET ORAL EVERY 6 HOURS PRN
Status: DISCONTINUED | OUTPATIENT
Start: 2024-09-23 | End: 2024-09-26 | Stop reason: HOSPADM

## 2024-09-23 RX ORDER — OXYCODONE HYDROCHLORIDE 5 MG/1
5 TABLET ORAL EVERY 4 HOURS PRN
Status: DISCONTINUED | OUTPATIENT
Start: 2024-09-23 | End: 2024-09-26 | Stop reason: HOSPADM

## 2024-09-23 RX ORDER — LIDOCAINE HYDROCHLORIDE 10 MG/ML
INJECTION, SOLUTION EPIDURAL; INFILTRATION; INTRACAUDAL; PERINEURAL AS NEEDED
Status: DISCONTINUED | OUTPATIENT
Start: 2024-09-23 | End: 2024-09-23

## 2024-09-23 RX ORDER — SODIUM CHLORIDE, SODIUM LACTATE, POTASSIUM CHLORIDE, CALCIUM CHLORIDE 600; 310; 30; 20 MG/100ML; MG/100ML; MG/100ML; MG/100ML
125 INJECTION, SOLUTION INTRAVENOUS CONTINUOUS
Status: DISCONTINUED | OUTPATIENT
Start: 2024-09-23 | End: 2024-09-23

## 2024-09-23 RX ORDER — LEVOTHYROXINE SODIUM 150 UG/1
150 TABLET ORAL DAILY
Status: DISCONTINUED | OUTPATIENT
Start: 2024-09-24 | End: 2024-09-26 | Stop reason: HOSPADM

## 2024-09-23 RX ORDER — CEFAZOLIN SODIUM 2 G/50ML
2000 SOLUTION INTRAVENOUS EVERY 8 HOURS
Status: DISCONTINUED | OUTPATIENT
Start: 2024-09-23 | End: 2024-09-23

## 2024-09-23 RX ORDER — HEPARIN SODIUM 5000 [USP'U]/ML
5000 INJECTION, SOLUTION INTRAVENOUS; SUBCUTANEOUS EVERY 8 HOURS SCHEDULED
Status: DISCONTINUED | OUTPATIENT
Start: 2024-09-23 | End: 2024-09-26 | Stop reason: HOSPADM

## 2024-09-23 RX ORDER — HEPARIN SODIUM 5000 [USP'U]/ML
5000 INJECTION, SOLUTION INTRAVENOUS; SUBCUTANEOUS ONCE
Status: COMPLETED | OUTPATIENT
Start: 2024-09-23 | End: 2024-09-23

## 2024-09-23 RX ORDER — METRONIDAZOLE 500 MG/100ML
500 INJECTION, SOLUTION INTRAVENOUS ONCE
Status: COMPLETED | OUTPATIENT
Start: 2024-09-23 | End: 2024-09-23

## 2024-09-23 RX ORDER — MAGNESIUM HYDROXIDE 1200 MG/15ML
LIQUID ORAL AS NEEDED
Status: DISCONTINUED | OUTPATIENT
Start: 2024-09-23 | End: 2024-09-23 | Stop reason: HOSPADM

## 2024-09-23 RX ADMIN — METRONIDAZOLE: 500 INJECTION, SOLUTION INTRAVENOUS at 08:06

## 2024-09-23 RX ADMIN — ROCURONIUM BROMIDE 30 MG: 10 INJECTION, SOLUTION INTRAVENOUS at 09:35

## 2024-09-23 RX ADMIN — HYDROMORPHONE HYDROCHLORIDE 0.5 MG: 1 INJECTION, SOLUTION INTRAMUSCULAR; INTRAVENOUS; SUBCUTANEOUS at 11:45

## 2024-09-23 RX ADMIN — SODIUM CHLORIDE, SODIUM LACTATE, POTASSIUM CHLORIDE, AND CALCIUM CHLORIDE 125 ML/HR: .6; .31; .03; .02 INJECTION, SOLUTION INTRAVENOUS at 07:32

## 2024-09-23 RX ADMIN — SODIUM CHLORIDE, SODIUM LACTATE, POTASSIUM CHLORIDE, AND CALCIUM CHLORIDE 125 ML/HR: .6; .31; .03; .02 INJECTION, SOLUTION INTRAVENOUS at 10:15

## 2024-09-23 RX ADMIN — ROCURONIUM BROMIDE 20 MG: 10 INJECTION, SOLUTION INTRAVENOUS at 08:30

## 2024-09-23 RX ADMIN — HEPARIN SODIUM 5000 UNITS: 5000 INJECTION INTRAVENOUS; SUBCUTANEOUS at 16:56

## 2024-09-23 RX ADMIN — CEFAZOLIN SODIUM 2000 MG: 2 SOLUTION INTRAVENOUS at 07:52

## 2024-09-23 RX ADMIN — CEFAZOLIN SODIUM 1000 MG: 1 SOLUTION INTRAVENOUS at 16:56

## 2024-09-23 RX ADMIN — LIDOCAINE HYDROCHLORIDE 60 MG: 10 INJECTION, SOLUTION EPIDURAL; INFILTRATION; INTRACAUDAL; PERINEURAL at 08:00

## 2024-09-23 RX ADMIN — FENTANYL CITRATE 100 MCG: 50 INJECTION, SOLUTION INTRAMUSCULAR; INTRAVENOUS at 08:25

## 2024-09-23 RX ADMIN — METRONIDAZOLE 500 MG: 500 TABLET ORAL at 16:57

## 2024-09-23 RX ADMIN — PROPOFOL 100 MG: 10 INJECTION, EMULSION INTRAVENOUS at 08:00

## 2024-09-23 RX ADMIN — FENTANYL CITRATE 50 MCG: 50 INJECTION, SOLUTION INTRAMUSCULAR; INTRAVENOUS at 10:16

## 2024-09-23 RX ADMIN — BUPIVACAINE HYDROCHLORIDE 20 ML: 2.5 INJECTION, SOLUTION EPIDURAL; INFILTRATION; INTRACAUDAL; PERINEURAL at 10:09

## 2024-09-23 RX ADMIN — MIDAZOLAM HYDROCHLORIDE 2 MG: 1 INJECTION, SOLUTION INTRAMUSCULAR; INTRAVENOUS at 07:56

## 2024-09-23 RX ADMIN — BACITRACIN ZINC, NEOMYCIN, POLYMYXIN B 1 SMALL APPLICATION: 400; 3.5; 5 OINTMENT TOPICAL at 11:23

## 2024-09-23 RX ADMIN — FENTANYL CITRATE 50 MCG: 50 INJECTION, SOLUTION INTRAMUSCULAR; INTRAVENOUS at 08:00

## 2024-09-23 RX ADMIN — SUGAMMADEX 300 MG: 100 INJECTION, SOLUTION INTRAVENOUS at 10:01

## 2024-09-23 RX ADMIN — BUPIVACAINE 20 ML: 13.3 INJECTION, SUSPENSION, LIPOSOMAL INFILTRATION at 10:09

## 2024-09-23 RX ADMIN — OXYCODONE HYDROCHLORIDE 5 MG: 5 TABLET ORAL at 16:58

## 2024-09-23 RX ADMIN — FENTANYL CITRATE 50 MCG: 50 INJECTION INTRAMUSCULAR; INTRAVENOUS at 10:59

## 2024-09-23 RX ADMIN — FENTANYL CITRATE 50 MCG: 50 INJECTION INTRAMUSCULAR; INTRAVENOUS at 10:44

## 2024-09-23 RX ADMIN — CHLORHEXIDINE GLUCONATE 0.12% ORAL RINSE 15 ML: 1.2 LIQUID ORAL at 07:34

## 2024-09-23 RX ADMIN — NICOTINE 1 PATCH: 14 PATCH, EXTENDED RELEASE TRANSDERMAL at 16:57

## 2024-09-23 RX ADMIN — HEPARIN SODIUM 5000 UNITS: 5000 INJECTION INTRAVENOUS; SUBCUTANEOUS at 07:30

## 2024-09-23 RX ADMIN — ROCURONIUM BROMIDE 50 MG: 10 INJECTION, SOLUTION INTRAVENOUS at 08:00

## 2024-09-23 RX ADMIN — ONDANSETRON 4 MG: 2 INJECTION INTRAMUSCULAR; INTRAVENOUS at 09:44

## 2024-09-23 NOTE — H&P
History of Present Illness   Jace Varghese is a 74 y.o. male who presents to the hospital for elective laparoscopic right hemicolectomy for ascending colon cancer, the patient developed COVID with moderate symptoms, he was cleared by his medical doctor for the surgery.  He denies having any other complaints at this time.    Review of Systems    The rest of the review of system total of 10 were negative except for the HPI.    I have reviewed the patient's PMH, PSH, Social History, Family History, Meds, and Allergies  Historical Information   Past Medical History:   Diagnosis Date    Coronary artery disease     Disease of thyroid gland     Hyperlipidemia     Hypertension     Myocardial infarction (HCC)      Past Surgical History:   Procedure Laterality Date    ANKLE FUSION Left 10/10/2023    CHOLECYSTECTOMY      COLONOSCOPY      CORONARY ANGIOPLASTY WITH STENT PLACEMENT      KNEE SURGERY Left     NECK SURGERY Right     ORIF TIBIA & FIBULA FRACTURES Left 10/29/2023    Procedure: OPEN REDUCTION W/ INTERNAL FIXATION (ORIF) ANKLE- ORIF Left ankle;  Surgeon: Myke Hamilton MD;  Location: [facility];  Service: Orthopedics    TONSILLECTOMY      TOOTH EXTRACTION      WISDOM TOOTH EXTRACTION       Social History     Tobacco Use    Smoking status: Former     Current packs/day: 0.00     Average packs/day: 1 pack/day for 54.4 years (53.7 ttl pk-yrs)     Types: Cigarettes     Start date: 1970     Quit date: 2024     Years since quittin.1     Passive exposure: Past    Smokeless tobacco: Never    Tobacco comments:     down to 1/2 ppd   Vaping Use    Vaping status: Never Used   Substance and Sexual Activity    Alcohol use: Yes     Alcohol/week: 1.0 standard drink of alcohol     Types: 1 Standard drinks or equivalent per week    Drug use: No    Sexual activity: Not on file     E-Cigarette/Vaping    E-Cigarette Use Never User      E-Cigarette/Vaping Substances    Nicotine No     THC No     CBD No     Flavoring No      Other No     Unknown No      Family history non-contributory  Social History     Tobacco Use    Smoking status: Former     Current packs/day: 0.00     Average packs/day: 1 pack/day for 54.4 years (53.7 ttl pk-yrs)     Types: Cigarettes     Start date: 1970     Quit date: 2024     Years since quittin.1     Passive exposure: Past    Smokeless tobacco: Never    Tobacco comments:     down to 1/2 ppd   Vaping Use    Vaping status: Never Used   Substance and Sexual Activity    Alcohol use: Yes     Alcohol/week: 1.0 standard drink of alcohol     Types: 1 Standard drinks or equivalent per week    Drug use: No    Sexual activity: Not on file       Current Facility-Administered Medications:     ceFAZolin (ANCEF) IVPB (premix in dextrose) 2,000 mg 50 mL, Once    lactated ringers infusion, Continuous, Last Rate: 125 mL/hr (24 0732)    metroNIDAZOLE (FLAGYL) IVPB (premix) 500 mg 100 mL, Once  Prior to Admission Medications   Prescriptions Last Dose Informant Patient Reported? Taking?   aspirin 81 mg chewable tablet 2024 Self No Yes   Sig: Chew 1 tablet (81 mg total) every 12 (twelve) hours for 28 days   Patient taking differently: Chew 81 mg daily   erythromycin base (E-MYCIN) 500 MG tablet 2024  Yes Yes   Sig: Take 2 tablets 1:00 p.m., 2:00 p.m. And 11:00 p.m. day before surgery   levothyroxine (Synthroid) 150 mcg tablet 2024 Self No Yes   Sig: Take 1 tablet (150 mcg total) by mouth daily   metoprolol tartrate (LOPRESSOR) 25 mg tablet 2024 Self No Yes   Sig: TAKE ONE TABLET BY MOUTH EVERY TWELVE HOURS   multivitamin (THERAGRAN) TABS 2024 Self Yes Yes   Sig: Take 1 tablet by mouth daily   neomycin (MYCIFRADIN) 500 mg tablet 2024  Yes Yes   Sig: Take 2 tablets at 1 p.m., 2 p.m. and 11:00 p.m. Day before surgery   nicotine (NICODERM CQ) 14 mg/24hr TD 24 hr patch 2024 Self No Yes   Sig: Place 1 patch on the skin over 24 hours daily Do not start before 2023.       Facility-Administered Medications: None     Atorvastatin    Objective      Temp:  [97.4 °F (36.3 °C)] 97.4 °F (36.3 °C)  HR:  [82] 82  Resp:  [13] 13  BP: (116)/(59) 116/59  O2 Device: None (Room air)          I/O       None          Lines/Drains/Airways       Active Status       None                  Physical Exam  Vitals and nursing note reviewed.   Constitutional:       General: He is not in acute distress.  Neck:      Comments: Status post right radical neck dissection.  Cardiovascular:      Rate and Rhythm: Normal rate and regular rhythm.   Pulmonary:      Effort: No respiratory distress.      Breath sounds: Normal breath sounds.   Abdominal:      Comments: Abdomen is soft, nondistended and nontender.  There is a right subcostal incision.   Skin:     General: Skin is warm.      Coloration: Skin is not jaundiced.      Findings: No erythema or rash.   Neurological:      Mental Status: He is alert and oriented to person, place, and time.      Cranial Nerves: No cranial nerve deficit.   Psychiatric:         Mood and Affect: Mood normal.         Behavior: Behavior normal.

## 2024-09-23 NOTE — ANESTHESIA POSTPROCEDURE EVALUATION
Post-Op Assessment Note    CV Status:  Stable  Pain Score: 0    Pain management: adequate       Mental Status:  Alert   PONV Controlled:  None   Airway Patency:  Patent  Two or more mitigation strategies used for obstructive sleep apnea   Post Op Vitals Reviewed: Yes    No anethesia notable event occurred.                /75 (09/23/24 1020)    Temp (P) 97.6 °F (36.4 °C) (09/23/24 1016)    Pulse 81 (09/23/24 1020)   Resp 12 (09/23/24 1020)    SpO2 98 % (09/23/24 1020)

## 2024-09-23 NOTE — ANESTHESIA PROCEDURE NOTES
Peripheral Block    Patient location during procedure: holding area  Start time: 9/23/2024 10:09 AM  Reason for block: at surgeon's request and post-op pain management  Staffing  Performed by: CARLOS A Reynolds MD  Authorized by: CARLOS A Reynolds MD    Preanesthetic Checklist  Completed: patient identified, IV checked, site marked, risks and benefits discussed, surgical consent, monitors and equipment checked, pre-op evaluation and timeout performed  Peripheral Block  Patient position: supine  Prep: ChloraPrep  Patient monitoring: frequent blood pressure checks, continuous pulse oximetry and heart rate  Block type: TAP  Laterality: bilateral  Injection technique: single-shot  Procedures: ultrasound guided, Ultrasound guidance required for the procedure to increase accuracy and safety of medication placement and decrease risk of complications.  Ultrasound permanent image saved  bupivacaine (PF) (MARCAINE) 0.25 % injection 20 mL - Perineural   20 mL - 9/23/2024 10:09:00 AM  bupivacaine liposomal (EXPAREL) 1.3 % injection 20 mL - Perineural   20 mL - 9/23/2024 10:09:00 AM  Needle  Needle type: Stimuplex   Needle gauge: 20 G  Needle length: 4 in  Needle localization: anatomical landmarks and ultrasound guidance  Needle insertion depth: 3 cm  Assessment  Injection assessment: incremental injection, frequent aspiration, injected with ease, negative aspiration, negative for heart rate change, no paresthesia on injection, no symptoms of intraneural/intravenous injection and needle tip visualized at all times  Paresthesia pain: none  Post-procedure:  site cleaned  patient tolerated the procedure well with no immediate complications

## 2024-09-23 NOTE — PLAN OF CARE
Problem: PAIN - ADULT  Goal: Verbalizes/displays adequate comfort level or baseline comfort level  Description: Interventions:  - Encourage patient to monitor pain and request assistance  - Assess pain using appropriate pain scale  - Administer analgesics based on type and severity of pain and evaluate response  - Implement non-pharmacological measures as appropriate and evaluate response  - Consider cultural and social influences on pain and pain management  - Notify physician/advanced practitioner if interventions unsuccessful or patient reports new pain  Outcome: Progressing     Problem: SAFETY ADULT  Goal: Patient will remain free of falls  Description: INTERVENTIONS:  - Educate patient/family on patient safety including physical limitations  - Instruct patient to call for assistance with activity   - Consult OT/PT to assist with strengthening/mobility   - Keep Call bell within reach  - Keep bed low and locked with side rails adjusted as appropriate  - Keep care items and personal belongings within reach  - Initiate and maintain comfort rounds  - Make Fall Risk Sign visible to staff  - Offer Toileting every 2 Hours, in advance of need  - Initiate/Maintain bed alarm  - Obtain necessary fall risk management equipment  - Apply yellow socks and bracelet for high fall risk patients  - Consider moving patient to room near nurses station  Outcome: Progressing     Problem: GASTROINTESTINAL - ADULT  Goal: Minimal or absence of nausea and/or vomiting  Description: INTERVENTIONS:  - Administer IV fluids if ordered to ensure adequate hydration  - Maintain NPO status until nausea and vomiting are resolved  - Nasogastric tube if ordered  - Administer ordered antiemetic medications as needed  - Provide nonpharmacologic comfort measures as appropriate  - Advance diet as tolerated, if ordered  - Consider nutrition services referral to assist patient with adequate nutrition and appropriate food choices  Outcome: Progressing      Problem: GASTROINTESTINAL - ADULT  Goal: Maintains or returns to baseline bowel function  Description: INTERVENTIONS:  - Assess bowel function  - Encourage oral fluids to ensure adequate hydration  - Administer IV fluids if ordered to ensure adequate hydration  - Administer ordered medications as needed  - Encourage mobilization and activity  - Consider nutritional services referral to assist patient with adequate nutrition and appropriate food choices  Outcome: Progressing

## 2024-09-23 NOTE — OP NOTE
OPERATIVE REPORT  PATIENT NAME: Jace Varghese    :  1950  MRN: [id number]  Pt Location: [facility] OR ROOM 02    SURGERY DATE: 2024    Surgeons and Role:     * Corey Miranda MD - Primary     * Jody Osuna PA-C - Assisting    Preop Diagnosis:  Malignant neoplasm of ascending colon (HCC) [C18.2]    Post-Op Diagnosis Codes:     * Malignant neoplasm of ascending colon (HCC) [C18.2]    Procedure(s):  RESECTION COLON RIGHT. EXTENSIVE LYSIS OF ADHESIONS    Specimen(s):  ID Type Source Tests Collected by Time Destination   1 : Right Hemicolectomy Tissue Colon TISSUE EXAM Corey Miranda MD 2024 0912        Estimated Blood Loss:   Minimal    Drains:  Urethral Catheter Double-lumen;Non-latex 16 Fr. (Active)   Number of days: 0   None    Anesthesia Type:   General    Operative Indications:  Malignant neoplasm of ascending colon (HCC) [C18.2]    Operative Findings:  There was a palpable tumor in the mid ascending colon, retroperitoneum was sustained from tattoo.  There was no obvious palpable lymph nodes in the mesentery of the ascending colon and proximal transverse colon.  There was no peritoneal implants and the tumor does not appear to be invading the serosal surface, but it was hard to tell due to the presence of ink surrounding the tumor area.  Liver surface showed no evidence of inflammatory or neoplastic process.  The rest of the abdominal cavity showed no evidence of inflammatory or neoplastic process.  Extensive lysis of adhesions in the right upper quadrant secondary to open cholecystectomy, it took approximately 60 minutes of lysis of adhesions.    Complications:   None    Procedure and Technique:  The patient was identified and the patient was placed in the operating table in a spine position.  After adequate anesthesia induction and satisfactory endotracheal intubation the abdomen was prepped and draped in sterile usual fashion with ChloraPrep.  Timeout was called the patient was identified as  per surgical site.      Veress needle was placed at Garcia's point, after verifying the positioning the abdomen was insufflated with CO2.  After obtaining adequate pneumoperitoneum an incision was made on the left upper quadrant, 5 mm trocar was introduced with the help of the Optiview.  Then the scope was advanced and exploration was performed with the above findings.  There was no obvious injury during the insertion of the Veress needle.    At this time the abdominal cavity was explored and it was noted to have dense adhesions at the umbilicus and also on the right upper quadrant, due to the presence of dense adhesions I elected to proceed with open right hemicolectomy.    5 mm port was removed.  Midline incision was made with a scalpel, taken down through the subcutaneous tissue with cautery.  Fascia and peritoneum were opened with cautery.  Upon entering the abdominal cavity and exploration was performed with above findings.      At this time I proceeded to take down the adhesions between the omentum and an umbilicus using cautery.  The adhesions on the right upper quadrant were carefully taken down between Metzenbaum scissors, blunt finger dissection and cautery, it took approximately 60 minutes to take down all the adhesions on the right upper quadrant due to the prior open cholecystectomy.    Once all the adhesions were carefully taken down wound protector was placed and my attention was directed to dissect the terminal ileum by dividing the attachments to the lateral gutter and pelvis using cautery.  The ascending colon was mobilized by dividing the white line of Toldt, with blunt finger dissection was able to mobilize the ascending colon medially.  Hepatic flexure was somewhat difficult to mobilize due to the tremendous amount of adhesions as described above.    At this time and I was able to mobilize the entire right colon including the proximal transverse colon, the terminal ileum was divided using CYNDY  stapler device, proximal transverse colon was also divided with CYNDY staple device including the right branch of the middle colic.  Mesentery was serially clamped at the base of the mesentery with Abbey clamps, divided with scissors and tied with 0 Vicryl.    Once resection was completed a side-to-side functional anastomosis was performed between the ileum and the proximal transverse colon using CYNDY stapler device.  Common channel was closed in 2 layers, 3-0 Vicryl in a continuous interlocking fashion and second layer with 3-0 Nurolon in an interrupted Lembert stitch.  Mesentery was closed with 2-0 Vicryl in a continuous fashion.    The entire abdominal cavity was copiously irrigated with sterile water.  There was no evidence of bleeding from any site of the dissection.  We proceeded to change gloves and new instruments were used to close the abdomen.  Wound protector was removed, Interceed was placed in the abdominal cavity to prevent adhesions, fascia was closed with #1 strata fix in a continuous fashion.  Subcutaneous tissue was copiously irrigated with sterile water and approximated with 2-0 Vicryl in an interrupted fashion and skin was closed with staples.  Sterile dressing was applied.    At the end of the case instrument, needles, and sponges counts were correct.  Patient tolerated procedure well.       I was present for the entire procedure., A qualified resident physician was not available., and A physician assistant was required during the procedure for retraction, tissue handling, dissection and suturing.    Patient Disposition:  PACU , hemodynamically stable, and extubated and stable    Colon Resection  Operation performed with curative intent Yes   Tumor Location (select all that apply) Ascending colon   Extent of colon and vascular resection (select all that apply) Right hemicolectomy - ileocolic, right colic (if present)               SIGNATURE: Corey Miranda MD  DATE: September 23, 2024  TIME: 9:56  AM

## 2024-09-23 NOTE — ANESTHESIA PREPROCEDURE EVALUATION
Procedure:  RESECTION COLON RIGHT LAPAROSCOPIC (Abdomen)    Relevant Problems   CARDIO   (+) Aneurysm of infrarenal abdominal aorta (HCC)   (+) CAD (coronary artery disease)   (+) Essential (primary) hypertension   (+) Mixed hyperlipidemia      ENDO   (+) Hypothyroidism      /RENAL   (+) Hypertensive kidney disease with chronic kidney disease stage III (HCC)   (+) Stage 3 chronic kidney disease, unspecified whether stage 3a or 3b CKD (HCC)      Behavioral Health   (+) Tobacco abuse      Surgery/Wound/Pain   (+) S/P right coronary artery (RCA) stent placement      Hypertension    Hyperlipidemia    Coronary artery disease    Myocardial infarction (HCC)    Disease of thyroid gland      2019 echo:     LEFT VENTRICLE:  Systolic function was normal by visual assessment. Ejection fraction was estimated to be 55 %.  There were no regional wall motion abnormalities.  Doppler parameters were consistent with abnormal left ventricular relaxation (grade 1 diastolic dysfunction).     MITRAL VALVE:  There was mild regurgitation.  Regurgitation grade was 1+ on a scale of 0 to 4+.         Physical Exam    Airway    Mallampati score: II  TM Distance: >3 FB  Neck ROM: full     Dental       Cardiovascular  Cardiovascular exam normal    Pulmonary  Pulmonary exam normal     Other Findings        Anesthesia Plan  ASA Score- 3     Anesthesia Type- general with ASA Monitors.         Additional Monitors:     Airway Plan: ETT.           Plan Factors-Exercise tolerance (METS): >4 METS.    Chart reviewed. EKG reviewed. Imaging results reviewed. Existing labs reviewed. Patient summary reviewed.                  Induction- intravenous.    Postoperative Plan- Plan for postoperative opioid use. Planned trial extubation        Informed Consent- Anesthetic plan and risks discussed with patient.  I personally reviewed this patient with the CRNA. Discussed and agreed on the Anesthesia Plan with the CRNA..

## 2024-09-24 PROBLEM — N18.31 STAGE 3A CHRONIC KIDNEY DISEASE (HCC): Status: ACTIVE | Noted: 2024-09-24

## 2024-09-24 PROBLEM — I10 PRIMARY HYPERTENSION: Status: ACTIVE | Noted: 2024-09-24

## 2024-09-24 PROBLEM — N17.9 AKI (ACUTE KIDNEY INJURY) (HCC): Status: ACTIVE | Noted: 2024-09-24

## 2024-09-24 LAB
ANION GAP SERPL CALCULATED.3IONS-SCNC: 8 MMOL/L (ref 4–13)
BACTERIA UR QL AUTO: ABNORMAL /HPF
BASOPHILS # BLD AUTO: 0.04 THOUSANDS/ΜL (ref 0–0.1)
BASOPHILS NFR BLD AUTO: 1 % (ref 0–1)
BILIRUB UR QL STRIP: NEGATIVE
BUN SERPL-MCNC: 17 MG/DL (ref 5–25)
CALCIUM SERPL-MCNC: 8.6 MG/DL (ref 8.4–10.2)
CHLORIDE SERPL-SCNC: 98 MMOL/L (ref 96–108)
CLARITY UR: CLEAR
CO2 SERPL-SCNC: 26 MMOL/L (ref 21–32)
COLOR UR: ABNORMAL
CREAT SERPL-MCNC: 1.21 MG/DL (ref 0.6–1.3)
CREAT UR-MCNC: 122.3 MG/DL
EOSINOPHIL # BLD AUTO: 0.06 THOUSAND/ΜL (ref 0–0.61)
EOSINOPHIL NFR BLD AUTO: 1 % (ref 0–6)
ERYTHROCYTE [DISTWIDTH] IN BLOOD BY AUTOMATED COUNT: 13.6 % (ref 11.6–15.1)
GFR SERPL CREATININE-BSD FRML MDRD: 59 ML/MIN/1.73SQ M
GLUCOSE SERPL-MCNC: 122 MG/DL (ref 65–140)
GLUCOSE UR STRIP-MCNC: NEGATIVE MG/DL
HCT VFR BLD AUTO: 34.3 % (ref 36.5–49.3)
HGB BLD-MCNC: 11 G/DL (ref 12–17)
HGB UR QL STRIP.AUTO: ABNORMAL
HYALINE CASTS #/AREA URNS LPF: ABNORMAL /LPF
IMM GRANULOCYTES # BLD AUTO: 0.03 THOUSAND/UL (ref 0–0.2)
IMM GRANULOCYTES NFR BLD AUTO: 0 % (ref 0–2)
KETONES UR STRIP-MCNC: NEGATIVE MG/DL
LEUKOCYTE ESTERASE UR QL STRIP: ABNORMAL
LYMPHOCYTES # BLD AUTO: 0.96 THOUSANDS/ΜL (ref 0.6–4.47)
LYMPHOCYTES NFR BLD AUTO: 12 % (ref 14–44)
MCH RBC QN AUTO: 28.5 PG (ref 26.8–34.3)
MCHC RBC AUTO-ENTMCNC: 32.1 G/DL (ref 31.4–37.4)
MCV RBC AUTO: 89 FL (ref 82–98)
MICROALBUMIN UR-MCNC: 34.4 MG/L
MICROALBUMIN/CREAT 24H UR: 28 MG/G CREATININE (ref 0–30)
MONOCYTES # BLD AUTO: 0.58 THOUSAND/ΜL (ref 0.17–1.22)
MONOCYTES NFR BLD AUTO: 7 % (ref 4–12)
MUCOUS THREADS UR QL AUTO: ABNORMAL
NEUTROPHILS # BLD AUTO: 6.2 THOUSANDS/ΜL (ref 1.85–7.62)
NEUTS SEG NFR BLD AUTO: 79 % (ref 43–75)
NITRITE UR QL STRIP: NEGATIVE
NON-SQ EPI CELLS URNS QL MICRO: ABNORMAL /HPF
NRBC BLD AUTO-RTO: 0 /100 WBCS
PH UR STRIP.AUTO: 6.5 [PH]
PLATELET # BLD AUTO: 292 THOUSANDS/UL (ref 149–390)
PMV BLD AUTO: 8.5 FL (ref 8.9–12.7)
POTASSIUM SERPL-SCNC: 4.4 MMOL/L (ref 3.5–5.3)
PROT UR STRIP-MCNC: ABNORMAL MG/DL
RBC # BLD AUTO: 3.86 MILLION/UL (ref 3.88–5.62)
RBC #/AREA URNS AUTO: ABNORMAL /HPF
SODIUM SERPL-SCNC: 132 MMOL/L (ref 135–147)
SP GR UR STRIP.AUTO: 1.02 (ref 1–1.03)
UROBILINOGEN UR STRIP-ACNC: <2 MG/DL
WBC # BLD AUTO: 7.87 THOUSAND/UL (ref 4.31–10.16)
WBC #/AREA URNS AUTO: ABNORMAL /HPF

## 2024-09-24 PROCEDURE — 80048 BASIC METABOLIC PNL TOTAL CA: CPT | Performed by: PHYSICIAN ASSISTANT

## 2024-09-24 PROCEDURE — 99024 POSTOP FOLLOW-UP VISIT: CPT | Performed by: SURGERY

## 2024-09-24 PROCEDURE — 99223 1ST HOSP IP/OBS HIGH 75: CPT | Performed by: INTERNAL MEDICINE

## 2024-09-24 PROCEDURE — 85025 COMPLETE CBC W/AUTO DIFF WBC: CPT | Performed by: PHYSICIAN ASSISTANT

## 2024-09-24 PROCEDURE — 82570 ASSAY OF URINE CREATININE: CPT | Performed by: INTERNAL MEDICINE

## 2024-09-24 PROCEDURE — 82043 UR ALBUMIN QUANTITATIVE: CPT | Performed by: INTERNAL MEDICINE

## 2024-09-24 PROCEDURE — 81001 URINALYSIS AUTO W/SCOPE: CPT | Performed by: INTERNAL MEDICINE

## 2024-09-24 RX ADMIN — NICOTINE 1 PATCH: 14 PATCH, EXTENDED RELEASE TRANSDERMAL at 08:49

## 2024-09-24 RX ADMIN — CEFAZOLIN SODIUM 1000 MG: 1 SOLUTION INTRAVENOUS at 00:57

## 2024-09-24 RX ADMIN — METRONIDAZOLE 500 MG: 500 TABLET ORAL at 00:56

## 2024-09-24 RX ADMIN — METRONIDAZOLE 500 MG: 500 TABLET ORAL at 08:48

## 2024-09-24 RX ADMIN — BACITRACIN ZINC, NEOMYCIN, POLYMYXIN B 1 SMALL APPLICATION: 400; 3.5; 5 OINTMENT TOPICAL at 17:28

## 2024-09-24 RX ADMIN — BACITRACIN ZINC, NEOMYCIN, POLYMYXIN B 1 SMALL APPLICATION: 400; 3.5; 5 OINTMENT TOPICAL at 08:48

## 2024-09-24 RX ADMIN — HEPARIN SODIUM 5000 UNITS: 5000 INJECTION INTRAVENOUS; SUBCUTANEOUS at 08:48

## 2024-09-24 RX ADMIN — HEPARIN SODIUM 5000 UNITS: 5000 INJECTION INTRAVENOUS; SUBCUTANEOUS at 00:56

## 2024-09-24 RX ADMIN — HEPARIN SODIUM 5000 UNITS: 5000 INJECTION INTRAVENOUS; SUBCUTANEOUS at 16:53

## 2024-09-24 RX ADMIN — METOPROLOL TARTRATE 25 MG: 25 TABLET, FILM COATED ORAL at 22:09

## 2024-09-24 RX ADMIN — LEVOTHYROXINE SODIUM 150 MCG: 0.15 TABLET ORAL at 08:48

## 2024-09-24 RX ADMIN — METOPROLOL TARTRATE 25 MG: 25 TABLET, FILM COATED ORAL at 10:24

## 2024-09-24 RX ADMIN — SODIUM CHLORIDE, SODIUM LACTATE, POTASSIUM CHLORIDE, AND CALCIUM CHLORIDE 125 ML/HR: .6; .31; .03; .02 INJECTION, SOLUTION INTRAVENOUS at 13:31

## 2024-09-24 NOTE — ASSESSMENT & PLAN NOTE
Lab Results   Component Value Date    EGFR 59 09/24/2024    EGFR 44 07/10/2024    EGFR 41 10/30/2023    CREATININE 1.21 09/24/2024    CREATININE 1.53 (H) 07/10/2024    CREATININE 1.62 (H) 10/30/2023   Patient does seems to be stage III CKD based on past data.  Possible etiology may related to hypertension.    Pathophysiology of kidney disease discussed with the patient    At this point will advise hydration and avoiding nephrotoxic medication

## 2024-09-24 NOTE — PROGRESS NOTES
"Progress Note - General Surgery   Jace Varghese 74 y.o. male MRN: [id number]  Unit/Bed#: -[facility] Encounter: [id number]    Assessment/Plan  Jace Varghese is a 74 y.o. male     POD1 laparoscopic converted to open right hemicolectomy with extensive lysis adhesions  AVSS, WBC 7.87, Hb 11.0  UOP 1.38/24hr, Cr 1.21  No flatus/BM, abdomen soft, mildly distended, hypoactive bowel sounds, appropriate incisional tenderness to palpation, midline incision covered with mepilex dressing    Continue current care, NPO/IVF hydration until return of bowel function  OK to remove baker catheter as patient has exhibited adequate urinary output   24h IV ancef/flagyl for antimicrobial coverage  Trend labs, monitor Hb, WBC, and vitals   I/Os  Serial abdominal exams  Encourage ambulation/OOB  Pain control/Antiemetics PRN  IS 10x an hour  DVT prophylaxis   [facility] consulted, Nephrology consulted        Subjective/Objective    Subjective: No acute events overnight    Objective:     Blood pressure 129/76, pulse 97, temperature 98.7 °F (37.1 °C), resp. rate 18, height 5' 9\" (1.753 m), weight 102 kg (224 lb 13.9 oz), SpO2 90%.,Body mass index is 33.21 kg/m².      Intake/Output Summary (Last 24 hours) at 9/24/2024 0853  Last data filed at 9/24/2024 0756  Gross per 24 hour   Intake 225 ml   Output 1505 ml   Net -1280 ml       Invasive Devices       Peripheral Intravenous Line  Duration             Peripheral IV 09/23/24 Right Wrist 1 day    Peripheral IV 09/23/24 Distal;Left;Upper;Ventral (anterior) Arm <1 day              Drain  Duration             Urethral Catheter Double-lumen;Non-latex 16 Fr. 1 day                    Physical Exam: /76   Pulse 97   Temp 98.7 °F (37.1 °C)   Resp 18   Ht 5' 9\" (1.753 m)   Wt 102 kg (224 lb 13.9 oz)   SpO2 90%   BMI 33.21 kg/m²   General appearance: alert and oriented, in no acute distress  Lungs: clear to auscultation bilaterally  Heart: regular rate and rhythm, S1, S2 normal, no murmur, " click, rub or gallop  Abdomen:  distended but soft, hypoactive bowel sounds, appropriate incisional tenderness to palpation, midline incision covered in dry clean mepilex  Extremities: extremities normal, warm and well-perfused; no cyanosis, clubbing, or edema    Lab, Imaging and other studies:I have personally reviewed pertinent lab results.     VTE Pharmacologic Prophylaxis: Heparin  VTE Mechanical Prophylaxis: sequential compression device    Recent Results (from the past 36 hour(s))   Type and screen    Collection Time: 09/23/24  7:23 AM   Result Value Ref Range    ABO Grouping A     Rh Factor Positive     Antibody Screen Negative     Specimen Expiration Date [id number]    Fingerstick Glucose (POCT)    Collection Time: 09/23/24  7:46 AM   Result Value Ref Range    POC Glucose 116 65 - 140 mg/dl   Basic metabolic panel    Collection Time: 09/24/24  6:11 AM   Result Value Ref Range    Sodium 132 (L) 135 - 147 mmol/L    Potassium 4.4 3.5 - 5.3 mmol/L    Chloride 98 96 - 108 mmol/L    CO2 26 21 - 32 mmol/L    ANION GAP 8 4 - 13 mmol/L    BUN 17 5 - 25 mg/dL    Creatinine 1.21 0.60 - 1.30 mg/dL    Glucose 122 65 - 140 mg/dL    Calcium 8.6 8.4 - 10.2 mg/dL    eGFR 59 ml/min/1.73sq m   CBC and differential    Collection Time: 09/24/24  6:11 AM   Result Value Ref Range    WBC 7.87 4.31 - 10.16 Thousand/uL    RBC 3.86 (L) 3.88 - 5.62 Million/uL    Hemoglobin 11.0 (L) 12.0 - 17.0 g/dL    Hematocrit 34.3 (L) 36.5 - 49.3 %    MCV 89 82 - 98 fL    MCH 28.5 26.8 - 34.3 pg    MCHC 32.1 31.4 - 37.4 g/dL    RDW 13.6 11.6 - 15.1 %    MPV 8.5 (L) 8.9 - 12.7 fL    Platelets 292 149 - 390 Thousands/uL    nRBC 0 /100 WBCs    Segmented % 79 (H) 43 - 75 %    Immature Grans % 0 0 - 2 %    Lymphocytes % 12 (L) 14 - 44 %    Monocytes % 7 4 - 12 %    Eosinophils Relative 1 0 - 6 %    Basophils Relative 1 0 - 1 %    Absolute Neutrophils 6.20 1.85 - 7.62 Thousands/µL    Absolute Immature Grans 0.03 0.00 - 0.20 Thousand/uL    Absolute  Lymphocytes 0.96 0.60 - 4.47 Thousands/µL    Absolute Monocytes 0.58 0.17 - 1.22 Thousand/µL    Eosinophils Absolute 0.06 0.00 - 0.61 Thousand/µL    Basophils Absolute 0.04 0.00 - 0.10 Thousands/µL

## 2024-09-24 NOTE — ASSESSMENT & PLAN NOTE
Patient has a fluctuating kidney function.  Got better with IV hydration in hospital.  Advise hydration with the patient

## 2024-09-24 NOTE — CONSULTS
Consultation - Nephrology   Name: Jace Varghese 74 y.o. male I MRN: [id number]  Unit/Bed#: -01 I Date of Admission: 9/23/2024   Date of Service: 9/24/2024 I Hospital Day: 1   Inpatient consult to Nephrology  Consult performed by: Hossein Salcedo MD  Consult ordered by: Sarah Bill MD        Physician Requesting Evaluation: Corey Miranda MD   Reason for Evaluation / Principal Problem: CKD    Assessment & Plan  Stage 3a chronic kidney disease (HCC)  Lab Results   Component Value Date    EGFR 59 09/24/2024    EGFR 44 07/10/2024    EGFR 41 10/30/2023    CREATININE 1.21 09/24/2024    CREATININE 1.53 (H) 07/10/2024    CREATININE 1.62 (H) 10/30/2023   Patient does seems to be stage III CKD based on past data.  Possible etiology may related to hypertension.    Pathophysiology of kidney disease discussed with the patient    At this point will advise hydration and avoiding nephrotoxic medication  JESSI (acute kidney injury) (HCC)  Patient has a fluctuating kidney function.  Got better with IV hydration in hospital.  Advise hydration with the patient  Primary hypertension  Very well-controlled while in hospital.  Will continue present medication  Malignant neoplasm of ascending colon (HCC)  Status post colectomy and being monitored by surgeon      I have reviewed the nephrology recommendations including management of CKD and avoiding hypotension, with surgery, and we are in agreement with renal plan including the information outlined above.     History of Present Illness   Jace Varghese is a 74 y.o. male who was admitted to hospital after presenting with electively for the colectomy for colon cancer. A renal consultation is requested today for assistance in the management of CKD and acute kidney injury.    Patient with known history of hypertension who was recently found to have colon cancer by CT scan with contrast and colonoscopy    Patient is hospital electively for the total colectomy    I was  being consulted because of history of CKD and acute kidney injury    Patient overall feeling quite well    Denies any venues that he has a kidney problem    He does a long history of hypertension    Denies any acute complaint    Denies any urinary complaint    Review of Systems   Constitutional:  Negative for fatigue.   HENT:  Negative for congestion.    Eyes:  Negative for photophobia and pain.   Respiratory:  Negative for chest tightness and shortness of breath.    Cardiovascular:  Negative for chest pain and palpitations.   Gastrointestinal:  Negative for abdominal distention, abdominal pain and blood in stool.   Endocrine: Negative for polydipsia.   Genitourinary:  Negative for difficulty urinating, dysuria, flank pain, hematuria and urgency.   Musculoskeletal:  Negative for arthralgias and back pain.   Skin:  Negative for rash.   Neurological:  Negative for dizziness, light-headedness and headaches.   Hematological:  Does not bruise/bleed easily.   Psychiatric/Behavioral:  Negative for behavioral problems. The patient is not nervous/anxious.        Patient baseline creatinine is about 1.5  Objective      Temp:  [97.9 °F (36.6 °C)-98.9 °F (37.2 °C)] 98.7 °F (37.1 °C)  HR:  [75-97] 97  Resp:  [10-18] 18  BP: ()/(65-82) 129/76  O2 Device: Nasal cannula         Vitals:    09/23/24 0708 09/23/24 1440   Weight: 102 kg (224 lb 13.9 oz) 102 kg (224 lb 13.9 oz)     I/O last 24 hours:  In: 325 [I.V.:225; IV Piggyback:100]  Out: 1680 [Urine:1555; Blood:125]  Lines/Drains/Airways       Active Status       None                  Physical Exam  Constitutional:       General: He is not in acute distress.     Appearance: He is well-developed.   HENT:      Head: Normocephalic.      Mouth/Throat:      Mouth: Mucous membranes are moist.   Eyes:      General: No scleral icterus.     Conjunctiva/sclera: Conjunctivae normal.   Neck:      Vascular: No JVD.   Cardiovascular:      Rate and Rhythm: Normal rate.      Heart sounds:  Normal heart sounds.   Pulmonary:      Effort: Pulmonary effort is normal.      Breath sounds: No wheezing.   Abdominal:      Palpations: Abdomen is soft.      Tenderness: There is no abdominal tenderness.   Musculoskeletal:         General: Normal range of motion.      Cervical back: Neck supple.   Skin:     General: Skin is warm.      Findings: No rash.   Neurological:      Mental Status: He is alert and oriented to person, place, and time.   Psychiatric:         Behavior: Behavior normal.        Current Weight: Weight - Scale: 102 kg (224 lb 13.9 oz)  First Weight: Weight - Scale: 102 kg (224 lb 13.9 oz)    Medications:    Current Facility-Administered Medications:     acetaminophen (TYLENOL) tablet 650 mg, 650 mg, Oral, Q6H PRN, PEEWEE Spicer-NOELLE    heparin (porcine) subcutaneous injection 5,000 Units, 5,000 Units, Subcutaneous, Q8H VISHAL, 5,000 Units at 09/24/24 0848 **AND** Platelet count, , , Once, PEEWEE Spicer-C    lactated ringers bolus 1,000 mL, 1,000 mL, Intravenous, Once PRN **AND** lactated ringers bolus 1,000 mL, 1,000 mL, Intravenous, Once PRN, Jody Osuna PA-C    lactated ringers infusion, 125 mL/hr, Intravenous, Continuous, PEEWEE Spicer-NOELLE, Last Rate: 125 mL/hr at 09/23/24 1015, 125 mL/hr at 09/23/24 1015    levothyroxine tablet 150 mcg, 150 mcg, Oral, Daily, PEEWEE Spicer-NOELLE, 150 mcg at 09/24/24 0848    metoprolol tartrate (LOPRESSOR) tablet 25 mg, 25 mg, Oral, Q12H, PEEWEE Spicer-C, 25 mg at 09/24/24 1024    morphine injection 2 mg, 2 mg, Intravenous, Q6H PRN, PEEWEE Spiecr-C    neomycin-bacitracin-polymyxin b (NEOSPORIN) ointment 1 small application, 1 small application, Topical, BID, Jody Osuna PA-C, 1 small application at 09/24/24 0848    nicotine (NICODERM CQ) 14 mg/24hr TD 24 hr patch 1 patch, 1 patch, Transdermal, Daily, Corey Miranda MD, 1 patch at 09/24/24 0849    ondansetron (ZOFRAN) injection 4 mg, 4 mg, Intravenous, Q6H PRN, Jody Osuna PA-C    oxyCODONE  "(ROXICODONE) immediate release tablet 10 mg, 10 mg, Oral, Q4H PRN, Jody Osuna PA-C    oxyCODONE (ROXICODONE) IR tablet 5 mg, 5 mg, Oral, Q4H PRN, Jody Osuna PA-C, 5 mg at 09/23/24 1658    sodium chloride 0.9 % bolus 1,000 mL, 1,000 mL, Intravenous, Once PRN **AND** sodium chloride 0.9 % bolus 1,000 mL, 1,000 mL, Intravenous, Once PRN, Jody Osuna PA-C      Lab Results: I have reviewed the following results:   Results from last 7 days   Lab Units 09/24/24  0611   WBC Thousand/uL 7.87   HEMOGLOBIN g/dL 11.0*   HEMATOCRIT % 34.3*   PLATELETS Thousands/uL 292   POTASSIUM mmol/L 4.4   CHLORIDE mmol/L 98   CO2 mmol/L 26   BUN mg/dL 17   CREATININE mg/dL 1.21   CALCIUM mg/dL 8.6       Administrative Statements     Portions of the record may have been created with voice recognition software. Occasional wrong word or \"sound a like\" substitutions may have occurred due to the inherent limitations of voice recognition software. Read the chart carefully and recognize, using context, where substitutions have occurred.If you have any questions, please contact the dictating provider.  "

## 2024-09-24 NOTE — CASE MANAGEMENT
Case Management Assessment & Discharge Planning Note    Patient name Jace Varghese  Location /-01 MRN [id number]  : 1950 Date 2024       Current Admission Date: 2024  Current Admission Diagnosis:Malignant neoplasm of ascending colon (HCC)   Patient Active Problem List    Diagnosis Date Noted Date Diagnosed    Stage 3a chronic kidney disease ([facility]) 2024     JESSI (acute kidney injury) ([facility]) 2024     Primary hypertension 2024     Malignant neoplasm of ascending colon ([facility]) 2024     Preop cardiovascular exam 2024     Aneurysm of infrarenal abdominal aorta ([facility]) 2024     CAD (coronary artery disease) 2024     Hypertensive kidney disease with chronic kidney disease stage III ([facility]) 2024     S/P ORIF (open reduction internal fixation) fracture 2023     Closed trimalleolar fracture of left ankle with routine healing, subsequent encounter 2023     Hypothyroidism 10/29/2023     Closed trimalleolar fracture 10/28/2023     Obesity, morbid ([facility]) 2023     Essential (primary) hypertension 2022     Stage 3 chronic kidney disease, unspecified whether stage 3a or 3b CKD ([facility]) 2022     Family history of colon cancer in father 2022     S/P right coronary artery (RCA) stent placement 2019     Mixed hyperlipidemia 2019     Tobacco abuse 2018     History of cancer tonsil 2018       LOS (days): 1  Geometric Mean LOS (GMLOS) (days): 2.9  Days to GMLOS:1.7     OBJECTIVE:    Risk of Unplanned Readmission Score: 11.77         Current admission status: Inpatient       Preferred Pharmacy:   [id number] - [place], [place] - [address] [address]  Phone: [phone] Fax: [phone]    Primary Care Provider: Baltazar Naidu MD    Primary Insurance: MEDICARE  Secondary Insurance:     ASSESSMENT:  Active Health Care Proxies        Hamida Tran Health Care Agent - Spouse   Primary Phone: [phone] (Mobile)  Home Phone: [phone]                 Advance Directives  Does patient have a Health Care POA?: Yes  Does patient have Advance Directives?: Yes  Advance Directives: Power of  for health care  Primary Contact: Hamida Jansenmeghazurdo, Spouse         Readmission Root Cause  30 Day Readmission: No    Patient Information  Admitted from:: Home  Mental Status: Alert  During Assessment patient was accompanied by: Not accompanied during assessment  Assessment information provided by:: Patient  Primary Caregiver: Self  County of Residence: [place]  What city do you live in?: [place]  Home entry access options. Select all that apply.: Stairs  Number of steps to enter home.: 2  Do the steps have railings?: Yes  Type of Current Residence: 2 story home  Upon entering residence, is there a bedroom on the main floor (no further steps)?: No  A bedroom is located on the following floor levels of residence (select all that apply):: 2nd Floor  Upon entering residence, is there a bathroom on the main floor (no further steps)?: No  Indicate which floors of current residence have a bathroom (select all the apply):: 2nd Floor  Number of steps to 2nd floor from main floor: One Flight  Living Arrangements: Lives w/ Spouse/significant other  Is patient a ?: No    Activities of Daily Living Prior to Admission  Functional Status: Independent  Completes ADLs independently?: Yes  Ambulates independently?: Yes  Does patient use assisted devices?: No  Does patient currently own DME?: No  Does patient have a history of Outpatient Therapy (PT/OT)?: No  Does the patient have a history of Short-Term Rehab?: No  Does patient have a history of HHC?: No  Does patient currently have HHC?: No         Patient Information Continued  Income Source: SSI/SSD  Does patient have prescription coverage?: Yes  Does patient receive dialysis treatments?: No  Does  patient have a history of substance abuse?: No  Does patient have a history of Mental Health Diagnosis?: No    PHQ 2/9 Screening   Reviewed PHQ 2/9 Depression Screening Score?: No    Means of Transportation  Means of Transport to Appts:: Drives Self      Social Determinants of Health (SDOH)      Flowsheet Row Most Recent Value   Housing Stability    In the last 12 months, was there a time when you were not able to pay the mortgage or rent on time? Y   In the past 12 months, how many times have you moved where you were living? 0   At any time in the past 12 months, were you homeless or living in a shelter (including now)? N   Transportation Needs    In the past 12 months, has lack of transportation kept you from medical appointments or from getting medications? no   In the past 12 months, has lack of transportation kept you from meetings, work, or from getting things needed for daily living? No   Food Insecurity    Within the past 12 months, you worried that your food would run out before you got the money to buy more. Never true   Within the past 12 months, the food you bought just didn't last and you didn't have money to get more. Never true   Utilities    In the past 12 months has the electric, gas, oil, or water company threatened to shut off services in your home? No            DISCHARGE DETAILS:    Discharge planning discussed with:: Patient at bedside.  Freedom of Choice: Yes  Comments - Freedom of Choice: CM discussed discharge planning and freedom of choice if services are recommended by [facility]. No CM needs are anticipated at this time.  CM contacted family/caregiver?: No- see comments (Patient declined.)  Were Treatment Team discharge recommendations reviewed with patient/caregiver?: Yes  Did patient/caregiver verbalize understanding of patient care needs?: Yes  Were patient/caregiver advised of the risks associated with not following Treatment Team discharge recommendations?: Yes         Requested Home  Health Care         Is the patient interested in HHC at discharge?: No    DME Referral Provided  Referral made for DME?: No    Other Referral/Resources/Interventions Provided:  Interventions: None Indicated    Would you like to participate in our [facility] service program?  : No - Declined    Treatment Team Recommendation: Home  Discharge Destination Plan:: Home  Transport at Discharge : Family

## 2024-09-25 LAB
ANION GAP SERPL CALCULATED.3IONS-SCNC: 8 MMOL/L (ref 4–13)
BASOPHILS # BLD AUTO: 0.05 THOUSANDS/ΜL (ref 0–0.1)
BASOPHILS NFR BLD AUTO: 1 % (ref 0–1)
BUN SERPL-MCNC: 16 MG/DL (ref 5–25)
CALCIUM SERPL-MCNC: 8.6 MG/DL (ref 8.4–10.2)
CHLORIDE SERPL-SCNC: 97 MMOL/L (ref 96–108)
CO2 SERPL-SCNC: 26 MMOL/L (ref 21–32)
CREAT SERPL-MCNC: 1.07 MG/DL (ref 0.6–1.3)
EOSINOPHIL # BLD AUTO: 0.08 THOUSAND/ΜL (ref 0–0.61)
EOSINOPHIL NFR BLD AUTO: 1 % (ref 0–6)
ERYTHROCYTE [DISTWIDTH] IN BLOOD BY AUTOMATED COUNT: 13.4 % (ref 11.6–15.1)
GFR SERPL CREATININE-BSD FRML MDRD: 68 ML/MIN/1.73SQ M
GLUCOSE SERPL-MCNC: 109 MG/DL (ref 65–140)
HCT VFR BLD AUTO: 34.9 % (ref 36.5–49.3)
HGB BLD-MCNC: 11.3 G/DL (ref 12–17)
IMM GRANULOCYTES # BLD AUTO: 0.04 THOUSAND/UL (ref 0–0.2)
IMM GRANULOCYTES NFR BLD AUTO: 1 % (ref 0–2)
LYMPHOCYTES # BLD AUTO: 0.89 THOUSANDS/ΜL (ref 0.6–4.47)
LYMPHOCYTES NFR BLD AUTO: 10 % (ref 14–44)
MCH RBC QN AUTO: 28.5 PG (ref 26.8–34.3)
MCHC RBC AUTO-ENTMCNC: 32.4 G/DL (ref 31.4–37.4)
MCV RBC AUTO: 88 FL (ref 82–98)
MONOCYTES # BLD AUTO: 0.61 THOUSAND/ΜL (ref 0.17–1.22)
MONOCYTES NFR BLD AUTO: 7 % (ref 4–12)
NEUTROPHILS # BLD AUTO: 7.14 THOUSANDS/ΜL (ref 1.85–7.62)
NEUTS SEG NFR BLD AUTO: 80 % (ref 43–75)
NRBC BLD AUTO-RTO: 0 /100 WBCS
PHOSPHATE SERPL-MCNC: 2.6 MG/DL (ref 2.3–4.1)
PLATELET # BLD AUTO: 295 THOUSANDS/UL (ref 149–390)
PMV BLD AUTO: 8.6 FL (ref 8.9–12.7)
POTASSIUM SERPL-SCNC: 3.9 MMOL/L (ref 3.5–5.3)
PTH-INTACT SERPL-MCNC: 27.5 PG/ML (ref 12–88)
RBC # BLD AUTO: 3.97 MILLION/UL (ref 3.88–5.62)
SODIUM SERPL-SCNC: 131 MMOL/L (ref 135–147)
WBC # BLD AUTO: 8.81 THOUSAND/UL (ref 4.31–10.16)

## 2024-09-25 PROCEDURE — 85025 COMPLETE CBC W/AUTO DIFF WBC: CPT | Performed by: INTERNAL MEDICINE

## 2024-09-25 PROCEDURE — 99024 POSTOP FOLLOW-UP VISIT: CPT

## 2024-09-25 PROCEDURE — 84100 ASSAY OF PHOSPHORUS: CPT | Performed by: INTERNAL MEDICINE

## 2024-09-25 PROCEDURE — 80048 BASIC METABOLIC PNL TOTAL CA: CPT | Performed by: INTERNAL MEDICINE

## 2024-09-25 PROCEDURE — 83970 ASSAY OF PARATHORMONE: CPT | Performed by: INTERNAL MEDICINE

## 2024-09-25 RX ADMIN — HEPARIN SODIUM 5000 UNITS: 5000 INJECTION INTRAVENOUS; SUBCUTANEOUS at 15:26

## 2024-09-25 RX ADMIN — HEPARIN SODIUM 5000 UNITS: 5000 INJECTION INTRAVENOUS; SUBCUTANEOUS at 23:56

## 2024-09-25 RX ADMIN — SODIUM CHLORIDE, SODIUM LACTATE, POTASSIUM CHLORIDE, AND CALCIUM CHLORIDE 125 ML/HR: .6; .31; .03; .02 INJECTION, SOLUTION INTRAVENOUS at 05:56

## 2024-09-25 RX ADMIN — LEVOTHYROXINE SODIUM 150 MCG: 0.15 TABLET ORAL at 09:36

## 2024-09-25 RX ADMIN — HEPARIN SODIUM 5000 UNITS: 5000 INJECTION INTRAVENOUS; SUBCUTANEOUS at 09:36

## 2024-09-25 RX ADMIN — METOPROLOL TARTRATE 25 MG: 25 TABLET, FILM COATED ORAL at 22:08

## 2024-09-25 RX ADMIN — HEPARIN SODIUM 5000 UNITS: 5000 INJECTION INTRAVENOUS; SUBCUTANEOUS at 00:53

## 2024-09-25 RX ADMIN — NICOTINE 1 PATCH: 14 PATCH, EXTENDED RELEASE TRANSDERMAL at 09:34

## 2024-09-25 RX ADMIN — METOPROLOL TARTRATE 25 MG: 25 TABLET, FILM COATED ORAL at 09:39

## 2024-09-25 RX ADMIN — SODIUM CHLORIDE, SODIUM LACTATE, POTASSIUM CHLORIDE, AND CALCIUM CHLORIDE 125 ML/HR: .6; .31; .03; .02 INJECTION, SOLUTION INTRAVENOUS at 13:40

## 2024-09-25 NOTE — PLAN OF CARE
Problem: PAIN - ADULT  Goal: Verbalizes/displays adequate comfort level or baseline comfort level  Description: Interventions:  - Encourage patient to monitor pain and request assistance  - Assess pain using appropriate pain scale  - Administer analgesics based on type and severity of pain and evaluate response  - Implement non-pharmacological measures as appropriate and evaluate response  - Consider cultural and social influences on pain and pain management  - Notify physician/advanced practitioner if interventions unsuccessful or patient reports new pain  Outcome: Progressing     Problem: SAFETY ADULT  Goal: Patient will remain free of falls  Description: INTERVENTIONS:  - Educate patient/family on patient safety including physical limitations  - Instruct patient to call for assistance with activity   - Consult OT/PT to assist with strengthening/mobility   - Keep Call bell within reach  - Keep bed low and locked with side rails adjusted as appropriate  - Keep care items and personal belongings within reach  - Initiate and maintain comfort rounds  - Make Fall Risk Sign visible to staff  - Offer Toileting every 2 Hours, in advance of need  - Initiate/Maintain bed alarm  - Obtain necessary fall risk management equipment  - Apply yellow socks and bracelet for high fall risk patients  - Consider moving patient to room near nurses station  Outcome: Progressing     Problem: GASTROINTESTINAL - ADULT  Goal: Maintains or returns to baseline bowel function  Description: INTERVENTIONS:  - Assess bowel function  - Encourage oral fluids to ensure adequate hydration  - Administer IV fluids if ordered to ensure adequate hydration  - Administer ordered medications as needed  - Encourage mobilization and activity  - Consider nutritional services referral to assist patient with adequate nutrition and appropriate food choices  Outcome: Progressing

## 2024-09-25 NOTE — PROGRESS NOTES
"Progress Note - General Surgery   Jace Varghese 74 y.o. male MRN: [id number]  Unit/Bed#: -01 Encounter: [id number]    Assessment:  Jace Varghese is a 74 y.o. male POD2 laparoscopic converted to open right hemicolectomy with extensive lysis adhesions.    AVSS, no leukocytosis, hemoglobin stable  Patient reports very small bowel movement last night, denies flatus.  Denies nausea or vomiting.  Ambulating without difficulty.    Plan:  Continue n.p.o. and IV fluids until bowel function improves  Monitor abdominal exam, labs, vitals  PRN pain medication and anti-emetics  Encourage ambulation  DVT ppx: heparin  Incentive spirometry 10 times/hour while awake  Continue home medications as prescribed   General Surgery is primary    Subjective/Objective    Subjective: No acute events overnight.     Objective:     Blood pressure 139/89, pulse 84, temperature 98.5 °F (36.9 °C), resp. rate 16, height 5' 9\" (1.753 m), weight 102 kg (224 lb 13.9 oz), SpO2 91%.,Body mass index is 33.21 kg/m².      Intake/Output Summary (Last 24 hours) at 9/25/2024 1003  Last data filed at 9/25/2024 0556  Gross per 24 hour   Intake 1000 ml   Output 150 ml   Net 850 ml       Invasive Devices       Peripheral Intravenous Line  Duration             Peripheral IV 09/23/24 Distal;Left;Upper;Ventral (anterior) Arm 2 days    Peripheral IV 09/23/24 Right Wrist 2 days                    Physical Exam:   GEN: NAD  HEENT: NCAT, MMM  CV: RRR, no m/r/g  Lung: Normal effort, CTA B/L, no w/r/r  Ab: Soft, distended, appropriately tender palpation around surgical incisions.  Incision is clean dry and intact with Mepilex dressing in place.  No erythema, edema, exudate.  Extrem: No CCE   Neuro: A+Ox3     Lab, Imaging and other studies:I have personally reviewed pertinent lab results.     VTE Pharmacologic Prophylaxis: Heparin  VTE Mechanical Prophylaxis: sequential compression device    Recent Results (from the past 36 hour(s))   Basic metabolic panel "    Collection Time: 09/24/24  6:11 AM   Result Value Ref Range    Sodium 132 (L) 135 - 147 mmol/L    Potassium 4.4 3.5 - 5.3 mmol/L    Chloride 98 96 - 108 mmol/L    CO2 26 21 - 32 mmol/L    ANION GAP 8 4 - 13 mmol/L    BUN 17 5 - 25 mg/dL    Creatinine 1.21 0.60 - 1.30 mg/dL    Glucose 122 65 - 140 mg/dL    Calcium 8.6 8.4 - 10.2 mg/dL    eGFR 59 ml/min/1.73sq m   CBC and differential    Collection Time: 09/24/24  6:11 AM   Result Value Ref Range    WBC 7.87 4.31 - 10.16 Thousand/uL    RBC 3.86 (L) 3.88 - 5.62 Million/uL    Hemoglobin 11.0 (L) 12.0 - 17.0 g/dL    Hematocrit 34.3 (L) 36.5 - 49.3 %    MCV 89 82 - 98 fL    MCH 28.5 26.8 - 34.3 pg    MCHC 32.1 31.4 - 37.4 g/dL    RDW 13.6 11.6 - 15.1 %    MPV 8.5 (L) 8.9 - 12.7 fL    Platelets 292 149 - 390 Thousands/uL    nRBC 0 /100 WBCs    Segmented % 79 (H) 43 - 75 %    Immature Grans % 0 0 - 2 %    Lymphocytes % 12 (L) 14 - 44 %    Monocytes % 7 4 - 12 %    Eosinophils Relative 1 0 - 6 %    Basophils Relative 1 0 - 1 %    Absolute Neutrophils 6.20 1.85 - 7.62 Thousands/µL    Absolute Immature Grans 0.03 0.00 - 0.20 Thousand/uL    Absolute Lymphocytes 0.96 0.60 - 4.47 Thousands/µL    Absolute Monocytes 0.58 0.17 - 1.22 Thousand/µL    Eosinophils Absolute 0.06 0.00 - 0.61 Thousand/µL    Basophils Absolute 0.04 0.00 - 0.10 Thousands/µL   Albumin / creatinine urine ratio    Collection Time: 09/24/24  1:26 PM   Result Value Ref Range    Creatinine, Ur 122.3 Reference range not established. mg/dL    Albumin,U,Random 34.4 (H) <20.0 mg/L    Albumin Creat Ratio 28 0 - 30 mg/g creatinine   UA (URINE) with reflex to Scope    Collection Time: 09/24/24  1:26 PM   Result Value Ref Range    Color, UA Light Yellow     Clarity, UA Clear     Specific Gravity, UA 1.019 1.003 - 1.030    pH, UA 6.5 4.5, 5.0, 5.5, 6.0, 6.5, 7.0, 7.5, 8.0    Leukocytes, UA Small (A) Negative    Nitrite, UA Negative Negative    Protein, UA Trace (A) Negative mg/dl    Glucose, UA Negative Negative mg/dl     Ketones, UA Negative Negative mg/dl    Urobilinogen, UA <2.0 <2.0 mg/dl mg/dl    Bilirubin, UA Negative Negative    Occult Blood, UA Small (A) Negative   Urine Microscopic    Collection Time: 09/24/24  1:26 PM   Result Value Ref Range    RBC, UA 30-50 (A) None Seen, 1-2 /hpf    WBC, UA 10-20 (A) None Seen, 1-2 /hpf    Epithelial Cells Occasional None Seen, Occasional /hpf    Bacteria, UA None Seen None Seen, Occasional /hpf    MUCUS THREADS Occasional (A) None Seen    Hyaline Casts, UA 0-3 (A) None Seen /lpf   Basic metabolic panel    Collection Time: 09/25/24  4:56 AM   Result Value Ref Range    Sodium 131 (L) 135 - 147 mmol/L    Potassium 3.9 3.5 - 5.3 mmol/L    Chloride 97 96 - 108 mmol/L    CO2 26 21 - 32 mmol/L    ANION GAP 8 4 - 13 mmol/L    BUN 16 5 - 25 mg/dL    Creatinine 1.07 0.60 - 1.30 mg/dL    Glucose 109 65 - 140 mg/dL    Calcium 8.6 8.4 - 10.2 mg/dL    eGFR 68 ml/min/1.73sq m   CBC and differential    Collection Time: 09/25/24  4:56 AM   Result Value Ref Range    WBC 8.81 4.31 - 10.16 Thousand/uL    RBC 3.97 3.88 - 5.62 Million/uL    Hemoglobin 11.3 (L) 12.0 - 17.0 g/dL    Hematocrit 34.9 (L) 36.5 - 49.3 %    MCV 88 82 - 98 fL    MCH 28.5 26.8 - 34.3 pg    MCHC 32.4 31.4 - 37.4 g/dL    RDW 13.4 11.6 - 15.1 %    MPV 8.6 (L) 8.9 - 12.7 fL    Platelets 295 149 - 390 Thousands/uL    nRBC 0 /100 WBCs    Segmented % 80 (H) 43 - 75 %    Immature Grans % 1 0 - 2 %    Lymphocytes % 10 (L) 14 - 44 %    Monocytes % 7 4 - 12 %    Eosinophils Relative 1 0 - 6 %    Basophils Relative 1 0 - 1 %    Absolute Neutrophils 7.14 1.85 - 7.62 Thousands/µL    Absolute Immature Grans 0.04 0.00 - 0.20 Thousand/uL    Absolute Lymphocytes 0.89 0.60 - 4.47 Thousands/µL    Absolute Monocytes 0.61 0.17 - 1.22 Thousand/µL    Eosinophils Absolute 0.08 0.00 - 0.61 Thousand/µL    Basophils Absolute 0.05 0.00 - 0.10 Thousands/µL   Phosphorus    Collection Time: 09/25/24  4:56 AM   Result Value Ref Range    Phosphorus 2.6 2.3 - 4.1  mg/dL   PTH, intact    Collection Time: 09/25/24  4:56 AM   Result Value Ref Range    PTH 27.5 12.0 - 88.0 pg/mL

## 2024-09-25 NOTE — QUICK NOTE
Went to see the patient.  Patient refused to see any other doctors so could not see the patient.    Will sign off for now

## 2024-09-25 NOTE — PLAN OF CARE
Problem: PAIN - ADULT  Goal: Verbalizes/displays adequate comfort level or baseline comfort level  Description: Interventions:  - Encourage patient to monitor pain and request assistance  - Assess pain using appropriate pain scale  - Administer analgesics based on type and severity of pain and evaluate response  - Implement non-pharmacological measures as appropriate and evaluate response  - Consider cultural and social influences on pain and pain management  - Notify physician/advanced practitioner if interventions unsuccessful or patient reports new pain  Outcome: Progressing     Problem: INFECTION - ADULT  Goal: Absence or prevention of progression during hospitalization  Description: INTERVENTIONS:  - Assess and monitor for signs and symptoms of infection  - Monitor lab/diagnostic results  - Monitor all insertion sites, i.e. indwelling lines, tubes, and drains  - Monitor endotracheal if appropriate and nasal secretions for changes in amount and color  - [place] appropriate cooling/warming therapies per order  - Administer medications as ordered  - Instruct and encourage patient and family to use good hand hygiene technique  - Identify and instruct in appropriate isolation precautions for identified infection/condition  Outcome: Progressing     Problem: SAFETY ADULT  Goal: Patient will remain free of falls  Description: INTERVENTIONS:  - Educate patient/family on patient safety including physical limitations  - Instruct patient to call for assistance with activity   - Consult OT/PT to assist with strengthening/mobility   - Keep Call bell within reach  - Keep bed low and locked with side rails adjusted as appropriate  - Keep care items and personal belongings within reach  - Initiate and maintain comfort rounds  - Make Fall Risk Sign visible to staff  -- Obtain necessary fall risk management equipment:   - Apply yellow socks and bracelet for high fall risk patients  - Consider moving patient to room near  nurses station  Outcome: Progressing

## 2024-09-26 VITALS
DIASTOLIC BLOOD PRESSURE: 80 MMHG | RESPIRATION RATE: 18 BRPM | TEMPERATURE: 97.9 F | SYSTOLIC BLOOD PRESSURE: 131 MMHG | WEIGHT: 224.87 LBS | HEART RATE: 78 BPM | BODY MASS INDEX: 33.31 KG/M2 | HEIGHT: 69 IN | OXYGEN SATURATION: 96 %

## 2024-09-26 PROBLEM — E66.09 CLASS 1 OBESITY DUE TO EXCESS CALORIES WITH SERIOUS COMORBIDITY AND BODY MASS INDEX (BMI) OF 33.0 TO 33.9 IN ADULT: Status: ACTIVE | Noted: 2023-03-27

## 2024-09-26 PROBLEM — E87.1 HYPONATREMIA: Status: ACTIVE | Noted: 2024-09-26

## 2024-09-26 PROBLEM — E66.811 CLASS 1 OBESITY DUE TO EXCESS CALORIES WITH SERIOUS COMORBIDITY AND BODY MASS INDEX (BMI) OF 33.0 TO 33.9 IN ADULT: Status: ACTIVE | Noted: 2023-03-27

## 2024-09-26 PROBLEM — N18.31 STAGE 3A CHRONIC KIDNEY DISEASE (HCC): Status: ACTIVE | Noted: 2022-08-11

## 2024-09-26 LAB
ANION GAP SERPL CALCULATED.3IONS-SCNC: 8 MMOL/L (ref 4–13)
BASOPHILS # BLD AUTO: 0.06 THOUSANDS/ΜL (ref 0–0.1)
BASOPHILS NFR BLD AUTO: 1 % (ref 0–1)
BUN SERPL-MCNC: 16 MG/DL (ref 5–25)
CALCIUM SERPL-MCNC: 8.4 MG/DL (ref 8.4–10.2)
CHLORIDE SERPL-SCNC: 97 MMOL/L (ref 96–108)
CO2 SERPL-SCNC: 25 MMOL/L (ref 21–32)
CREAT SERPL-MCNC: 0.96 MG/DL (ref 0.6–1.3)
EOSINOPHIL # BLD AUTO: 0.16 THOUSAND/ΜL (ref 0–0.61)
EOSINOPHIL NFR BLD AUTO: 2 % (ref 0–6)
ERYTHROCYTE [DISTWIDTH] IN BLOOD BY AUTOMATED COUNT: 13.4 % (ref 11.6–15.1)
GFR SERPL CREATININE-BSD FRML MDRD: 78 ML/MIN/1.73SQ M
GLUCOSE SERPL-MCNC: 100 MG/DL (ref 65–140)
GLUCOSE SERPL-MCNC: 124 MG/DL (ref 65–140)
HCT VFR BLD AUTO: 33.6 % (ref 36.5–49.3)
HGB BLD-MCNC: 10.9 G/DL (ref 12–17)
IMM GRANULOCYTES # BLD AUTO: 0.03 THOUSAND/UL (ref 0–0.2)
IMM GRANULOCYTES NFR BLD AUTO: 0 % (ref 0–2)
LYMPHOCYTES # BLD AUTO: 1.07 THOUSANDS/ΜL (ref 0.6–4.47)
LYMPHOCYTES NFR BLD AUTO: 12 % (ref 14–44)
MCH RBC QN AUTO: 28 PG (ref 26.8–34.3)
MCHC RBC AUTO-ENTMCNC: 32.4 G/DL (ref 31.4–37.4)
MCV RBC AUTO: 86 FL (ref 82–98)
MONOCYTES # BLD AUTO: 0.6 THOUSAND/ΜL (ref 0.17–1.22)
MONOCYTES NFR BLD AUTO: 7 % (ref 4–12)
NEUTROPHILS # BLD AUTO: 7.02 THOUSANDS/ΜL (ref 1.85–7.62)
NEUTS SEG NFR BLD AUTO: 78 % (ref 43–75)
NRBC BLD AUTO-RTO: 0 /100 WBCS
PLATELET # BLD AUTO: 282 THOUSANDS/UL (ref 149–390)
PMV BLD AUTO: 8.6 FL (ref 8.9–12.7)
POTASSIUM SERPL-SCNC: 3.8 MMOL/L (ref 3.5–5.3)
RBC # BLD AUTO: 3.89 MILLION/UL (ref 3.88–5.62)
SODIUM SERPL-SCNC: 130 MMOL/L (ref 135–147)
WBC # BLD AUTO: 8.94 THOUSAND/UL (ref 4.31–10.16)

## 2024-09-26 PROCEDURE — 85025 COMPLETE CBC W/AUTO DIFF WBC: CPT | Performed by: INTERNAL MEDICINE

## 2024-09-26 PROCEDURE — 99024 POSTOP FOLLOW-UP VISIT: CPT | Performed by: PHYSICIAN ASSISTANT

## 2024-09-26 PROCEDURE — 82948 REAGENT STRIP/BLOOD GLUCOSE: CPT

## 2024-09-26 PROCEDURE — 88309 TISSUE EXAM BY PATHOLOGIST: CPT | Performed by: PATHOLOGY

## 2024-09-26 PROCEDURE — 80048 BASIC METABOLIC PNL TOTAL CA: CPT | Performed by: INTERNAL MEDICINE

## 2024-09-26 PROCEDURE — NC001 PR NO CHARGE: Performed by: PHYSICIAN ASSISTANT

## 2024-09-26 RX ORDER — OXYCODONE HYDROCHLORIDE 5 MG/1
5 TABLET ORAL EVERY 4 HOURS PRN
Qty: 12 TABLET | Refills: 0 | Status: ON HOLD | OUTPATIENT
Start: 2024-09-26 | End: 2024-09-29

## 2024-09-26 RX ADMIN — METOPROLOL TARTRATE 25 MG: 25 TABLET, FILM COATED ORAL at 11:09

## 2024-09-26 RX ADMIN — SODIUM CHLORIDE, SODIUM LACTATE, POTASSIUM CHLORIDE, AND CALCIUM CHLORIDE 100 ML/HR: .6; .31; .03; .02 INJECTION, SOLUTION INTRAVENOUS at 13:32

## 2024-09-26 RX ADMIN — LEVOTHYROXINE SODIUM 150 MCG: 0.15 TABLET ORAL at 08:59

## 2024-09-26 RX ADMIN — HEPARIN SODIUM 5000 UNITS: 5000 INJECTION INTRAVENOUS; SUBCUTANEOUS at 09:00

## 2024-09-26 RX ADMIN — SODIUM CHLORIDE, SODIUM LACTATE, POTASSIUM CHLORIDE, AND CALCIUM CHLORIDE 125 ML/HR: .6; .31; .03; .02 INJECTION, SOLUTION INTRAVENOUS at 04:31

## 2024-09-26 RX ADMIN — NICOTINE 1 PATCH: 14 PATCH, EXTENDED RELEASE TRANSDERMAL at 09:00

## 2024-09-26 NOTE — ASSESSMENT & PLAN NOTE
Renal functions currently stable, continue to monitor  Patient refused nephrology follow up on 9/25

## 2024-09-26 NOTE — ASSESSMENT & PLAN NOTE
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

## 2024-09-26 NOTE — DISCHARGE SUMMARY
Discharge Summary - Jace Varghese 74 y.o. male MRN: [id number]    Unit/Bed#: -01 Encounter: [id number]        Admitting Diagnosis: Malignant neoplasm of ascending colon (HCC) [C18.2]    HPI: Admission Date: 9/23/2024 as per Admit Note by Dr. Miranda  Jace Varghese is a 74 y.o. male who presents to the hospital for elective laparoscopic right hemicolectomy for ascending colon cancer, the patient developed COVID with moderate symptoms, he was cleared by his medical doctor for the surgery.  He denies having any other complaints at this time.   Plan:  OR for Laparoscopic Right colon Resection     Procedures Performed: Procedure(s): Laparoscopic converted to Open   RESECTION COLON RIGHT, EXTENSIVE LYSIS OF ADHESIONS    Hospital Course: the patient was admitted as an elective case to the OR for Laparoscopic right hemicolectomy. Upon insertion of the laparoscope there were multiple adhesion to the abdominal wall. This was converted to open, he had a side to side anastomosis, resection right hemicolectomy, extensive GISELA, he was closed with strata fix suture #1, sub cutaneous vicryl and staple closure.  He remained inpatient for IV antibiotics x 3 doses, NPO x 2 days, waiting for return of bowel function. His baker catheter was removed POD 1.    POD 3 the patient did have a bowel movement x 2 and was passing gas.  He was started on CLD and tolerated this well, that same day he was advanced to soft surgical diet which he tolerated well. He was satisfactory for discharge. Staples were removed and all instructions were discussed  The patient did have post op hyponatremia which was managed with p.o. sodium intake with p.o. diet.      On the day of discharge:  VSS,  no leukocytosis, Hbg stable 10.9, 11   Lungs clear  RRR  ABD: soft, incision healing well. Staples removed, edges approximated well, steri strips applied  NBS,   Incisional tenderness without erythema    No calf tenderness      Pt will follow up in 2  weeks   Discharge instructions were given verbally and written     Complications: none     Discharge Diagnosis:   Malignant neoplasm of ascending colon (HCC) [C18.2]  Post Op Hyponatremia   Post op Ileus     Condition at Discharge: good     Discharge instructions/Information to patient and family:   See after visit summary for information provided to patient and family.      Provisions for Follow-Up Care:  See after visit summary for information related to follow-up care and any pertinent home health orders.      Disposition: See After Visit Summary for discharge disposition information.    Planned Readmission: 30     Discharge Statement   I spent 30 minutes discharging the patient. This time was spent on the day of discharge. I had direct contact with the patient on the day of discharge. Additional documentation is required if more than 30 minutes were spent on discharge.     Discharge Medications:  See after visit summary for reconciled discharge medications provided to patient and family.      Jody MAGDALENO

## 2024-09-26 NOTE — DISCHARGE INSTR - AVS FIRST PAGE
Thank you, it has been a pleasure taking care of you.     Call the Surgical office to make appointment for 2 weeks   Call your PCP for appointment in 1 week, please have this appointment completed prior to surgery follow up in case there were any changes to your medication while inpatient.  Continue to use the incentive spirometer (the breathing machine) to help clear the secretions from your lungs so you do not develop pneumonia  Continue to use the nicotine patch and not go back to smoking cigarettes.   Continue to drink the ensure 1 bottle per day to get enough protein to help you heal.    Meds:  If you do not need strong pain medicine you may take Tylenol 650 mg every 4 hours and ibuprofen 200 mg up to 800 mg every 8 hours as needed for pain. Space these medications apart by 4 hours. Do not take ibuprofen if you have reflux or GERD or gastric ulcer disease.Do not take Tylenol if you have liver disease.   If your pain is not relieved with these two medications then take the prescription pain medications.   Do not take acetaminophen (Tylenol) WITH  pain meds that contain acetaminophen. Take one or the other.  Do not exceed more than 4000 mg of acetaminophen in 24 hours  or 3000 mg if you have liver disease.     No driving until seen in the office in 2 weeks   No driving while taking pain meds.   No heavy lifting or strenuous exercise until you are cleared in the surgery office in 2 weeks   You may shower starting in 2 days. Remove the dressings, leave the steri strips on. You can get incision and the steri strips wet, pat them dry.   Steri-strips covering your incisions  may be remove in 5 days . They can get wet.       Call the office if you have increased pain not relieved with pain medicine.  Call the office if you have a fever,redness, the wound opens up, you have pus draining from your incision.   Take colace 100 mg daily to avoid constipation.   Any medications given at discharge you will need to have your  PCP refill them.        Deep Vein Thrombosis        Report to ED or Call your local emergency number (911 in the [place]) if:   You feel lightheaded, short of breath, and have chest pain.     You cough up blood.     When should I call my doctor?   Your arm or leg feels warm, tender, and painful. It may look swollen and red.    WHAT YOU NEED TO KNOW:   What is a deep vein thrombosis (DVT)? A DVT is a blood clot that forms in a deep vein of the body. The deep veins in the legs, thighs, and hips are the most common sites for DVT. A DVT can also occur in a deep vein within your arms. The clot prevents the normal flow of blood in the vein. The blood backs up and causes pain and swelling. The DVT can break into smaller pieces and travel to your lungs and cause a blockage called a pulmonary embolism (PE). A PE can become life-threatening.     What increases my risk for a DVT? After you have a DVT, your risk for another increases. A DVT can happen to anyone, but any of the following may increase your risk:  A family history of blood clots     Limited activity caused by bed rest, a leg cast, or sitting for long periods     Injury to a deep vein, or surgery     A blood disorder that makes your blood clot faster than normal, such as factor V Leiden mutation     Age older than 60 years     Hormone replacement therapy     Birth control pills, especially in women who smoke or are older than 35 years     Pregnancy, and for 6 weeks after childbirth     Cancer or heart failure     A catheter placed in a large vein     Smoking cigarettes     Obesity or varicose veins     What are the signs and symptoms of a DVT?   Swelling     Redness     Warmth, pain, or tenderness     How is a DVT diagnosed?   A D-dimer blood test may be done to check for signs of a blood clot.     An ultrasound uses sound waves to show pictures on a monitor. An ultrasound may be done to show a clot in your vein.     Contrast venography is an x-ray of a vein. Contrast  liquid is used to make the vein easier to see on the x-ray. Tell a healthcare provider if you have ever had an allergic reaction to contrast liquid.            What can I do to prevent a DVT?  Walking and being mobile is very important after surgery to prevent a blood clot from forming even if you are having surgical discomfort. You need to walk.  If you are on a car ride, you must stop, get out and walk around every hour,  flex your ankle frequently while riding in the car. If you must travel by airplane you will need to get up and walk the isle every hour, flex your ankles frequently while sitting. Compression socks 20-30 mm Hg are recommended as well.  You can buy these in a pharmacy.     Exercise regularly to help increase your blood flow. Walking is a good low-impact exercise. Talk to your healthcare provider about the best exercise plan for you.          Change your body position or move around often. Move and stretch in your seat several times each hour if you travel by car or work at a desk. In an airplane, get up and walk every hour. Move your legs by tightening and releasing your leg muscles while sitting. You can move your legs while sitting by raising and lowering your heels. Keep your toes on the floor while you do this. You can also raise and lower your toes while keeping your heels on the floor.                  Maintain a healthy weight. Ask your healthcare provider what a healthy weight is for you. Ask him or her to help you create a weight loss plan if you are overweight.     Do not smoke. Nicotine and other chemicals in cigarettes and cigars can damage blood vessels and make it more difficult to manage your DVT. Ask your healthcare provider for information if you currently smoke and need help to quit. E-cigarettes or smokeless tobacco still contain nicotine. Talk to your healthcare provider before you use these products.     Ask about birth control if you are a woman who takes the pill. A birth  control pill increases the risk for PE in certain women. The risk is higher if you are also older than 35, smoke cigarettes, or have a blood clotting disorder. Talk to your healthcare provider about other ways to prevent pregnancy, such as a cervical cap or intrauterine device (IUD).

## 2024-09-26 NOTE — PLAN OF CARE
Problem: PAIN - ADULT  Goal: Verbalizes/displays adequate comfort level or baseline comfort level  Description: Interventions:  - Encourage patient to monitor pain and request assistance  - Assess pain using appropriate pain scale  - Administer analgesics based on type and severity of pain and evaluate response  - Implement non-pharmacological measures as appropriate and evaluate response  - Consider cultural and social influences on pain and pain management  - Notify physician/advanced practitioner if interventions unsuccessful or patient reports new pain  Outcome: Progressing     Problem: INFECTION - ADULT  Goal: Absence or prevention of progression during hospitalization  Description: INTERVENTIONS:  - Assess and monitor for signs and symptoms of infection  - Monitor lab/diagnostic results  - Monitor all insertion sites, i.e. indwelling lines, tubes, and drains  - Monitor endotracheal if appropriate and nasal secretions for changes in amount and color  - [place] appropriate cooling/warming therapies per order  - Administer medications as ordered  - Instruct and encourage patient and family to use good hand hygiene technique  - Identify and instruct in appropriate isolation precautions for identified infection/condition  Outcome: Progressing

## 2024-09-26 NOTE — PLAN OF CARE
Problem: PAIN - ADULT  Goal: Verbalizes/displays adequate comfort level or baseline comfort level  Description: Interventions:  - Encourage patient to monitor pain and request assistance  - Assess pain using appropriate pain scale  - Administer analgesics based on type and severity of pain and evaluate response  - Implement non-pharmacological measures as appropriate and evaluate response  - Consider cultural and social influences on pain and pain management  - Notify physician/advanced practitioner if interventions unsuccessful or patient reports new pain  9/26/2024 1528 by Mary Cortez RN  Outcome: Adequate for Discharge  9/26/2024 1350 by Mary Cortez RN  Outcome: Progressing     Problem: INFECTION - ADULT  Goal: Absence or prevention of progression during hospitalization  Description: INTERVENTIONS:  - Assess and monitor for signs and symptoms of infection  - Monitor lab/diagnostic results  - Monitor all insertion sites, i.e. indwelling lines, tubes, and drains  - Monitor endotracheal if appropriate and nasal secretions for changes in amount and color  - [place] appropriate cooling/warming therapies per order  - Administer medications as ordered  - Instruct and encourage patient and family to use good hand hygiene technique  - Identify and instruct in appropriate isolation precautions for identified infection/condition  9/26/2024 1528 by Mary Cortez RN  Outcome: Adequate for Discharge  9/26/2024 1350 by Mary Cortez RN  Outcome: Progressing  Goal: Absence of fever/infection during neutropenic period  Description: INTERVENTIONS:  - Monitor WBC    Outcome: Adequate for Discharge     Problem: SAFETY ADULT  Goal: Patient will remain free of falls  Description: INTERVENTIONS:  - Educate patient/family on patient safety including physical limitations  - Instruct patient to call for assistance with activity   - Consult OT/PT to assist with strengthening/mobility   - Keep Call  bell within reach  - Keep bed low and locked with side rails adjusted as appropriate  - Keep care items and personal belongings within reach  - Initiate and maintain comfort rounds  - Make Fall Risk Sign visible to staff  - Offer Toileting every 2 Hours, in advance of need  - Apply yellow socks and bracelet for high fall risk patients  - Consider moving patient to room near nurses station  Outcome: Adequate for Discharge  Goal: Maintain or return to baseline ADL function  Description: INTERVENTIONS:  -  Assess patient's ability to carry out ADLs; assess patient's baseline for ADL function and identify physical deficits which impact ability to perform ADLs (bathing, care of mouth/teeth, toileting, grooming, dressing, etc.)  - Assess/evaluate cause of self-care deficits   - Assess range of motion  - Assess patient's mobility; develop plan if impaired  - Assess patient's need for assistive devices and provide as appropriate  - Encourage maximum independence but intervene and supervise when necessary  - Involve family in performance of ADLs  - Assess for home care needs following discharge   - Consider OT consult to assist with ADL evaluation and planning for discharge  - Provide patient education as appropriate  Outcome: Adequate for Discharge  Goal: Maintains/Returns to pre admission functional level  Description: INTERVENTIONS:  - Perform AM-PAC 6 Click Basic Mobility/ Daily Activity assessment daily.  - Set and communicate daily mobility goal to care team and patient/family/caregiver.   - Collaborate with rehabilitation services on mobility goals if consulted  - Perform Range of Motion 4 times a day.  - Reposition patient every 2 hours.  - Dangle patient 3 times a day  - Stand patient 3 times a day  - Ambulate patient 3 times a day  - Out of bed to chair 3 times a day   - Out of bed for meals 3 times a day  - Out of bed for toileting  - Record patient progress and toleration of activity level   Outcome: Adequate for  Discharge     Problem: DISCHARGE PLANNING  Goal: Discharge to home or other facility with appropriate resources  Description: INTERVENTIONS:  - Identify barriers to discharge w/patient and caregiver  - Arrange for needed discharge resources and transportation as appropriate  - Identify discharge learning needs (meds, wound care, etc.)  - Arrange for interpretive services to assist at discharge as needed  - Refer to Case Management Department for coordinating discharge planning if the patient needs post-hospital services based on physician/advanced practitioner order or complex needs related to functional status, cognitive ability, or social support system  Outcome: Adequate for Discharge     Problem: Knowledge Deficit  Goal: Patient/family/caregiver demonstrates understanding of disease process, treatment plan, medications, and discharge instructions  Description: Complete learning assessment and assess knowledge base.  Interventions:  - Provide teaching at level of understanding  - Provide teaching via preferred learning methods  Outcome: Adequate for Discharge     Problem: GASTROINTESTINAL - ADULT  Goal: Minimal or absence of nausea and/or vomiting  Description: INTERVENTIONS:  - Administer IV fluids if ordered to ensure adequate hydration  - Maintain NPO status until nausea and vomiting are resolved  - Nasogastric tube if ordered  - Administer ordered antiemetic medications as needed  - Provide nonpharmacologic comfort measures as appropriate  - Advance diet as tolerated, if ordered  - Consider nutrition services referral to assist patient with adequate nutrition and appropriate food choices  Outcome: Adequate for Discharge  Goal: Maintains or returns to baseline bowel function  Description: INTERVENTIONS:  - Assess bowel function  - Encourage oral fluids to ensure adequate hydration  - Administer IV fluids if ordered to ensure adequate hydration  - Administer ordered medications as needed  - Encourage  mobilization and activity  - Consider nutritional services referral to assist patient with adequate nutrition and appropriate food choices  Outcome: Adequate for Discharge  Goal: Maintains adequate nutritional intake  Description: INTERVENTIONS:  - Monitor percentage of each meal consumed  - Identify factors contributing to decreased intake, treat as appropriate  - Assist with meals as needed  - Monitor I&O, weight, and lab values if indicated  - Obtain nutrition services referral as needed  Outcome: Adequate for Discharge  Goal: Establish and maintain optimal ostomy function  Description: INTERVENTIONS:  - Assess bowel function  - Encourage oral fluids to ensure adequate hydration  - Administer IV fluids if ordered to ensure adequate hydration   - Administer ordered medications as needed  - Encourage mobilization and activity  - Nutrition services referral to assist patient with appropriate food choices  - Assess stoma site  - Consider wound care consult   Outcome: Adequate for Discharge  Goal: Oral mucous membranes remain intact  Description: INTERVENTIONS  - Assess oral mucosa and hygiene practices  - Implement preventative oral hygiene regimen  - Implement oral medicated treatments as ordered  - Initiate Nutrition services referral as needed  Outcome: Adequate for Discharge

## 2024-09-26 NOTE — PLAN OF CARE
Problem: PAIN - ADULT  Goal: Verbalizes/displays adequate comfort level or baseline comfort level  Description: Interventions:  - Encourage patient to monitor pain and request assistance  - Assess pain using appropriate pain scale  - Administer analgesics based on type and severity of pain and evaluate response  - Implement non-pharmacological measures as appropriate and evaluate response  - Consider cultural and social influences on pain and pain management  - Notify physician/advanced practitioner if interventions unsuccessful or patient reports new pain  Outcome: Progressing     Problem: INFECTION - ADULT  Goal: Absence or prevention of progression during hospitalization  Description: INTERVENTIONS:  - Assess and monitor for signs and symptoms of infection  - Monitor lab/diagnostic results  - Monitor all insertion sites, i.e. indwelling lines, tubes, and drains  - Monitor endotracheal if appropriate and nasal secretions for changes in amount and color  - [place] appropriate cooling/warming therapies per order  - Administer medications as ordered  - Instruct and encourage patient and family to use good hand hygiene technique  - Identify and instruct in appropriate isolation precautions for identified infection/condition  Outcome: Progressing

## 2024-09-26 NOTE — ASSESSMENT & PLAN NOTE
Renal functions are currently stable, will continue to monitor  Patient refused nephrology follow up on 9/25

## 2024-09-26 NOTE — ASSESSMENT & PLAN NOTE
Patient added for elective laparoscopic right hemicolectomy to the surgery team secondary to ascending colon cancer  He is s/p (9/23/24) lap converted to open right hemicolectomy with extensive lysis of adhesions   Remains NPO pending return of bowel function

## 2024-09-27 ENCOUNTER — TRANSITIONAL CARE MANAGEMENT (OUTPATIENT)
Dept: FAMILY MEDICINE CLINIC | Facility: CLINIC | Age: 74
End: 2024-09-27

## 2024-09-27 DIAGNOSIS — Z59.819 HOUSING INSECURITY: Primary | ICD-10-CM

## 2024-09-27 SDOH — ECONOMIC STABILITY - HOUSING INSECURITY: HOUSING INSTABILITY UNSPECIFIED: Z59.819

## 2024-09-28 ENCOUNTER — APPOINTMENT (EMERGENCY)
Dept: CT IMAGING | Facility: HOSPITAL | Age: 74
DRG: 394 | End: 2024-09-28
Payer: MEDICARE

## 2024-09-28 ENCOUNTER — HOSPITAL ENCOUNTER (INPATIENT)
Facility: HOSPITAL | Age: 74
LOS: 5 days | Discharge: HOME | DRG: 394 | End: 2024-10-03
Attending: EMERGENCY MEDICINE | Admitting: SURGERY
Payer: MEDICARE

## 2024-09-28 ENCOUNTER — APPOINTMENT (INPATIENT)
Dept: RADIOLOGY | Facility: HOSPITAL | Age: 74
DRG: 394 | End: 2024-09-28
Payer: MEDICARE

## 2024-09-28 DIAGNOSIS — K56.609 BOWEL OBSTRUCTION (HCC): ICD-10-CM

## 2024-09-28 DIAGNOSIS — I10 ESSENTIAL (PRIMARY) HYPERTENSION: Primary | ICD-10-CM

## 2024-09-28 DIAGNOSIS — E78.2 MIXED HYPERLIPIDEMIA: ICD-10-CM

## 2024-09-28 LAB
ALBUMIN SERPL BCG-MCNC: 4 G/DL (ref 3.5–5)
ALP SERPL-CCNC: 79 U/L (ref 34–104)
ALT SERPL W P-5'-P-CCNC: 31 U/L (ref 7–52)
ANION GAP SERPL CALCULATED.3IONS-SCNC: 10 MMOL/L (ref 4–13)
AST SERPL W P-5'-P-CCNC: 44 U/L (ref 13–39)
BASOPHILS # BLD AUTO: 0.05 THOUSANDS/ΜL (ref 0–0.1)
BASOPHILS NFR BLD AUTO: 1 % (ref 0–1)
BILIRUB SERPL-MCNC: 0.39 MG/DL (ref 0.2–1)
BUN SERPL-MCNC: 18 MG/DL (ref 5–25)
CALCIUM SERPL-MCNC: 9.5 MG/DL (ref 8.4–10.2)
CHLORIDE SERPL-SCNC: 92 MMOL/L (ref 96–108)
CO2 SERPL-SCNC: 26 MMOL/L (ref 21–32)
CREAT SERPL-MCNC: 1.44 MG/DL (ref 0.6–1.3)
EOSINOPHIL # BLD AUTO: 0.13 THOUSAND/ΜL (ref 0–0.61)
EOSINOPHIL NFR BLD AUTO: 1 % (ref 0–6)
ERYTHROCYTE [DISTWIDTH] IN BLOOD BY AUTOMATED COUNT: 13.5 % (ref 11.6–15.1)
GFR SERPL CREATININE-BSD FRML MDRD: 47 ML/MIN/1.73SQ M
GLUCOSE SERPL-MCNC: 136 MG/DL (ref 65–140)
HCT VFR BLD AUTO: 36.9 % (ref 36.5–49.3)
HGB BLD-MCNC: 12 G/DL (ref 12–17)
IMM GRANULOCYTES # BLD AUTO: 0.04 THOUSAND/UL (ref 0–0.2)
IMM GRANULOCYTES NFR BLD AUTO: 0 % (ref 0–2)
LIPASE SERPL-CCNC: 13 U/L (ref 11–82)
LYMPHOCYTES # BLD AUTO: 0.93 THOUSANDS/ΜL (ref 0.6–4.47)
LYMPHOCYTES NFR BLD AUTO: 9 % (ref 14–44)
MCH RBC QN AUTO: 28 PG (ref 26.8–34.3)
MCHC RBC AUTO-ENTMCNC: 32.5 G/DL (ref 31.4–37.4)
MCV RBC AUTO: 86 FL (ref 82–98)
MONOCYTES # BLD AUTO: 0.66 THOUSAND/ΜL (ref 0.17–1.22)
MONOCYTES NFR BLD AUTO: 6 % (ref 4–12)
NEUTROPHILS # BLD AUTO: 8.93 THOUSANDS/ΜL (ref 1.85–7.62)
NEUTS SEG NFR BLD AUTO: 83 % (ref 43–75)
NRBC BLD AUTO-RTO: 0 /100 WBCS
PLATELET # BLD AUTO: 337 THOUSANDS/UL (ref 149–390)
PMV BLD AUTO: 8.5 FL (ref 8.9–12.7)
POTASSIUM SERPL-SCNC: 3.6 MMOL/L (ref 3.5–5.3)
PROT SERPL-MCNC: 7.4 G/DL (ref 6.4–8.4)
RBC # BLD AUTO: 4.29 MILLION/UL (ref 3.88–5.62)
SODIUM SERPL-SCNC: 128 MMOL/L (ref 135–147)
WBC # BLD AUTO: 10.74 THOUSAND/UL (ref 4.31–10.16)

## 2024-09-28 PROCEDURE — 36415 COLL VENOUS BLD VENIPUNCTURE: CPT | Performed by: EMERGENCY MEDICINE

## 2024-09-28 PROCEDURE — 0D9670Z DRAINAGE OF STOMACH WITH DRAINAGE DEVICE, VIA NATURAL OR ARTIFICIAL OPENING: ICD-10-PCS | Performed by: SURGERY

## 2024-09-28 PROCEDURE — 85025 COMPLETE CBC W/AUTO DIFF WBC: CPT | Performed by: EMERGENCY MEDICINE

## 2024-09-28 PROCEDURE — 93005 ELECTROCARDIOGRAM TRACING: CPT

## 2024-09-28 PROCEDURE — 96374 THER/PROPH/DIAG INJ IV PUSH: CPT

## 2024-09-28 PROCEDURE — 80053 COMPREHEN METABOLIC PANEL: CPT | Performed by: EMERGENCY MEDICINE

## 2024-09-28 PROCEDURE — 83690 ASSAY OF LIPASE: CPT | Performed by: EMERGENCY MEDICINE

## 2024-09-28 PROCEDURE — 74018 RADEX ABDOMEN 1 VIEW: CPT

## 2024-09-28 PROCEDURE — 99284 EMERGENCY DEPT VISIT MOD MDM: CPT

## 2024-09-28 PROCEDURE — 96361 HYDRATE IV INFUSION ADD-ON: CPT

## 2024-09-28 PROCEDURE — 99285 EMERGENCY DEPT VISIT HI MDM: CPT | Performed by: EMERGENCY MEDICINE

## 2024-09-28 PROCEDURE — 74177 CT ABD & PELVIS W/CONTRAST: CPT

## 2024-09-28 RX ORDER — DOCUSATE SODIUM 100 MG/1
100 CAPSULE, LIQUID FILLED ORAL 2 TIMES DAILY
Status: DISCONTINUED | OUTPATIENT
Start: 2024-09-28 | End: 2024-10-03 | Stop reason: HOSPADM

## 2024-09-28 RX ORDER — CEFAZOLIN SODIUM 2 G/50ML
2000 SOLUTION INTRAVENOUS EVERY 8 HOURS
Status: COMPLETED | OUTPATIENT
Start: 2024-09-28 | End: 2024-09-29

## 2024-09-28 RX ORDER — MIDAZOLAM HYDROCHLORIDE 2 MG/2ML
2 INJECTION, SOLUTION INTRAMUSCULAR; INTRAVENOUS ONCE
Status: COMPLETED | OUTPATIENT
Start: 2024-09-28 | End: 2024-09-28

## 2024-09-28 RX ORDER — ONDANSETRON 2 MG/ML
4 INJECTION INTRAMUSCULAR; INTRAVENOUS ONCE
Status: COMPLETED | OUTPATIENT
Start: 2024-09-28 | End: 2024-09-28

## 2024-09-28 RX ORDER — SODIUM CHLORIDE 9 MG/ML
100 INJECTION, SOLUTION INTRAVENOUS CONTINUOUS
Status: DISCONTINUED | OUTPATIENT
Start: 2024-09-28 | End: 2024-09-29

## 2024-09-28 RX ORDER — HEPARIN SODIUM 5000 [USP'U]/ML
5000 INJECTION, SOLUTION INTRAVENOUS; SUBCUTANEOUS EVERY 8 HOURS SCHEDULED
Status: DISCONTINUED | OUTPATIENT
Start: 2024-09-28 | End: 2024-10-03 | Stop reason: HOSPADM

## 2024-09-28 RX ORDER — ONDANSETRON 2 MG/ML
4 INJECTION INTRAMUSCULAR; INTRAVENOUS EVERY 6 HOURS PRN
Status: DISCONTINUED | OUTPATIENT
Start: 2024-09-28 | End: 2024-10-03 | Stop reason: HOSPADM

## 2024-09-28 RX ADMIN — CEFAZOLIN SODIUM 2000 MG: 2 SOLUTION INTRAVENOUS at 21:33

## 2024-09-28 RX ADMIN — MIDAZOLAM 2 MG: 1 INJECTION INTRAMUSCULAR; INTRAVENOUS at 21:00

## 2024-09-28 RX ADMIN — SODIUM CHLORIDE 100 ML/HR: 0.9 INJECTION, SOLUTION INTRAVENOUS at 23:25

## 2024-09-28 RX ADMIN — SODIUM CHLORIDE 1000 ML: 0.9 INJECTION, SOLUTION INTRAVENOUS at 19:29

## 2024-09-28 RX ADMIN — HEPARIN SODIUM 5000 UNITS: 5000 INJECTION INTRAVENOUS; SUBCUTANEOUS at 21:36

## 2024-09-28 RX ADMIN — IOHEXOL 100 ML: 350 INJECTION, SOLUTION INTRAVENOUS at 20:23

## 2024-09-28 RX ADMIN — ONDANSETRON 4 MG: 2 INJECTION INTRAMUSCULAR; INTRAVENOUS at 19:29

## 2024-09-28 NOTE — ED PROVIDER NOTES
Final diagnoses:   Bowel obstruction (HCC)     ED Disposition       ED Disposition   Admit    Condition   Stable    Date/Time   Sat Sep 28, 2024  8:48 PM    Comment   --             Assessment & Plan       Medical Decision Making  Differential diagnosis includes ileus, bowel obstruction, perforation, pancreatitis, acute coronary syndrome, acute cholecystitis, diverticulitis, obstructive uropathy, acute appendicitis and other causes of abdominal pain.  Laboratory evaluation was nonspecific.  EKG was nonischemic.  CAT scan shows a markedly distended stomach with air-fluid level..  He also has fluid-filled loops of distended small bowel with air-fluid levels.  No free air.  Consulted with general surgery.  Will admit.  Recommended NG tube.  NG tube ordered.    Amount and/or Complexity of Data Reviewed  Labs: ordered. Decision-making details documented in ED Course.  Radiology: ordered and independent interpretation performed. Decision-making details documented in ED Course.  ECG/medicine tests: ordered and independent interpretation performed. Decision-making details documented in ED Course.  Discussion of management or test interpretation with external provider(s): General surgery    Risk  Prescription drug management.  Decision regarding hospitalization.             Medications   sodium chloride 0.9 % infusion (has no administration in time range)   docusate sodium (COLACE) capsule 100 mg (has no administration in time range)   ondansetron (ZOFRAN) injection 4 mg (has no administration in time range)   heparin (porcine) subcutaneous injection 5,000 Units (has no administration in time range)   ceFAZolin (ANCEF) IVPB (premix in dextrose) 2,000 mg 50 mL (has no administration in time range)   morphine injection 2 mg (has no administration in time range)   midazolam (VERSED) injection 2 mg (has no administration in time range)   sodium chloride 0.9 % bolus 1,000 mL (1,000 mL Intravenous New Bag 9/28/24 1929)    ondansetron (ZOFRAN) injection 4 mg (4 mg Intravenous Given 9/28/24 1929)   iohexol (OMNIPAQUE) 350 MG/ML injection (MULTI-DOSE) 100 mL (100 mL Intravenous Given 9/28/24 2023)       ED Risk Strat Scores                   Identification of Seniors at Risk      Flowsheet Row Most Recent Value   (ISAR) Identification of Seniors at Risk    Before the illness or injury that brought you to the Emergency, did you need someone to help you on a regular basis? 0 Filed at: 09/28/2024 1754   In the last 24 hours, have you needed more help than usual? 1 Filed at: 09/28/2024 1754   Have you been hospitalized for one or more nights during the past 6 months? 1 Filed at: 09/28/2024 1754   In general, do you see well? 0 Filed at: 09/28/2024 1754   In general, do you have serious problems with your memory? 0 Filed at: 09/28/2024 1754   Do you take more than three different medications every day? 1 Filed at: 09/28/2024 1754   ISAR Score 3 Filed at: 09/28/2024 1754                SBIRT 22yo+      Flowsheet Row Most Recent Value   Initial Alcohol Screen: US AUDIT-C     1. How often do you have a drink containing alcohol? 0 Filed at: 09/28/2024 1754   2. How many drinks containing alcohol do you have on a typical day you are drinking?  0 Filed at: 09/28/2024 1754   3a. Male UNDER 65: How often do you have five or more drinks on one occasion? 0 Filed at: 09/28/2024 1754   3b. FEMALE Any Age, or MALE 65+: How often do you have 4 or more drinks on one occassion? 0 Filed at: 09/28/2024 1754   Audit-C Score 0 Filed at: 09/28/2024 1754   REY: How many times in the past year have you...    Used an illegal drug or used a prescription medication for non-medical reasons? Never Filed at: 09/28/2024 1754                            History of Present Illness       Chief Complaint   Patient presents with    Post-op Problem     Had colon resection done on Monday, has not been able to eat since this morning, vomiting all day. Had abd distention post  op but yesterday doubled in size and today is worse. Only passing liquid stools       Past Medical History:   Diagnosis Date    Coronary artery disease     Disease of thyroid gland     Hyperlipidemia     Hypertension     Myocardial infarction (HCC)       Past Surgical History:   Procedure Laterality Date    ANKLE FUSION Left 10/10/2023    CHOLECYSTECTOMY      COLONOSCOPY      CORONARY ANGIOPLASTY WITH STENT PLACEMENT      HEMICOLOECTOMY W/ ANASTOMOSIS N/A 2024    Procedure: RESECTION COLON RIGHT, EXTENSIVE LYSIS OF ADHESIONS;  Surgeon: Corey Miranda MD;  Location: [facility];  Service: General    KNEE SURGERY Left     NECK SURGERY Right     ORIF TIBIA & FIBULA FRACTURES Left 10/29/2023    Procedure: OPEN REDUCTION W/ INTERNAL FIXATION (ORIF) ANKLE- ORIF Left ankle;  Surgeon: Myke Hamilton MD;  Location: [facility];  Service: Orthopedics    TONSILLECTOMY      TOOTH EXTRACTION      WISDOM TOOTH EXTRACTION        Family History   Problem Relation Age of Onset    Heart disease Mother         84    Colon cancer Father       Social History     Tobacco Use    Smoking status: Former     Current packs/day: 0.00     Average packs/day: 1 pack/day for 54.4 years (53.7 ttl pk-yrs)     Types: Cigarettes     Start date: 1970     Quit date: 2024     Years since quittin.1     Passive exposure: Past    Smokeless tobacco: Never    Tobacco comments:     down to 1/2 ppd   Vaping Use    Vaping status: Never Used   Substance Use Topics    Alcohol use: Yes     Alcohol/week: 1.0 standard drink of alcohol     Types: 1 Standard drinks or equivalent per week    Drug use: No      E-Cigarette/Vaping    E-Cigarette Use Never User       E-Cigarette/Vaping Substances    Nicotine No     THC No     CBD No     Flavoring No     Other No     Unknown No       I have reviewed and agree with the history as documented.     Patient is a 74-year-old male.  On Monday he had a colectomy for colon cancer.  He was passing gas.  He had  liquid stools.  He was tolerating p.o.  This evening he developed nausea and vomiting associated with abdominal distention.  The vomiting was not bilious.  It was not bloody.  He is not complaining of pain just abdominal pressure.  No chest pain.  No flank pain.  No fever or chills.  No urinary complaints.        Review of Systems   Constitutional:  Negative for chills and fever.   HENT:  Negative for rhinorrhea and sore throat.    Eyes:  Negative for pain, redness and visual disturbance.   Respiratory:  Negative for cough and shortness of breath.    Cardiovascular:  Negative for chest pain and leg swelling.   Gastrointestinal:  Positive for abdominal pain, diarrhea, nausea and vomiting.   Endocrine: Negative for polydipsia and polyuria.   Genitourinary:  Negative for dysuria, frequency and hematuria.   Musculoskeletal:  Negative for back pain and neck pain.   Skin:  Negative for rash and wound.   Allergic/Immunologic: Negative for immunocompromised state.   Neurological:  Negative for weakness, numbness and headaches.   Psychiatric/Behavioral:  Negative for hallucinations and suicidal ideas.    All other systems reviewed and are negative.          Objective       ED Triage Vitals   Temperature Pulse Blood Pressure Respirations SpO2 Patient Position - Orthostatic VS   09/28/24 1751 09/28/24 1751 09/28/24 1751 09/28/24 1751 09/28/24 1751 09/28/24 1751   97.5 °F (36.4 °C) 95 108/64 20 93 % Sitting      Temp Source Heart Rate Source BP Location FiO2 (%) Pain Score    09/28/24 1751 09/28/24 1751 09/28/24 1751 -- 09/28/24 2001    Oral Monitor Left arm  No Pain      Vitals      Date and Time Temp Pulse SpO2 Resp BP Pain Score FACES Pain Rating User   09/28/24 2001 97.7 °F (36.5 °C) 84 90 % 16 122/70 No Pain -- AN   09/28/24 1751 97.5 °F (36.4 °C) 95 93 % 20 108/64 -- -- TOD            Physical Exam  Vitals reviewed.   Constitutional:       General: He is not in acute distress.     Appearance: He is not toxic-appearing.    HENT:      Head: Normocephalic and atraumatic.      Nose: Nose normal.      Mouth/Throat:      Mouth: Mucous membranes are moist.   Eyes:      General:         Right eye: No discharge.         Left eye: No discharge.      Conjunctiva/sclera: Conjunctivae normal.   Cardiovascular:      Rate and Rhythm: Normal rate and regular rhythm.      Pulses: Normal pulses.      Heart sounds: Normal heart sounds. No murmur heard.     No friction rub. No gallop.   Pulmonary:      Effort: Pulmonary effort is normal. No respiratory distress.      Breath sounds: Normal breath sounds. No stridor. No wheezing, rhonchi or rales.   Abdominal:      General: Bowel sounds are normal. There is distension.      Palpations: Abdomen is soft.      Tenderness: There is no abdominal tenderness. There is no right CVA tenderness, left CVA tenderness, guarding or rebound.      Comments: Abdominal incision well-approximated.  No erythema or drainage.   Musculoskeletal:         General: No swelling, tenderness, deformity or signs of injury. Normal range of motion.      Cervical back: Normal range of motion and neck supple. No rigidity.      Right lower leg: No edema.      Left lower leg: No edema.      Comments: No calf pain or unilateral leg swelling   Skin:     General: Skin is warm and dry.      Coloration: Skin is not jaundiced or pale.      Findings: No bruising, erythema or rash.   Neurological:      General: No focal deficit present.      Mental Status: He is alert and oriented to person, place, and time.      Cranial Nerves: No facial asymmetry.      Sensory: No sensory deficit.      Motor: Motor function is intact.   Psychiatric:         Mood and Affect: Mood normal.         Behavior: Behavior normal.         Results Reviewed       Procedure Component Value Units Date/Time    Platelet count [[id number]]     Lab Status: No result Specimen: Blood     Comprehensive metabolic panel [[id number]]  (Abnormal) Collected: 09/28/24 1938    Lab Status:  Final result Specimen: Blood from Arm, Right Updated: 09/28/24 2009     Sodium 128 mmol/L      Potassium 3.6 mmol/L      Chloride 92 mmol/L      CO2 26 mmol/L      ANION GAP 10 mmol/L      BUN 18 mg/dL      Creatinine 1.44 mg/dL      Glucose 136 mg/dL      Calcium 9.5 mg/dL      AST 44 U/L      ALT 31 U/L      Alkaline Phosphatase 79 U/L      Total Protein 7.4 g/dL      Albumin 4.0 g/dL      Total Bilirubin 0.39 mg/dL      eGFR 47 ml/min/1.73sq m     Narrative:      [employer] guidelines for Chronic Kidney Disease (CKD):     Stage 1 with normal or high GFR (GFR > 90 mL/min/1.73 square meters)    Stage 2 Mild CKD (GFR = 60-89 mL/min/1.73 square meters)    Stage 3A Moderate CKD (GFR = 45-59 mL/min/1.73 square meters)    Stage 3B Moderate CKD (GFR = 30-44 mL/min/1.73 square meters)    Stage 4 Severe CKD (GFR = 15-29 mL/min/1.73 square meters)    Stage 5 End Stage CKD (GFR <15 mL/min/1.73 square meters)  Note: GFR calculation is accurate only with a steady state creatinine    Lipase [[id number]]  (Normal) Collected: 09/28/24 1938    Lab Status: Final result Specimen: Blood from Arm, Right Updated: 09/28/24 2009     Lipase 13 u/L     CBC and differential [[id number]]  (Abnormal) Collected: 09/28/24 1938    Lab Status: Final result Specimen: Blood from Arm, Right Updated: 09/28/24 1945     WBC 10.74 Thousand/uL      RBC 4.29 Million/uL      Hemoglobin 12.0 g/dL      Hematocrit 36.9 %      MCV 86 fL      MCH 28.0 pg      MCHC 32.5 g/dL      RDW 13.5 %      MPV 8.5 fL      Platelets 337 Thousands/uL      nRBC 0 /100 WBCs      Segmented % 83 %      Immature Grans % 0 %      Lymphocytes % 9 %      Monocytes % 6 %      Eosinophils Relative 1 %      Basophils Relative 1 %      Absolute Neutrophils 8.93 Thousands/µL      Absolute Immature Grans 0.04 Thousand/uL      Absolute Lymphocytes 0.93 Thousands/µL      Absolute Monocytes 0.66 Thousand/µL      Eosinophils Absolute 0.13 Thousand/µL      Basophils  Absolute 0.05 Thousands/µL             CT abdomen pelvis with contrast   ED Interpretation by Tarun Odonnell MD (09/28 2027)   Multiple air-fluid levels.  No free air.          ECG 12 Lead Documentation Only    Date/Time: 9/28/2024 7:45 PM    Performed by: Tarun Odonnell MD  Authorized by: Tarun Odonnell MD    ECG reviewed by me, the ED Provider: yes    Patient location:  ED  Comments:      Normal sinus rhythm with sinus arrhythmia.  Possible lateral infarct, age undetermined.  Inferior infarct, present on old EKG, abnormal EKG.  No STEMI.      ED Medication and Procedure Management   Prior to Admission Medications   Prescriptions Last Dose Informant Patient Reported? Taking?   aspirin 81 mg chewable tablet  Self No No   Sig: Chew 1 tablet (81 mg total) every 12 (twelve) hours for 28 days   Patient taking differently: Chew 81 mg daily   levothyroxine (Synthroid) 150 mcg tablet  Self No No   Sig: Take 1 tablet (150 mcg total) by mouth daily   metoprolol tartrate (LOPRESSOR) 25 mg tablet  Self No No   Sig: TAKE ONE TABLET BY MOUTH EVERY TWELVE HOURS   multivitamin (THERAGRAN) TABS  Self Yes No   Sig: Take 1 tablet by mouth daily   nicotine (NICODERM CQ) 14 mg/24hr TD 24 hr patch  Self No No   Sig: Place 1 patch on the skin over 24 hours daily Do not start before October 31, 2023.   oxyCODONE (ROXICODONE) 5 immediate release tablet   No No   Sig: Take 1 tablet (5 mg total) by mouth every 4 (four) hours as needed for moderate pain for up to 3 days Max Daily Amount: 30 mg      Facility-Administered Medications: None     Patient's Medications   Discharge Prescriptions    No medications on file     No discharge procedures on file.  ED SEPSIS DOCUMENTATION   Time reflects when diagnosis was documented in both MDM as applicable and the Disposition within this note       Time User Action Codes Description Comment    9/28/2024  8:40 PM Corey Miranda Add [E87.1] Hyponatremia     9/28/2024  8:40 PM Corey Miranda  Remove [E87.1] Hyponatremia     9/28/2024  8:40 PM Corey Miranda Add [I10] Essential (primary) hypertension     9/28/2024  8:40 PM Corey Miranda [E78.2] Mixed hyperlipidemia     9/28/2024  8:48 PM Tarun Odonnell Add [K56.609] Bowel obstruction (HCC)                  Tarun Odonnell MD  09/28/24 2048

## 2024-09-29 ENCOUNTER — APPOINTMENT (INPATIENT)
Dept: RADIOLOGY | Facility: HOSPITAL | Age: 74
DRG: 394 | End: 2024-09-29
Payer: MEDICARE

## 2024-09-29 PROBLEM — R09.02 HYPOXIA: Status: ACTIVE | Noted: 2024-09-29

## 2024-09-29 PROBLEM — K56.7 ILEUS (HCC): Status: ACTIVE | Noted: 2024-09-29

## 2024-09-29 LAB
ALBUMIN SERPL BCG-MCNC: 3.7 G/DL (ref 3.5–5)
ALP SERPL-CCNC: 76 U/L (ref 34–104)
ALT SERPL W P-5'-P-CCNC: 26 U/L (ref 7–52)
ANION GAP SERPL CALCULATED.3IONS-SCNC: 9 MMOL/L (ref 4–13)
AST SERPL W P-5'-P-CCNC: 33 U/L (ref 13–39)
ATRIAL RATE: 72 BPM
BASOPHILS # BLD AUTO: 0.03 THOUSANDS/ÂΜL (ref 0–0.1)
BASOPHILS NFR BLD AUTO: 0 % (ref 0–1)
BILIRUB SERPL-MCNC: 0.34 MG/DL (ref 0.2–1)
BUN SERPL-MCNC: 20 MG/DL (ref 5–25)
CALCIUM SERPL-MCNC: 9.1 MG/DL (ref 8.4–10.2)
CHLORIDE SERPL-SCNC: 94 MMOL/L (ref 96–108)
CO2 SERPL-SCNC: 29 MMOL/L (ref 21–32)
CREAT SERPL-MCNC: 1.39 MG/DL (ref 0.6–1.3)
EOSINOPHIL # BLD AUTO: 0.02 THOUSAND/ÂΜL (ref 0–0.61)
EOSINOPHIL NFR BLD AUTO: 0 % (ref 0–6)
ERYTHROCYTE [DISTWIDTH] IN BLOOD BY AUTOMATED COUNT: 13.7 % (ref 11.6–15.1)
GFR SERPL CREATININE-BSD FRML MDRD: 49 ML/MIN/1.73SQ M
GLUCOSE SERPL-MCNC: 123 MG/DL (ref 65–140)
HCT VFR BLD AUTO: 32.5 % (ref 36.5–49.3)
HGB BLD-MCNC: 10.5 G/DL (ref 12–17)
IMM GRANULOCYTES # BLD AUTO: 0.03 THOUSAND/UL (ref 0–0.2)
IMM GRANULOCYTES NFR BLD AUTO: 0 % (ref 0–2)
LYMPHOCYTES # BLD AUTO: 0.8 THOUSANDS/ÂΜL (ref 0.6–4.47)
LYMPHOCYTES NFR BLD AUTO: 7 % (ref 14–44)
MAGNESIUM SERPL-MCNC: 2 MG/DL (ref 1.9–2.7)
MCH RBC QN AUTO: 28.5 PG (ref 26.8–34.3)
MCHC RBC AUTO-ENTMCNC: 32.3 G/DL (ref 31.4–37.4)
MCV RBC AUTO: 88 FL (ref 82–98)
MONOCYTES # BLD AUTO: 0.68 THOUSAND/ÂΜL (ref 0.17–1.22)
MONOCYTES NFR BLD AUTO: 6 % (ref 4–12)
NEUTROPHILS # BLD AUTO: 9.35 THOUSANDS/ÂΜL (ref 1.85–7.62)
NEUTS SEG NFR BLD AUTO: 87 % (ref 43–75)
NRBC BLD AUTO-RTO: 0 /100 WBCS
P AXIS: 45 DEGREES
PHOSPHATE SERPL-MCNC: 5.7 MG/DL (ref 2.3–4.1)
PLATELET # BLD AUTO: 302 THOUSANDS/UL (ref 149–390)
PMV BLD AUTO: 8.8 FL (ref 8.9–12.7)
POTASSIUM SERPL-SCNC: 3.8 MMOL/L (ref 3.5–5.3)
PR INTERVAL: 148 MS
PROT SERPL-MCNC: 7 G/DL (ref 6.4–8.4)
QRS AXIS: 43 DEGREES
QRSD INTERVAL: 88 MS
QT INTERVAL: 404 MS
QTC INTERVAL: 442 MS
RBC # BLD AUTO: 3.69 MILLION/UL (ref 3.88–5.62)
SODIUM SERPL-SCNC: 132 MMOL/L (ref 135–147)
T WAVE AXIS: 28 DEGREES
VENTRICULAR RATE: 72 BPM
WBC # BLD AUTO: 10.91 THOUSAND/UL (ref 4.31–10.16)

## 2024-09-29 PROCEDURE — 83735 ASSAY OF MAGNESIUM: CPT | Performed by: SURGERY

## 2024-09-29 PROCEDURE — 99024 POSTOP FOLLOW-UP VISIT: CPT | Performed by: PHYSICIAN ASSISTANT

## 2024-09-29 PROCEDURE — 85025 COMPLETE CBC W/AUTO DIFF WBC: CPT | Performed by: SURGERY

## 2024-09-29 PROCEDURE — 84100 ASSAY OF PHOSPHORUS: CPT | Performed by: SURGERY

## 2024-09-29 PROCEDURE — 80053 COMPREHEN METABOLIC PANEL: CPT | Performed by: SURGERY

## 2024-09-29 PROCEDURE — 93010 ELECTROCARDIOGRAM REPORT: CPT | Performed by: INTERNAL MEDICINE

## 2024-09-29 PROCEDURE — 74022 RADEX COMPL AQT ABD SERIES: CPT

## 2024-09-29 PROCEDURE — 99223 1ST HOSP IP/OBS HIGH 75: CPT | Performed by: STUDENT IN AN ORGANIZED HEALTH CARE EDUCATION/TRAINING PROGRAM

## 2024-09-29 RX ORDER — NICOTINE 21 MG/24HR
14 PATCH, TRANSDERMAL 24 HOURS TRANSDERMAL DAILY
Status: DISCONTINUED | OUTPATIENT
Start: 2024-09-29 | End: 2024-09-30

## 2024-09-29 RX ORDER — SODIUM CHLORIDE 9 MG/ML
125 INJECTION, SOLUTION INTRAVENOUS CONTINUOUS
Status: DISCONTINUED | OUTPATIENT
Start: 2024-09-29 | End: 2024-10-01

## 2024-09-29 RX ADMIN — HEPARIN SODIUM 5000 UNITS: 5000 INJECTION INTRAVENOUS; SUBCUTANEOUS at 22:07

## 2024-09-29 RX ADMIN — HEPARIN SODIUM 5000 UNITS: 5000 INJECTION INTRAVENOUS; SUBCUTANEOUS at 14:26

## 2024-09-29 RX ADMIN — ONDANSETRON 4 MG: 2 INJECTION INTRAMUSCULAR; INTRAVENOUS at 01:31

## 2024-09-29 RX ADMIN — SODIUM CHLORIDE 125 ML/HR: 0.9 INJECTION, SOLUTION INTRAVENOUS at 18:26

## 2024-09-29 RX ADMIN — SODIUM CHLORIDE 125 ML/HR: 0.9 INJECTION, SOLUTION INTRAVENOUS at 09:14

## 2024-09-29 RX ADMIN — NICOTINE 14 MG: 14 PATCH, EXTENDED RELEASE TRANSDERMAL at 11:51

## 2024-09-29 RX ADMIN — TRIMETHOBENZAMIDE HYDROCHLORIDE 200 MG: 100 INJECTION INTRAMUSCULAR at 05:27

## 2024-09-29 RX ADMIN — HEPARIN SODIUM 5000 UNITS: 5000 INJECTION INTRAVENOUS; SUBCUTANEOUS at 05:27

## 2024-09-29 NOTE — PLAN OF CARE
Problem: PAIN - ADULT  Goal: Verbalizes/displays adequate comfort level or baseline comfort level  Description: Interventions:  - Encourage patient to monitor pain and request assistance  - Assess pain using appropriate pain scale  - Administer analgesics based on type and severity of pain and evaluate response  - Implement non-pharmacological measures as appropriate and evaluate response  - Consider cultural and social influences on pain and pain management  - Notify physician/advanced practitioner if interventions unsuccessful or patient reports new pain  Outcome: Progressing     Problem: INFECTION - ADULT  Goal: Absence or prevention of progression during hospitalization  Description: INTERVENTIONS:  - Assess and monitor for signs and symptoms of infection  - Monitor lab/diagnostic results  - Monitor all insertion sites, i.e. indwelling lines, tubes, and drains  - Monitor endotracheal if appropriate and nasal secretions for changes in amount and color  - [place] appropriate cooling/warming therapies per order  - Administer medications as ordered  - Instruct and encourage patient and family to use good hand hygiene technique  - Identify and instruct in appropriate isolation precautions for identified infection/condition  Outcome: Progressing  Goal: Absence of fever/infection during neutropenic period  Description: INTERVENTIONS:  - Monitor WBC    Outcome: Progressing     Problem: SAFETY ADULT  Goal: Patient will remain free of falls  Description: INTERVENTIONS:  - Educate patient/family on patient safety including physical limitations  - Instruct patient to call for assistance with activity   - Consult OT/PT to assist with strengthening/mobility   - Keep Call bell within reach  - Keep bed low and locked with side rails adjusted as appropriate  - Keep care items and personal belongings within reach  - Initiate and maintain comfort rounds  - Make Fall Risk Sign visible to staff  - Offer Toileting every 2 Hours,  in advance of need  - Initiate/Maintain alarm  - Obtain necessary fall risk management equipment:   - Apply yellow socks and bracelet for high fall risk patients  - Consider moving patient to room near nurses station  Outcome: Progressing  Goal: Maintain or return to baseline ADL function  Description: INTERVENTIONS:  -  Assess patient's ability to carry out ADLs; assess patient's baseline for ADL function and identify physical deficits which impact ability to perform ADLs (bathing, care of mouth/teeth, toileting, grooming, dressing, etc.)  - Assess/evaluate cause of self-care deficits   - Assess range of motion  - Assess patient's mobility; develop plan if impaired  - Assess patient's need for assistive devices and provide as appropriate  - Encourage maximum independence but intervene and supervise when necessary  - Involve family in performance of ADLs  - Assess for home care needs following discharge   - Consider OT consult to assist with ADL evaluation and planning for discharge  - Provide patient education as appropriate  Outcome: Progressing  Goal: Maintains/Returns to pre admission functional level  Description: INTERVENTIONS:  - Perform AM-PAC 6 Click Basic Mobility/ Daily Activity assessment daily.  - Set and communicate daily mobility goal to care team and patient/family/caregiver.   - Collaborate with rehabilitation services on mobility goals if consulted  - Perform Range of Motion 3 times a day.  - Reposition patient every 2 hours.  - Dangle patient 3 times a day  - Stand patient 3 times a day  - Ambulate patient 3 times a day  - Out of bed to chair 3 times a day   - Out of bed for meals 3 times a day  - Out of bed for toileting  - Record patient progress and toleration of activity level   Outcome: Progressing     Problem: DISCHARGE PLANNING  Goal: Discharge to home or other facility with appropriate resources  Description: INTERVENTIONS:  - Identify barriers to discharge w/patient and caregiver  -  Arrange for needed discharge resources and transportation as appropriate  - Identify discharge learning needs (meds, wound care, etc.)  - Arrange for interpretive services to assist at discharge as needed  - Refer to Case Management Department for coordinating discharge planning if the patient needs post-hospital services based on physician/advanced practitioner order or complex needs related to functional status, cognitive ability, or social support system  Outcome: Progressing     Problem: Knowledge Deficit  Goal: Patient/family/caregiver demonstrates understanding of disease process, treatment plan, medications, and discharge instructions  Description: Complete learning assessment and assess knowledge base.  Interventions:  - Provide teaching at level of understanding  - Provide teaching via preferred learning methods  Outcome: Progressing

## 2024-09-29 NOTE — H&P
GENERAL SURGERY HISTORY AND PHYSICAL    Jace Varghese 74 y.o. male MRN: [id number]  Unit/Bed#: -01 Encounter: [id number]      Assessment & Plan   74y M with post op ileus, s/p R colon resection 9/23/24  HTN  JESSI    Presented with abdominal pain, nausea and vomiting. CT scans show findings consistent with ileus. Ng tube placed in the ED. JESSI w Cr 1.44 on admission, improving to 1.39 today. WBC 10.91, Hgb 10.5    CT AP: Diffusely dilated fluid-filled small bowel loops throughout the abdomen. Although there is mild narrowing at the ileocolic anastomosis, appearance is favored to represent ileus rather than true obstruction. However, continued follow-up recommended to   ensure resolution and exclude developing obstruction  Small amount of gas in the urinary bladder likely secondary to recent instrumentation. If there is concern for infection, correlate with urinalysis.    Plan  Continue with NG tube to LIWS.  Check positioning and advance tube if imaging shows this is needed.    Continue with diet n.p.o. and IV fluids   Analgesia and antiemetics, as needed   Monitor labs and abdominal exams, replete electrolytes   PRN pain medication and anti-emetics  Encourage ambulation  DVT ppx  Incentive spirometry 10 times/hour while awake  Consult internal medicine for medical management  General Surgery primary please contact if any questions  ______________________________________________________________________  Chief Complaint Abdominal pain, Nausea and vomiting    HPI: Jace Varghese is a 74 y.o. year old male with PMHx of hypertension who presented to the emergency department yesterday with abdominal pain, nausea and vomiting.  Patient had recent right colon resection on 9/23/2024.  His hospital stay was uneventful and he was discharged on 9/26/2024.  Patient states he went home and was eating without difficulty Thursday night and throughout Friday.  He states he was having loose watery bowel movements and  passing flatus.  On Saturday he developed abdominal distention, abdominal pain, and nausea and vomiting after eating breakfast which would not resolve prompting presentation to the emergency department.  NG tube was placed in the ED.  Patient states he continued to vomit after tube was placed.  Today he does feel better but feels still distended.  He is not passing any flatus or having bowel movements.    Review of Systems   Constitutional:  Positive for appetite change. Negative for chills and fever.   HENT:  Negative for ear pain and sore throat.    Eyes:  Negative for pain and visual disturbance.   Respiratory:  Negative for cough and shortness of breath.    Cardiovascular:  Negative for chest pain and palpitations.   Gastrointestinal:  Positive for abdominal distention, abdominal pain and diarrhea. Negative for nausea and vomiting.   Genitourinary:  Negative for difficulty urinating, dysuria and hematuria.   Musculoskeletal:  Negative for arthralgias and back pain.   Skin:  Negative for color change and rash.   Neurological:  Negative for dizziness, seizures, syncope and headaches.   Hematological: Negative.    Psychiatric/Behavioral: Negative.     All other systems reviewed and are negative.      Meds/Allergies   Allergies   Allergen Reactions    Atorvastatin Myalgia     all current active meds have been reviewed    Historical Information   Past Medical History:   Diagnosis Date    Coronary artery disease     Disease of thyroid gland     Hyperlipidemia     Hypertension     Myocardial infarction (HCC)      Past Surgical History:   Procedure Laterality Date    ANKLE FUSION Left 10/10/2023    CHOLECYSTECTOMY      COLONOSCOPY      CORONARY ANGIOPLASTY WITH STENT PLACEMENT      HEMICOLOECTOMY W/ ANASTOMOSIS N/A 9/23/2024    Procedure: RESECTION COLON RIGHT, EXTENSIVE LYSIS OF ADHESIONS;  Surgeon: Corey Miranda MD;  Location: [facility];  Service: General    KNEE SURGERY Left     NECK SURGERY Right     ORIF TIBIA &  "FIBULA FRACTURES Left 10/29/2023    Procedure: OPEN REDUCTION W/ INTERNAL FIXATION (ORIF) ANKLE- ORIF Left ankle;  Surgeon: Myke Hamilton MD;  Location: [facility];  Service: Orthopedics    TONSILLECTOMY      TOOTH EXTRACTION      WISDOM TOOTH EXTRACTION       Social History   Social History     Substance and Sexual Activity   Alcohol Use Yes    Alcohol/week: 1.0 standard drink of alcohol    Types: 1 Standard drinks or equivalent per week     Social History     Substance and Sexual Activity   Drug Use No     Social History     Tobacco Use   Smoking Status Former    Current packs/day: 0.00    Average packs/day: 1 pack/day for 54.4 years (53.7 ttl pk-yrs)    Types: Cigarettes    Start date: 1970    Quit date: 2024    Years since quittin.1    Passive exposure: Past   Smokeless Tobacco Never   Tobacco Comments    down to 1/2 ppd       Family History:  Negative/unremarkable except as detailed in HPI.      Objective   Vitals: /78   Pulse 91   Temp 99.4 °F (37.4 °C)   Resp 19   Ht 5' 9\" (1.753 m)   Wt 104 kg (229 lb)   SpO2 98%   BMI 33.82 kg/m² ,Body mass index is 33.82 kg/m².    Intake/Output Summary (Last 24 hours) at 2024 1634  Last data filed at 2024 0907  Gross per 24 hour   Intake 0 ml   Output 3250 ml   Net -3250 ml     Invasive Devices       Peripheral Intravenous Line  Duration             Peripheral IV 24 Distal;Right;Upper;Ventral (anterior) Arm <1 day              Drain  Duration             NG/OG/Enteral Tube Nasogastric 12 Fr Right nare <1 day                    Lab Results: CBC with diff:   Lab Results   Component Value Date    WBC 10.91 (H) 2024    HGB 10.5 (L) 2024    HCT 32.5 (L) 2024    MCV 88 2024     2024    RBC 3.69 (L) 2024    MCH 28.5 2024    MCHC 32.3 2024    RDW 13.7 2024    MPV 8.8 (L) 2024    NRBC 0 2024   , BMP/CMP:   Lab Results   Component Value Date    SODIUM 132 (L) " "09/29/2024    K 3.8 09/29/2024    CL 94 (L) 09/29/2024    CO2 29 09/29/2024    BUN 20 09/29/2024    CREATININE 1.39 (H) 09/29/2024    CALCIUM 9.1 09/29/2024    AST 33 09/29/2024    ALT 26 09/29/2024    ALKPHOS 76 09/29/2024    EGFR 49 09/29/2024       Physical Exam  Vitals: /78   Pulse 91   Temp 99.4 °F (37.4 °C)   Resp 19   Ht 5' 9\" (1.753 m)   Wt 104 kg (229 lb)   SpO2 98%   BMI 33.82 kg/m² ,Body mass index is 33.82 kg/m².  General appearance: AAO x3, no distress, appears stated age, cooperative  HEENT: PERRL, sclera clear, anicterus, oral mucosa is pink  Neck: No carotid bruits, trachea is midline    Back: no tenderness,deformity,   Lungs:clear throughout  Heart:: RRR, S1, S2 normal  Abdomen: Hypoactive bowel sounds, distended, abdomen with mild generalized tenderness.  Midline abdominal incision with Steri-Strips in place, no evidence of infection, no erythema or fluctuance, skin well-approximated.  Extremities: No edema, pulses palpable, no tenderness  Skin: Warm, dry, no rash  Neurologic: CN II-XII grossly intact, no tremor, affect appropriate    Imaging Studies: XR abdomen 1 view kub    Result Date: 9/29/2024  Impression: Nasogastric tube is seen with its tip projecting at the level of the distal esophagus. Advancement is recommended. Multiple dilated loops of air-filled small bowel similar to same-day CT. Findings are again favored to represent ileus, however, distal obstruction may demonstrate a similar appearance. Continued follow-up with abdominal radiography and/or CT is recommended to monitor for resolution. The study was marked in EPIC for immediate notification. Resident: Bart Nunes I, the attending radiologist, have reviewed the images and agree with the final report above. Workstation performed: [id number]     CT abdomen pelvis with contrast    Result Date: 9/28/2024  Impression: Diffusely dilated fluid-filled small bowel loops throughout the abdomen. Although there is mild " narrowing at the ileocolic anastomosis, appearance is favored to represent ileus rather than true obstruction. However, continued follow-up recommended to ensure resolution and exclude developing obstruction Small amount of gas in the urinary bladder likely secondary to recent instrumentation. If there is concern for infection, correlate with urinalysis. The study was marked in EPIC for immediate notification. Workstation performed: [id number]     EKG, Pathology, and Other Studies: Reviewed radiology reports from this admission including: CT abdomen/pelvis.  VTE Prophylaxis: VTE covered by:  heparin (porcine), Subcutaneous, 5,000 Units at 09/29/24 1426    and Sequential compression device (Venodyne)      Code Status: Level 1 - Full Code    Johnna Kowalski PA-C  9/29/2024

## 2024-09-29 NOTE — ASSESSMENT & PLAN NOTE
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  Appearance favored to represent ileus rather than true obstruction.  He was admitted to the General Surgery service and NGT, IVF and NPO is the current recommendation  Continue management per primary surgery service

## 2024-09-29 NOTE — QUICK NOTE
Patient refused his morning Blood work.  I explain to patient the importance of his labs and still refuses. RN Notified

## 2024-09-29 NOTE — ED NOTES
SBAR sent to Charge Nurse, patient is off to Xray and then to  the Unit, AAOx3 resp even and unlabored with no S$S of distress.      Vick Robert RN  09/28/24 0236

## 2024-09-29 NOTE — ASSESSMENT & PLAN NOTE
Noted low Spo2   This is likely related to splinting from pain and abdominal distention however discussed with the patient regarding further workup with a chest x-ray or CT scan.  However he refuses at this time and wants to focus on his abdominal issues. He denied any SOB or chest pain at this time.   He did agree that if this worsened to proceed at least with Chest Xray   For now supportive care with O2 support is recommended

## 2024-09-29 NOTE — CONSULTS
Consultation - Hospitalist   Name: Jace Varghese 74 y.o. 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  Appearance favored to represent ileus rather than true obstruction.  He was admitted to the General Surgery service and NGT, IVF and NPO is the current recommendation  Continue management per primary surgery service   Tobacco abuse  Agreeable for nicotine patch  Essential (primary) hypertension  Holding metoprolol for now   Can add PRN if needed   Hypothyroidism  Holding levothyroxine   If not cleared for PO tomorrow will switch to IV   Ok to hold for today   JESSI (acute kidney injury) ([facility])  Cr on admission 1.44, slightly better today 1.39   Continue fluids   Check BMP in the AM  Hypoxia  Noted low Spo2   This is likely related to splinting from pain and abdominal distention however discussed with the patient regarding further workup with a chest x-ray or CT scan.  However he refuses at this time and wants to focus on his abdominal issues. He denied any SOB or chest pain at this time.   He did agree that if this worsened to proceed at least with Chest Xray   For now supportive care with O2 support is recommended   Hospitalist service will follow.    VTE Pharmacologic Prophylaxis:   Moderate Risk (Score 3-4) - Pharmacological DVT  "Prophylaxis Ordered: heparin.      Anticipated Length of Stay: Patient will be admitted on an inpatient basis with an anticipated length of stay of greater than 2 midnights secondary to Ileus.    History of Present Illness   Chief Complaint: abdominal pain, n/v     Jace Varghese is a 74 y.o. male with a PMH of colon mass s/p recent R hemicolectomy who presents with abdominal pain, n/v, liquid stools. Reported this started Saturday evening and that initially postop he was doing well. This was accompanied by nausea, vomiting and inability to tolerate oral intake. On my evaluation, he reported his pain was about a 5/10 currently. No BM reported since arrival. Last emesis was 7am. No chest pain, sob, fevers, chills.     Review of Systems  10 pt review of systems reviewed with patient and pertinent positive/negatives as per HPI.    I have reviewed the patient's PMH, PSH, Social History, Family History, Meds, and Allergies  Social History:  Marital Status: /Civil Union   Occupation: unknown  Patient Pre-hospital Living Situation: Home  Patient Pre-hospital Level of Mobility: walks  Patient Pre-hospital Diet Restrictions: none    Objective     Vitals:   Blood Pressure: 108/69 (09/29/24 0749)  Pulse: 104 (09/29/24 0749)  Temperature: 97.6 °F (36.4 °C) (09/28/24 2206)  Temp Source: Oral (09/28/24 2206)  Respirations: 19 (09/29/24 0749)  Height: 5' 9\" (175.3 cm) (09/28/24 2206)  Weight - Scale: 104 kg (229 lb) (09/28/24 2206)  SpO2: (!) 83 % (09/29/24 0815)    Physical Exam  NAD  Nonlabored resp on room air  NGT is in place draining bilious contents  Abdominal distension noted with TTP without guarding/rigidity  aaox3  Lines/Drains:  Lines/Drains/Airways       Active Status       Name Placement date Placement time Site Days    NG/OG/Enteral Tube Nasogastric 12 Fr Right nare 09/28/24 2125  Right nare  less than 1                        Additional Data:   Lab Results: I have reviewed the following results: CBC/BMP: "   .     09/29/24  0538   WBC 10.91*   HGB 10.5*   HCT 32.5*      SODIUM 132*   K 3.8   CL 94*   CO2 29   BUN 20   CREATININE 1.39*   GLUC 123   MG 2.0   PHOS 5.7*    , Creatinine Clearance: Estimated Creatinine Clearance: 56.2 mL/min (A) (by C-G formula based on SCr of 1.39 mg/dL (H))., LFTs:   .     09/29/24  0538   AST 33   ALT 26   ALB 3.7   TBILI 0.34   ALKPHOS 76      Results from last 7 days   Lab Units 09/29/24  0538   WBC Thousand/uL 10.91*   HEMOGLOBIN g/dL 10.5*   HEMATOCRIT % 32.5*   PLATELETS Thousands/uL 302   SEGS PCT % 87*   LYMPHO PCT % 7*   MONO PCT % 6   EOS PCT % 0     Results from last 7 days   Lab Units 09/29/24  0538   SODIUM mmol/L 132*   POTASSIUM mmol/L 3.8   CHLORIDE mmol/L 94*   CO2 mmol/L 29   BUN mg/dL 20   CREATININE mg/dL 1.39*   ANION GAP mmol/L 9   CALCIUM mg/dL 9.1   ALBUMIN g/dL 3.7   TOTAL BILIRUBIN mg/dL 0.34   ALK PHOS U/L 76   ALT U/L 26   AST U/L 33   GLUCOSE RANDOM mg/dL 123         Results from last 7 days   Lab Units 09/26/24  0503 09/23/24  0746   POC GLUCOSE mg/dl 124 116     Lab Results   Component Value Date    HGBA1C 6.5 (H) 07/10/2024    HGBA1C 6.1 12/30/2018           Imaging Review: Reviewed radiology reports from this admission including: CT abdomen/pelvis.  Other Studies: No additional pertinent studies reviewed.    Administrative Statements   I have spent a total time of 35+ minutes in caring for this patient on the day of the visit/encounter including Diagnostic results, Risks and benefits of tx options, and Instructions for management.    ** Please Note: This note has been constructed using a voice recognition system. **

## 2024-09-30 ENCOUNTER — PATIENT OUTREACH (OUTPATIENT)
Dept: CASE MANAGEMENT | Facility: OTHER | Age: 74
End: 2024-09-30

## 2024-09-30 LAB
ANION GAP SERPL CALCULATED.3IONS-SCNC: 9 MMOL/L (ref 4–13)
BASOPHILS # BLD AUTO: 0.05 THOUSANDS/ÂΜL (ref 0–0.1)
BASOPHILS NFR BLD AUTO: 1 % (ref 0–1)
BUN SERPL-MCNC: 26 MG/DL (ref 5–25)
CALCIUM SERPL-MCNC: 8.6 MG/DL (ref 8.4–10.2)
CHLORIDE SERPL-SCNC: 97 MMOL/L (ref 96–108)
CO2 SERPL-SCNC: 29 MMOL/L (ref 21–32)
CREAT SERPL-MCNC: 1.38 MG/DL (ref 0.6–1.3)
EOSINOPHIL # BLD AUTO: 0.13 THOUSAND/ÂΜL (ref 0–0.61)
EOSINOPHIL NFR BLD AUTO: 2 % (ref 0–6)
ERYTHROCYTE [DISTWIDTH] IN BLOOD BY AUTOMATED COUNT: 13.9 % (ref 11.6–15.1)
GFR SERPL CREATININE-BSD FRML MDRD: 50 ML/MIN/1.73SQ M
GLUCOSE SERPL-MCNC: 102 MG/DL (ref 65–140)
HCT VFR BLD AUTO: 33 % (ref 36.5–49.3)
HGB BLD-MCNC: 10.7 G/DL (ref 12–17)
IMM GRANULOCYTES # BLD AUTO: 0.03 THOUSAND/UL (ref 0–0.2)
IMM GRANULOCYTES NFR BLD AUTO: 0 % (ref 0–2)
LYMPHOCYTES # BLD AUTO: 1.1 THOUSANDS/ÂΜL (ref 0.6–4.47)
LYMPHOCYTES NFR BLD AUTO: 13 % (ref 14–44)
MAGNESIUM SERPL-MCNC: 2 MG/DL (ref 1.9–2.7)
MCH RBC QN AUTO: 28.4 PG (ref 26.8–34.3)
MCHC RBC AUTO-ENTMCNC: 32.4 G/DL (ref 31.4–37.4)
MCV RBC AUTO: 88 FL (ref 82–98)
MONOCYTES # BLD AUTO: 0.72 THOUSAND/ÂΜL (ref 0.17–1.22)
MONOCYTES NFR BLD AUTO: 9 % (ref 4–12)
NEUTROPHILS # BLD AUTO: 6.39 THOUSANDS/ÂΜL (ref 1.85–7.62)
NEUTS SEG NFR BLD AUTO: 75 % (ref 43–75)
NRBC BLD AUTO-RTO: 0 /100 WBCS
PLATELET # BLD AUTO: 327 THOUSANDS/UL (ref 149–390)
PMV BLD AUTO: 8.5 FL (ref 8.9–12.7)
POTASSIUM SERPL-SCNC: 3.7 MMOL/L (ref 3.5–5.3)
RBC # BLD AUTO: 3.77 MILLION/UL (ref 3.88–5.62)
SODIUM SERPL-SCNC: 135 MMOL/L (ref 135–147)
WBC # BLD AUTO: 8.42 THOUSAND/UL (ref 4.31–10.16)

## 2024-09-30 PROCEDURE — 80048 BASIC METABOLIC PNL TOTAL CA: CPT | Performed by: PHYSICIAN ASSISTANT

## 2024-09-30 PROCEDURE — 85025 COMPLETE CBC W/AUTO DIFF WBC: CPT | Performed by: PHYSICIAN ASSISTANT

## 2024-09-30 PROCEDURE — 99232 SBSQ HOSP IP/OBS MODERATE 35: CPT | Performed by: STUDENT IN AN ORGANIZED HEALTH CARE EDUCATION/TRAINING PROGRAM

## 2024-09-30 PROCEDURE — 99024 POSTOP FOLLOW-UP VISIT: CPT

## 2024-09-30 PROCEDURE — 83735 ASSAY OF MAGNESIUM: CPT | Performed by: PHYSICIAN ASSISTANT

## 2024-09-30 RX ORDER — ASPIRIN 81 MG/1
81 TABLET, CHEWABLE ORAL EVERY 12 HOURS
Status: DISCONTINUED | OUTPATIENT
Start: 2024-09-30 | End: 2024-10-03 | Stop reason: HOSPADM

## 2024-09-30 RX ORDER — LEVOTHYROXINE SODIUM 150 UG/1
150 TABLET ORAL DAILY
Status: DISCONTINUED | OUTPATIENT
Start: 2024-10-01 | End: 2024-10-03 | Stop reason: HOSPADM

## 2024-09-30 RX ORDER — NICOTINE 21 MG/24HR
1 PATCH, TRANSDERMAL 24 HOURS TRANSDERMAL DAILY
Status: DISCONTINUED | OUTPATIENT
Start: 2024-10-01 | End: 2024-10-03 | Stop reason: HOSPADM

## 2024-09-30 RX ORDER — METOPROLOL TARTRATE 25 MG/1
25 TABLET, FILM COATED ORAL EVERY 12 HOURS
Status: DISCONTINUED | OUTPATIENT
Start: 2024-09-30 | End: 2024-10-03 | Stop reason: HOSPADM

## 2024-09-30 RX ADMIN — SODIUM CHLORIDE 125 ML/HR: 0.9 INJECTION, SOLUTION INTRAVENOUS at 04:47

## 2024-09-30 RX ADMIN — NICOTINE 14 MG: 14 PATCH, EXTENDED RELEASE TRANSDERMAL at 08:24

## 2024-09-30 RX ADMIN — METOPROLOL TARTRATE 25 MG: 25 TABLET, FILM COATED ORAL at 17:25

## 2024-09-30 RX ADMIN — HEPARIN SODIUM 5000 UNITS: 5000 INJECTION INTRAVENOUS; SUBCUTANEOUS at 13:19

## 2024-09-30 RX ADMIN — HEPARIN SODIUM 5000 UNITS: 5000 INJECTION INTRAVENOUS; SUBCUTANEOUS at 22:15

## 2024-09-30 RX ADMIN — DOCUSATE SODIUM 100 MG: 100 CAPSULE, LIQUID FILLED ORAL at 17:01

## 2024-09-30 RX ADMIN — HEPARIN SODIUM 5000 UNITS: 5000 INJECTION INTRAVENOUS; SUBCUTANEOUS at 05:01

## 2024-09-30 RX ADMIN — ASPIRIN 81 MG: 81 TABLET, CHEWABLE ORAL at 17:25

## 2024-09-30 RX ADMIN — SODIUM CHLORIDE 125 ML/HR: 0.9 INJECTION, SOLUTION INTRAVENOUS at 13:18

## 2024-09-30 NOTE — ASSESSMENT & PLAN NOTE
# [phone]   Pt Mobile phone         Pt said he saw his \"hearing doctor\" today and said he said pt should contact pcp's office and schedule getting his ears flushed out.   Pt has appt next Thursday and wants to speak to the nurse, asking if this is something that can be done at that appt.     Please advise,    Creatinine trending down 1.44 on admission, 1.38 on morning labs.  Continue fluids.  Continue to monitor.

## 2024-09-30 NOTE — CASE MANAGEMENT
Case Management Assessment & Discharge Planning Note    Patient name Jace Varghese  Location /-01 MRN [id number]  : 1950 Date 2024       Current Admission Date: 2024  Current Admission Diagnosis:Ileus (HCC)   Patient Active Problem List    Diagnosis Date Noted Date Diagnosed    Ileus (HCC) 2024     Hypoxia 2024     Hyponatremia 2024     JESSI (acute kidney injury) (HCC) 2024     Primary hypertension 2024     Malignant neoplasm of ascending colon (HCC) 2024     Preop cardiovascular exam 2024     Aneurysm of infrarenal abdominal aorta (HCC) 2024     CAD (coronary artery disease) 2024     Hypertensive kidney disease with chronic kidney disease stage III (HCC) 2024     S/P ORIF (open reduction internal fixation) fracture 2023     Closed trimalleolar fracture of left ankle with routine healing, subsequent encounter 2023     Hypothyroidism 10/29/2023     Closed trimalleolar fracture 10/28/2023     Class 1 obesity due to excess calories 2023     Essential (primary) hypertension 2022     Family history of colon cancer in father 2022     Stage 3a chronic kidney disease (HCC) 2022     S/P right coronary artery (RCA) stent placement 2019     Mixed hyperlipidemia 2019     Tobacco abuse 2018     History of cancer tonsil 2018       LOS (days): 2  Geometric Mean LOS (GMLOS) (days): 3  Days to GMLOS:1.2     OBJECTIVE:  PATIENT READMITTED TO HOSPITAL  Risk of Unplanned Readmission Score: 18.67         Current admission status: Inpatient       Preferred Pharmacy:   [id number] - [place], [place] - [address] [address]  Phone: [phone] Fax: [phone]    Primary Care Provider: Baltazar Naidu MD    Primary Insurance: MEDICARE  Secondary Insurance:     ASSESSMENT:  Active Health Care Proxies        "Jiankeegankatelynn Hamida Health Care Agent - Spouse   Primary Phone: [phone] (Mobile)                 Advance Directives  Does patient have a Health Care POA?: Yes  Does patient have Advance Directives?: Yes  Advance Directives: Living will, Power of  for health care  Was patient offered paperwork?: Yes  Primary Contact: Hamida, spouse         Readmission Root Cause  30 Day Readmission: Yes  During your hospital stay, did someone (provider, nurse, ) explain your care to you in a way you could understand?: Yes  Did you feel medically stable to leave the hospital?: Yes  Were you able to pay for your medication at the pharmacy?: Yes  Did you have reliable transportation to take you to your appointments?: Yes  During previous admission, was a post-acute recommendation made?: No  Patient was readmitted due to: \"bowels stopped working\"  Action Plan: NGT, NPO    Patient Information  Admitted from:: Home  Mental Status: Alert  During Assessment patient was accompanied by: Not accompanied during assessment  Assessment information provided by:: Patient  Primary Caregiver: Self  Support Systems: Spouse/significant other  County of Residence: [place]  What [facility] do you live in?: [place]  Home entry access options. Select all that apply.: Stairs  Number of steps to enter home.: 2  Do the steps have railings?: Yes  Type of Current Residence: 2 story home  Upon entering residence, is there a bedroom on the main floor (no further steps)?: No  A bedroom is located on the following floor levels of residence (select all that apply):: 2nd Floor  Upon entering residence, is there a bathroom on the main floor (no further steps)?: No  Indicate which floors of current residence have a bathroom (select all the apply):: 2nd Floor  Number of steps to 2nd floor from main floor: One Flight  Living Arrangements: Lives w/ Spouse/significant other    Activities of Daily Living Prior to Admission  Functional Status: " Independent  Completes ADLs independently?: Yes  Ambulates independently?: Yes  Does patient use assisted devices?: No  Does patient currently own DME?: No  Does patient have a history of Outpatient Therapy (PT/OT)?: No  Does the patient have a history of Short-Term Rehab?: No  Does patient have a history of HHC?: No  Does patient currently have HHC?: No         Patient Information Continued  Income Source: SSI/SSD  Does patient have prescription coverage?: Yes  Does patient receive dialysis treatments?: No  Does patient have a history of substance abuse?: No  Does patient have a history of Mental Health Diagnosis?: No         Means of Transportation  Means of Transport to Appts:: Drives Self      Social Determinants of Health (SDOH)      Flowsheet Row Most Recent Value   Housing Stability    In the last 12 months, was there a time when you were not able to pay the mortgage or rent on time? N   In the past 12 months, how many times have you moved where you were living? 0   At any time in the past 12 months, were you homeless or living in a shelter (including now)? N   Transportation Needs    In the past 12 months, has lack of transportation kept you from medical appointments or from getting medications? no   In the past 12 months, has lack of transportation kept you from meetings, work, or from getting things needed for daily living? No   Food Insecurity    Within the past 12 months, you worried that your food would run out before you got the money to buy more. Never true   Within the past 12 months, the food you bought just didn't last and you didn't have money to get more. Never true   Utilities    In the past 12 months has the electric, gas, oil, or water company threatened to shut off services in your home? No            DISCHARGE DETAILS:    Discharge planning discussed with:: Patient at the bedside  Freedom of Choice: Yes  Comments - Freedom of Choice: FOC maintained in discussion regarding discharge planning.  Patient is alert oriented and competent to make decisions. Introduced self and role, explained role of CM in arranging services such as DME, STR, HHC, and providing community resource information. Discussed current living situation and needs at discharge. Patient is IPTA. CM offered HHC, STR, DME, PCP, OP CM, community resources_. Patient declined CM resources at this time. Does not use DME. Pt is aware and encouraged to seek CM for any questions or concerns. CM continues to follow.  CM contacted family/caregiver?: No- see comments  Were Treatment Team discharge recommendations reviewed with patient/caregiver?: Yes  Did patient/caregiver verbalize understanding of patient care needs?: Yes  Were patient/caregiver advised of the risks associated with not following Treatment Team discharge recommendations?: Yes    Contacts  Patient Contacts: pranav eMi  Relationship to Patient:: Family  Reason/Outcome: Emergency Contact    Requested Home Health Care         Is the patient interested in HHC at discharge?: No    DME Referral Provided  Referral made for DME?: No    Other Referral/Resources/Interventions Provided:  Interventions: Declines resources  Referral Comments: CM will continue to follow for needs.    Would you like to participate in our [facility] service program?  : No - Declined    Treatment Team Recommendation: Home  Discharge Destination Plan:: Home  Transport at Discharge : Family

## 2024-09-30 NOTE — ASSESSMENT & PLAN NOTE
Assessment:  73 yo M with post-op ileus S/p right hemicolectomy 9/23/24  Discharged 9/26 s/p surgery. Returned to ED with pain, n/v, 9/28.   9/28 CT showed dilated small bowel loops consistent with ileus.   NG tube in place, NPO.  AVSS; WBC 8.42; Hgb 10.7 (trending up) Cr 1.38 (trending down)  Feels much less bloated today with return of bowel function (x4 BM this morning).  Has some incisional pain appropriate post-operatively.  Incision site is well-approximated and c/d/I without signs of infection.     Plan:  Clamp NG tube. Initiate PO trial with clear liquids. Instructed patient to take small sips , drink slowly and immediately notify his nurse if he feels nauseated in order to reinitiate NG tube suction.   If patient tolerates PO trial, can remove NG tube and continue clear liquid diet.  Analgesia and antiemetics PRN  Monitor labs and replete electrolytes.  Encourage OOB and ambulation.  DVT ppx.   Incentive spirometer 10x/hr while awake  Appreciate medicine recommendations for medical management.

## 2024-09-30 NOTE — QUICK NOTE
Saw patient on afternoon rounds with physician. He was tolerating PO trial without complaints of nausea/vomiting or abdominal pain. +bowel function. NG tube was removed at bedside. Patient tolerated well.

## 2024-09-30 NOTE — PROGRESS NOTES
OP CM rcvd SDOH/ housing referral on pt.  Pt resides with his wife in a two story home.   Pt indep prior to admission and driving self to appts.  Pt is currently admitted.  Please reconsult OP CM for any needs.

## 2024-09-30 NOTE — ASSESSMENT & PLAN NOTE
Ok to hold levothyroxine for now given long half-life   Will resume tomorrow PO if tolerating diet

## 2024-09-30 NOTE — ASSESSMENT & PLAN NOTE
Can give levothyroxine PO if PO trial successful, otherwise IV is appropriate.  Medicine following.

## 2024-09-30 NOTE — ASSESSMENT & PLAN NOTE
Noted low Spo2   Improving with resolution of his abdominal pain  He did agree that if symptoms or o2 requirement increased to agree with Chest Xray   For now supportive care with O2 support is recommended. IS/flutter valve, early mobilization also recommended

## 2024-09-30 NOTE — ASSESSMENT & PLAN NOTE
Patient had a recent right hemicolectomy for malignancy of the ascending colon with General Surgery at [facility]  Recently discharged 9/26   He reported abdominal pain, liquid stools, n/v that started 9/28 and worsened so he came to the ED  CTAP showed Diffusely dilated fluid-filled small bowel loops throughout the abdomen.  Appearance favored to represent ileus rather than true obstruction.  He was admitted to the General Surgery service and initial recommendations were for NGT, IVF and NPO is the current recommendation  He reported 3 Bms overnight and abdominal pain is much better.   Cleared for Clear Liquid diet per gen surg and NGT was clamped   Continue management per primary surgery service

## 2024-09-30 NOTE — PROGRESS NOTES
"Progress Note - Surgery-General   Name: Jace Varghese 74 y.o. male I MRN: [id number]  Unit/Bed#: MS 306Mihaela I Date of Admission: 9/28/2024   Date of Service: 9/30/2024 I Hospital Day: 2     Assessment & Plan  Ileus ([facility])  Assessment:  75 yo M with post-op ileus S/p right hemicolectomy 9/23/24  Discharged 9/26 s/p surgery. Returned to ED with pain, n/v, 9/28.   9/28 CT showed dilated small bowel loops consistent with ileus.   NG tube in place, NPO.  AVSS; WBC 8.42; Hgb 10.7 (trending up) Cr 1.38 (trending down)  Feels much less bloated today with return of bowel function (x4 BM this morning).  Has some incisional pain appropriate post-operatively.  Incision site is well-approximated and c/d/I without signs of infection.     Plan:  Clamp NG tube. Initiate PO trial with clear liquids. Instructed patient to take small sips , drink slowly and immediately notify his nurse if he feels nauseated in order to reinitiate NG tube suction.   If patient tolerates PO trial, can remove NG tube and continue clear liquid diet.  Analgesia and antiemetics PRN  Monitor labs and replete electrolytes.  Encourage OOB and ambulation.  DVT ppx.   Incentive spirometer 10x/hr while awake  Appreciate medicine recommendations for medical management.     Tobacco abuse  Encourage cessation.  Continue nicotine patch.    Essential (primary) hypertension  BP stable, can consider metroprolol PRN.  Medicine following.  Hypothyroidism  Can give levothyroxine PO if PO trial successful, otherwise IV is appropriate.  Medicine following.    JESSI (acute kidney injury) ([facility])  Creatinine trending down 1.44 on admission, 1.38 on morning labs.  Continue fluids.  Continue to monitor.            History of Present Illness   Patient feels \"much better\" today. He reports feeling much less bloated/distended today since he had a return of bowel function this morning. He endorses passing gas and four liquid bowel movements today. He has some incisional pain but " other wise denies pain. He denies nausea or vomiting. He is hungry and wants to eat. He has been voiding. He denies fever or chills, CP, or SOB.     Objective      Temp:  [98.5 °F (36.9 °C)-99.4 °F (37.4 °C)] 98.5 °F (36.9 °C)  HR:  [] 87  BP: (120-137)/(76-78) 126/76  O2 Device: Nasal cannula          I/O         09/28 0701 09/29 0700 09/29 0701 09/30 0700 09/30 0701  10/01 0700    P.O. 0      I.V. (mL/kg)  2443.8 (23.5) 1064.6 (10.2)    Total Intake(mL/kg) 0 (0) 2443.8 (23.5) 1064.6 (10.2)    Urine (mL/kg/hr) 200 725 (0.3)     Emesis/NG output 2400 1900 300    Stool   0    Total Output 2600 2625 300    Net -2600 -181.3 +764.6           Unmeasured Stool Occurrence   1 x    Unmeasured Emesis Occurrence 1 x            Lines/Drains/Airways       Active Status       Name Placement date Placement time Site Days    NG/OG/Enteral Tube Nasogastric 12 Fr Right nare 09/28/24 2125  Right nare  1                  Physical Exam  Vitals and nursing note reviewed.   Constitutional:       General: He is not in acute distress.     Appearance: He is well-developed.   HENT:      Head: Normocephalic and atraumatic.   Eyes:      General: No scleral icterus.     Conjunctiva/sclera: Conjunctivae normal.   Cardiovascular:      Rate and Rhythm: Normal rate and regular rhythm.   Pulmonary:      Effort: Pulmonary effort is normal. No respiratory distress.      Breath sounds: Normal breath sounds.   Abdominal:      Palpations: Abdomen is soft.      Comments: Abdomen is soft, non-distended, and appropriately tender around incision site. No guarding or rebound tenderness present. +Bowel sounds.    Incision: midline incision c/d/i and well-approximated.  No bleeding, discharge, purulence, erythema, or foul odor.    Musculoskeletal:         General: No swelling.      Cervical back: Neck supple.   Skin:     General: Skin is warm and dry.      Capillary Refill: Capillary refill takes less than 2 seconds.   Neurological:      Mental Status:  He is alert and oriented to person, place, and time.      Cranial Nerves: No cranial nerve deficit.   Psychiatric:         Mood and Affect: Mood normal.         Behavior: Behavior normal.                Lab Results: I have reviewed the following results:   Imaging Review: No pertinent imaging studies reviewed.  Other Studies: No additional pertinent studies reviewed.    VTE Pharmacologic Prophylaxis: VTE covered by:  heparin (porcine), Subcutaneous, 5,000 Units at 09/30/24 1319     VTE Mechanical Prophylaxis: sequential compression device

## 2024-09-30 NOTE — ASSESSMENT & PLAN NOTE
Cr on admission 1.44, slightly better today 1.39   Baseline likely 1.1-1.2 range   Continue fluids until patient able to take PO adequately   Recheck daily BMP

## 2024-09-30 NOTE — PROGRESS NOTES
Progress Note - Hospitalist   Name: Jace Varghese 74 y.o. male I MRN: [id number]  Unit/Bed#: -01 I Date of Admission: 9/28/2024   Date of Service: 9/30/2024 I Hospital Day: 2    Assessment & Plan  Ileus (HCC)  Patient had a recent right hemicolectomy for malignancy of the ascending colon with General Surgery at [facility]  Recently discharged 9/26   He reported abdominal pain, liquid stools, n/v that started 9/28 and worsened so he came to the ED  CTAP showed Diffusely dilated fluid-filled small bowel loops throughout the abdomen.  Appearance favored to represent ileus rather than true obstruction.  He was admitted to the General Surgery service and initial recommendations were for NGT, IVF and NPO is the current recommendation  He reported 3 Bms overnight and abdominal pain is much better.   Cleared for Clear Liquid diet per gen surg and NGT was clamped   Continue management per primary surgery service   Tobacco abuse  Agreeable for nicotine patch  Essential (primary) hypertension  Holding metoprolol for now   On clear liquid diet -can resume metoprolol pending blood pressures  Hypothyroidism  Ok to hold levothyroxine for now given long half-life   Will resume tomorrow PO if tolerating diet  JESSI (acute kidney injury) ([facility])  Cr on admission 1.44, slightly better today 1.39   Baseline likely 1.1-1.2 range   Continue fluids until patient able to take PO adequately   Recheck daily BMP   Hypoxia  Noted low Spo2   Improving with resolution of his abdominal pain  He did agree that if symptoms or o2 requirement increased to agree with Chest Xray   For now supportive care with O2 support is recommended. IS/flutter valve, early mobilization also recommended     VTE Pharmacologic Prophylaxis:   Moderate Risk (Score 3-4) - Pharmacological DVT Prophylaxis Ordered: heparin.    Mobility:   Basic Mobility Inpatient Raw Score: 24  JH-HLM Goal: 8: Walk 250 feet or more  JH-HLM Achieved: 7: Walk 25 feet or more  JH-HLM Goal  achieved. Continue to encourage appropriate mobility.    Patient Centered Rounds: I performed bedside rounds with nursing staff today.   Discussions with Specialists or Other Care Team Provider: none      Current Length of Stay: 2 day(s)  Current Patient Status: Inpatient     Discharge Plan: [facility] is following this patient on consult. They are medically stable for discharge when deemed appropriate by primary service.    Code Status: Level 1 - Full Code    Subjective   Reported 3 BM since yesterday. Improvement in abdominal distention and pain. No sob, chest pain reported.     Objective     Vitals:   Temp (24hrs), Av °F (37.2 °C), Min:98.5 °F (36.9 °C), Max:99.4 °F (37.4 °C)    Temp:  [98.5 °F (36.9 °C)-99.4 °F (37.4 °C)] 98.5 °F (36.9 °C)  HR:  [] 87  BP: (120-137)/(76-78) 126/76  SpO2:  [90 %-98 %] 95 %  Body mass index is 33.82 kg/m².     Input and Output Summary (last 24 hours):     Intake/Output Summary (Last 24 hours) at 2024 1451  Last data filed at 2024 1318  Gross per 24 hour   Intake 3508.33 ml   Output 2275 ml   Net 1233.33 ml       Physical Exam   NAD  Nonlabored resp  RRR  Nondistended, some RLQ tenderness to deep palpiation otherwise benign abd  [id number]    Lines/Drains:  Lines/Drains/Airways       Active Status       Name Placement date Placement time Site Days    NG/OG/Enteral Tube Nasogastric 12 Fr Right nare 24  2125  Right nare  1                            Lab Results: I have reviewed the following results: CBC/BMP:   .     24  0531   WBC 8.42   HGB 10.7*   HCT 33.0*      SODIUM 135   K 3.7   CL 97   CO2 29   BUN 26*   CREATININE 1.38*   GLUC 102   MG 2.0    , Creatinine Clearance: Estimated Creatinine Clearance: 56.6 mL/min (A) (by C-G formula based on SCr of 1.38 mg/dL (H))., LFTs: No new results in last 24 hours.    Results from last 7 days   Lab Units 24  0531   WBC Thousand/uL 8.42   HEMOGLOBIN g/dL 10.7*   HEMATOCRIT % 33.0*   PLATELETS Thousands/uL  327   SEGS PCT % 75   LYMPHO PCT % 13*   MONO PCT % 9   EOS PCT % 2     Results from last 7 days   Lab Units 09/30/24  0531 09/29/24  0538   SODIUM mmol/L 135 132*   POTASSIUM mmol/L 3.7 3.8   CHLORIDE mmol/L 97 94*   CO2 mmol/L 29 29   BUN mg/dL 26* 20   CREATININE mg/dL 1.38* 1.39*   ANION GAP mmol/L 9 9   CALCIUM mg/dL 8.6 9.1   ALBUMIN g/dL  --  3.7   TOTAL BILIRUBIN mg/dL  --  0.34   ALK PHOS U/L  --  76   ALT U/L  --  26   AST U/L  --  33   GLUCOSE RANDOM mg/dL 102 123         Results from last 7 days   Lab Units 09/26/24  0503   POC GLUCOSE mg/dl 124               Recent Cultures (last 7 days):         Imaging Review: Reviewed radiology reports from this admission including: CT abdomen/pelvis.  Other Studies: No additional pertinent studies reviewed.    Last 24 Hours Medication List:     Current Facility-Administered Medications:     docusate sodium (COLACE) capsule 100 mg, BID    heparin (porcine) subcutaneous injection 5,000 Units, Q8H VISHAL **AND** [CANCELED] Platelet count, Once    morphine injection 2 mg, Q2H PRN    nicotine (NICODERM CQ) 14 mg/24hr TD 24 hr patch 14 mg, Daily    ondansetron (ZOFRAN) injection 4 mg, Q6H PRN    sodium chloride 0.9 % infusion, Continuous, Last Rate: 125 mL/hr (09/30/24 1318)    Administrative Statements   Today, Patient Was Seen By: Maykel Patel MD  I have spent a total time of 35+ minutes in caring for this patient on the day of the visit/encounter including Diagnostic results, Risks and benefits of tx options, and Instructions for management.    **Please Note: This note may have been constructed using a voice recognition system.**

## 2024-10-01 LAB
ANION GAP SERPL CALCULATED.3IONS-SCNC: 6 MMOL/L (ref 4–13)
BASOPHILS # BLD AUTO: 0.03 THOUSANDS/ÂΜL (ref 0–0.1)
BASOPHILS NFR BLD AUTO: 0 % (ref 0–1)
BUN SERPL-MCNC: 21 MG/DL (ref 5–25)
CALCIUM SERPL-MCNC: 8.4 MG/DL (ref 8.4–10.2)
CHLORIDE SERPL-SCNC: 101 MMOL/L (ref 96–108)
CO2 SERPL-SCNC: 28 MMOL/L (ref 21–32)
CREAT SERPL-MCNC: 1.13 MG/DL (ref 0.6–1.3)
EOSINOPHIL # BLD AUTO: 0.18 THOUSAND/ÂΜL (ref 0–0.61)
EOSINOPHIL NFR BLD AUTO: 2 % (ref 0–6)
ERYTHROCYTE [DISTWIDTH] IN BLOOD BY AUTOMATED COUNT: 13.7 % (ref 11.6–15.1)
GFR SERPL CREATININE-BSD FRML MDRD: 64 ML/MIN/1.73SQ M
GLUCOSE SERPL-MCNC: 99 MG/DL (ref 65–140)
HCT VFR BLD AUTO: 35.5 % (ref 36.5–49.3)
HGB BLD-MCNC: 11.2 G/DL (ref 12–17)
IMM GRANULOCYTES # BLD AUTO: 0.03 THOUSAND/UL (ref 0–0.2)
IMM GRANULOCYTES NFR BLD AUTO: 0 % (ref 0–2)
LYMPHOCYTES # BLD AUTO: 0.95 THOUSANDS/ÂΜL (ref 0.6–4.47)
LYMPHOCYTES NFR BLD AUTO: 12 % (ref 14–44)
MAGNESIUM SERPL-MCNC: 1.9 MG/DL (ref 1.9–2.7)
MCH RBC QN AUTO: 28.3 PG (ref 26.8–34.3)
MCHC RBC AUTO-ENTMCNC: 31.5 G/DL (ref 31.4–37.4)
MCV RBC AUTO: 90 FL (ref 82–98)
MONOCYTES # BLD AUTO: 0.52 THOUSAND/ÂΜL (ref 0.17–1.22)
MONOCYTES NFR BLD AUTO: 7 % (ref 4–12)
NEUTROPHILS # BLD AUTO: 6.04 THOUSANDS/ÂΜL (ref 1.85–7.62)
NEUTS SEG NFR BLD AUTO: 79 % (ref 43–75)
NRBC BLD AUTO-RTO: 0 /100 WBCS
PHOSPHATE SERPL-MCNC: 2.5 MG/DL (ref 2.3–4.1)
PLATELET # BLD AUTO: 366 THOUSANDS/UL (ref 149–390)
PMV BLD AUTO: 8.3 FL (ref 8.9–12.7)
POTASSIUM SERPL-SCNC: 4.1 MMOL/L (ref 3.5–5.3)
RBC # BLD AUTO: 3.96 MILLION/UL (ref 3.88–5.62)
SODIUM SERPL-SCNC: 135 MMOL/L (ref 135–147)
WBC # BLD AUTO: 7.75 THOUSAND/UL (ref 4.31–10.16)

## 2024-10-01 PROCEDURE — 99024 POSTOP FOLLOW-UP VISIT: CPT | Performed by: SURGERY

## 2024-10-01 PROCEDURE — 99232 SBSQ HOSP IP/OBS MODERATE 35: CPT | Performed by: STUDENT IN AN ORGANIZED HEALTH CARE EDUCATION/TRAINING PROGRAM

## 2024-10-01 PROCEDURE — 85025 COMPLETE CBC W/AUTO DIFF WBC: CPT

## 2024-10-01 PROCEDURE — 83735 ASSAY OF MAGNESIUM: CPT | Performed by: PHYSICIAN ASSISTANT

## 2024-10-01 PROCEDURE — 84100 ASSAY OF PHOSPHORUS: CPT | Performed by: PHYSICIAN ASSISTANT

## 2024-10-01 PROCEDURE — 80048 BASIC METABOLIC PNL TOTAL CA: CPT

## 2024-10-01 RX ORDER — DEXTROSE MONOHYDRATE, SODIUM CHLORIDE, AND POTASSIUM CHLORIDE 50; 1.49; 4.5 G/1000ML; G/1000ML; G/1000ML
100 INJECTION, SOLUTION INTRAVENOUS CONTINUOUS
Status: DISCONTINUED | OUTPATIENT
Start: 2024-10-01 | End: 2024-10-02

## 2024-10-01 RX ADMIN — DOCUSATE SODIUM 100 MG: 100 CAPSULE, LIQUID FILLED ORAL at 17:17

## 2024-10-01 RX ADMIN — HEPARIN SODIUM 5000 UNITS: 5000 INJECTION INTRAVENOUS; SUBCUTANEOUS at 13:02

## 2024-10-01 RX ADMIN — DEXTROSE, SODIUM CHLORIDE, AND POTASSIUM CHLORIDE 125 ML/HR: 5; .45; .15 INJECTION INTRAVENOUS at 13:01

## 2024-10-01 RX ADMIN — MORPHINE SULFATE 2 MG: 2 INJECTION, SOLUTION INTRAMUSCULAR; INTRAVENOUS at 13:05

## 2024-10-01 RX ADMIN — ASPIRIN 81 MG: 81 TABLET, CHEWABLE ORAL at 05:22

## 2024-10-01 RX ADMIN — NICOTINE 1 PATCH: 14 PATCH, EXTENDED RELEASE TRANSDERMAL at 08:47

## 2024-10-01 RX ADMIN — METOPROLOL TARTRATE 25 MG: 25 TABLET, FILM COATED ORAL at 05:25

## 2024-10-01 RX ADMIN — B-COMPLEX W/ C & FOLIC ACID TAB 1 TABLET: TAB at 08:45

## 2024-10-01 RX ADMIN — ASPIRIN 81 MG: 81 TABLET, CHEWABLE ORAL at 17:17

## 2024-10-01 RX ADMIN — METOPROLOL TARTRATE 25 MG: 25 TABLET, FILM COATED ORAL at 17:17

## 2024-10-01 RX ADMIN — DOCUSATE SODIUM 100 MG: 100 CAPSULE, LIQUID FILLED ORAL at 08:45

## 2024-10-01 RX ADMIN — DEXTROSE, SODIUM CHLORIDE, AND POTASSIUM CHLORIDE 100 ML/HR: 5; .45; .15 INJECTION INTRAVENOUS at 21:28

## 2024-10-01 RX ADMIN — LEVOTHYROXINE SODIUM 150 MCG: 0.15 TABLET ORAL at 08:45

## 2024-10-01 RX ADMIN — SODIUM CHLORIDE 125 ML/HR: 0.9 INJECTION, SOLUTION INTRAVENOUS at 05:20

## 2024-10-01 RX ADMIN — HEPARIN SODIUM 5000 UNITS: 5000 INJECTION INTRAVENOUS; SUBCUTANEOUS at 05:23

## 2024-10-01 NOTE — ASSESSMENT & PLAN NOTE
Patient had a recent right hemicolectomy for malignancy of the ascending colon with General Surgery at [facility]  Recently discharged 9/26   He reported abdominal pain, liquid stools, n/v that started 9/28 and worsened so he came to the ED  CTAP showed Diffusely dilated fluid-filled small bowel loops throughout the abdomen.  Appearance favored to represent ileus rather than true obstruction.  He was admitted to the General Surgery service and initial recommendations were for NGT, IVF and NPO is the current recommendation  On 9/30 was doing better after 3 Bms and was cleared for CLD however having more distension - back to NPO per surgery  Continue management per primary surgery service

## 2024-10-01 NOTE — CASE MANAGEMENT
Case Management Progress Note    Patient name Jace Varghese  Location /-01 MRN [id number]  : 1950 Date 10/1/2024       LOS (days): 3  Geometric Mean LOS (GMLOS) (days): 3  Days to GMLOS:0.3        OBJECTIVE:        Current admission status: Inpatient  Preferred Pharmacy:   [id number] [address] [address]  Phone: [phone] Fax: [phone]    Primary Care Provider: Baltazar Naidu MD    Primary Insurance: MEDICARE  Secondary Insurance:     PROGRESS NOTE:    Discussed patient's case in interdisciplinary rounds this morning with [facility] provider. Patient is not medically cleared for discharge- stomach bloated/ distended. Not tolerating intake, diet downgraded to sips. Anticipating discharge in 48h to home. Patient has no CM needs. CM will continue to follow.

## 2024-10-01 NOTE — PROGRESS NOTES
Progress Note - Hospitalist   Name: Jace Varghese 74 y.o. 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  Appearance favored to represent ileus rather than true obstruction.  He was admitted to the General Surgery service and initial recommendations were for NGT, IVF and NPO is the current recommendation  On 9/30 was doing better after 3 Bms and was cleared for CLD however having more distension - back to NPO per surgery  Continue management per primary surgery service   Tobacco abuse  Agreeable for nicotine patch  Counseled regarding cessation   Essential (primary) hypertension  Metop resumed  Monitor for hypotension   Hypothyroidism  Levothyroxine was resumed    JESSI (acute kidney injury) (HCC)  Resolved with fluids   Hypoxia  Resolving   He is now 93-94% on room air after resolution of his abd distension  Continue to monitor   No signs or symptoms of infection or fluid overload at this time  Hyponatremia  Hyponatremia, POA-resolved, likely due to poor oral intake evidenced by Na 128> 132> 135 requiring 1L NSS bolus, IVF and monitoring of BMP.     VTE Pharmacologic Prophylaxis:   Moderate Risk (Score 3-4) - Pharmacological DVT Prophylaxis Ordered: heparin.    Mobility:   Basic Mobility Inpatient Raw Score: 24  JH-HLM Goal: 8: Walk 250 feet or more  JH-HLM Achieved: 8: Walk 250 feet ot more  JH-HLM Goal achieved. Continue to encourage appropriate mobility.    Patient Centered Rounds: I performed bedside rounds with nursing staff today.   Discussions with Specialists or Other Care Team  Provider: none        Current Length of Stay: 3 day(s)  Current Patient Status: Inpatient     Discharge Plan: [facility] is following this patient on consult. They are not yet medically stable for discharge secondary to Ileus.    Code Status: Level 1 - Full Code    Subjective   Reported increased abd distension that started overnight. He reported he had 1 bm yesterday but none today. Pain is worse. Currently npo    Objective     Vitals:   Temp (24hrs), Av.3 °F (36.8 °C), Min:97.6 °F (36.4 °C), Max:99.2 °F (37.3 °C)    Temp:  [97.6 °F (36.4 °C)-99.2 °F (37.3 °C)] 97.6 °F (36.4 °C)  HR:  [] 101  Resp:  [15-18] 15  BP: (129-152)/(81-94) 129/81  SpO2:  [87 %-92 %] 92 %  Body mass index is 33.82 kg/m².     Input and Output Summary (last 24 hours):     Intake/Output Summary (Last 24 hours) at 10/1/2024 1231  Last data filed at 10/1/2024 0900  Gross per 24 hour   Intake 1724.58 ml   Output --   Net 1724.58 ml       Physical Exam   No acute distress, appears mildly uncomfortable  Regular rate and rhythm  Nonlabored respirations on room air with SpO2 93%  Abdomen is soft, right lower quadrant tenderness to palpation without guarding/rebound/rigidity  Alert and oriented x 3    Lines/Drains:  Lines/Drains/Airways       Active Status       None                            Lab Results: I have reviewed the following results: CBC/BMP:   .     10/01/24  1023   WBC 7.75   HGB 11.2*   HCT 35.5*      SODIUM 135   K 4.1      CO2 28   BUN 21   CREATININE 1.13   GLUC 99    , Creatinine Clearance: Estimated Creatinine Clearance: 69.2 mL/min (by C-G formula based on SCr of 1.13 mg/dL)., LFTs: No new results in last 24 hours.    Results from last 7 days   Lab Units 10/01/24  1023   WBC Thousand/uL 7.75   HEMOGLOBIN g/dL 11.2*   HEMATOCRIT % 35.5*   PLATELETS Thousands/uL 366   SEGS PCT % 79*   LYMPHO PCT % 12*   MONO PCT % 7   EOS PCT % 2     Results from last 7 days   Lab Units 10/01/24  1023 24  0531 24  0538    SODIUM mmol/L 135   < > 132*   POTASSIUM mmol/L 4.1   < > 3.8   CHLORIDE mmol/L 101   < > 94*   CO2 mmol/L 28   < > 29   BUN mg/dL 21   < > 20   CREATININE mg/dL 1.13   < > 1.39*   ANION GAP mmol/L 6   < > 9   CALCIUM mg/dL 8.4   < > 9.1   ALBUMIN g/dL  --   --  3.7   TOTAL BILIRUBIN mg/dL  --   --  0.34   ALK PHOS U/L  --   --  76   ALT U/L  --   --  26   AST U/L  --   --  33   GLUCOSE RANDOM mg/dL 99   < > 123    < > = values in this interval not displayed.         Results from last 7 days   Lab Units 09/26/24  0503   POC GLUCOSE mg/dl 124               Recent Cultures (last 7 days):         Imaging Review: Reviewed radiology reports from this admission including: CT abdomen/pelvis.  Other Studies: No additional pertinent studies reviewed.    Last 24 Hours Medication List:     Current Facility-Administered Medications:     aspirin chewable tablet 81 mg, Q12H    dextrose 5 % and sodium chloride 0.45 % with KCl 20 mEq/L infusion, Continuous    docusate sodium (COLACE) capsule 100 mg, BID    heparin (porcine) subcutaneous injection 5,000 Units, Q8H VISHAL **AND** [CANCELED] Platelet count, Once    levothyroxine tablet 150 mcg, Daily    metoprolol tartrate (LOPRESSOR) tablet 25 mg, Q12H    morphine injection 2 mg, Q2H PRN    multivitamin stress formula tablet 1 tablet, Daily    nicotine (NICODERM CQ) 14 mg/24hr TD 24 hr patch 1 patch, Daily    ondansetron (ZOFRAN) injection 4 mg, Q6H PRN    Administrative Statements   Today, Patient Was Seen By: Maykel Patel MD  I have spent a total time of 35+ minutes in caring for this patient on the day of the visit/encounter including Diagnostic results, Risks and benefits of tx options, and Instructions for management.    **Please Note: This note may have been constructed using a voice recognition system.**

## 2024-10-01 NOTE — PROGRESS NOTES
Progress Note - Surgery-General   Name: Jace Varghese 74 y.o. male I MRN: [id number]  Unit/Bed#: MS [facility] I Date of Admission: 9/28/2024   Date of Service: 10/1/2024 I Hospital Day: 3     Assessment & Plan  Ileus (HCC)  Assessment:  75 yo M with post-op ileus S/p right hemicolectomy 9/23/24  Discharged 9/26 s/p surgery. Returned to ED with pain, n/v, 9/28.   9/28 CT showed dilated small bowel loops consistent with ileus.  AVSS  Has some incisional pain appropriate post-operatively.  NG tube clamped and diet advanced to clears 9/30  Started feeling bloated last night; abdomen is distended on exam; endorses burping with minimal flatus.   Incision site is well-approximated and c/d/I without signs of infection.     Plan:  Diet changed to sips of clear liquids.   Analgesia and antiemetics PRN  Monitor labs and replete electrolytes.  Encourage OOB and ambulation.  DVT ppx.   Incentive spirometer 10x/hr while awake  Appreciate medicine recommendations for medical management.     Tobacco abuse  Encourage cessation.  Continue nicotine patch.    Essential (primary) hypertension  BP stable, can consider metroprolol PRN.  Medicine following.  Hypothyroidism  Can give levothyroxine PO if PO trial successful, otherwise IV is appropriate.  Medicine following.    JESSI (acute kidney injury) ([facility])  Creatinine stable.  Continue fluids.  Continue to monitor.            History of Present Illness   Patient started to feel bloated at about 10pm overnight. He has been burping often. He endorses passing flatus and liquid bowel movements. He feels some pain in his lower abdomen that resolves when he does pass gas. He denies nausea/vomiting. He denies fever/chills/chest pain/sob. He has been ambulating and voiding without difficulty.     Objective      Temp:  [97.6 °F (36.4 °C)-99.2 °F (37.3 °C)] 97.6 °F (36.4 °C)  HR:  [] 101  Resp:  [15-18] 15  BP: (129-152)/(81-94) 129/81  O2 Device: None (Room air)          I/O         09/29  0701  09/30 0700 09/30 0701  10/01 0700 10/01 0701  10/02 0700    P.O.  420     I.V. (mL/kg) 2443.8 (23.5) 1064.6 (10.2)     Total Intake(mL/kg) 2443.8 (23.5) 1484.6 (14.3)     Urine (mL/kg/hr) 725 (0.3)      Emesis/NG output 1900 300     Stool  0     Total Output 2625 300     Net -181.3 +1184.6            Unmeasured Stool Occurrence  1 x           Lines/Drains/Airways       Active Status       None                  Physical Exam  Vitals and nursing note reviewed.   Constitutional:       General: He is not in acute distress.     Appearance: Normal appearance. He is well-developed.   HENT:      Head: Normocephalic and atraumatic.   Eyes:      General: No scleral icterus.     Conjunctiva/sclera: Conjunctivae normal.   Cardiovascular:      Rate and Rhythm: Normal rate and regular rhythm.      Heart sounds: No murmur heard.  Pulmonary:      Effort: Pulmonary effort is normal. No respiratory distress.      Breath sounds: Normal breath sounds. No stridor. No wheezing.   Chest:      Chest wall: No tenderness.   Abdominal:      Palpations: Abdomen is soft.      Comments: Abdomen mildly distended, appropriately tender post-operatively, +BS, without rebound tenderness, guarding, or mass.     Incision: midline incision well-approximated without hematoma, discharge, erythema, or foul odor.    Musculoskeletal:         General: No swelling or tenderness.      Cervical back: Neck supple.      Right lower leg: No edema.      Left lower leg: No edema.   Skin:     General: Skin is warm and dry.      Capillary Refill: Capillary refill takes less than 2 seconds.   Neurological:      Mental Status: He is alert and oriented to person, place, and time.   Psychiatric:         Mood and Affect: Mood normal.         Behavior: Behavior normal.                Lab Results: I have reviewed the following results:   Imaging Review: No pertinent imaging studies reviewed.  Other Studies: No additional pertinent studies reviewed.    VTE Pharmacologic  Prophylaxis: VTE covered by:  heparin (porcine), Subcutaneous, 5,000 Units at 10/01/24 3606     VTE Mechanical Prophylaxis: sequential compression device

## 2024-10-01 NOTE — PLAN OF CARE
Problem: INFECTION - ADULT  Goal: Absence or prevention of progression during hospitalization  Description: INTERVENTIONS:  - Assess and monitor for signs and symptoms of infection  - Monitor lab/diagnostic results  - Monitor all insertion sites, i.e. indwelling lines, tubes, and drains  - Monitor endotracheal if appropriate and nasal secretions for changes in amount and color  - [place] appropriate cooling/warming therapies per order  - Administer medications as ordered  - Instruct and encourage patient and family to use good hand hygiene technique  - Identify and instruct in appropriate isolation precautions for identified infection/condition  Outcome: Progressing     Problem: SAFETY ADULT  Goal: Patient will remain free of falls  Description: INTERVENTIONS:  - Educate patient/family on patient safety including physical limitations  - Instruct patient to call for assistance with activity   - Consult OT/PT to assist with strengthening/mobility   - Keep Call bell within reach  - Keep bed low and locked with side rails adjusted as appropriate  - Keep care items and personal belongings within reach  - Initiate and maintain comfort rounds  - Make Fall Risk Sign visible to staff  - Obtain necessary fall risk management equipment:   - Apply yellow socks and bracelet for high fall risk patients  - Consider moving patient to room near nurses station  Outcome: Progressing

## 2024-10-01 NOTE — ASSESSMENT & PLAN NOTE
Assessment:  75 yo M with post-op ileus S/p right hemicolectomy 9/23/24  Discharged 9/26 s/p surgery. Returned to ED with pain, n/v, 9/28.   9/28 CT showed dilated small bowel loops consistent with ileus.  AVSS  Has some incisional pain appropriate post-operatively.  NG tube clamped and diet advanced to clears 9/30  Started feeling bloated last night; abdomen is distended on exam; endorses burping with minimal flatus.   Incision site is well-approximated and c/d/I without signs of infection.     Plan:  Diet changed to sips of clear liquids.   Analgesia and antiemetics PRN  Monitor labs and replete electrolytes.  Encourage OOB and ambulation.  DVT ppx.   Incentive spirometer 10x/hr while awake  Appreciate medicine recommendations for medical management.

## 2024-10-01 NOTE — ASSESSMENT & PLAN NOTE
Resolving   He is now 93-94% on room air after resolution of his abd distension  Continue to monitor   No signs or symptoms of infection or fluid overload at this time

## 2024-10-01 NOTE — ASSESSMENT & PLAN NOTE
Hyponatremia, POA-resolved, likely due to poor oral intake evidenced by Na 128> 132> 135 requiring 1L NSS bolus, IVF and monitoring of BMP.

## 2024-10-02 LAB
ANION GAP SERPL CALCULATED.3IONS-SCNC: 6 MMOL/L (ref 4–13)
BASOPHILS # BLD AUTO: 0.03 THOUSANDS/ÂΜL (ref 0–0.1)
BASOPHILS NFR BLD AUTO: 0 % (ref 0–1)
BUN SERPL-MCNC: 15 MG/DL (ref 5–25)
CALCIUM SERPL-MCNC: 8.3 MG/DL (ref 8.4–10.2)
CHLORIDE SERPL-SCNC: 102 MMOL/L (ref 96–108)
CO2 SERPL-SCNC: 26 MMOL/L (ref 21–32)
CREAT SERPL-MCNC: 1.02 MG/DL (ref 0.6–1.3)
EOSINOPHIL # BLD AUTO: 0.21 THOUSAND/ÂΜL (ref 0–0.61)
EOSINOPHIL NFR BLD AUTO: 3 % (ref 0–6)
ERYTHROCYTE [DISTWIDTH] IN BLOOD BY AUTOMATED COUNT: 13.6 % (ref 11.6–15.1)
GFR SERPL CREATININE-BSD FRML MDRD: 72 ML/MIN/1.73SQ M
GLUCOSE SERPL-MCNC: 118 MG/DL (ref 65–140)
HCT VFR BLD AUTO: 34.3 % (ref 36.5–49.3)
HGB BLD-MCNC: 10.8 G/DL (ref 12–17)
IMM GRANULOCYTES # BLD AUTO: 0.02 THOUSAND/UL (ref 0–0.2)
IMM GRANULOCYTES NFR BLD AUTO: 0 % (ref 0–2)
LYMPHOCYTES # BLD AUTO: 1 THOUSANDS/ÂΜL (ref 0.6–4.47)
LYMPHOCYTES NFR BLD AUTO: 14 % (ref 14–44)
MAGNESIUM SERPL-MCNC: 1.8 MG/DL (ref 1.9–2.7)
MCH RBC QN AUTO: 28.1 PG (ref 26.8–34.3)
MCHC RBC AUTO-ENTMCNC: 31.5 G/DL (ref 31.4–37.4)
MCV RBC AUTO: 89 FL (ref 82–98)
MONOCYTES # BLD AUTO: 0.52 THOUSAND/ÂΜL (ref 0.17–1.22)
MONOCYTES NFR BLD AUTO: 7 % (ref 4–12)
NEUTROPHILS # BLD AUTO: 5.61 THOUSANDS/ÂΜL (ref 1.85–7.62)
NEUTS SEG NFR BLD AUTO: 76 % (ref 43–75)
NRBC BLD AUTO-RTO: 0 /100 WBCS
PHOSPHATE SERPL-MCNC: 2.2 MG/DL (ref 2.3–4.1)
PLATELET # BLD AUTO: 385 THOUSANDS/UL (ref 149–390)
PMV BLD AUTO: 8.3 FL (ref 8.9–12.7)
POTASSIUM SERPL-SCNC: 4.2 MMOL/L (ref 3.5–5.3)
RBC # BLD AUTO: 3.85 MILLION/UL (ref 3.88–5.62)
SODIUM SERPL-SCNC: 134 MMOL/L (ref 135–147)
WBC # BLD AUTO: 7.39 THOUSAND/UL (ref 4.31–10.16)

## 2024-10-02 PROCEDURE — 99231 SBSQ HOSP IP/OBS SF/LOW 25: CPT | Performed by: STUDENT IN AN ORGANIZED HEALTH CARE EDUCATION/TRAINING PROGRAM

## 2024-10-02 PROCEDURE — 99024 POSTOP FOLLOW-UP VISIT: CPT

## 2024-10-02 PROCEDURE — 80048 BASIC METABOLIC PNL TOTAL CA: CPT

## 2024-10-02 PROCEDURE — 84100 ASSAY OF PHOSPHORUS: CPT | Performed by: PHYSICIAN ASSISTANT

## 2024-10-02 PROCEDURE — 83735 ASSAY OF MAGNESIUM: CPT | Performed by: PHYSICIAN ASSISTANT

## 2024-10-02 PROCEDURE — 85025 COMPLETE CBC W/AUTO DIFF WBC: CPT

## 2024-10-02 RX ORDER — DEXTROSE MONOHYDRATE, SODIUM CHLORIDE, AND POTASSIUM CHLORIDE 50; 1.49; 4.5 G/1000ML; G/1000ML; G/1000ML
50 INJECTION, SOLUTION INTRAVENOUS CONTINUOUS
Status: DISCONTINUED | OUTPATIENT
Start: 2024-10-02 | End: 2024-10-02

## 2024-10-02 RX ADMIN — B-COMPLEX W/ C & FOLIC ACID TAB 1 TABLET: TAB at 09:06

## 2024-10-02 RX ADMIN — METOPROLOL TARTRATE 25 MG: 25 TABLET, FILM COATED ORAL at 05:37

## 2024-10-02 RX ADMIN — ASPIRIN 81 MG: 81 TABLET, CHEWABLE ORAL at 17:00

## 2024-10-02 RX ADMIN — DEXTROSE, SODIUM CHLORIDE, AND POTASSIUM CHLORIDE 100 ML/HR: 5; .45; .15 INJECTION INTRAVENOUS at 05:39

## 2024-10-02 RX ADMIN — DOCUSATE SODIUM 100 MG: 100 CAPSULE, LIQUID FILLED ORAL at 17:00

## 2024-10-02 RX ADMIN — ASPIRIN 81 MG: 81 TABLET, CHEWABLE ORAL at 05:37

## 2024-10-02 RX ADMIN — DOCUSATE SODIUM 100 MG: 100 CAPSULE, LIQUID FILLED ORAL at 09:07

## 2024-10-02 RX ADMIN — LEVOTHYROXINE SODIUM 150 MCG: 0.15 TABLET ORAL at 09:07

## 2024-10-02 RX ADMIN — NICOTINE 1 PATCH: 14 PATCH, EXTENDED RELEASE TRANSDERMAL at 09:04

## 2024-10-02 RX ADMIN — METOPROLOL TARTRATE 25 MG: 25 TABLET, FILM COATED ORAL at 17:00

## 2024-10-02 NOTE — ASSESSMENT & PLAN NOTE
Assessment:  75 yo M with POD#9 with post-op ileus S/p right hemicolectomy 9/23/24  Discharged 9/26 s/p surgery. Returned to ED with pain, n/v, 9/28.   9/28 CT showed dilated small bowel loops consistent with ileus.  Waiting on morning lab results.   AVSS.  Has some incisional pain appropriate post-operatively.  Tolerating NPO with sips well.  Bowel function returned with ample flatus and bowel movement.   Incision site is well-approximated and c/d/I without signs of infection.     Plan:  Advanced diet to clear liquids.   Analgesia and antiemetics PRN.  Monitor labs and replete electrolytes; waiting on morning lab results.  Encourage OOB and ambulation.  DVT ppx.   Incentive spirometer 10x/hr while awake  Appreciate medicine recommendations for medical management.

## 2024-10-02 NOTE — CASE MANAGEMENT
Case Management Progress Note    Patient name aJce Varghese  Location /-01 MRN [id number]  : 1950 Date 10/2/2024       LOS (days): 4  Geometric Mean LOS (GMLOS) (days): 2.9  Days to GMLOS:-0.9        OBJECTIVE:        Current admission status: Inpatient  Preferred Pharmacy:   [facility] [id number] [address]  Phone: [phone] Fax: [phone]    Primary Care Provider: Baltazar Naidu MD    Primary Insurance: MEDICARE  Secondary Insurance:     PROGRESS NOTE:  Discussed patient's case in interdisciplinary rounds this morning with [facility] provider. Patient is not medically cleared for discharge- diet advanced to sips of clears, patient noted making laps in hallway. Anticipating discharge in 48h to home. Patient has no CM needs. CM will continue to follow.

## 2024-10-02 NOTE — PLAN OF CARE
Problem: PAIN - ADULT  Goal: Verbalizes/displays adequate comfort level or baseline comfort level  Description: Interventions:  - Encourage patient to monitor pain and request assistance  - Assess pain using appropriate pain scale  - Administer analgesics based on type and severity of pain and evaluate response  - Implement non-pharmacological measures as appropriate and evaluate response  - Consider cultural and social influences on pain and pain management  - Notify physician/advanced practitioner if interventions unsuccessful or patient reports new pain  Outcome: Progressing     Problem: GASTROINTESTINAL - ADULT  Goal: Maintains or returns to baseline bowel function  Description: INTERVENTIONS:  - Assess bowel function  - Encourage oral fluids to ensure adequate hydration  - Administer IV fluids if ordered to ensure adequate hydration  - Administer ordered medications as needed  - Encourage mobilization and activity  - Consider nutritional services referral to assist patient with adequate nutrition and appropriate food choices  Outcome: Progressing

## 2024-10-02 NOTE — ASSESSMENT & PLAN NOTE
Was made NPO again yesterday (10/1) due to increased distension and pain  This morning feeling better, less bloated. Said he had a semi-formed BM at 7am. Will try and walk more today  No n/v  Management per primary gen surg service

## 2024-10-02 NOTE — ASSESSMENT & PLAN NOTE
Hyponatremia, POA-resolved, likely due to poor oral intake evidenced by Na 128> 132> 135 requiring 1L NSS bolus, IVF and monitoring of BMP.   Na normal

## 2024-10-02 NOTE — PROGRESS NOTES
Progress Note - Hospitalist   Name: Jace Varghese 74 y.o. male I MRN: [id number]  Unit/Bed#: -[facility] I Date of Admission: 9/28/2024   Date of Service: 10/2/2024 I Hospital Day: 4    Assessment & Plan  Ileus (HCC)  Was made NPO again yesterday (10/1) due to increased distension and pain  This morning feeling better, less bloated. Said he had a semi-formed BM at 7am. Will try and walk more today  No n/v  Management per primary gen surg service  Tobacco abuse  Agreeable for nicotine patch  Counseled regarding cessation   Essential (primary) hypertension  Metop resumed  BP acceptable  Hypothyroidism  Levothyroxine was resumed    JESSI (acute kidney injury) (HCC)  Resolved with fluids   Hypoxia  Resolved   Likely was related to splinting/atelectasis  Hyponatremia  Hyponatremia, POA-resolved, likely due to poor oral intake evidenced by Na 128> 132> 135 requiring 1L NSS bolus, IVF and monitoring of BMP.   Na normal     VTE Pharmacologic Prophylaxis:   Moderate Risk (Score 3-4) - Pharmacological DVT Prophylaxis Ordered: heparin.    Mobility:   Basic Mobility Inpatient Raw Score: 24  JH-HLM Goal: 8: Walk 250 feet or more  JH-HLM Achieved: 8: Walk 250 feet ot more  JH-HLM Goal achieved. Continue to encourage appropriate mobility.    Patient Centered Rounds: I performed bedside rounds with nursing staff today.   Discussions with Specialists or Other Care Team Provider: none    Education and Discussions with Family / Patient: Patient declined call to .     Current Length of Stay: 4 day(s)  Current Patient Status: Inpatient     Discharge Plan: [facility] is following this patient on consult. They are not yet medically stable for discharge secondary to Ileus.    Code Status: Level 1 - Full Code    Subjective   Reported feeling better since yesterday. BM this morning reported small but semi-solid. No n/v. NPO currently. No other complaints.     Objective :  Temp:  [97.6 °F (36.4 °C)-97.9 °F (36.6 °C)] 97.6 °F (36.4  °C)  HR:  [] 112  BP: (126-152)/(79-90) 135/83  Resp:  [18] 18  SpO2:  [93 %-97 %] 97 %  O2 Device: None (Room air)    Body mass index is 33.82 kg/m².     Input and Output Summary (last 24 hours):     Intake/Output Summary (Last 24 hours) at 10/2/2024 0852  Last data filed at 10/2/2024 0801  Gross per 24 hour   Intake 1190 ml   Output --   Net 1190 ml       Physical Exam  NAD  Nonlabored resp on RA  RRR  NTND abd, mild RLQ ttp  AAox3    Lines/Drains:              Lab Results: I have reviewed the following results:   Results from last 7 days   Lab Units 10/01/24  1023   WBC Thousand/uL 7.75   HEMOGLOBIN g/dL 11.2*   HEMATOCRIT % 35.5*   PLATELETS Thousands/uL 366   SEGS PCT % 79*   LYMPHO PCT % 12*   MONO PCT % 7   EOS PCT % 2     Results from last 7 days   Lab Units 10/01/24  1023 09/30/24  0531 09/29/24  0538   SODIUM mmol/L 135   < > 132*   POTASSIUM mmol/L 4.1   < > 3.8   CHLORIDE mmol/L 101   < > 94*   CO2 mmol/L 28   < > 29   BUN mg/dL 21   < > 20   CREATININE mg/dL 1.13   < > 1.39*   ANION GAP mmol/L 6   < > 9   CALCIUM mg/dL 8.4   < > 9.1   ALBUMIN g/dL  --   --  3.7   TOTAL BILIRUBIN mg/dL  --   --  0.34   ALK PHOS U/L  --   --  76   ALT U/L  --   --  26   AST U/L  --   --  33   GLUCOSE RANDOM mg/dL 99   < > 123    < > = values in this interval not displayed.         Results from last 7 days   Lab Units 09/26/24  0503   POC GLUCOSE mg/dl 124               Recent Cultures (last 7 days):         Imaging Results Review: No pertinent imaging studies reviewed.  Other Study Results Review: No additional pertinent studies reviewed.    Last 24 Hours Medication List:     Current Facility-Administered Medications:     aspirin chewable tablet 81 mg, Q12H    dextrose 5 % and sodium chloride 0.45 % with KCl 20 mEq/L infusion, Continuous, Last Rate: 100 mL/hr (10/02/24 0539)    docusate sodium (COLACE) capsule 100 mg, BID    heparin (porcine) subcutaneous injection 5,000 Units, Q8H VISHAL **AND** [CANCELED] Platelet  count, Once    levothyroxine tablet 150 mcg, Daily    metoprolol tartrate (LOPRESSOR) tablet 25 mg, Q12H    morphine injection 2 mg, Q2H PRN    multivitamin stress formula tablet 1 tablet, Daily    nicotine (NICODERM CQ) 14 mg/24hr TD 24 hr patch 1 patch, Daily    ondansetron (ZOFRAN) injection 4 mg, Q6H PRN    Administrative Statements   Today, Patient Was Seen By: Maykel Patel MD  I have spent a total time of 25+ minutes in caring for this patient on the day of the visit/encounter including Diagnostic results, Risks and benefits of tx options, Instructions for management, and Risk factor reductions.    **Please Note: This note may have been constructed using a voice recognition system.**

## 2024-10-02 NOTE — PROGRESS NOTES
Progress Note - Surgery-General   Name: Jace Varghese 74 y.o. male I MRN: [id number]  Unit/Bed#: [facility] I Date of Admission: 9/28/2024   Date of Service: 10/2/2024 I Hospital Day: 4     Assessment & Plan  Ileus (HCC)  Assessment:  73 yo M with POD#9 with post-op ileus S/p right hemicolectomy 9/23/24  Discharged 9/26 s/p surgery. Returned to ED with pain, n/v, 9/28.   9/28 CT showed dilated small bowel loops consistent with ileus.  Waiting on morning lab results.   AVSS.  Has some incisional pain appropriate post-operatively.  Tolerating NPO with sips well.  Bowel function returned with ample flatus and bowel movement.   Incision site is well-approximated and c/d/I without signs of infection.     Plan:  Advanced diet to clear liquids.   Analgesia and antiemetics PRN.  Monitor labs and replete electrolytes; waiting on morning lab results.  Encourage OOB and ambulation.  DVT ppx.   Incentive spirometer 10x/hr while awake  Appreciate medicine recommendations for medical management.     Tobacco abuse  Encourage cessation.  Continue nicotine patch.    Essential (primary) hypertension  BP stable, can consider metroprolol PRN.  Medicine following.  Hypothyroidism  Can give levothyroxine PO if PO trial successful, otherwise IV is appropriate.  Medicine following.    JESSI (acute kidney injury) ([facility])  Creatinine stable.  Continue fluids.  Continue to monitor.            Subjective   Patient is feeling pretty good. No acute events overnight. He denies nausea or vomiting. He tolerated the NPO with sips diet well without any feeling of bloating. He has been passing flatus and had a more-solid bowel movement. He has been using his incentive spirometer and walking the hallways. He has minimal chad-incisional abdominal pain and denies other complaints.     Objective :  Temp:  [97.6 °F (36.4 °C)-97.9 °F (36.6 °C)] 97.6 °F (36.4 °C)  HR:  [] 112  BP: (126-152)/(79-90) 135/83  Resp:  [18] 18  SpO2:  [93 %-97 %] 97 %  O2  Device: None (Room air)    I/O         09/30 0701  10/01 0700 10/01 0701  10/02 0700 10/02 0701  10/03 0700    P.O. 420 240 0    I.V. (mL/kg) 1064.6 (10.2) 950 (9.1)     Total Intake(mL/kg) 1484.6 (14.3) 1190 (11.4) 0 (0)    Urine (mL/kg/hr)       Emesis/NG output 300      Stool 0      Total Output 300      Net +1184.6 +1190 0           Unmeasured Stool Occurrence 1 x  1 x            Physical Exam  Vitals and nursing note reviewed.   Constitutional:       General: He is not in acute distress.     Appearance: Normal appearance. He is well-developed. He is not ill-appearing, toxic-appearing or diaphoretic.   HENT:      Head: Normocephalic and atraumatic.   Eyes:      Conjunctiva/sclera: Conjunctivae normal.   Cardiovascular:      Rate and Rhythm: Normal rate and regular rhythm.      Heart sounds: No murmur heard.  Pulmonary:      Effort: Pulmonary effort is normal. No respiratory distress.      Breath sounds: Normal breath sounds.   Abdominal:      General: Bowel sounds are normal.      Palpations: Abdomen is soft.      Tenderness: There is no abdominal tenderness. There is no guarding or rebound.      Comments: Abdomen is round, soft. +BS in all quadrants.    Musculoskeletal:         General: No swelling.      Cervical back: Neck supple.   Skin:     General: Skin is warm and dry.      Capillary Refill: Capillary refill takes less than 2 seconds.   Neurological:      Mental Status: He is alert and oriented to person, place, and time.   Psychiatric:         Mood and Affect: Mood normal.               Lab Results: I have reviewed the following results:  Recent Labs     10/01/24  1023   WBC 7.75   HGB 11.2*   HCT 35.5*      SODIUM 135   K 4.1      CO2 28   BUN 21   CREATININE 1.13   GLUC 99   MG 1.9   PHOS 2.5             VTE Pharmacologic Prophylaxis: VTE covered by:  heparin (porcine), Subcutaneous, 5,000 Units at 10/01/24 1302     VTE Mechanical Prophylaxis: sequential compression device

## 2024-10-03 ENCOUNTER — TELEPHONE (OUTPATIENT)
Dept: SURGERY | Facility: CLINIC | Age: 74
End: 2024-10-03

## 2024-10-03 ENCOUNTER — TRANSITIONAL CARE MANAGEMENT (OUTPATIENT)
Dept: FAMILY MEDICINE CLINIC | Facility: CLINIC | Age: 74
End: 2024-10-03

## 2024-10-03 VITALS
WEIGHT: 229 LBS | TEMPERATURE: 98 F | OXYGEN SATURATION: 96 % | RESPIRATION RATE: 17 BRPM | SYSTOLIC BLOOD PRESSURE: 130 MMHG | BODY MASS INDEX: 33.92 KG/M2 | HEART RATE: 86 BPM | HEIGHT: 69 IN | DIASTOLIC BLOOD PRESSURE: 82 MMHG

## 2024-10-03 PROBLEM — K56.7 ILEUS (HCC): Status: RESOLVED | Noted: 2024-09-29 | Resolved: 2024-10-03

## 2024-10-03 LAB
ANION GAP SERPL CALCULATED.3IONS-SCNC: 9 MMOL/L (ref 4–13)
BASOPHILS # BLD AUTO: 0.02 THOUSANDS/ÂΜL (ref 0–0.1)
BASOPHILS NFR BLD AUTO: 0 % (ref 0–1)
BUN SERPL-MCNC: 11 MG/DL (ref 5–25)
CALCIUM SERPL-MCNC: 8 MG/DL (ref 8.4–10.2)
CHLORIDE SERPL-SCNC: 100 MMOL/L (ref 96–108)
CO2 SERPL-SCNC: 23 MMOL/L (ref 21–32)
CREAT SERPL-MCNC: 0.97 MG/DL (ref 0.6–1.3)
EOSINOPHIL # BLD AUTO: 0.18 THOUSAND/ÂΜL (ref 0–0.61)
EOSINOPHIL NFR BLD AUTO: 3 % (ref 0–6)
ERYTHROCYTE [DISTWIDTH] IN BLOOD BY AUTOMATED COUNT: 13.5 % (ref 11.6–15.1)
GFR SERPL CREATININE-BSD FRML MDRD: 77 ML/MIN/1.73SQ M
GLUCOSE SERPL-MCNC: 95 MG/DL (ref 65–140)
HCT VFR BLD AUTO: 32.4 % (ref 36.5–49.3)
HGB BLD-MCNC: 10.3 G/DL (ref 12–17)
IMM GRANULOCYTES # BLD AUTO: 0.02 THOUSAND/UL (ref 0–0.2)
IMM GRANULOCYTES NFR BLD AUTO: 0 % (ref 0–2)
LYMPHOCYTES # BLD AUTO: 1.01 THOUSANDS/ÂΜL (ref 0.6–4.47)
LYMPHOCYTES NFR BLD AUTO: 14 % (ref 14–44)
MAGNESIUM SERPL-MCNC: 1.7 MG/DL (ref 1.9–2.7)
MCH RBC QN AUTO: 27.8 PG (ref 26.8–34.3)
MCHC RBC AUTO-ENTMCNC: 31.8 G/DL (ref 31.4–37.4)
MCV RBC AUTO: 88 FL (ref 82–98)
MONOCYTES # BLD AUTO: 0.46 THOUSAND/ÂΜL (ref 0.17–1.22)
MONOCYTES NFR BLD AUTO: 7 % (ref 4–12)
NEUTROPHILS # BLD AUTO: 5.4 THOUSANDS/ÂΜL (ref 1.85–7.62)
NEUTS SEG NFR BLD AUTO: 76 % (ref 43–75)
NRBC BLD AUTO-RTO: 0 /100 WBCS
PHOSPHATE SERPL-MCNC: 2.6 MG/DL (ref 2.3–4.1)
PLATELET # BLD AUTO: 350 THOUSANDS/UL (ref 149–390)
PMV BLD AUTO: 8.3 FL (ref 8.9–12.7)
POTASSIUM SERPL-SCNC: 3.7 MMOL/L (ref 3.5–5.3)
RBC # BLD AUTO: 3.7 MILLION/UL (ref 3.88–5.62)
SODIUM SERPL-SCNC: 132 MMOL/L (ref 135–147)
WBC # BLD AUTO: 7.09 THOUSAND/UL (ref 4.31–10.16)

## 2024-10-03 PROCEDURE — 99024 POSTOP FOLLOW-UP VISIT: CPT

## 2024-10-03 PROCEDURE — 80048 BASIC METABOLIC PNL TOTAL CA: CPT

## 2024-10-03 PROCEDURE — 84100 ASSAY OF PHOSPHORUS: CPT

## 2024-10-03 PROCEDURE — 83735 ASSAY OF MAGNESIUM: CPT

## 2024-10-03 PROCEDURE — 99024 POSTOP FOLLOW-UP VISIT: CPT | Performed by: SURGERY

## 2024-10-03 PROCEDURE — 85025 COMPLETE CBC W/AUTO DIFF WBC: CPT

## 2024-10-03 RX ADMIN — ASPIRIN 81 MG: 81 TABLET, CHEWABLE ORAL at 05:03

## 2024-10-03 RX ADMIN — DOCUSATE SODIUM 100 MG: 100 CAPSULE, LIQUID FILLED ORAL at 08:05

## 2024-10-03 RX ADMIN — LEVOTHYROXINE SODIUM 150 MCG: 0.15 TABLET ORAL at 08:05

## 2024-10-03 RX ADMIN — NICOTINE 1 PATCH: 14 PATCH, EXTENDED RELEASE TRANSDERMAL at 08:06

## 2024-10-03 RX ADMIN — METOPROLOL TARTRATE 25 MG: 25 TABLET, FILM COATED ORAL at 05:03

## 2024-10-03 RX ADMIN — B-COMPLEX W/ C & FOLIC ACID TAB 1 TABLET: TAB at 08:05

## 2024-10-03 NOTE — NURSING NOTE
Patient discharged in stable condition, no distress present. AVS reviewed with patient and he verbalized his understanding. He had no questions for nursing. Patient is awaitng wife to pick him up at 5pm.

## 2024-10-03 NOTE — TELEPHONE ENCOUNTER
MARIELA his appt time for 10/10/24 was changed to 12pm. If he cannot make this time, to please give me a call.

## 2024-10-03 NOTE — DISCHARGE SUMMARY
"Discharge Summary - Surgery-General   Name: Jace Varghese 74 y.o. male I MRN: [id number]  Unit/Bed#: -01 I Date of Admission: 9/28/2024   Date of Service: 10/3/2024 I Hospital Day: 5    Admission Date: 9/28/2024 1847  Discharge Date: 10/03/24  Admitting Diagnosis: Mixed hyperlipidemia [E78.2]  Bowel obstruction (HCC) [K56.609]  Vomiting [R11.10]  Essential (primary) hypertension [I10]  Discharge Diagnosis:   Medical Problems       Resolved Problems  Date Reviewed: 9/23/2024   None         HPI: As per HPI written on admission by Johnna Kowalski, \"Jace Varghese is a 74 y.o. year old male with PMHx of hypertension who presented to the emergency department yesterday with abdominal pain, nausea and vomiting.  Patient had recent right colon resection on 9/23/2024.  His hospital stay was uneventful and he was discharged on 9/26/2024.  Patient states he went home and was eating without difficulty Thursday night and throughout Friday.  He states he was having loose watery bowel movements and passing flatus.  On Saturday he developed abdominal distention, abdominal pain, and nausea and vomiting after eating breakfast which would not resolve prompting presentation to the emergency department.  NG tube was placed in the ED.  Patient states he continued to vomit after tube was placed.  Today he does feel better but feels still distended.  He is not passing any flatus or having bowel movements.\"    Procedures Performed:   Orders Placed This Encounter   Procedures    ED ECG Documentation Only       Summary of Hospital Course: Hospital Course: No notes on file Jace Varghese is a 74 y.o. year old male who presented on 9/28/2024 with postoperative nausea and vomiting.  He was admitted for conservative management of postoperative ileus. Once abdominal pain improved and bowel function returned his diet was advanced as tolerated. Vital signs are stable and laboratory work is within normal limits. Tolerating a " regular diet and voiding spontaneously. Bowel function is present. Ambulating without difficulty. Pain is well controlled on oral pain medication. We discussed outpatient follow-up with general surgery in 2 weeks. The patient was educated on the diagnosis and treatment plan and all questions were adequately answered. The patient was deemed appropriate for discharge in the care of family on 10/3/2024.    Significant Findings, Care, Treatment and Services Provided: Conservative management for postop ileus    Complications: N/A    Condition at Discharge: stable       Discharge instructions/Information to patient and family:   See After Visit Summary (AVS) for information provided to patient and family.      Provisions for Follow-Up Care:  See after visit summary for information related to follow-up care and any pertinent home health orders.      PCP: Baltazar Naidu MD    Disposition: Home    Planned Readmission: No     Discharge Medications:  See after visit summary for reconciled discharge medications provided to patient and family.      Discharge Statement:  I have spent a total time of 30 minutes in caring for this patient on the day of the visit/encounter. .

## 2024-10-03 NOTE — DISCHARGE INSTR - AVS FIRST PAGE
Follow up:  Following discharge from the hospital call the office in 1-2 days to set up a post operative appointment to be seen in 2 weeks.  [phone]  Call the office if you have increased pain not relieved with pain medicine.  Call the office if you have a fever,redness, the wound opens up, you have pus draining from your incision.     AFTER YOU LEAVE: Following discharge from the hospital, you may have some questions about your procedure, your activities or your general condition.  These instructions may answer some of your questions and help you adjust during the first few days following your operation.  You can expect to be sore and tender mostly around the incisions. This pain should last approximally 5 days and gradually improve daily.          Incisions:  You may apply ice to the incisions to help with pain.  It is normal to have some bruising, swelling or mild discoloration around the incision.  If increasing redness or pain develops, call our office immediately.   Do not apply any creams, lotions, or ointments.    If you have dressings:  You may remove the gauze dressing from your incisions 48 hours after surgery. Underneath this dressing is a tape like dressing called Steri Strips. Leave the steri strips in place for 5-7 days. They may fall off on their own. This is okay.     Bathing:   You may shower daily with soap and water the day after the procedure. It is OK to GENTLY wash the incision with soap and water then pat dry.  DO NOT SCRUB. Do NOT soak incision in a tub, pool, or hot tub for 2 weeks.    Diet:   Resume your normal diet unless specified otherwise.  We recommend you slowly advance your diet. Try to start with softer bland foods and gradually advance as tolerated. Be sure to consume plenty of water.  Avoid alcohol.        Activity/Restrictions:   The evening following the procedure you should rest as much as possible, sitting, lying or reclining.  you should be sure someone remains with you  until the next morning.  Gradually increase your activity daily. Walking 3-4 times daily is good and stairs are ok.  Listen to your body.  If you start to get tired or sore then rest.   No strenuous activity or exercise for 3-4 weeks.   No heavy lifting, pushing or pulling.   No driving for 5 days or while taking narcotics for pain.       Return or work:   You may return to work or other activities as soon as your pain is controlled and you feel comfortable. For many people, this is 5 to 7 days after surgery. If your job requires heavy lifting you will need to be on light duty for 2-3 weeks.     Medication:   If you were given a prescription for Percocet, Norco, or Vicodin for pain be sure to eat prior to taking as these medications as they may cause nausea and vomiting on an empty stomach.    If you do not want to take stronger medications for pain, you may take Tylenol, Aleve OR Ibuprofen  DO NOT take Tylenol  (acetaminophen) with these medication for a fever or for further pain control as these medications already contain Tylenol in them. Take one or the other.  Do not exceed more than 4000 mg of acetaminophen in 24 hours or 3000 mg if you have liver disease.   If you were given an antibiotic take it until it is finished.    Constipation:   Patients often experience constipation after surgery.  You may take a stool softener (Colace) to help prevent constipation.    If you experience significant nausea or vomiting after abdominal surgery, call the office before trying any stronger medications or laxatives  Call the office if you haven't had a Bowel movement in 2-3days after surgery    Contact your healthcare provider if:   You have a fever over 101°F (38°C) or chills.    You have pain or nausea that is not relieved by medicine.    You have redness and swelling around your incisions, or blood or pus is leaking from your incisions.    You are constipated or have diarrhea.    Your skin or eyes are yellow, or your  bowel movements are pale.    You have questions or concerns about your surgery, condition, or care.    Seek care immediately or call 911 if:   You cannot stop vomiting.    Your bowel movements are black or bloody.    You have pain in your abdomen and it is swollen or hard.    Your arm or leg feels warm, tender, and painful. It may look swollen and red.   You feel lightheaded, short of breath, and have chest pain.    You cough up blood.

## 2024-10-03 NOTE — CASE MANAGEMENT
Case Management Discharge Planning Note    Patient name Jace Varghese  Location /-01 MRN [id number]  : 1950 Date 10/3/2024       Current Admission Date: 2024  Current Admission Diagnosis:Hypoxia   Patient Active Problem List    Diagnosis Date Noted Date Diagnosed    Hypoxia 2024     Hyponatremia 2024     JSESI (acute kidney injury) (HCC) 2024     Primary hypertension 2024     Malignant neoplasm of ascending colon (HCC) 2024     Preop cardiovascular exam 2024     Aneurysm of infrarenal abdominal aorta (HCC) 2024     CAD (coronary artery disease) 2024     Hypertensive kidney disease with chronic kidney disease stage III (HCC) 2024     S/P ORIF (open reduction internal fixation) fracture 2023     Closed trimalleolar fracture of left ankle with routine healing, subsequent encounter 2023     Hypothyroidism 10/29/2023     Closed trimalleolar fracture 10/28/2023     Class 1 obesity due to excess calories 2023     Essential (primary) hypertension 2022     Family history of colon cancer in father 2022     Stage 3a chronic kidney disease (HCC) 2022     S/P right coronary artery (RCA) stent placement 2019     Mixed hyperlipidemia 2019     Tobacco abuse 2018     History of cancer tonsil 2018       LOS (days): 5  Geometric Mean LOS (GMLOS) (days): 2.9  Days to GMLOS:-1.9     OBJECTIVE:  Risk of Unplanned Readmission Score: 18.81         Current admission status: Inpatient   Preferred Pharmacy:   [id number] - [place], [place] - [address] [address]  Phone: [phone] Fax: [phone]    Primary Care Provider: Baltazar Naidu MD    Primary Insurance: MEDICARE  Secondary Insurance:     DISCHARGE DETAILS:    Discharge planning discussed with:: Patient at the bedside  [place] of Choice: Yes                                                                                 IMM Given (Date):: 10/03/24  IMM Given to:: Patient     Additional Comments: IMM and Medicare rights reviewed with patient at the bedside. Patient verbalized understanding and signed original. Copy given to patient, signed original filed to medical records.

## 2024-10-03 NOTE — PROGRESS NOTES
"Progress Note - General Surgery   Jace Varghese 74 y.o. male MRN: [id number]  Unit/Bed#: -01 Encounter: [id number]    Assessment:  Jace Varghese is a 74 y.o. male POD10 with post-op ileus S/p right hemicolectomy 9/23/24  Discharged 9/26 s/p surgery. Returned to ED with pain, n/v, 9/28    AVSS, no leukocytosis, hemoglobin stable magnesium 1.7, remainder of labs unremarkable    Plan:  Advance to regular diet as tolerated.  Plan for discharge today if tolerating diet.  Monitor abdominal exam, labs, vitals  PRN pain medication and anti-emetics  Encourage ambulation  DVT ppx: heparin  Incentive spirometry 10 times/hour while awake  Continue home medications as prescribed   General Surgery primary    Subjective/Objective    Subjective: No acute events overnight.     Objective:     Blood pressure 130/82, pulse 86, temperature 98 °F (36.7 °C), resp. rate 15, height 5' 9\" (1.753 m), weight 104 kg (229 lb), SpO2 95%.,Body mass index is 33.82 kg/m².      Intake/Output Summary (Last 24 hours) at 10/3/2024 0816  Last data filed at 10/3/2024 0501  Gross per 24 hour   Intake 720 ml   Output --   Net 720 ml       Invasive Devices       None                   Physical Exam:   GEN: NAD  HEENT: NCAT, MMM  CV: RRR, no m/r/g  Lung: Normal effort, CTA B/L, no w/r/r  Ab: Soft, NT/ND  Extrem: No CCE   Neuro: A+Ox3     Lab, Imaging and other studies:I have personally reviewed pertinent lab results.     VTE Pharmacologic Prophylaxis: Heparin  VTE Mechanical Prophylaxis: sequential compression device    Recent Results (from the past 36 hour(s))   Basic metabolic panel    Collection Time: 10/02/24  9:39 AM   Result Value Ref Range    Sodium 134 (L) 135 - 147 mmol/L    Potassium 4.2 3.5 - 5.3 mmol/L    Chloride 102 96 - 108 mmol/L    CO2 26 21 - 32 mmol/L    ANION GAP 6 4 - 13 mmol/L    BUN 15 5 - 25 mg/dL    Creatinine 1.02 0.60 - 1.30 mg/dL    Glucose 118 65 - 140 mg/dL    Calcium 8.3 (L) 8.4 - 10.2 mg/dL    eGFR 72 " ml/min/1.73sq m   Phosphorus    Collection Time: 10/02/24  9:39 AM   Result Value Ref Range    Phosphorus 2.2 (L) 2.3 - 4.1 mg/dL   Magnesium    Collection Time: 10/02/24  9:39 AM   Result Value Ref Range    Magnesium 1.8 (L) 1.9 - 2.7 mg/dL   CBC and differential    Collection Time: 10/02/24  9:43 AM   Result Value Ref Range    WBC 7.39 4.31 - 10.16 Thousand/uL    RBC 3.85 (L) 3.88 - 5.62 Million/uL    Hemoglobin 10.8 (L) 12.0 - 17.0 g/dL    Hematocrit 34.3 (L) 36.5 - 49.3 %    MCV 89 82 - 98 fL    MCH 28.1 26.8 - 34.3 pg    MCHC 31.5 31.4 - 37.4 g/dL    RDW 13.6 11.6 - 15.1 %    MPV 8.3 (L) 8.9 - 12.7 fL    Platelets 385 149 - 390 Thousands/uL    nRBC 0 /100 WBCs    Segmented % 76 (H) 43 - 75 %    Immature Grans % 0 0 - 2 %    Lymphocytes % 14 14 - 44 %    Monocytes % 7 4 - 12 %    Eosinophils Relative 3 0 - 6 %    Basophils Relative 0 0 - 1 %    Absolute Neutrophils 5.61 1.85 - 7.62 Thousands/µL    Absolute Immature Grans 0.02 0.00 - 0.20 Thousand/uL    Absolute Lymphocytes 1.00 0.60 - 4.47 Thousands/µL    Absolute Monocytes 0.52 0.17 - 1.22 Thousand/µL    Eosinophils Absolute 0.21 0.00 - 0.61 Thousand/µL    Basophils Absolute 0.03 0.00 - 0.10 Thousands/µL   CBC and differential    Collection Time: 10/03/24  4:53 AM   Result Value Ref Range    WBC 7.09 4.31 - 10.16 Thousand/uL    RBC 3.70 (L) 3.88 - 5.62 Million/uL    Hemoglobin 10.3 (L) 12.0 - 17.0 g/dL    Hematocrit 32.4 (L) 36.5 - 49.3 %    MCV 88 82 - 98 fL    MCH 27.8 26.8 - 34.3 pg    MCHC 31.8 31.4 - 37.4 g/dL    RDW 13.5 11.6 - 15.1 %    MPV 8.3 (L) 8.9 - 12.7 fL    Platelets 350 149 - 390 Thousands/uL    nRBC 0 /100 WBCs    Segmented % 76 (H) 43 - 75 %    Immature Grans % 0 0 - 2 %    Lymphocytes % 14 14 - 44 %    Monocytes % 7 4 - 12 %    Eosinophils Relative 3 0 - 6 %    Basophils Relative 0 0 - 1 %    Absolute Neutrophils 5.40 1.85 - 7.62 Thousands/µL    Absolute Immature Grans 0.02 0.00 - 0.20 Thousand/uL    Absolute Lymphocytes 1.01 0.60 - 4.47  Thousands/µL    Absolute Monocytes 0.46 0.17 - 1.22 Thousand/µL    Eosinophils Absolute 0.18 0.00 - 0.61 Thousand/µL    Basophils Absolute 0.02 0.00 - 0.10 Thousands/µL   Basic metabolic panel    Collection Time: 10/03/24  4:53 AM   Result Value Ref Range    Sodium 132 (L) 135 - 147 mmol/L    Potassium 3.7 3.5 - 5.3 mmol/L    Chloride 100 96 - 108 mmol/L    CO2 23 21 - 32 mmol/L    ANION GAP 9 4 - 13 mmol/L    BUN 11 5 - 25 mg/dL    Creatinine 0.97 0.60 - 1.30 mg/dL    Glucose 95 65 - 140 mg/dL    Calcium 8.0 (L) 8.4 - 10.2 mg/dL    eGFR 77 ml/min/1.73sq m   Magnesium    Collection Time: 10/03/24  4:53 AM   Result Value Ref Range    Magnesium 1.7 (L) 1.9 - 2.7 mg/dL   Phosphorus    Collection Time: 10/03/24  4:53 AM   Result Value Ref Range    Phosphorus 2.6 2.3 - 4.1 mg/dL

## 2024-10-03 NOTE — NURSING NOTE
Patient refused new IV placement, [facility] provider made aware. The patient does not have an IV at this time.

## 2024-10-10 ENCOUNTER — OFFICE VISIT (OUTPATIENT)
Dept: SURGERY | Facility: CLINIC | Age: 74
End: 2024-10-10

## 2024-10-10 VITALS
OXYGEN SATURATION: 99 % | HEIGHT: 69 IN | SYSTOLIC BLOOD PRESSURE: 124 MMHG | WEIGHT: 217.2 LBS | HEART RATE: 90 BPM | TEMPERATURE: 98 F | BODY MASS INDEX: 32.17 KG/M2 | RESPIRATION RATE: 16 BRPM | DIASTOLIC BLOOD PRESSURE: 80 MMHG

## 2024-10-10 DIAGNOSIS — C18.2 MALIGNANT NEOPLASM OF ASCENDING COLON (HCC): ICD-10-CM

## 2024-10-10 DIAGNOSIS — Z48.89 POSTOPERATIVE VISIT: Primary | ICD-10-CM

## 2024-10-10 PROCEDURE — 99024 POSTOP FOLLOW-UP VISIT: CPT | Performed by: SURGERY

## 2024-10-10 NOTE — PROGRESS NOTES
Post-Op Follow Up- General Surgery   Jace Varghese 74 y.o. male MRN: [id number]  Unit/Bed#:  Encounter: [id number]    Assessment & Plan     Assessment:  Status post open right hemicolectomy for T3 N2 ascending colon cancer, improved  Plan:  Patient is doing much better after right hemicolectomy, he developed postop ileus requiring hospitalization.  He is tolerating diet and having regular bowel movement.  He will return to my office in 1 month for follow-up and will refer to medical oncology at this time.    History of Present Illness     HPI:  Jace Varghese is a 74 y.o. male who presents to my office for first postop follow-up after open right hemicolectomy for ascending colon cancer.  Patient stated that he is having some loose bowel movement, a little bit distended, he is tolerating diet.  He denies any nausea or vomiting but he does complains of occasional abdominal pain which improved after burping or having bowel movement.  He denies having any fever, chills or any other constitutional symptoms.  Pathology discussed with patient.    Historical Information   Past Medical History:   Diagnosis Date    Coronary artery disease     Disease of thyroid gland     Hyperlipidemia     Hypertension     Myocardial infarction (HCC)      Past Surgical History:   Procedure Laterality Date    ANKLE FUSION Left 10/10/2023    CHOLECYSTECTOMY      COLONOSCOPY      CORONARY ANGIOPLASTY WITH STENT PLACEMENT      HEMICOLOECTOMY W/ ANASTOMOSIS N/A 9/23/2024    Procedure: RESECTION COLON RIGHT, EXTENSIVE LYSIS OF ADHESIONS;  Surgeon: Corey Miranda MD;  Location: [facility];  Service: General    KNEE SURGERY Left     NECK SURGERY Right     ORIF TIBIA & FIBULA FRACTURES Left 10/29/2023    Procedure: OPEN REDUCTION W/ INTERNAL FIXATION (ORIF) ANKLE- ORIF Left ankle;  Surgeon: Myke Hamilton MD;  Location: [facility];  Service: Orthopedics    TONSILLECTOMY      TOOTH EXTRACTION      WISDOM TOOTH EXTRACTION       Social  "History   Social History     Substance and Sexual Activity   Alcohol Use Yes    Alcohol/week: 1.0 standard drink of alcohol    Types: 1 Standard drinks or equivalent per week     Social History     Substance and Sexual Activity   Drug Use No     Social History     Tobacco Use   Smoking Status Former    Current packs/day: 0.00    Average packs/day: 1 pack/day for 54.4 years (53.7 ttl pk-yrs)    Types: Cigarettes    Start date: 1970    Quit date: 2024    Years since quittin.2    Passive exposure: Past   Smokeless Tobacco Never   Tobacco Comments    down to 1/2 ppd     Family History: Family history non-contributory    Meds/Allergies   all medications and allergies reviewed     Current Outpatient Medications:     aspirin 81 mg chewable tablet, Chew 1 tablet (81 mg total) every 12 (twelve) hours for 28 days (Patient taking differently: Chew 81 mg daily), Disp: 56 tablet, Rfl: 0    levothyroxine (Synthroid) 150 mcg tablet, Take 1 tablet (150 mcg total) by mouth daily, Disp: 90 tablet, Rfl: 1    metoprolol tartrate (LOPRESSOR) 25 mg tablet, TAKE ONE TABLET BY MOUTH EVERY TWELVE HOURS, Disp: 180 tablet, Rfl: 3    multivitamin (THERAGRAN) TABS, Take 1 tablet by mouth daily, Disp: , Rfl:     nicotine (NICODERM CQ) 14 mg/24hr TD 24 hr patch, Place 1 patch on the skin over 24 hours daily Do not start before 2023. (Patient not taking: Reported on 10/10/2024), Disp: 28 patch, Rfl: 0  Allergies   Allergen Reactions    Atorvastatin Myalgia       Objective     Current Vitals:   Blood Pressure: 124/80 (10/10/24 1147)  Pulse: 90 (10/10/24 1147)  Temperature: 98 °F (36.7 °C) (10/10/24 114)  Respirations: 16 (10/10/24 114)  Height: 5' 9\" (175.3 cm) (10/10/24 114)  Weight - Scale: 98.5 kg (217 lb 3.2 oz) (10/10/24 114)  SpO2: 99 % (10/10/24 114)    Physical Exam  Vitals and nursing note reviewed.   Abdominal:      Comments: Abdomen is soft, nondistended, nontender, midline incision is healed well without " evidence of infection or incisional hernia.

## 2024-10-11 ENCOUNTER — DOCUMENTATION (OUTPATIENT)
Dept: HEMATOLOGY ONCOLOGY | Facility: CLINIC | Age: 74
End: 2024-10-11

## 2024-10-11 ENCOUNTER — PATIENT MESSAGE (OUTPATIENT)
Dept: SURGERY | Facility: CLINIC | Age: 74
End: 2024-10-11

## 2024-10-11 NOTE — PROGRESS NOTES
Chart rvw'd on 10/11/2024      Referred by:  Dr. Corey Miranda    Diagnosis:  Malignant neoplasm of ascending colon    Imagin2024 CT AP w contrast-  Diffusely dilated fluid-filled small bowel loops throughout the abdomen. Although there is mild narrowing at the ileocolic anastomosis, appearance is favored to represent ileus rather than true obstruction. However, continued follow-up recommended to   ensure resolution and exclude developing obstruction     Small amount of gas in the urinary bladder likely secondary to recent instrumentation. If there is concern for infection, correlate with urinalysis.      Pathology:  2024-  [id number]  RESULTS OF IMMUNOHISTOCHEMICAL ANALYSIS FOR MISMATCH REPAIR PROTEIN LOSS     INTERPRETATION (block A1): Loss of nuclear expression of MLH1 and PMS2, see comment.     Comment:   Testing for methylation of the MLH1 promoter and / or mutation of BRAF is indicated (the presence of a BRAF V600E mutation and / or MLH1 methylation suggests that the tumor is sporadic and germline evaluation is probably not indicated; absence of both MLH1 methylation and of BRAF V600E mutation suggests the possibility of Garber syndrome and sequencing and / or large deletion / duplication testing of germline MLH1 may be indicated)  MLH1 promotor methylation has been requested on block A1, and the results will be issued in another addendum.        RESULTS:  Antibody            Clone                 Description                                  Results  MLH1                 M1                     Mismatch repair protein  Loss of nuclear expression  MSH2                [id number]        Mismatch repair protein  Intact nuclear expression  MSH6                SP93                  Mismatch repair protein  Intact nuclear expression  PMS2                 A16                    Mismatch repair protein  Loss of nuclear expression     Note: A positive control for each antibody have been reviewed and  accepted.     These tests were developed and their performance characteristics determined by [facility].  They may not be cleared or approved by the U.S. Food and Drug Administration.  The FDA determined that such clearance or approval is not necessary.  These tests are used for clinical purposes.  They should not be regarded as investigational or for research.  This laboratory has been approved by [place] [id number], designated as a high-complexity laboratory and is qualified to perform these tests.    Addendum electronically signed by Immanuel Boswell MD on 7/2/2024 at 12:08 PM   Final Diagnosis   A. Colon, mid ascending, biopsy:  -Invasive adenocarcinoma.        Surgery:  9/23/2024 R colon resection      Post surgical pathology:  Final Diagnosis   A. Right colon with terminal ileum and appendix, right hemicolectomy:  -   Invasive mucinous adenocarcinoma of ascending colon, moderately differentiated.   -   MMR (MLH1, MSH2, MSH6 and PMS2) studies by IHC performed on biopsy specimen [id number].   -   A14 is sent to [facility] for MI Profile Testing.   -   See synoptic report.     COLON AND RECTUM: Resection   8th Edition - Protocol posted: 12/13/2023COLON AND RECTUM: RESECTION - All Specimens  SPECIMEN   Procedure  Right hemicolectomy   TUMOR   Tumor Site  Ascending colon   Histologic Type  Mucinous adenocarcinoma   Histologic Grade  G2, moderately differentiated   Tumor Size  Greatest dimension (Centimeters): 4.1 cm   Tumor Extent  Invades through muscularis propria into the pericolonic or perirectal tissue   Macroscopic Tumor Perforation  Not identified   Lymphatic and / or Vascular Invasion  Small vessel   Perineural Invasion  Not identified   Tumor Budding Score  High (10 or more)   Type of Polyp in which Invasive Carcinoma Arose  Tubular adenoma   Treatment Effect  No known presurgical therapy   MARGINS   Margin Status for Invasive Carcinoma  All margins negative for invasive  carcinoma   Closest Margin(s) to Invasive Carcinoma  Retroperitoneal margin   Distance from Invasive Carcinoma to Closest Margin  4.8 cm   Margin Status for Non-Invasive Tumor  All margins negative for high-grade dysplasia / intramucosal carcinoma and low-grade dysplasia   REGIONAL LYMPH NODES   Regional Lymph Node Status  Tumor present in regional lymph node(s)   Number of Lymph Nodes with Tumor  5   Number of Lymph Nodes Examined  17   Tumor Deposits  Not identified   pTNM CLASSIFICATION (AJCC 8th Edition)   Reporting of pT, pN, and (when applicable) pM categories is based on information available to the pathologist at the time the report is issued. As per the AJCC (Chapter 1, 8th Ed.) it is the managing physician’s responsibility to establish the final pathologic stage based upon all pertinent information, including but potentially not limited to this pathology report.   pT Category  pT3   pN Category  pN2a   ADDITIONAL FINDINGS   Additional Findings  Appendix with fibrous obliteration of the tip, ascending colon with tubulovillous adenoma   Comment(s)  MMR (MLH1, MSH2, MSH6 and PMS2) studies by IHC performed on biopsy specimen [id number].  A14 is sent to [facility] for MI Profile Testing.

## 2024-10-14 ENCOUNTER — TELEPHONE (OUTPATIENT)
Age: 74
End: 2024-10-14

## 2024-10-14 ENCOUNTER — TELEPHONE (OUTPATIENT)
Dept: HEMATOLOGY ONCOLOGY | Facility: CLINIC | Age: 74
End: 2024-10-14

## 2024-10-14 NOTE — TELEPHONE ENCOUNTER
Please assist the patient with scheduling with in the clinical review timeframe  of 1-2 weeks. The patient has been scheduled on 11/12/2024 at the [facility] (preferred campus) .     Patients best contact number [phone]

## 2024-10-14 NOTE — TELEPHONE ENCOUNTER
Called patient to reschedule appt . Per patient he can't change  the appt he has scheduled and he wants to kip it as it is

## 2024-10-15 DIAGNOSIS — C18.2 CARCINOMA OF ASCENDING COLON (HCC): Primary | ICD-10-CM

## 2024-10-22 ENCOUNTER — PATIENT OUTREACH (OUTPATIENT)
Dept: HEMATOLOGY ONCOLOGY | Facility: CLINIC | Age: 74
End: 2024-10-22

## 2024-10-22 NOTE — PROGRESS NOTES
NN initial phone outreach to the pt, introduced myself and explained my role. We reviewed his upcoming appt that is sched on 11/12 w Dr. Aceves. I asked what the reason for his appt being sched out is and he states that this was the soonest avialable appt in [place]. I let him know that I will look into this to see if we could get him sched sooner than this.     We then discussed the reason for his consult and how medical oncology would recommend for chemotherapy after surgery. In this case patient may potentially need a port for IV acces. I rvwd him the details pertaining to the port and I would place a referral when to IR when the pt is rescheduled. He asks that we avoid sched him on Mon and Thurs since his wife babysits the grandkids and he wants her w him at his consult.     We completed the general assessment, no referrals at this time.     Pt overall is doing well, healing up fine after surgery. His bowel movements are not 100% back to normal as they were but they are soft and he is going daily. He does not have an ostomy.     He has my phone number and knows to call me until he is seen in office. Holley Cornejo will reach out to him once he is on a treatment plan to [facility]. He thanked me for calling him today.

## 2024-10-22 NOTE — PROGRESS NOTES
NN initial phone outreach to the pt, no answer, LVM stating my name title and reason for call, waiting for a call back from the pt.

## 2024-10-29 LAB
CARIS GENOMIC LOH - EXOME: NORMAL
CARIS HER2/NEU: NEGATIVE
CARIS HLA-A: NORMAL
CARIS HLA-B: NORMAL
CARIS HLA-C: NORMAL
CARIS MISMATCH REPAIR STATUS: NORMAL
CARIS MLH1: NORMAL
CARIS MSH2: NORMAL
CARIS MSH6: NORMAL
CARIS MSI - EXOME: NORMAL
CARIS PD-L1 (SP142): NEGATIVE
CARIS PMS2: NORMAL
CARIS TMB - EXOME: NORMAL

## 2024-11-05 ENCOUNTER — DOCUMENTATION (OUTPATIENT)
Dept: GENETICS | Facility: CLINIC | Age: 74
End: 2024-11-05

## 2024-11-05 NOTE — PROGRESS NOTES
The Genetics Team reviewed Jace's CARIS result and recommends a referral for germline genetic testing.    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  A pathogenic and/or likely pathogenic variant identified at greater than 30% allele frequency in one of 40 genes.  For the following 6 genes (APC, PTEN, RB1, TP53, CDKN2A & SMARCA4) follow-up should be restricted to tumors arising in individuals diagnosed under the age of 30.        Meets NCCN Version 2.2024 General Testing Criteria which states that testing is clinically indicated for individuals who have a pathogenic and/or likely pathogenic variant identified on tumor genomic testing that has clinical implications if also identified in the germline regardless of personal and/or family history.

## 2024-11-08 DIAGNOSIS — I21.11 ST ELEVATION MYOCARDIAL INFARCTION INVOLVING RIGHT CORONARY ARTERY (HCC): ICD-10-CM

## 2024-11-08 RX ORDER — METOPROLOL TARTRATE 25 MG/1
25 TABLET, FILM COATED ORAL EVERY 12 HOURS
Qty: 180 TABLET | Refills: 1 | Status: SHIPPED | OUTPATIENT
Start: 2024-11-08

## 2024-11-12 ENCOUNTER — OFFICE VISIT (OUTPATIENT)
Dept: SURGERY | Facility: CLINIC | Age: 74
End: 2024-11-12

## 2024-11-12 ENCOUNTER — OFFICE VISIT (OUTPATIENT)
Dept: HEMATOLOGY ONCOLOGY | Facility: CLINIC | Age: 74
End: 2024-11-12
Payer: MEDICARE

## 2024-11-12 VITALS
WEIGHT: 218 LBS | BODY MASS INDEX: 32.29 KG/M2 | HEIGHT: 69 IN | TEMPERATURE: 97.5 F | SYSTOLIC BLOOD PRESSURE: 132 MMHG | RESPIRATION RATE: 18 BRPM | OXYGEN SATURATION: 95 % | DIASTOLIC BLOOD PRESSURE: 78 MMHG | HEART RATE: 68 BPM

## 2024-11-12 VITALS
TEMPERATURE: 97.5 F | OXYGEN SATURATION: 95 % | HEART RATE: 68 BPM | WEIGHT: 216.4 LBS | HEIGHT: 69 IN | RESPIRATION RATE: 16 BRPM | BODY MASS INDEX: 32.05 KG/M2 | DIASTOLIC BLOOD PRESSURE: 78 MMHG | SYSTOLIC BLOOD PRESSURE: 132 MMHG

## 2024-11-12 DIAGNOSIS — C18.2 MALIGNANT NEOPLASM OF ASCENDING COLON (HCC): Primary | ICD-10-CM

## 2024-11-12 DIAGNOSIS — I25.10 CORONARY ARTERY DISEASE INVOLVING NATIVE CORONARY ARTERY OF NATIVE HEART WITHOUT ANGINA PECTORIS: ICD-10-CM

## 2024-11-12 DIAGNOSIS — E03.9 HYPOTHYROIDISM, UNSPECIFIED TYPE: ICD-10-CM

## 2024-11-12 DIAGNOSIS — Z48.89 POSTOPERATIVE VISIT: Primary | ICD-10-CM

## 2024-11-12 DIAGNOSIS — E78.2 MIXED HYPERLIPIDEMIA: ICD-10-CM

## 2024-11-12 DIAGNOSIS — I10 ESSENTIAL (PRIMARY) HYPERTENSION: ICD-10-CM

## 2024-11-12 PROCEDURE — 99024 POSTOP FOLLOW-UP VISIT: CPT | Performed by: SURGERY

## 2024-11-12 PROCEDURE — 99205 OFFICE O/P NEW HI 60 MIN: CPT | Performed by: INTERNAL MEDICINE

## 2024-11-12 NOTE — PROGRESS NOTES
"Ambulatory Visit  Name: Jace Varghese      : 1950      MRN: [id number]  Encounter Provider: Corey Miranda MD  Encounter Date: 2024   Encounter department: [facility]    Assessment & Plan  Postoperative visit  Patient is doing well after surgery, he is tolerating diet and having regular bowel movement.     Plan: Patient is discharged from my care he is scheduled to see medical oncologist today he will follow-up with GI for surveillance colonoscopy next year.    History of Present Illness     Jace Varghese is a 74 y.o. male who presents to my office for second postop follow-up after open right hemicolectomy for T3 N2 colon cancer.  He offers no complaints at this time.    Objective     /78 (BP Location: Left arm, Patient Position: Sitting, Cuff Size: Standard)   Pulse 68   Temp 97.5 °F (36.4 °C)   Resp 16   Ht 5' 9\" (1.753 m)   Wt 98.2 kg (216 lb 6.4 oz)   SpO2 95%   BMI 31.96 kg/m²     Physical Exam  Vitals and nursing note reviewed.   Abdominal:      Comments: Abdomen is soft, nondistended and nontender.  Midline incision was completely healed without evidence of infection or incisional hernia.       "

## 2024-11-12 NOTE — PROGRESS NOTES
"                               Hematology/Oncology Outpatient Office Note    Date of Service: 2024    [facility] HEMATOLOGY ONCOLOGY SPECIALISTS CANDICE      Reason for Consultation:   Chief Complaint   Patient presents with    Follow-up    Consult       Cancer Stage:  Cancer Staging   Malignant neoplasm of ascending colon (HCC)  Staging form: Colon and Rectum, AJCC 8th Edition  - Pathologic stage from 2024: Stage IIIB (pT3, pN2a, cM0) - Signed by Ariela Aceves MD on 2024     Referral Physician: Corey Miranda MD    Primary Care Physician:  Baltazar Nadiu MD     Nickname: Enrrique    Spouse: Hamida Padilla ECO    Today's ECO    Goals and Barriers:  Current Goal: Minimize effects of disease burden, extend life.   Barriers to accomplishing this: None    Patient's Capacity to Self Care:  Patient is able to self care      ASSESSMENT & PLAN      Diagnosis ICD-10-CM Associated Orders   1. Malignant neoplasm of ascending colon (HCC)  C18.2 Ambulatory Referral to Hematology / Oncology      2. Essential (primary) hypertension  I10       3. Coronary artery disease involving native coronary artery of native heart without angina pectoris  I25.10       4. Hypothyroidism, unspecified type  E03.9       5. Mixed hyperlipidemia  E78.2             This is a 74 y.o. c PMHx notable for hypothyroidism and hypertension, being seen in consultation for recently diagnosed stage IIIb colon cancer.  Patient states he feels well after the surgery.  He has recovered well.  Denies any abdominal pain nausea or vomiting.  Has been having regular bowel movements.  He continues to smoke.  He smokes about 4-5 cigarettes/day. Was smoking \"a lot more\".       Discussion of decision making  Oncology history updated, accordingly, during this visit  Goals of care/patient communication  I discussed with the patient the clinical course leading up to their cancer diagnosis. I reviewed relevant office notes, imaging reports and " pathology result as well.  I told the patient that this is a case of curable disease and what this means. We discussed that the goal of anti-cancer therapy is to provide best quality of life, extend overall survival, and progression free survival as shown in clinical trials.   Upon initial conversation patient adamantly refused getting any chemotherapy stating that he does not want to feel sick with the chemotherapy.  He feels perfectly fine and he has no symptoms from his cancer at this point.  His cancer has been taken out.  I had a detailed discussion with the patient including going over the pathology report indicating that given the patient has stage IIIb colon cancer there is high risk for recurrence and presenting with metastatic disease within a few months.  At this point if he decides to proceed with the treatment it would be of curative intent however once the disease is metastatic there is no cure for the cancer.  He then inquired how much would the chemotherapy cost.  I explained that we will have our financial department navigate through the financial help the patient might require.  I explained the risks/benefits of the proposed cancer therapy: FOLFOX versus CapeOx. After discussion including understanding risks of possible life-threatening complications and therapy-related malignancy development, patient was given written information on the chemotherapy medications.  He stated that he will get back to the office.    TNM/Staging At Diagnosis  TNM  Cancer Staging   Malignant neoplasm of ascending colon (HCC)  Staging form: Colon and Rectum, AJCC 8th Edition  - Pathologic stage from 9/23/2024: Stage IIIB (pT3, pN2a, cM0) - Signed by Ariela Aceves MD on 11/11/2024  Stage prefix: Initial diagnosis  Histologic grading system: 4 grade system  Histologic grade (G): G2  Lymph-vascular invasion (LVI): LVI present/identified, NOS     Disease Features/Tumor Markers/Genetics  Tumor Marker: CEA preop  10.8.  Notable Path Features: Histologic grade 2 moderately differentiated adenocarcinoma with lymphovascular invasion.  Treatment    Treatment Team Members  Surgeon    Labs  Diagnostics        Discussion of decision making    I personally reviewed the following lab results, the image studies, pathology, other specialty/physicians consult notes and recommendations, and outside medical records. I had a lengthy discussion with the patient and shared the work-up findings. We discussed the diagnosis and management plan as below. I spent 63 minutes reviewing the records (labs, clinician notes, outside records, medical history, ordering medicine/tests/procedures, monitoring of anti-neoplastic toxicities, interpreting the imaging/labs previously done) and coordination of care as well as direct time with the patient today, of which greater than 50% of the time was spent in counseling and coordination of care with the patient/family.    Plan/Labs  Patient given the option of FOLFOX versus CapeOx.  Patient to get back to the office.  Depending on what option the patient wants to go with, he will need a port placed.  Discussed that with the patient.  Get baseline postop CEA levels.  Patient with a family history significant for cancer with father diagnosed with colon cancer and mother had breast cancer.  Patient declines genetic testing.  RTC in 1 week.        Follow Up    All questions were answered to the patient's satisfaction during this encounter. The patient knows the contact information for our office and knows to reach out for any relevant concerns related to this encounter. They are to call for any temperature 100.4 or higher, new symptoms including but not restricted to shaking chills, decreased appetite, nausea, vomiting, diarrhea, increased fatigue, shortness of breath or chest pain, confusion, and not feeling the strength to come to the clinic. For all other listed problems and medical diagnosis in their chart -  they are managed by PCP and/or other specialists, which the patient acknowledges. Thank you very much for your consultation and making us a part of this patient's care. We are continuing to follow closely with you. Please do not hesitate to reach out to me with any additional questions or concerns.    Ariela Aceves MD  Hematology & Medical Oncology Staff Physician             Disclaimer: This document was prepared using Dragon Medical One. If a word or phrase is confusing, or does not make sense, this is likely due to recognition error which was not discovered during this clinician's review. If you believe an error has occurred, please contact me through [facility] line for luisa?cation.      ONCOLOGY HISTORY OF PRESENT ILLNESS        Oncology History   Malignant neoplasm of ascending colon (HCC)   9/23/2024 Initial Diagnosis    Malignant neoplasm of ascending colon (HCC)     9/23/2024 -  Cancer Staged    Staging form: Colon and Rectum, AJCC 8th Edition  - Pathologic stage from 9/23/2024: Stage IIIB (pT3, pN2a, cM0) - Signed by Ariela Aceves MD on 11/11/2024  Stage prefix: Initial diagnosis  Histologic grading system: 4 grade system  Histologic grade (G): G2  Lymph-vascular invasion (LVI): LVI present/identified, NOS             SUBJECTIVE  (INTERVAL HISTORY)      I have reviewed the relevant past medical, surgical, social and family history. I have also reviewed allergies and medications for this patient.    Review of Systems  Review of Systems   Constitutional:  Negative for activity change, appetite change, fatigue, fever and unexpected weight change.   HENT:  Negative for mouth sores, nosebleeds and sore throat.    Eyes:  Negative for redness and visual disturbance.   Respiratory:  Negative for cough, chest tightness, shortness of breath and wheezing.    Cardiovascular:  Negative for chest pain, palpitations and leg swelling.   Gastrointestinal:  Negative for abdominal pain, blood in stool,  constipation, diarrhea, nausea and vomiting.   Genitourinary:  Negative for dysuria, flank pain and hematuria.   Musculoskeletal:  Negative for arthralgias, back pain, gait problem and myalgias.   Skin:  Negative for pallor, rash and wound.   Neurological:  Negative for dizziness, seizures, speech difficulty, weakness, light-headedness, numbness and headaches.   Hematological:  Negative for adenopathy. Does not bruise/bleed easily.   Psychiatric/Behavioral:  Negative for behavioral problems and confusion.          A 10-point review of system was performed, pertinent positive and negative were detailed as above. Otherwise, the 10-point review of system was negative.      Past Medical History:   Diagnosis Date    Coronary artery disease     Disease of thyroid gland     Hyperlipidemia     Hypertension     Myocardial infarction (HCC)        Past Surgical History:   Procedure Laterality Date    ANKLE FUSION Left 10/10/2023    CHOLECYSTECTOMY      COLONOSCOPY      CORONARY ANGIOPLASTY WITH STENT PLACEMENT      HEMICOLOECTOMY W/ ANASTOMOSIS N/A 9/23/2024    Procedure: RESECTION COLON RIGHT, EXTENSIVE LYSIS OF ADHESIONS;  Surgeon: Corey Miranda MD;  Location: [facility];  Service: General    KNEE SURGERY Left     NECK SURGERY Right     ORIF TIBIA & FIBULA FRACTURES Left 10/29/2023    Procedure: OPEN REDUCTION W/ INTERNAL FIXATION (ORIF) ANKLE- ORIF Left ankle;  Surgeon: Myke Hamilton MD;  Location: [facility];  Service: Orthopedics    TONSILLECTOMY      TOOTH EXTRACTION      WISDOM TOOTH EXTRACTION         Family History   Problem Relation Age of Onset    Heart disease Mother         84    Colon cancer Father        Social History     Socioeconomic History    Marital status: /Civil Union     Spouse name: Not on file    Number of children: Not on file    Years of education: Not on file    Highest education level: Not on file   Occupational History    Not on file   Tobacco Use    Smoking status: Former      Current packs/day: 0.00     Average packs/day: 1 pack/day for 54.4 years (53.7 ttl pk-yrs)     Types: Cigarettes     Start date: 1970     Quit date: 2024     Years since quittin.3     Passive exposure: Past    Smokeless tobacco: Never    Tobacco comments:     down to 1/2 ppd   Vaping Use    Vaping status: Never Used   Substance and Sexual Activity    Alcohol use: Yes     Alcohol/week: 1.0 standard drink of alcohol     Types: 1 Standard drinks or equivalent per week    Drug use: No    Sexual activity: Not on file   Other Topics Concern    Not on file   Social History Narrative    Not on file     Social Determinants of Health     Financial Resource Strain: Not on file   Food Insecurity: No Food Insecurity (2024)    Nursing - Inadequate Food Risk Classification     Worried About Running Out of Food in the Last Year: Never true     Ran Out of Food in the Last Year: Never true     Ran Out of Food in the Last Year: Not on file   Transportation Needs: No Transportation Needs (2024)    PRAPARE - Transportation     Lack of Transportation (Medical): No     Lack of Transportation (Non-Medical): No   Physical Activity: Not on file   Stress: Not on file   Social Connections: Not on file   Intimate Partner Violence: Not on file   Housing Stability: Low Risk  (2024)    Housing Stability Vital Sign     Unable to Pay for Housing in the Last Year: No     Number of Times Moved in the Last Year: 0     Homeless in the Last Year: No   Recent Concern: Housing Stability - High Risk (2024)    Housing Stability Vital Sign     Unable to Pay for Housing in the Last Year: Yes     Number of Times Moved in the Last Year: 0     Homeless in the Last Year: No       Allergies   Allergen Reactions    Atorvastatin Myalgia       Current Outpatient Medications   Medication Sig Dispense Refill    aspirin 81 mg chewable tablet Chew 1 tablet (81 mg total) every 12 (twelve) hours for 28 days (Patient taking differently: Chew  81 mg daily) 56 tablet 0    levothyroxine (Synthroid) 150 mcg tablet Take 1 tablet (150 mcg total) by mouth daily 90 tablet 1    metoprolol tartrate (LOPRESSOR) 25 mg tablet Take 1 tablet (25 mg total) by mouth every 12 (twelve) hours 180 tablet 1    multivitamin (THERAGRAN) TABS Take 1 tablet by mouth daily       No current facility-administered medications for this visit.       (Not in a hospital admission)        Objective:     24 Hour Vitals Assessment:     Vitals:    11/12/24 1316   BP: 132/78   Pulse: 68   Resp: 18   Temp: 97.5 °F (36.4 °C)   SpO2: 95%       PHYSICIAN EXAM :    Physical Exam  Vitals and nursing note reviewed.   Constitutional:       General: He is not in acute distress.     Appearance: Normal appearance.   HENT:      Head: Normocephalic and atraumatic.      Mouth/Throat:      Mouth: Mucous membranes are moist.      Pharynx: Oropharynx is clear.   Eyes:      General: No scleral icterus.     Extraocular Movements: Extraocular movements intact.      Conjunctiva/sclera: Conjunctivae normal.   Cardiovascular:      Rate and Rhythm: Normal rate and regular rhythm.      Pulses: Normal pulses.      Heart sounds: No murmur heard.  Pulmonary:      Effort: Pulmonary effort is normal.      Breath sounds: Normal breath sounds. No wheezing, rhonchi or rales.   Abdominal:      General: Bowel sounds are normal.      Palpations: Abdomen is soft.      Tenderness: There is no abdominal tenderness.   Musculoskeletal:         General: No swelling or tenderness. Normal range of motion.      Cervical back: Neck supple.   Lymphadenopathy:      Cervical: No cervical adenopathy.   Skin:     General: Skin is warm and dry.      Coloration: Skin is not pale.      Findings: No bruising, erythema or rash.   Neurological:      General: No focal deficit present.      Mental Status: He is alert and oriented to person, place, and time.      Motor: No weakness.   Psychiatric:         Mood and Affect: Mood normal.          Behavior: Behavior normal.             DATA REVIEW:    Pathology Result:    Final Diagnosis   Date Value Ref Range Status   09/23/2024   Final    A. Right colon with terminal ileum and appendix, right hemicolectomy:  -   Invasive mucinous adenocarcinoma of ascending colon, moderately differentiated.   -   MMR (MLH1, MSH2, MSH6 and PMS2) studies by IHC performed on biopsy specimen [id number].   -   A14 is sent to [facility] for MI Profile Testing.   -   See synoptic report.    Select slide [id number] is reviewed by Dr. Amish Freeman who concurs with the diagnosis.     Interpretation performed at [facility] [address] [place] [address] [place] [address]       06/27/2024   Final    A. Colon, mid ascending, biopsy:  -Invasive adenocarcinoma.            Image Results:   Image result are reviewed and documented in Hematology/Oncology history    XR abdomen obstruction series  Narrative: XR ABDOMEN OBSTRUCTION SERIES    INDICATION: post op ileus.    COMPARISON: None    FINDINGS:    Nasogastric tube terminating in the distal esophagus.    Multiple dilated loops of small bowel with air-fluid levels. Relative absence of air in the proximal and distal colon. No air within the rectum which is obscured by contrast in the bladder. The findings are concerning for an early bowel obstruction   versus ileus. Overall appearance is similar to the previous study.    No pneumoperitoneum.    No pathologic calcification or soft tissue mass.    Bones are unremarkable for patient's age.    Examination of the chest reveals bibasilar subsegmental atelectasis. Underlying pneumonia is not excluded. Normal cardiomediastinal silhouette.  Impression: Early distal small bowel obstruction versus postoperative ileus.    Nasogastric tube terminating in the distal esophagus.    Bibasilar subsegmental atelectasis.    Workstation performed: [id number]      LABS:  Lab data are reviewed and documented in HemOnc history.       Lab Results   Component Value  "Date    HGB 10.3 (L) 10/03/2024    HCT 32.4 (L) 10/03/2024    MCV 88 10/03/2024     10/03/2024    WBC 7.09 10/03/2024    NRBC 0 10/03/2024     Lab Results   Component Value Date    K 3.7 10/03/2024     10/03/2024    CO2 23 10/03/2024    BUN 11 10/03/2024    CREATININE 0.97 10/03/2024    GLUF 90 07/10/2024    CALCIUM 8.0 (L) 10/03/2024    AST 33 09/29/2024    ALT 26 09/29/2024    ALKPHOS 76 09/29/2024    EGFR 77 10/03/2024       No results found for: \"IRON\", \"TIBC\", \"FERRITIN\"    No results found for: \"KITKGYOL31\"    No results for input(s): \"WBC\", \"CREAT\", \"PLT\" in the last 72 hours.     By:  Ariela Aceves MD, 11/12/2024, 1:44 PM                                  "

## 2024-11-13 ENCOUNTER — PATIENT OUTREACH (OUTPATIENT)
Dept: HEMATOLOGY ONCOLOGY | Facility: CLINIC | Age: 74
End: 2024-11-13

## 2024-11-13 NOTE — PROGRESS NOTES
Patient and spouse were provided information on Capecitabine and Flouracil and Oxaliplatin. They were concerned with cost of all drugs and seemed very hesitant to do treatment. He stated he would not move forward until he had costs of both IV and oral and could make a financial decision. Did not discuss in detail as they stated they would go over this in detail at home. They have a follow up with provider to discuss if they will move forward with this.

## 2024-11-13 NOTE — PROGRESS NOTES
I reached out and spoke with Enrrique  now that consults have been completed with the oncology teams to review for any barriers to care and offer supportive services as needed. Distress Thermometer completed at this time. Patient scored 0/10. Based on responses to DT, no indication for referral to SW needed at this time. . I reviewed and updated the members assigned to the care team in [facility].   He knows the members of the care team as well as how and when to contact them with any needs.   He verbalizes managing the schedules well.   He is currently able to drive and denies any transportation needs.    He denies any uncontrolled symptoms. Discussed role of Palliative Care in symptom and side effect management. Declined referral at this time.  He states that he is eating and drinking as per usual with no unintentional weight loss.     Pt does occasionally smoke,  He states he is well supported by family and friends.  Community support groups discussed including the Cancer Support Community of the [facility]. Patient declined information at this time.   He feels he has adequate insurance coverage and denies any financial concerns at this time.     Based on individual needs I will follow up as needed I have provided my direct contact information and welcome them to contact me if needs as discussed above change. He was appreciative for the call.

## 2024-11-14 ENCOUNTER — TELEPHONE (OUTPATIENT)
Dept: HEMATOLOGY ONCOLOGY | Facility: CLINIC | Age: 74
End: 2024-11-14

## 2024-11-14 ENCOUNTER — TELEPHONE (OUTPATIENT)
Dept: SURGICAL ONCOLOGY | Facility: CLINIC | Age: 74
End: 2024-11-14

## 2024-11-14 NOTE — TELEPHONE ENCOUNTER
Received message from office requesting estimated cost should pt choose to pursue Xeloda. Per [facility]:  500 mg (112 tabs): $19.14  150 mg (28 tabs): $6.54  Total copay amount for each 3 week cycle: $25.68  No PA's required, payable through his Part-B benefit.    I called pt and let him know it's coming out to around $26 every 3 weeks for this drug.

## 2024-11-14 NOTE — TELEPHONE ENCOUNTER
Oncology Finance Advocacy Intake and Intervention  Oncology Finance Counselor/Advocate placed call to patient. This writer informed patient that this writer is here to assist patient with billing questions, financial assistance, payment/payment plans, quotes, copayment assistance, insurance optimization, and insurance navigation.    This writer conducted a thorough benefit review of copayment, deductible, and out of pocket cost. This information is documented below and has been reviewed with patient.     Copayment:  Deductible:  Out of Pocket Cost:  Insurance optimization (Limited benefit vs self-pay):  Patient assistance status:Patient Outreach   Free Drug Applications:  Oral Chemo Application:  BIN#:  PCN#:  GRP#:  Copay:$  Interventions:    E mail received from Lyn DUNLAP regarding the patient's concerns'    I have a new referral for pt Enrrique Bales [birth year] from Dr. Aceves.  Her chart note from 11/12 indicates that pt is not agreeable to starting treatment until he has an idea of the cost.      At this point if he decides to proceed with the treatment it would be of curative intent however once the disease is metastatic there is no cure for the cancer. He then inquired how much would the chemotherapy cost. I explained that we will have our financial department navigate through the financial help the patient might require.    Is there a way to determine this for him ahead of time?  It looks like he has Medicare A/B only so we will likely have to do the FA application.  Can someone please reach out to him to review?      I placed a call to the patient and introduced myself and my role here at [facility] as an Oncology . I review his insurance and recommend that he could optimize his coverage by looking for a Medicare advantage plan. The patient stated that they are in the process of doing such. I also saw that the patient has self pay balances that possibly our Hospital Financial Counselor Team  can assist with. An e mail will be sent out to the team today to mail to the patient. I would state to the patient that an appointment can be set up with them at our [facility] by contacting them at [phone]. Their open for appointments on Monday, Tuesday, and Friday from 8 am till 4 pm. They need 3 types of information and this can be confirmed over the phone.    Bank Statements  Proof of Income  And most recent Income tax    Information above was review thoroughly with patient and patient was advise of possible assistance programs/interventions. If any question arise patient can contact this writer at below information. This information was given to patient at time of contact.      Denzel Young  Phone:[phone]  Email: [email or web address]

## 2024-11-14 NOTE — TELEPHONE ENCOUNTER
Called and spoke with Enrrique regarding lab work not completed yet. Pt verbalized not knowing she put any labs in when he saw her last but agreed to check my chart for them and go and get them done.

## 2024-11-15 ENCOUNTER — APPOINTMENT (OUTPATIENT)
Dept: LAB | Facility: HOSPITAL | Age: 74
End: 2024-11-15
Payer: MEDICARE

## 2024-11-15 DIAGNOSIS — C18.2 MALIGNANT NEOPLASM OF ASCENDING COLON (HCC): ICD-10-CM

## 2024-11-15 DIAGNOSIS — E03.9 HYPOTHYROIDISM, UNSPECIFIED TYPE: Primary | ICD-10-CM

## 2024-11-15 LAB
CEA SERPL-MCNC: 2.9 NG/ML (ref 0–3)
T4 FREE SERPL-MCNC: 0.78 NG/DL (ref 0.61–1.12)
TSH SERPL DL<=0.05 MIU/L-ACNC: 9.11 UIU/ML (ref 0.45–4.5)

## 2024-11-15 PROCEDURE — 36415 COLL VENOUS BLD VENIPUNCTURE: CPT

## 2024-11-15 PROCEDURE — 82378 CARCINOEMBRYONIC ANTIGEN: CPT

## 2024-11-15 PROCEDURE — 84439 ASSAY OF FREE THYROXINE: CPT

## 2024-11-15 PROCEDURE — 84443 ASSAY THYROID STIM HORMONE: CPT

## 2024-11-18 ENCOUNTER — RESULTS FOLLOW-UP (OUTPATIENT)
Dept: FAMILY MEDICINE CLINIC | Facility: CLINIC | Age: 74
End: 2024-11-18

## 2024-11-18 DIAGNOSIS — E03.9 HYPOTHYROIDISM, UNSPECIFIED TYPE: Primary | ICD-10-CM

## 2024-11-19 ENCOUNTER — OFFICE VISIT (OUTPATIENT)
Dept: HEMATOLOGY ONCOLOGY | Facility: CLINIC | Age: 74
End: 2024-11-19
Payer: MEDICARE

## 2024-11-19 ENCOUNTER — TELEPHONE (OUTPATIENT)
Dept: HEMATOLOGY ONCOLOGY | Facility: CLINIC | Age: 74
End: 2024-11-19

## 2024-11-19 ENCOUNTER — PATIENT OUTREACH (OUTPATIENT)
Dept: CASE MANAGEMENT | Facility: HOSPITAL | Age: 74
End: 2024-11-19

## 2024-11-19 VITALS
RESPIRATION RATE: 18 BRPM | TEMPERATURE: 97.8 F | HEIGHT: 69 IN | WEIGHT: 220 LBS | HEART RATE: 74 BPM | OXYGEN SATURATION: 96 % | BODY MASS INDEX: 32.58 KG/M2 | SYSTOLIC BLOOD PRESSURE: 124 MMHG | DIASTOLIC BLOOD PRESSURE: 72 MMHG

## 2024-11-19 DIAGNOSIS — I71.43 INFRARENAL ABDOMINAL AORTIC ANEURYSM (AAA) WITHOUT RUPTURE (HCC): ICD-10-CM

## 2024-11-19 DIAGNOSIS — C18.2 MALIGNANT NEOPLASM OF ASCENDING COLON (HCC): Primary | ICD-10-CM

## 2024-11-19 DIAGNOSIS — I10 ESSENTIAL (PRIMARY) HYPERTENSION: ICD-10-CM

## 2024-11-19 DIAGNOSIS — E78.2 MIXED HYPERLIPIDEMIA: ICD-10-CM

## 2024-11-19 DIAGNOSIS — I10 PRIMARY HYPERTENSION: ICD-10-CM

## 2024-11-19 DIAGNOSIS — E03.9 HYPOTHYROIDISM, UNSPECIFIED TYPE: ICD-10-CM

## 2024-11-19 PROCEDURE — 99215 OFFICE O/P EST HI 40 MIN: CPT | Performed by: INTERNAL MEDICINE

## 2024-11-19 PROCEDURE — 99406 BEHAV CHNG SMOKING 3-10 MIN: CPT | Performed by: INTERNAL MEDICINE

## 2024-11-19 RX ORDER — CAPECITABINE 150 MG/1
150 TABLET, FILM COATED ORAL 2 TIMES DAILY
Qty: 28 TABLET | Refills: 5 | Status: SHIPPED | OUTPATIENT
Start: 2024-11-19

## 2024-11-19 RX ORDER — CAPECITABINE 500 MG/1
2000 TABLET, FILM COATED ORAL 2 TIMES DAILY
Qty: 112 TABLET | Refills: 5 | Status: SHIPPED | OUTPATIENT
Start: 2024-11-19

## 2024-11-19 RX ORDER — SODIUM CHLORIDE 9 MG/ML
20 INJECTION, SOLUTION INTRAVENOUS ONCE AS NEEDED
OUTPATIENT
Start: 2024-12-02

## 2024-11-19 RX ORDER — DEXTROSE MONOHYDRATE 50 MG/ML
20 INJECTION, SOLUTION INTRAVENOUS ONCE
OUTPATIENT
Start: 2024-12-02

## 2024-11-19 NOTE — PROGRESS NOTES
MSW s/w pt by phone this afternoon after receiving a referral from Med Onc that pt is without secondary insurance and worried about the cost of treatment.  I had previously referred him to our finance team.  Pt tells me today that he has looked at many secondary plans and feels that they are unaffordable.  He has his SS senior care and a pension and does not feel that he will qualify for MA.  We spoke about searching on [employer] marketplace for plan options, contacting the [facility] for any of their health insurance counselors or events, and also about completeing the hospital FA application.  He tells me that he already has this paperwork and is working on completing it.  We spoke about the supporting documents needed to complete the michelle and how the program works in general.  He was very appreciative of the time spent talking and confirms that he has my direct number for any other needs moving forward.  He thanked me for the call and denies any other needs at this time.

## 2024-11-19 NOTE — PROGRESS NOTES
Hematology/Oncology Outpatient Follow- up Note  Jace Varghese 74 y.o. male MRN: @ Encounter: [id number]        Date:  11/19/2024        Assessment / Plan:      1. Malignant neoplasm of ascending colon (HCC)  -     Infusion Calculated Appointment Request; Future; Expected date: 11/25/2024  -     CBC and differential; Future; Expected date: 11/25/2024  -     Comprehensive metabolic panel; Future; Expected date: 11/25/2024  2. Primary hypertension  3. Infrarenal abdominal aortic aneurysm (AAA) without rupture (HCC)  4. Essential (primary) hypertension  5. Hypothyroidism, unspecified type  6. Mixed hyperlipidemia     -Patient decided to proceed with capecitabine and oxaliplatin.  The cost for 3 weeks supply of capecitabine is $26.  Patient states he is deciding to do only 3 cycles of chemotherapy and then would most likely stop.  I explained that the plan is to complete 24 weeks of treatment however if the patient develops any significant/intolerable side effects we can always adjust the dose and if that does not help then stop the chemotherapy.  - I explained the risks/benefits of the proposed cancer therapy: CapeOx and after discussion including understanding risks of possible life-threatening complications and therapy-related malignancy development, informed consent has been signed and obtained  -Patient was again counseled regarding smoking cessation however the patient is not interested.  Total of 3 minutes were spent counseling the patient.  -RTC in 3 weeks with labs.     HPI:     74 y.o. c PMHx notable for hypothyroidism and hypertension, seen in consultation on 11/12/24 for recently diagnosed stage IIIb colon cancer.  Patient states he feels well after the surgery.  He has recovered well.     Interval History:    Patient presents today with his wife for follow-up.  States he feels fine and great.  Denies any complaints.  States why does he have to get treatment if he feels fine and his CEA level is down  to normal.  Explained that given the fact that patient's lymph nodes are positive for the patient at high risk for cancer recurrence.  Adjuvant chemotherapy is recommended for patients with stage III colon cancer to decrease the risk of cancer recurrence.  Continues to smoke 4-5 cigarettes/day.     Cancer Staging:  Cancer Staging   Malignant neoplasm of ascending colon (HCC)  Staging form: Colon and Rectum, AJCC 8th Edition  - Pathologic stage from 9/23/2024: Stage IIIB (pT3, pN2a, cM0) - Signed by Ariela Aceves MD on 11/11/2024  Stage prefix: Initial diagnosis  Histologic grading system: 4 grade system  Histologic grade (G): G2  Lymph-vascular invasion (LVI): LVI present/identified, NOS      Molecular Testing:     Previous Hematologic/ Oncologic History:    Oncology History   Malignant neoplasm of ascending colon (HCC)   9/23/2024 Initial Diagnosis    Malignant neoplasm of ascending colon (HCC)     9/23/2024 -  Cancer Staged    Staging form: Colon and Rectum, AJCC 8th Edition  - Pathologic stage from 9/23/2024: Stage IIIB (pT3, pN2a, cM0) - Signed by Ariela Aceves MD on 11/11/2024  Stage prefix: Initial diagnosis  Histologic grading system: 4 grade system  Histologic grade (G): G2  Lymph-vascular invasion (LVI): LVI present/identified, NOS       11/25/2024 -  Chemotherapy    capecitabine (XELODA), 1,000 mg/m2 = 2,150 mg, Oral, 2 times daily with meals, 0 of 8 cycles  alteplase (CATHFLO), 2 mg, Intracatheter, Every 1 Minute as needed, 0 of 8 cycles  oxaliplatin (ELOXATIN) chemo infusion, 130 mg/m2 = 279.5 mg, Intravenous, Once, 0 of 8 cycles         Current Hematologic/ Oncologic Treatment:      To start capecitabine and oxaliplatin every 21 days as follows:  Capecitabine 1000 mg/m² p.o. twice daily for 14 days with 1 week off  Oxaliplatin 130 mg/m² IV on day 1 of each cycle.  Plan for a total of 8 cycles.    Test Results:    Imaging: No results found.          Labs:   Lab Results   Component Value Date    WBC  "7.09 10/03/2024    HGB 10.3 (L) 10/03/2024    HCT 32.4 (L) 10/03/2024    MCV 88 10/03/2024     10/03/2024     Lab Results   Component Value Date    K 3.7 10/03/2024     10/03/2024    CO2 23 10/03/2024    BUN 11 10/03/2024    CREATININE 0.97 10/03/2024    GLUF 90 07/10/2024    CALCIUM 8.0 (L) 10/03/2024    AST 33 09/29/2024    ALT 26 09/29/2024    ALKPHOS 76 09/29/2024    EGFR 77 10/03/2024         No results found for: \"SPEP\", \"UPEP\"    Lab Results   Component Value Date    PSA 0.521 07/10/2024    PSA 0.8 08/15/2022       Lab Results   Component Value Date    CEA 2.9 11/15/2024       No results found for: \"\"    No results found for: \"AFP\"    No results found for: \"IRON\", \"TIBC\", \"FERRITIN\"    No results found for: \"[id number]\"      ROS: Review of Systems   Constitutional:  Negative for activity change, appetite change, chills, diaphoresis, fatigue, fever and unexpected weight change.   HENT:  Negative for mouth sores, nosebleeds and sore throat.    Eyes:  Negative for redness and visual disturbance.   Respiratory:  Negative for cough, chest tightness, shortness of breath and wheezing.    Cardiovascular:  Negative for chest pain, palpitations and leg swelling.   Gastrointestinal:  Negative for abdominal pain, blood in stool, constipation, diarrhea, nausea and vomiting.   Genitourinary:  Negative for dysuria, flank pain and hematuria.   Musculoskeletal:  Negative for arthralgias, back pain, gait problem and myalgias.   Skin:  Negative for pallor, rash and wound.   Neurological:  Negative for dizziness, seizures, speech difficulty, weakness, light-headedness, numbness and headaches.   Hematological:  Negative for adenopathy. Does not bruise/bleed easily.   Psychiatric/Behavioral:  Negative for behavioral problems and confusion.          Current Medications: Reviewed  Allergies: Reviewed  PMH/FH/SH:  Reviewed      Physical Exam:    Body surface area is 2.15 meters squared.    Wt Readings from Last 3 " Encounters:   11/19/24 99.8 kg (220 lb)   11/12/24 98.9 kg (218 lb)   11/12/24 98.2 kg (216 lb 6.4 oz)        Temp Readings from Last 3 Encounters:   11/19/24 97.8 °F (36.6 °C) (Temporal)   11/12/24 97.5 °F (36.4 °C) (Temporal)   11/12/24 97.5 °F (36.4 °C)        BP Readings from Last 3 Encounters:   11/19/24 124/72   11/12/24 132/78   11/12/24 132/78         Pulse Readings from Last 3 Encounters:   11/19/24 74   11/12/24 68   11/12/24 68         Physical Exam  Vitals and nursing note reviewed.   Constitutional:       General: He is not in acute distress.     Appearance: Normal appearance.   HENT:      Head: Normocephalic and atraumatic.      Mouth/Throat:      Mouth: Mucous membranes are moist.      Pharynx: Oropharynx is clear.   Eyes:      General: No scleral icterus.     Extraocular Movements: Extraocular movements intact.      Conjunctiva/sclera: Conjunctivae normal.   Cardiovascular:      Rate and Rhythm: Normal rate and regular rhythm.      Pulses: Normal pulses.      Heart sounds: No murmur heard.  Pulmonary:      Effort: Pulmonary effort is normal.      Breath sounds: Normal breath sounds. No wheezing, rhonchi or rales.   Abdominal:      General: Bowel sounds are normal.      Palpations: Abdomen is soft.      Tenderness: There is no abdominal tenderness.   Musculoskeletal:         General: No swelling or tenderness. Normal range of motion.      Cervical back: Neck supple.   Lymphadenopathy:      Cervical: No cervical adenopathy.   Skin:     General: Skin is warm and dry.      Coloration: Skin is not pale.      Findings: No bruising, erythema or rash.   Neurological:      General: No focal deficit present.      Mental Status: He is alert and oriented to person, place, and time.      Motor: No weakness.   Psychiatric:         Mood and Affect: Mood normal.         Behavior: Behavior normal.           Goals and Barriers:  Current Goal: Prolong Survival from Cancer.   Barriers: None.      Patient's Capacity to  Self Care:  Patient is able to self care.    Disclaimer: This document was prepared using [employer] technology. If a word or phrase is confusing, or does not make sense, this is likely due to recognition error which was not discovered during this clinician's review. If you believe an error has occurred, please contact me through [facility] service line for luisa?cation.      Follow Up    All questions were answered to the patient's satisfaction during this encounter. The patient knows the contact information for our office and knows to reach out for any relevant concerns related to this encounter. They are to call for any temperature 100.4 or higher, new symptoms including but not restricted to shaking chills, decreased appetite, nausea, vomiting, diarrhea, increased fatigue, shortness of breath or chest pain, confusion, and not feeling the strength to come to the clinic. For all other listed problems and medical diagnosis in their chart - they are managed by PCP and/or other specialists, which the patient acknowledges.     I spent 46 minutes reviewing the records (labs, clinician notes, outside records, medical history, ordering medicine/tests/procedures, monitoring of anti-neoplastic toxicities, interpreting the imaging/labs previously done) and coordination of care as well as direct time with the patient today, of which greater than 50% of the time was spent in counseling and coordination of care with the patient/family.

## 2024-11-20 ENCOUNTER — TELEPHONE (OUTPATIENT)
Dept: HEMATOLOGY ONCOLOGY | Facility: CLINIC | Age: 74
End: 2024-11-20

## 2024-11-20 DIAGNOSIS — C18.2 MALIGNANT NEOPLASM OF ASCENDING COLON (HCC): Primary | ICD-10-CM

## 2024-11-20 RX ORDER — ONDANSETRON 8 MG/1
8 TABLET, FILM COATED ORAL EVERY 8 HOURS PRN
Qty: 20 TABLET | Refills: 0 | Status: SHIPPED | OUTPATIENT
Start: 2024-11-20

## 2024-11-20 NOTE — PROGRESS NOTES
Spoke with patient and spouse yesterday and they have decided to move forward with Oxaliplatin and Capecitabine. Made them aware of oral chemotherapy process and for the time being he doesn't want a port. I did educatee that with Oxaliplatin there can be some aching to IV area and some warmth may have to be applied. Instructed to see how he tolerated with first infusion and if he needed a port we can schedule for next cycles. He verbalized understanding. Provided chemo education sheet and numbers to call with any questions or issues with side effects.  I did provide some information I had received from the authorization team regarding Oxaliplatin and cost and did make him aware now that order is in place they will be able to provide additional information for him if needed if there is an out of pocket expense.   Also informed that a script for Zofran would be sent to pharmacy. No further questions at this time.   Consent was obtained by Dr Aceves.

## 2024-11-20 NOTE — TELEPHONE ENCOUNTER
Mo   New pt  Confirmed with oral chemo team he will have Capecitabine mailed this week or next.   Please schedule. Thank you

## 2024-11-26 ENCOUNTER — RA CDI HCC (OUTPATIENT)
Dept: OTHER | Facility: HOSPITAL | Age: 74
End: 2024-11-26

## 2024-12-04 ENCOUNTER — TELEPHONE (OUTPATIENT)
Dept: HEMATOLOGY ONCOLOGY | Facility: CLINIC | Age: 74
End: 2024-12-04

## 2024-12-05 ENCOUNTER — APPOINTMENT (OUTPATIENT)
Dept: LAB | Facility: HOSPITAL | Age: 74
End: 2024-12-05
Attending: INTERNAL MEDICINE
Payer: MEDICARE

## 2024-12-05 DIAGNOSIS — C18.2 MALIGNANT NEOPLASM OF ASCENDING COLON (HCC): ICD-10-CM

## 2024-12-05 LAB
ALBUMIN SERPL BCG-MCNC: 4.1 G/DL (ref 3.5–5)
ALP SERPL-CCNC: 91 U/L (ref 34–104)
ALT SERPL W P-5'-P-CCNC: 12 U/L (ref 7–52)
ANION GAP SERPL CALCULATED.3IONS-SCNC: 6 MMOL/L (ref 4–13)
AST SERPL W P-5'-P-CCNC: 23 U/L (ref 13–39)
BASOPHILS # BLD AUTO: 0.07 THOUSANDS/ÂΜL (ref 0–0.1)
BASOPHILS NFR BLD AUTO: 1 % (ref 0–1)
BILIRUB SERPL-MCNC: 0.41 MG/DL (ref 0.2–1)
BUN SERPL-MCNC: 19 MG/DL (ref 5–25)
CALCIUM SERPL-MCNC: 9.4 MG/DL (ref 8.4–10.2)
CHLORIDE SERPL-SCNC: 100 MMOL/L (ref 96–108)
CO2 SERPL-SCNC: 29 MMOL/L (ref 21–32)
CREAT SERPL-MCNC: 1.23 MG/DL (ref 0.6–1.3)
EOSINOPHIL # BLD AUTO: 0.37 THOUSAND/ÂΜL (ref 0–0.61)
EOSINOPHIL NFR BLD AUTO: 5 % (ref 0–6)
ERYTHROCYTE [DISTWIDTH] IN BLOOD BY AUTOMATED COUNT: 15.4 % (ref 11.6–15.1)
GFR SERPL CREATININE-BSD FRML MDRD: 57 ML/MIN/1.73SQ M
GLUCOSE SERPL-MCNC: 100 MG/DL (ref 65–140)
HCT VFR BLD AUTO: 38 % (ref 36.5–49.3)
HGB BLD-MCNC: 11.9 G/DL (ref 12–17)
IMM GRANULOCYTES # BLD AUTO: 0.02 THOUSAND/UL (ref 0–0.2)
IMM GRANULOCYTES NFR BLD AUTO: 0 % (ref 0–2)
LYMPHOCYTES # BLD AUTO: 1.53 THOUSANDS/ÂΜL (ref 0.6–4.47)
LYMPHOCYTES NFR BLD AUTO: 20 % (ref 14–44)
MCH RBC QN AUTO: 26 PG (ref 26.8–34.3)
MCHC RBC AUTO-ENTMCNC: 31.3 G/DL (ref 31.4–37.4)
MCV RBC AUTO: 83 FL (ref 82–98)
MONOCYTES # BLD AUTO: 0.64 THOUSAND/ÂΜL (ref 0.17–1.22)
MONOCYTES NFR BLD AUTO: 9 % (ref 4–12)
NEUTROPHILS # BLD AUTO: 4.89 THOUSANDS/ÂΜL (ref 1.85–7.62)
NEUTS SEG NFR BLD AUTO: 65 % (ref 43–75)
NRBC BLD AUTO-RTO: 0 /100 WBCS
PLATELET # BLD AUTO: 306 THOUSANDS/UL (ref 149–390)
PMV BLD AUTO: 8.5 FL (ref 8.9–12.7)
POTASSIUM SERPL-SCNC: 4.3 MMOL/L (ref 3.5–5.3)
PROT SERPL-MCNC: 7.5 G/DL (ref 6.4–8.4)
RBC # BLD AUTO: 4.58 MILLION/UL (ref 3.88–5.62)
SODIUM SERPL-SCNC: 135 MMOL/L (ref 135–147)
WBC # BLD AUTO: 7.52 THOUSAND/UL (ref 4.31–10.16)

## 2024-12-05 PROCEDURE — 85025 COMPLETE CBC W/AUTO DIFF WBC: CPT

## 2024-12-05 PROCEDURE — 36415 COLL VENOUS BLD VENIPUNCTURE: CPT

## 2024-12-05 PROCEDURE — 80053 COMPREHEN METABOLIC PANEL: CPT

## 2024-12-09 ENCOUNTER — TELEPHONE (OUTPATIENT)
Dept: INFUSION CENTER | Facility: CLINIC | Age: 74
End: 2024-12-09

## 2024-12-09 NOTE — TELEPHONE ENCOUNTER
New patient phone call made. Reviewed appointment date and time. Revived policies and procedures of infusion center. All questions answered at this time.

## 2024-12-10 ENCOUNTER — OFFICE VISIT (OUTPATIENT)
Dept: HEMATOLOGY ONCOLOGY | Facility: CLINIC | Age: 74
End: 2024-12-10
Payer: MEDICARE

## 2024-12-10 VITALS
RESPIRATION RATE: 18 BRPM | SYSTOLIC BLOOD PRESSURE: 118 MMHG | DIASTOLIC BLOOD PRESSURE: 76 MMHG | OXYGEN SATURATION: 95 % | WEIGHT: 222 LBS | HEART RATE: 63 BPM | HEIGHT: 69 IN | TEMPERATURE: 97.7 F | BODY MASS INDEX: 32.88 KG/M2

## 2024-12-10 DIAGNOSIS — I10 ESSENTIAL (PRIMARY) HYPERTENSION: ICD-10-CM

## 2024-12-10 DIAGNOSIS — Z72.0 TOBACCO ABUSE: ICD-10-CM

## 2024-12-10 DIAGNOSIS — C18.2 MALIGNANT NEOPLASM OF ASCENDING COLON (HCC): Primary | ICD-10-CM

## 2024-12-10 DIAGNOSIS — E03.9 HYPOTHYROIDISM, UNSPECIFIED TYPE: ICD-10-CM

## 2024-12-10 PROCEDURE — 99214 OFFICE O/P EST MOD 30 MIN: CPT | Performed by: INTERNAL MEDICINE

## 2024-12-10 PROCEDURE — 99406 BEHAV CHNG SMOKING 3-10 MIN: CPT | Performed by: INTERNAL MEDICINE

## 2024-12-10 NOTE — PROGRESS NOTES
"  Hematology/Oncology Outpatient Follow- up Note  Jace Varghese 74 y.o. male MRN: @ Encounter: [id number]        Date:  12/10/2024        Assessment / Plan:      1. Malignant neoplasm of ascending colon (HCC)  Assessment & Plan:  Proceed with cycle 1 day 1 and XELOX on 12/11/2024.  RTC 12/27/2024 with labs to evaluate tolerability and to assess for any toxicity.  2. Hypothyroidism, unspecified type  Assessment & Plan:  Continue levothyroxine.  Management as per PCP.  3. Essential (primary) hypertension  Assessment & Plan:  On metoprolol.  Management as per PCP.  4. Tobacco abuse  Assessment & Plan:  Continues to smoke about half a pack per day.  Counseled in detail regarding smoking cessation however patient is not interested stating \"it is easier said than done.\"  At least 6 minutes were spent counseling the patient.         HPI:    74 y.o. c PMHx notable for hypothyroidism and hypertension, seen in consultation on 11/12/24 for recently diagnosed stage IIIb colon cancer.  Patient states he feels well after the surgery.  He has recovered well.     Interval History:    Patient presents today for follow-up of colon cancer.  He is accompanied by his wife Hamida.  Feels fine and offers no complaints.  Denies any abdominal pain.  No nausea, vomiting or diarrhea.  No blood in the stool.  Has been gaining weight and eating well.  Denies fatigue.  Continues to smoke about half a pack per day.      Cancer Staging:  Cancer Staging   Malignant neoplasm of ascending colon (HCC)  Staging form: Colon and Rectum, AJCC 8th Edition  - Pathologic stage from 9/23/2024: Stage IIIB (pT3, pN2a, cM0) - Signed by Ariela Aceves MD on 11/11/2024  Stage prefix: Initial diagnosis  Histologic grading system: 4 grade system  Histologic grade (G): G2  Lymph-vascular invasion (LVI): LVI present/identified, NOS      Molecular Testing:     Previous Hematologic/ Oncologic History:    Oncology History   Malignant neoplasm of ascending colon " "(HCC)   9/23/2024 Initial Diagnosis    Malignant neoplasm of ascending colon (HCC)     9/23/2024 -  Cancer Staged    Staging form: Colon and Rectum, AJCC 8th Edition  - Pathologic stage from 9/23/2024: Stage IIIB (pT3, pN2a, cM0) - Signed by Ariela Aceves MD on 11/11/2024  Stage prefix: Initial diagnosis  Histologic grading system: 4 grade system  Histologic grade (G): G2  Lymph-vascular invasion (LVI): LVI present/identified, NOS       12/11/2024 -  Chemotherapy    capecitabine (XELODA), 1,000 mg/m2 = 2,150 mg, Oral, 2 times daily with meals, 0 of 8 cycles  alteplase (CATHFLO), 2 mg, Intracatheter, Every 1 Minute as needed, 0 of 8 cycles  oxaliplatin (ELOXATIN) chemo infusion, 130 mg/m2 = 279.5 mg, Intravenous, Once, 0 of 8 cycles         Current Hematologic/ Oncologic Treatment:      XELOX C1D1 12/11/2024        Test Results:    Imaging: No results found.          Labs:   Lab Results   Component Value Date    WBC 7.52 12/05/2024    HGB 11.9 (L) 12/05/2024    HCT 38.0 12/05/2024    MCV 83 12/05/2024     12/05/2024     Lab Results   Component Value Date    K 4.3 12/05/2024     12/05/2024    CO2 29 12/05/2024    BUN 19 12/05/2024    CREATININE 1.23 12/05/2024    GLUF 90 07/10/2024    CALCIUM 9.4 12/05/2024    AST 23 12/05/2024    ALT 12 12/05/2024    ALKPHOS 91 12/05/2024    EGFR 57 12/05/2024         No results found for: \"SPEP\", \"UPEP\"    Lab Results   Component Value Date    PSA 0.521 07/10/2024    PSA 0.8 08/15/2022       Lab Results   Component Value Date    CEA 2.9 11/15/2024       No results found for: \"\"    No results found for: \"AFP\"    No results found for: \"IRON\", \"TIBC\", \"FERRITIN\"    No results found for: \"[id number]\"      ROS: 14 point review of systems was negative except that mentioned in HPI.      Current Medications: Reviewed  Allergies: Reviewed  PMH/FH/SH:  Reviewed      Physical Exam:    Body surface area is 2.16 meters squared.    Wt Readings from Last 3 Encounters: "   12/10/24 101 kg (222 lb)   11/19/24 99.8 kg (220 lb)   11/12/24 98.9 kg (218 lb)        Temp Readings from Last 3 Encounters:   12/10/24 97.7 °F (36.5 °C) (Temporal)   11/19/24 97.8 °F (36.6 °C) (Temporal)   11/12/24 97.5 °F (36.4 °C) (Temporal)        BP Readings from Last 3 Encounters:   12/10/24 118/76   11/19/24 124/72   11/12/24 132/78         Pulse Readings from Last 3 Encounters:   12/10/24 63   11/19/24 74   11/12/24 68         Physical Exam  Vitals and nursing note reviewed.   Constitutional:       General: He is not in acute distress.     Appearance: Normal appearance.   HENT:      Head: Normocephalic and atraumatic.      Mouth/Throat:      Mouth: Mucous membranes are moist.      Pharynx: Oropharynx is clear.   Eyes:      General: No scleral icterus.     Extraocular Movements: Extraocular movements intact.      Conjunctiva/sclera: Conjunctivae normal.   Cardiovascular:      Rate and Rhythm: Normal rate and regular rhythm.      Pulses: Normal pulses.      Heart sounds: No murmur heard.  Pulmonary:      Effort: Pulmonary effort is normal.      Breath sounds: Normal breath sounds. No wheezing, rhonchi or rales.   Abdominal:      General: Bowel sounds are normal.      Palpations: Abdomen is soft.      Tenderness: There is no abdominal tenderness.   Musculoskeletal:         General: No swelling or tenderness. Normal range of motion.      Cervical back: Neck supple.   Lymphadenopathy:      Cervical: No cervical adenopathy.   Skin:     General: Skin is warm and dry.      Coloration: Skin is not pale.      Findings: No bruising, erythema or rash.   Neurological:      General: No focal deficit present.      Mental Status: He is alert and oriented to person, place, and time.      Motor: No weakness.   Psychiatric:         Mood and Affect: Mood normal.         Behavior: Behavior normal.           Goals and Barriers:  Current Goal: Prolong Survival from Cancer.   Barriers: None.      Patient's Capacity to Self Care:   Patient is able to self care.    Disclaimer: This document was prepared using [employer] technology. If a word or phrase is confusing, or does not make sense, this is likely due to recognition error which was not discovered during this clinician's review. If you believe an error has occurred, please contact me through [facility] service line for luisa?cation.      Follow Up    All questions were answered to the patient's satisfaction during this encounter. The patient knows the contact information for our office and knows to reach out for any relevant concerns related to this encounter. They are to call for any temperature 100.4 or higher, new symptoms including but not restricted to shaking chills, decreased appetite, nausea, vomiting, diarrhea, increased fatigue, shortness of breath or chest pain, confusion, and not feeling the strength to come to the clinic. For all other listed problems and medical diagnosis in their chart - they are managed by PCP and/or other specialists, which the patient acknowledges.     I spent 38 minutes reviewing the records (labs, clinician notes, outside records, medical history, ordering medicine/tests/procedures, monitoring of anti-neoplastic toxicities, interpreting the imaging/labs previously done) and coordination of care as well as direct time with the patient today, of which greater than 50% of the time was spent in counseling and coordination of care with the patient/family.

## 2024-12-10 NOTE — ASSESSMENT & PLAN NOTE
Proceed with cycle 1 day 1 and XELOX on 12/11/2024.  RTC 12/27/2024 with labs to evaluate tolerability and to assess for any toxicity.

## 2024-12-10 NOTE — ASSESSMENT & PLAN NOTE
"Continues to smoke about half a pack per day.  Counseled in detail regarding smoking cessation however patient is not interested stating \"it is easier said than done.\"  At least 6 minutes were spent counseling the patient.  "

## 2024-12-11 ENCOUNTER — HOSPITAL ENCOUNTER (OUTPATIENT)
Dept: INFUSION CENTER | Facility: CLINIC | Age: 74
Discharge: HOME | End: 2024-12-11
Payer: MEDICARE

## 2024-12-11 VITALS
DIASTOLIC BLOOD PRESSURE: 69 MMHG | RESPIRATION RATE: 20 BRPM | HEART RATE: 96 BPM | TEMPERATURE: 97.9 F | BODY MASS INDEX: 33 KG/M2 | SYSTOLIC BLOOD PRESSURE: 111 MMHG | WEIGHT: 222.8 LBS | HEIGHT: 69 IN

## 2024-12-11 DIAGNOSIS — C18.2 MALIGNANT NEOPLASM OF ASCENDING COLON (HCC): Primary | ICD-10-CM

## 2024-12-11 PROCEDURE — 96367 TX/PROPH/DG ADDL SEQ IV INF: CPT

## 2024-12-11 PROCEDURE — 96415 CHEMO IV INFUSION ADDL HR: CPT

## 2024-12-11 PROCEDURE — 96413 CHEMO IV INFUSION 1 HR: CPT

## 2024-12-11 RX ORDER — DEXTROSE MONOHYDRATE 50 MG/ML
20 INJECTION, SOLUTION INTRAVENOUS ONCE
Status: COMPLETED | OUTPATIENT
Start: 2024-12-11 | End: 2024-12-11

## 2024-12-11 RX ORDER — SODIUM CHLORIDE 9 MG/ML
20 INJECTION, SOLUTION INTRAVENOUS ONCE AS NEEDED
Status: DISCONTINUED | OUTPATIENT
Start: 2024-12-11 | End: 2024-12-14 | Stop reason: HOSPADM

## 2024-12-11 RX ADMIN — OXALIPLATIN 279.5 MG: 5 INJECTION, SOLUTION INTRAVENOUS at 08:24

## 2024-12-11 RX ADMIN — DEXAMETHASONE SODIUM PHOSPHATE: 10 INJECTION, SOLUTION INTRAMUSCULAR; INTRAVENOUS at 07:52

## 2024-12-11 RX ADMIN — DEXTROSE 20 ML/HR: 5 SOLUTION INTRAVENOUS at 08:27

## 2024-12-11 NOTE — PROGRESS NOTES
Patient presents today for eloxatin infusion offering no complaints. PIV placed with positive blood return. Patient tolerated infusion well.PIV removed. AVS declined. Next appointment 1/2/2025 at 8:30.

## 2024-12-23 ENCOUNTER — APPOINTMENT (OUTPATIENT)
Dept: LAB | Facility: HOSPITAL | Age: 74
End: 2024-12-23
Attending: INTERNAL MEDICINE
Payer: MEDICARE

## 2024-12-23 ENCOUNTER — NURSE TRIAGE (OUTPATIENT)
Age: 74
End: 2024-12-23

## 2024-12-23 DIAGNOSIS — C18.2 MALIGNANT NEOPLASM OF ASCENDING COLON (HCC): ICD-10-CM

## 2024-12-23 LAB
ALBUMIN SERPL BCG-MCNC: 4.2 G/DL (ref 3.5–5)
ALP SERPL-CCNC: 84 U/L (ref 34–104)
ALT SERPL W P-5'-P-CCNC: 12 U/L (ref 7–52)
ANION GAP SERPL CALCULATED.3IONS-SCNC: 4 MMOL/L (ref 4–13)
AST SERPL W P-5'-P-CCNC: 26 U/L (ref 13–39)
BASOPHILS # BLD AUTO: 0.06 THOUSANDS/ÂΜL (ref 0–0.1)
BASOPHILS NFR BLD AUTO: 1 % (ref 0–1)
BILIRUB SERPL-MCNC: 0.35 MG/DL (ref 0.2–1)
BUN SERPL-MCNC: 22 MG/DL (ref 5–25)
CALCIUM SERPL-MCNC: 9.4 MG/DL (ref 8.4–10.2)
CHLORIDE SERPL-SCNC: 102 MMOL/L (ref 96–108)
CO2 SERPL-SCNC: 28 MMOL/L (ref 21–32)
CREAT SERPL-MCNC: 1.13 MG/DL (ref 0.6–1.3)
EOSINOPHIL # BLD AUTO: 0.34 THOUSAND/ÂΜL (ref 0–0.61)
EOSINOPHIL NFR BLD AUTO: 4 % (ref 0–6)
ERYTHROCYTE [DISTWIDTH] IN BLOOD BY AUTOMATED COUNT: 14.9 % (ref 11.6–15.1)
GFR SERPL CREATININE-BSD FRML MDRD: 64 ML/MIN/1.73SQ M
GLUCOSE SERPL-MCNC: 99 MG/DL (ref 65–140)
HCT VFR BLD AUTO: 39.1 % (ref 36.5–49.3)
HGB BLD-MCNC: 12.1 G/DL (ref 12–17)
IMM GRANULOCYTES # BLD AUTO: 0.04 THOUSAND/UL (ref 0–0.2)
IMM GRANULOCYTES NFR BLD AUTO: 1 % (ref 0–2)
LYMPHOCYTES # BLD AUTO: 1.66 THOUSANDS/ÂΜL (ref 0.6–4.47)
LYMPHOCYTES NFR BLD AUTO: 21 % (ref 14–44)
MCH RBC QN AUTO: 25.7 PG (ref 26.8–34.3)
MCHC RBC AUTO-ENTMCNC: 30.9 G/DL (ref 31.4–37.4)
MCV RBC AUTO: 83 FL (ref 82–98)
MONOCYTES # BLD AUTO: 0.52 THOUSAND/ÂΜL (ref 0.17–1.22)
MONOCYTES NFR BLD AUTO: 6 % (ref 4–12)
NEUTROPHILS # BLD AUTO: 5.46 THOUSANDS/ÂΜL (ref 1.85–7.62)
NEUTS SEG NFR BLD AUTO: 67 % (ref 43–75)
NRBC BLD AUTO-RTO: 0 /100 WBCS
PLATELET # BLD AUTO: 258 THOUSANDS/UL (ref 149–390)
PMV BLD AUTO: 8.8 FL (ref 8.9–12.7)
POTASSIUM SERPL-SCNC: 4 MMOL/L (ref 3.5–5.3)
PROT SERPL-MCNC: 7.5 G/DL (ref 6.4–8.4)
RBC # BLD AUTO: 4.71 MILLION/UL (ref 3.88–5.62)
SODIUM SERPL-SCNC: 134 MMOL/L (ref 135–147)
WBC # BLD AUTO: 8.08 THOUSAND/UL (ref 4.31–10.16)

## 2024-12-23 PROCEDURE — 80053 COMPREHEN METABOLIC PANEL: CPT

## 2024-12-23 PROCEDURE — 36415 COLL VENOUS BLD VENIPUNCTURE: CPT

## 2024-12-23 PROCEDURE — 85025 COMPLETE CBC W/AUTO DIFF WBC: CPT

## 2024-12-23 NOTE — TELEPHONE ENCOUNTER
Returned call to patient and asked if he wanted to send us a picture through my chart if he was concerned. He denied any swelling to area or redness just tender. I did confirm wit him that oxaliplatin does burn and a common complaint. Made him aware I can show him what port looks like and he can discuss further with provider when he sees her this week. Did encourage some warm compresses to area Pt agreeable with instructions.

## 2024-12-23 NOTE — TELEPHONE ENCOUNTER
"Pt called back reporting that during his last treatment received on 12/11, he had fallen asleep and about 1.5 hours in, he woke up to a burning painful sensation at the IV site where the medication was running on the right forearm. Pt then got a new IV on the left arm for the remaining 30 mins which he reported had burning sensation as well but had resolved  fairly quick. However, pt reported that the right arm is  to the touch and can feel two knots  and has come mild redness 1 inch below the elbow but is unsure if that is from him rubbing on it. Pt reported that the symptoms are getting better but very slowly. Denies red streak, no swelling ,fever, pain, chills, sob or chest pain. Pt would like to speak to the provider about the recommendations and seeing if getting a port is recommended. Please advise       Reason for Disposition   Small area of skin redness at IV site (< 1 inch or 2.5 cm) and no fever, swelling    Answer Assessment - Initial Assessment Questions  1. SYMPTOM:  \"What's the main symptom you're concerned about?\" (e.g., pain, redness, swelling, pus)      Very tender , very slight redness around elbow about a inch below. Pt reports you can see the outline of the vein . Pt reports that it feels like there is 2 knots on the right arm   2. ONSET: \"When did symptoms start?\"      12/11 after treatment   3. IV TYPE: \"What kind of IV line do you have?\" (e.g., central line, PICC, peripheral IV)      PIV  4. IV LOCATION - SITE: \"Where does the IV enter your body?\"      Right forearm , about 3 inches from the wrist   5. IV START DATE: \"When was this IV put in?\"      12/11  6. IV REASON: \"Why do you have this IV line?\"      Treatment   7. IV FUNCTION: \"Describe how the IV is running?\" (e.g., running normally, running slowly, not running, unable to flush)       Pt was sleeping , upon awaken had a burning sensation at IV site . Infusion RN made aware and changed the IV to the left arm for the remaining " "of the 30 mins in which pt reports having some of the same burning sensation but did resolved on its own.   8. PAIN: \"Is there any pain?\" If Yes, ask: \"How bad is the pain?\" (Scale 1-10; or mild, moderate, severe) \"Describe the pain.\" (e.g., burning, throbbing, shooting, sharp, etc.)      Just tender to the touch now   9. SWELLING: \"Is there any swelling at your IV site?\"       Denies   10. FEVER: \"Do you have a fever?\" If Yes, ask: \"What is your temperature, how was it measured, and when did it start?\"         Denies   11. OTHER SYMPTOMS: \"Do you have any other symptoms?\" (e.g., shaking chills, weakness)        Denies   12. VISITING NURSE: \"Do you have a visiting nurse?\" (e.g., home health nurse, IV infusion nurse)        Denies   13. PUMP: \"Is it on a pump?\" If Yes,, ask: \"Is there an alarm and what is the message?\"        Denies    Protocols used: IV Site and Other Symptoms-Adult-[place]    "

## 2024-12-27 ENCOUNTER — TELEPHONE (OUTPATIENT)
Dept: HEMATOLOGY ONCOLOGY | Facility: CLINIC | Age: 74
End: 2024-12-27

## 2024-12-27 ENCOUNTER — OFFICE VISIT (OUTPATIENT)
Dept: HEMATOLOGY ONCOLOGY | Facility: CLINIC | Age: 74
End: 2024-12-27
Payer: MEDICARE

## 2024-12-27 VITALS
SYSTOLIC BLOOD PRESSURE: 112 MMHG | RESPIRATION RATE: 18 BRPM | WEIGHT: 220 LBS | DIASTOLIC BLOOD PRESSURE: 74 MMHG | HEIGHT: 69 IN | BODY MASS INDEX: 32.58 KG/M2 | TEMPERATURE: 97.6 F | OXYGEN SATURATION: 97 % | HEART RATE: 65 BPM

## 2024-12-27 DIAGNOSIS — Z72.0 TOBACCO ABUSE: ICD-10-CM

## 2024-12-27 DIAGNOSIS — I10 ESSENTIAL (PRIMARY) HYPERTENSION: ICD-10-CM

## 2024-12-27 DIAGNOSIS — C18.2 MALIGNANT NEOPLASM OF ASCENDING COLON (HCC): Primary | ICD-10-CM

## 2024-12-27 DIAGNOSIS — E03.9 HYPOTHYROIDISM, UNSPECIFIED TYPE: ICD-10-CM

## 2024-12-27 PROCEDURE — 99215 OFFICE O/P EST HI 40 MIN: CPT | Performed by: INTERNAL MEDICINE

## 2024-12-27 PROCEDURE — 99406 BEHAV CHNG SMOKING 3-10 MIN: CPT | Performed by: INTERNAL MEDICINE

## 2024-12-27 NOTE — H&P (VIEW-ONLY)
"  Hematology/Oncology Outpatient Follow- up Note  Jace Varghese 74 y.o. male MRN: @ Encounter: [id number]        Date:  12/31/2024        Assessment / Plan:      1. Malignant neoplasm of ascending colon (HCC)  Assessment & Plan:  Proceed with cycle 2 day 1 and XELOX on 01/02/2025.  VIR referral for port placement.   RTC 01/22/2025 with labs or sooner prn.   Orders:  -     Ambulatory referral to Interventional Radiology; Future  2. Essential (primary) hypertension  Assessment & Plan:  On metoprolol.  Management as per PCP.  3. Hypothyroidism, unspecified type  Assessment & Plan:  Continue levothyroxine.  Management as per PCP.  4. Tobacco abuse  Assessment & Plan:  Continues to smoke about half a pack per day.  Counseled in detail regarding smoking cessation however patient is not interested. At least 4 minutes were spent counseling the patient.         HPI:    74 y.o. c PMHx notable for hypothyroidism and hypertension, seen in consultation on 11/12/24 for recently diagnosed stage IIIb colon cancer. Patient states he feels well after the surgery. He has recovered well.     Interval History:    Patient presents today for follow up with his wife Hamida. He states that he developed \"knots\" in his vein after getting the Oxaliplatin.  He reports his scalp is somewhat tender.  Also has some mild neuropathy in the toes.  Was able to tolerate cold fluids.  Did have some tingling sensation however no pain.  Had occasional diarrhea- self limited. NO chest pains or shortness of breath. Did have fatigue-improved now. Developed a rash behind the ear that also has resolved.     Continues to smoke.     Cancer Staging:  Cancer Staging   Malignant neoplasm of ascending colon (HCC)  Staging form: Colon and Rectum, AJCC 8th Edition  - Pathologic stage from 9/23/2024: Stage IIIB (pT3, pN2a, cM0) - Signed by Ariela Aceves MD on 11/11/2024  Stage prefix: Initial diagnosis  Histologic grading system: 4 grade system  Histologic " "grade (G): G2  Lymph-vascular invasion (LVI): LVI present/identified, NOS      Molecular Testing:     Previous Hematologic/ Oncologic History:    Oncology History   Malignant neoplasm of ascending colon (HCC)   9/23/2024 Initial Diagnosis    Malignant neoplasm of ascending colon (HCC)     9/23/2024 -  Cancer Staged    Staging form: Colon and Rectum, AJCC 8th Edition  - Pathologic stage from 9/23/2024: Stage IIIB (pT3, pN2a, cM0) - Signed by Ariela Aceves MD on 11/11/2024  Stage prefix: Initial diagnosis  Histologic grading system: 4 grade system  Histologic grade (G): G2  Lymph-vascular invasion (LVI): LVI present/identified, NOS       12/11/2024 -  Chemotherapy    capecitabine (XELODA), 1,000 mg/m2 = 2,150 mg, Oral, 2 times daily with meals, 1 of 8 cycles  alteplase (CATHFLO), 2 mg, Intracatheter, Every 1 Minute as needed, 1 of 8 cycles  oxaliplatin (ELOXATIN) chemo infusion, 130 mg/m2 = 279.5 mg, Intravenous, Once, 1 of 8 cycles  Administration: 279.5 mg (12/11/2024)         Current Hematologic/ Oncologic Treatment:       Cycle 1 Day 1 XOLOX 12/11/2024        Test Results:    Imaging: No results found.          Labs:   Lab Results   Component Value Date    WBC 8.08 12/23/2024    HGB 12.1 12/23/2024    HCT 39.1 12/23/2024    MCV 83 12/23/2024     12/23/2024     Lab Results   Component Value Date    K 4.0 12/23/2024     12/23/2024    CO2 28 12/23/2024    BUN 22 12/23/2024    CREATININE 1.13 12/23/2024    GLUF 90 07/10/2024    CALCIUM 9.4 12/23/2024    AST 26 12/23/2024    ALT 12 12/23/2024    ALKPHOS 84 12/23/2024    EGFR 64 12/23/2024         No results found for: \"SPEP\", \"UPEP\"    Lab Results   Component Value Date    PSA 0.521 07/10/2024    PSA 0.8 08/15/2022       Lab Results   Component Value Date    CEA 2.9 11/15/2024       No results found for: \"\"    No results found for: \"AFP\"    No results found for: \"IRON\", \"TIBC\", \"FERRITIN\"    No results found for: \"[id number]\"      ROS: 14 point " review of systems was negative except that mentioned in HPI.      Current Medications: Reviewed  Allergies: Reviewed  PMH/FH/SH:  Reviewed      Physical Exam:    Body surface area is 2.15 meters squared.    Wt Readings from Last 3 Encounters:   12/27/24 99.8 kg (220 lb)   12/11/24 101 kg (222 lb 12.8 oz)   12/10/24 101 kg (222 lb)        Temp Readings from Last 3 Encounters:   12/27/24 97.6 °F (36.4 °C) (Temporal)   12/11/24 97.9 °F (36.6 °C) (Oral)   12/10/24 97.7 °F (36.5 °C) (Temporal)        BP Readings from Last 3 Encounters:   12/27/24 112/74   12/11/24 111/69   12/10/24 118/76         Pulse Readings from Last 3 Encounters:   12/27/24 65   12/11/24 96   12/10/24 63         Physical Exam  Vitals and nursing note reviewed.   Constitutional:       General: He is not in acute distress.     Appearance: Normal appearance.   HENT:      Head: Normocephalic and atraumatic.      Mouth/Throat:      Mouth: Mucous membranes are moist.      Pharynx: Oropharynx is clear.   Eyes:      General: No scleral icterus.     Extraocular Movements: Extraocular movements intact.      Conjunctiva/sclera: Conjunctivae normal.   Cardiovascular:      Rate and Rhythm: Normal rate and regular rhythm.      Pulses: Normal pulses.      Heart sounds: No murmur heard.  Pulmonary:      Effort: Pulmonary effort is normal.      Breath sounds: Normal breath sounds. No wheezing, rhonchi or rales.   Abdominal:      General: Bowel sounds are normal.      Palpations: Abdomen is soft.      Tenderness: There is no abdominal tenderness.   Musculoskeletal:         General: No swelling or tenderness. Normal range of motion.      Cervical back: Neck supple.   Lymphadenopathy:      Cervical: No cervical adenopathy.   Skin:     General: Skin is warm and dry.      Coloration: Skin is not pale.      Findings: No bruising, erythema or rash.   Neurological:      General: No focal deficit present.      Mental Status: He is alert and oriented to person, place, and  time.      Motor: No weakness.   Psychiatric:         Mood and Affect: Mood normal.         Behavior: Behavior normal.           Goals and Barriers:  Current Goal: Prolong Survival from Cancer.   Barriers: None.      Patient's Capacity to Self Care:  Patient is able to self care.    Disclaimer: This document was prepared using [employer] technology. If a word or phrase is confusing, or does not make sense, this is likely due to recognition error which was not discovered during this clinician's review. If you believe an error has occurred, please contact me through [facility] service line for [email or web address]?cation.      Follow Up    All questions were answered to the patient's satisfaction during this encounter. The patient knows the contact information for our office and knows to reach out for any relevant concerns related to this encounter. They are to call for any temperature 100.4 or higher, new symptoms including but not restricted to shaking chills, decreased appetite, nausea, vomiting, diarrhea, increased fatigue, shortness of breath or chest pain, confusion, and not feeling the strength to come to the clinic. For all other listed problems and medical diagnosis in their chart - they are managed by PCP and/or other specialists, which the patient acknowledges.     I spent 43 minutes reviewing the records (labs, clinician notes, outside records, medical history, ordering medicine/tests/procedures, monitoring of anti-neoplastic toxicities, interpreting the imaging/labs previously done) and coordination of care as well as direct time with the patient today, of which greater than 50% of the time was spent in counseling and coordination of care with the patient/family.

## 2024-12-27 NOTE — TELEPHONE ENCOUNTER
Called IR twice    Once at 11:40am  Once at 11:45 am   Again at 12:03pm in which I was able to get the patient scheduled for the procedure .  Total hold time 16 minutes     Phone call at 12:03pm - 10 minutes .

## 2024-12-27 NOTE — PROGRESS NOTES
"  Hematology/Oncology Outpatient Follow- up Note  Jace Varghese 74 y.o. male MRN: @ Encounter: [id number]        Date:  12/31/2024        Assessment / Plan:      1. Malignant neoplasm of ascending colon (HCC)  Assessment & Plan:  Proceed with cycle 2 day 1 and XELOX on 01/02/2025.  VIR referral for port placement.   RTC 01/22/2025 with labs or sooner prn.   Orders:  -     Ambulatory referral to Interventional Radiology; Future  2. Essential (primary) hypertension  Assessment & Plan:  On metoprolol.  Management as per PCP.  3. Hypothyroidism, unspecified type  Assessment & Plan:  Continue levothyroxine.  Management as per PCP.  4. Tobacco abuse  Assessment & Plan:  Continues to smoke about half a pack per day.  Counseled in detail regarding smoking cessation however patient is not interested. At least 4 minutes were spent counseling the patient.         HPI:    74 y.o. c PMHx notable for hypothyroidism and hypertension, seen in consultation on 11/12/24 for recently diagnosed stage IIIb colon cancer. Patient states he feels well after the surgery. He has recovered well.     Interval History:    Patient presents today for follow up with his wife Hamida. He states that he developed \"knots\" in his vein after getting the Oxaliplatin.  He reports his scalp is somewhat tender.  Also has some mild neuropathy in the toes.  Was able to tolerate cold fluids.  Did have some tingling sensation however no pain.  Had occasional diarrhea- self limited. NO chest pains or shortness of breath. Did have fatigue-improved now. Developed a rash behind the ear that also has resolved.     Continues to smoke.     Cancer Staging:  Cancer Staging   Malignant neoplasm of ascending colon (HCC)  Staging form: Colon and Rectum, AJCC 8th Edition  - Pathologic stage from 9/23/2024: Stage IIIB (pT3, pN2a, cM0) - Signed by Ariela Aceves MD on 11/11/2024  Stage prefix: Initial diagnosis  Histologic grading system: 4 grade system  Histologic " "grade (G): G2  Lymph-vascular invasion (LVI): LVI present/identified, NOS      Molecular Testing:     Previous Hematologic/ Oncologic History:    Oncology History   Malignant neoplasm of ascending colon (HCC)   9/23/2024 Initial Diagnosis    Malignant neoplasm of ascending colon (HCC)     9/23/2024 -  Cancer Staged    Staging form: Colon and Rectum, AJCC 8th Edition  - Pathologic stage from 9/23/2024: Stage IIIB (pT3, pN2a, cM0) - Signed by Ariela Aceves MD on 11/11/2024  Stage prefix: Initial diagnosis  Histologic grading system: 4 grade system  Histologic grade (G): G2  Lymph-vascular invasion (LVI): LVI present/identified, NOS       12/11/2024 -  Chemotherapy    capecitabine (XELODA), 1,000 mg/m2 = 2,150 mg, Oral, 2 times daily with meals, 1 of 8 cycles  alteplase (CATHFLO), 2 mg, Intracatheter, Every 1 Minute as needed, 1 of 8 cycles  oxaliplatin (ELOXATIN) chemo infusion, 130 mg/m2 = 279.5 mg, Intravenous, Once, 1 of 8 cycles  Administration: 279.5 mg (12/11/2024)         Current Hematologic/ Oncologic Treatment:       Cycle 1 Day 1 XOLOX 12/11/2024        Test Results:    Imaging: No results found.          Labs:   Lab Results   Component Value Date    WBC 8.08 12/23/2024    HGB 12.1 12/23/2024    HCT 39.1 12/23/2024    MCV 83 12/23/2024     12/23/2024     Lab Results   Component Value Date    K 4.0 12/23/2024     12/23/2024    CO2 28 12/23/2024    BUN 22 12/23/2024    CREATININE 1.13 12/23/2024    GLUF 90 07/10/2024    CALCIUM 9.4 12/23/2024    AST 26 12/23/2024    ALT 12 12/23/2024    ALKPHOS 84 12/23/2024    EGFR 64 12/23/2024         No results found for: \"SPEP\", \"UPEP\"    Lab Results   Component Value Date    PSA 0.521 07/10/2024    PSA 0.8 08/15/2022       Lab Results   Component Value Date    CEA 2.9 11/15/2024       No results found for: \"\"    No results found for: \"AFP\"    No results found for: \"IRON\", \"TIBC\", \"FERRITIN\"    No results found for: \"[id number]\"      ROS: 14 point " review of systems was negative except that mentioned in HPI.      Current Medications: Reviewed  Allergies: Reviewed  PMH/FH/SH:  Reviewed      Physical Exam:    Body surface area is 2.15 meters squared.    Wt Readings from Last 3 Encounters:   12/27/24 99.8 kg (220 lb)   12/11/24 101 kg (222 lb 12.8 oz)   12/10/24 101 kg (222 lb)        Temp Readings from Last 3 Encounters:   12/27/24 97.6 °F (36.4 °C) (Temporal)   12/11/24 97.9 °F (36.6 °C) (Oral)   12/10/24 97.7 °F (36.5 °C) (Temporal)        BP Readings from Last 3 Encounters:   12/27/24 112/74   12/11/24 111/69   12/10/24 118/76         Pulse Readings from Last 3 Encounters:   12/27/24 65   12/11/24 96   12/10/24 63         Physical Exam  Vitals and nursing note reviewed.   Constitutional:       General: He is not in acute distress.     Appearance: Normal appearance.   HENT:      Head: Normocephalic and atraumatic.      Mouth/Throat:      Mouth: Mucous membranes are moist.      Pharynx: Oropharynx is clear.   Eyes:      General: No scleral icterus.     Extraocular Movements: Extraocular movements intact.      Conjunctiva/sclera: Conjunctivae normal.   Cardiovascular:      Rate and Rhythm: Normal rate and regular rhythm.      Pulses: Normal pulses.      Heart sounds: No murmur heard.  Pulmonary:      Effort: Pulmonary effort is normal.      Breath sounds: Normal breath sounds. No wheezing, rhonchi or rales.   Abdominal:      General: Bowel sounds are normal.      Palpations: Abdomen is soft.      Tenderness: There is no abdominal tenderness.   Musculoskeletal:         General: No swelling or tenderness. Normal range of motion.      Cervical back: Neck supple.   Lymphadenopathy:      Cervical: No cervical adenopathy.   Skin:     General: Skin is warm and dry.      Coloration: Skin is not pale.      Findings: No bruising, erythema or rash.   Neurological:      General: No focal deficit present.      Mental Status: He is alert and oriented to person, place, and  time.      Motor: No weakness.   Psychiatric:         Mood and Affect: Mood normal.         Behavior: Behavior normal.           Goals and Barriers:  Current Goal: Prolong Survival from Cancer.   Barriers: None.      Patient's Capacity to Self Care:  Patient is able to self care.    Disclaimer: This document was prepared using [employer] technology. If a word or phrase is confusing, or does not make sense, this is likely due to recognition error which was not discovered during this clinician's review. If you believe an error has occurred, please contact me through [facility] service line for luisa?cation.      Follow Up    All questions were answered to the patient's satisfaction during this encounter. The patient knows the contact information for our office and knows to reach out for any relevant concerns related to this encounter. They are to call for any temperature 100.4 or higher, new symptoms including but not restricted to shaking chills, decreased appetite, nausea, vomiting, diarrhea, increased fatigue, shortness of breath or chest pain, confusion, and not feeling the strength to come to the clinic. For all other listed problems and medical diagnosis in their chart - they are managed by PCP and/or other specialists, which the patient acknowledges.     I spent 43 minutes reviewing the records (labs, clinician notes, outside records, medical history, ordering medicine/tests/procedures, monitoring of anti-neoplastic toxicities, interpreting the imaging/labs previously done) and coordination of care as well as direct time with the patient today, of which greater than 50% of the time was spent in counseling and coordination of care with the patient/family.

## 2024-12-27 NOTE — TELEPHONE ENCOUNTER
Called and left msg about IR PORT PLACEMENT being scheduled - 1/17     Gave pt IR phone number if needed , also gave [facility] number if needed and instructed pt to be aware that a nurse will be calling approximately a week prior to procedure .

## 2024-12-29 RX ORDER — SODIUM CHLORIDE 9 MG/ML
20 INJECTION, SOLUTION INTRAVENOUS ONCE AS NEEDED
Status: CANCELLED | OUTPATIENT
Start: 2025-01-22

## 2024-12-29 RX ORDER — DEXTROSE MONOHYDRATE 50 MG/ML
20 INJECTION, SOLUTION INTRAVENOUS ONCE
Status: CANCELLED | OUTPATIENT
Start: 2025-01-22

## 2024-12-30 ENCOUNTER — TELEPHONE (OUTPATIENT)
Dept: HEMATOLOGY ONCOLOGY | Facility: CLINIC | Age: 74
End: 2024-12-30

## 2024-12-30 NOTE — TELEPHONE ENCOUNTER
Called IR to reschedule PORT PLACEMENT for sooner appt per clinical team request. Left Teams call back number due to 15 min wait time .

## 2024-12-30 NOTE — TELEPHONE ENCOUNTER
Called patient in regards of rescheduling port placement . Unfortunately IR dos not have any availability before 1/2/25 . Left message that the assign appt on 1/ 17 stays  the same

## 2024-12-31 ENCOUNTER — TELEPHONE (OUTPATIENT)
Dept: INFUSION CENTER | Facility: CLINIC | Age: 74
End: 2024-12-31

## 2024-12-31 DIAGNOSIS — C18.2 MALIGNANT NEOPLASM OF ASCENDING COLON (HCC): Primary | ICD-10-CM

## 2024-12-31 DIAGNOSIS — N18.31 STAGE 3A CHRONIC KIDNEY DISEASE (HCC): ICD-10-CM

## 2024-12-31 NOTE — ASSESSMENT & PLAN NOTE
Continues to smoke about half a pack per day.  Counseled in detail regarding smoking cessation however patient is not interested. At least 4 minutes were spent counseling the patient.

## 2024-12-31 NOTE — ASSESSMENT & PLAN NOTE
Proceed with cycle 2 day 1 and XELOX on 01/02/2025.  VIR referral for port placement.   RTC 01/22/2025 with labs or sooner prn.

## 2025-01-02 ENCOUNTER — HOSPITAL ENCOUNTER (OUTPATIENT)
Dept: INFUSION CENTER | Facility: CLINIC | Age: 75
Discharge: HOME | End: 2025-01-02

## 2025-01-09 RX ORDER — CEFAZOLIN SODIUM 2 G/50ML
2000 SOLUTION INTRAVENOUS ONCE
Status: CANCELLED | OUTPATIENT
Start: 2025-01-17

## 2025-01-09 RX ORDER — SODIUM CHLORIDE 9 MG/ML
30 INJECTION, SOLUTION INTRAVENOUS CONTINUOUS
Status: CANCELLED | OUTPATIENT
Start: 2025-01-09

## 2025-01-16 ENCOUNTER — RESULTS FOLLOW-UP (OUTPATIENT)
Dept: FAMILY MEDICINE CLINIC | Facility: CLINIC | Age: 75
End: 2025-01-16

## 2025-01-16 ENCOUNTER — APPOINTMENT (OUTPATIENT)
Dept: LAB | Facility: HOSPITAL | Age: 75
End: 2025-01-16
Payer: MEDICARE

## 2025-01-16 DIAGNOSIS — C18.2 MALIGNANT NEOPLASM OF ASCENDING COLON (HCC): ICD-10-CM

## 2025-01-16 DIAGNOSIS — E03.9 HYPOTHYROIDISM, UNSPECIFIED TYPE: Primary | ICD-10-CM

## 2025-01-16 DIAGNOSIS — E03.9 HYPOTHYROIDISM, UNSPECIFIED TYPE: ICD-10-CM

## 2025-01-16 DIAGNOSIS — N18.31 STAGE 3A CHRONIC KIDNEY DISEASE (HCC): ICD-10-CM

## 2025-01-16 LAB
ALBUMIN SERPL BCG-MCNC: 4.3 G/DL (ref 3.5–5)
ALP SERPL-CCNC: 101 U/L (ref 34–104)
ALT SERPL W P-5'-P-CCNC: 15 U/L (ref 7–52)
ANION GAP SERPL CALCULATED.3IONS-SCNC: 5 MMOL/L (ref 4–13)
AST SERPL W P-5'-P-CCNC: 30 U/L (ref 13–39)
BASOPHILS # BLD AUTO: 0.07 THOUSANDS/ΜL (ref 0–0.1)
BASOPHILS NFR BLD AUTO: 1 % (ref 0–1)
BILIRUB SERPL-MCNC: 0.48 MG/DL (ref 0.2–1)
BUN SERPL-MCNC: 21 MG/DL (ref 5–25)
CALCIUM SERPL-MCNC: 9.8 MG/DL (ref 8.4–10.2)
CHLORIDE SERPL-SCNC: 100 MMOL/L (ref 96–108)
CO2 SERPL-SCNC: 30 MMOL/L (ref 21–32)
CREAT SERPL-MCNC: 1.2 MG/DL (ref 0.6–1.3)
EOSINOPHIL # BLD AUTO: 0.28 THOUSAND/ΜL (ref 0–0.61)
EOSINOPHIL NFR BLD AUTO: 4 % (ref 0–6)
ERYTHROCYTE [DISTWIDTH] IN BLOOD BY AUTOMATED COUNT: 19.7 % (ref 11.6–15.1)
GFR SERPL CREATININE-BSD FRML MDRD: 59 ML/MIN/1.73SQ M
GLUCOSE P FAST SERPL-MCNC: 105 MG/DL (ref 65–99)
HCT VFR BLD AUTO: 40.1 % (ref 36.5–49.3)
HGB BLD-MCNC: 12.2 G/DL (ref 12–17)
IMM GRANULOCYTES # BLD AUTO: 0.02 THOUSAND/UL (ref 0–0.2)
IMM GRANULOCYTES NFR BLD AUTO: 0 % (ref 0–2)
LYMPHOCYTES # BLD AUTO: 1.6 THOUSANDS/ΜL (ref 0.6–4.47)
LYMPHOCYTES NFR BLD AUTO: 21 % (ref 14–44)
MCH RBC QN AUTO: 26.4 PG (ref 26.8–34.3)
MCHC RBC AUTO-ENTMCNC: 30.4 G/DL (ref 31.4–37.4)
MCV RBC AUTO: 87 FL (ref 82–98)
MONOCYTES # BLD AUTO: 0.75 THOUSAND/ΜL (ref 0.17–1.22)
MONOCYTES NFR BLD AUTO: 10 % (ref 4–12)
NEUTROPHILS # BLD AUTO: 4.79 THOUSANDS/ΜL (ref 1.85–7.62)
NEUTS SEG NFR BLD AUTO: 64 % (ref 43–75)
NRBC BLD AUTO-RTO: 0 /100 WBCS
PLATELET # BLD AUTO: 232 THOUSANDS/UL (ref 149–390)
PMV BLD AUTO: 8.8 FL (ref 8.9–12.7)
POTASSIUM SERPL-SCNC: 4.5 MMOL/L (ref 3.5–5.3)
PROT SERPL-MCNC: 7.8 G/DL (ref 6.4–8.4)
RBC # BLD AUTO: 4.62 MILLION/UL (ref 3.88–5.62)
SODIUM SERPL-SCNC: 135 MMOL/L (ref 135–147)
T4 FREE SERPL-MCNC: 0.76 NG/DL (ref 0.61–1.12)
TSH SERPL DL<=0.05 MIU/L-ACNC: 5.22 UIU/ML (ref 0.45–4.5)
WBC # BLD AUTO: 7.51 THOUSAND/UL (ref 4.31–10.16)

## 2025-01-16 PROCEDURE — 36415 COLL VENOUS BLD VENIPUNCTURE: CPT

## 2025-01-16 PROCEDURE — 80053 COMPREHEN METABOLIC PANEL: CPT

## 2025-01-16 PROCEDURE — 84439 ASSAY OF FREE THYROXINE: CPT

## 2025-01-16 PROCEDURE — 85025 COMPLETE CBC W/AUTO DIFF WBC: CPT

## 2025-01-16 PROCEDURE — 84443 ASSAY THYROID STIM HORMONE: CPT

## 2025-01-17 ENCOUNTER — HOSPITAL ENCOUNTER (OUTPATIENT)
Dept: NON INVASIVE DIAGNOSTICS | Facility: HOSPITAL | Age: 75
Discharge: HOME | End: 2025-01-17
Attending: INTERNAL MEDICINE
Payer: MEDICARE

## 2025-01-17 VITALS
HEART RATE: 71 BPM | HEIGHT: 69 IN | BODY MASS INDEX: 32.39 KG/M2 | TEMPERATURE: 97.5 F | OXYGEN SATURATION: 96 % | DIASTOLIC BLOOD PRESSURE: 58 MMHG | RESPIRATION RATE: 27 BRPM | WEIGHT: 218.7 LBS | SYSTOLIC BLOOD PRESSURE: 108 MMHG

## 2025-01-17 DIAGNOSIS — C18.2 MALIGNANT NEOPLASM OF ASCENDING COLON (HCC): ICD-10-CM

## 2025-01-17 DIAGNOSIS — E03.9 HYPOTHYROIDISM, UNSPECIFIED TYPE: ICD-10-CM

## 2025-01-17 LAB
INR PPP: 0.93 (ref 0.85–1.19)
PROTHROMBIN TIME: 13.1 SECONDS (ref 12.3–15)

## 2025-01-17 PROCEDURE — 77001 FLUOROGUIDE FOR VEIN DEVICE: CPT | Performed by: RADIOLOGY

## 2025-01-17 PROCEDURE — 76937 US GUIDE VASCULAR ACCESS: CPT

## 2025-01-17 PROCEDURE — 99152 MOD SED SAME PHYS/QHP 5/>YRS: CPT | Performed by: RADIOLOGY

## 2025-01-17 PROCEDURE — C1788 PORT, INDWELLING, IMP: HCPCS

## 2025-01-17 PROCEDURE — 76937 US GUIDE VASCULAR ACCESS: CPT | Performed by: RADIOLOGY

## 2025-01-17 PROCEDURE — 99152 MOD SED SAME PHYS/QHP 5/>YRS: CPT

## 2025-01-17 PROCEDURE — 77001 FLUOROGUIDE FOR VEIN DEVICE: CPT

## 2025-01-17 PROCEDURE — 99153 MOD SED SAME PHYS/QHP EA: CPT

## 2025-01-17 PROCEDURE — 36561 INSERT TUNNELED CV CATH: CPT

## 2025-01-17 PROCEDURE — 36561 INSERT TUNNELED CV CATH: CPT | Performed by: RADIOLOGY

## 2025-01-17 PROCEDURE — 85610 PROTHROMBIN TIME: CPT | Performed by: RADIOLOGY

## 2025-01-17 RX ORDER — MIDAZOLAM HYDROCHLORIDE 2 MG/2ML
INJECTION, SOLUTION INTRAMUSCULAR; INTRAVENOUS AS NEEDED
Status: COMPLETED | OUTPATIENT
Start: 2025-01-17 | End: 2025-01-17

## 2025-01-17 RX ORDER — FENTANYL CITRATE 50 UG/ML
INJECTION, SOLUTION INTRAMUSCULAR; INTRAVENOUS AS NEEDED
Status: COMPLETED | OUTPATIENT
Start: 2025-01-17 | End: 2025-01-17

## 2025-01-17 RX ORDER — SODIUM CHLORIDE 9 MG/ML
30 INJECTION, SOLUTION INTRAVENOUS CONTINUOUS
Status: DISCONTINUED | OUTPATIENT
Start: 2025-01-17 | End: 2025-01-18 | Stop reason: HOSPADM

## 2025-01-17 RX ORDER — CEFAZOLIN SODIUM 2 G/50ML
2000 SOLUTION INTRAVENOUS ONCE
Status: COMPLETED | OUTPATIENT
Start: 2025-01-17 | End: 2025-01-17

## 2025-01-17 RX ORDER — LIDOCAINE HYDROCHLORIDE AND EPINEPHRINE 10; 10 MG/ML; UG/ML
INJECTION, SOLUTION INFILTRATION; PERINEURAL AS NEEDED
Status: COMPLETED | OUTPATIENT
Start: 2025-01-17 | End: 2025-01-17

## 2025-01-17 RX ORDER — LEVOTHYROXINE SODIUM 150 UG/1
150 TABLET ORAL DAILY
Qty: 90 TABLET | Refills: 0 | Status: SHIPPED | OUTPATIENT
Start: 2025-01-17 | End: 2026-01-17

## 2025-01-17 RX ADMIN — Medication 10 ML: at 09:12

## 2025-01-17 RX ADMIN — MIDAZOLAM HYDROCHLORIDE 1 MG: 1 INJECTION, SOLUTION INTRAMUSCULAR; INTRAVENOUS at 09:11

## 2025-01-17 RX ADMIN — CEFAZOLIN SODIUM 2000 MG: 2 SOLUTION INTRAVENOUS at 09:06

## 2025-01-17 RX ADMIN — FENTANYL CITRATE 50 MCG: 50 INJECTION, SOLUTION INTRAMUSCULAR; INTRAVENOUS at 09:01

## 2025-01-17 RX ADMIN — MIDAZOLAM HYDROCHLORIDE 1 MG: 1 INJECTION, SOLUTION INTRAMUSCULAR; INTRAVENOUS at 09:18

## 2025-01-17 RX ADMIN — FENTANYL CITRATE 50 MCG: 50 INJECTION, SOLUTION INTRAMUSCULAR; INTRAVENOUS at 09:11

## 2025-01-17 RX ADMIN — MIDAZOLAM HYDROCHLORIDE 1 MG: 1 INJECTION, SOLUTION INTRAMUSCULAR; INTRAVENOUS at 09:01

## 2025-01-17 RX ADMIN — LIDOCAINE HYDROCHLORIDE,EPINEPHRINE BITARTRATE 5 ML: 10; .01 INJECTION, SOLUTION INFILTRATION; PERINEURAL at 09:10

## 2025-01-17 RX ADMIN — FENTANYL CITRATE 50 MCG: 50 INJECTION, SOLUTION INTRAMUSCULAR; INTRAVENOUS at 09:18

## 2025-01-17 NOTE — INTERVAL H&P NOTE
"Patient arrived to IR for port placement    The procedure and risks were discussed with the patient.  All questions were answered. Informed consent was obtained.    H & P reviewed after examining the patient and I find no changes in the patient condition since the H & P has been written.    /76   Pulse 88   Temp 97.6 °F (36.4 °C) (Temporal)   Resp 20   Ht 5' 9\" (1.753 m)   Wt 99.2 kg (218 lb 11.1 oz)   SpO2 95%   BMI 32.30 kg/m²     Patient re-evaluated. Accept as history and physical.    Mario Arriaga, DO/January 17, 2025/8:29 AM  "

## 2025-01-17 NOTE — DISCHARGE INSTRUCTIONS
Implanted Venous Access Port     WHAT YOU NEED TO KNOW:   An implanted venous access port is a device used to give treatments and take blood. It may also be called a central venous access device (CVAD). The port is a small container that is placed under your skin, usually in your upper chest. The port is attached to a catheter that enters a large vein.   DISCHARGE INSTRUCTIONS:   Resume your normal diet. Small sips of flat soda will help with mild nausea.  Prevent an infection:   Wash your hands often.  Use soap and water. Clean your hands before and after you care for your port. Remind everyone who cares for your port to wash their hands.   Check your skin for infection every day.  Look for redness, swelling, or fluid oozing from the port site.  Care for your port:   1. You may shower beginning 48 hours after procedure.     2.  Leave glue in place.    3. It is normal for some bruising to occur.    4. Use Tylenol for pain.    5. Limit use of arm on the side that your port was placed. Lift nothing heavier than 5 pounds for 1 week, and then gradually increase activity as tolerated.    6. DO NOT apply ointment, lotion or cream to port site until incision is healed. Allow glue to fall off. DO NOT attempt to peel glue from skin even it it begins to flake.     7. After the port incision is healed you may swim, bathe.  Notify the Interventional Radiologist if you have any of the followin. Fever above 101 F    2. Increased redness or swelling after 1st day.     3. Increased pain after 1st day.    4. Any sign of infection (drainage from port site, skin separation, hot to touch).    5. Persistent nausea or vomiting.    Contact Interventional Radiology at [phone] ([facility] PATIENTS: Contact Interventional Radiology at [phone]) ([facility] PATIENTS: Contact Interventional Radiology at [phone]).

## 2025-01-21 NOTE — PROGRESS NOTES
Name: Jace Varghese      : 1950      MRN: [id number]  Encounter Provider: Ariela Aceves MD  Encounter Date: 2025   Encounter department: [facility] [place]  :  Assessment & Plan  Malignant neoplasm of ascending colon (HCC)  Proceed with cycle 2 day 1 XELOX today 2025.(Deferred by the patient as he wanted port placed before the next treatment)  S/p port placement on 2025.   RTC 2025 with labs or sooner prn.   Given right tender forearm nodules will get doppler.   Orders:    VAS upper limb venous duplex scan, unilateral/limited; Future    Right arm pain    Orders:    VAS upper limb venous duplex scan, unilateral/limited; Future    Hypothyroidism, unspecified type  Continue levothyroxine.  Management as per PCP.         Essential (primary) hypertension  On metoprolol.  Management as per PCP.         RTC in 3 weeks or sooner prn    History of Present Illness   Chief Complaint   Patient presents with    Follow-up   74 y.o. c PMHx notable for hypothyroidism and hypertension, seen in consultation on 24 for recently diagnosed stage IIIb colon cancer. Patient states he feels well after the surgery. He has recovered well.     Interval History:  Patient presents today for follow up.  He underwent port placement on 2025.  He reports that the knots in his right arm have gotten worse and are tender when palpated.  He is upset about the fact that he was charged $25 per visit for smoking cessation counseling.  Other than that he feels fine.  He denies fevers.  No chest pains or shortness of breath.  No abdominal pain.  No diarrhea or constipation.  Denies any headaches, lightheadedness or dizziness.  Has been tolerating Xeloda fairly well.  No peeling of the skin.    Developed neuropathy (toes) after 1st cycle of treatment which has improved now.   Oncology History   Cancer Staging   Malignant neoplasm of ascending colon (HCC)  Staging form:  "Colon and Rectum, AJCC 8th Edition  - Pathologic stage from 9/23/2024: Stage IIIB (pT3, pN2a, cM0) - Signed by Ariela Aceves MD on 11/11/2024  Stage prefix: Initial diagnosis  Histologic grading system: 4 grade system  Histologic grade (G): G2  Lymph-vascular invasion (LVI): LVI present/identified, NOS  Oncology History   Malignant neoplasm of ascending colon (HCC)   9/23/2024 Initial Diagnosis    Malignant neoplasm of ascending colon (HCC)     9/23/2024 -  Cancer Staged    Staging form: Colon and Rectum, AJCC 8th Edition  - Pathologic stage from 9/23/2024: Stage IIIB (pT3, pN2a, cM0) - Signed by Ariela Aceves MD on 11/11/2024  Stage prefix: Initial diagnosis  Histologic grading system: 4 grade system  Histologic grade (G): G2  Lymph-vascular invasion (LVI): LVI present/identified, NOS       12/11/2024 -  Chemotherapy    capecitabine (XELODA), 1,000 mg/m2 = 2,150 mg, Oral, 2 times daily with meals, 1 of 1 cycle, Start date: --, End date: --  alteplase (CATHFLO), 2 mg, Intracatheter, Every 1 Minute as needed, 2 of 8 cycles  oxaliplatin (ELOXATIN) chemo infusion, 130 mg/m2 = 279.5 mg, Intravenous, Once, 2 of 8 cycles  Administration: 279.5 mg (12/11/2024)        Pertinent Medical History   01/22/25: reviewed    Review of Systems  14 point review of systems is negative except that mentioned in HPI.        Objective   /78 (BP Location: Left arm, Patient Position: Sitting, Cuff Size: Adult)   Pulse 69   Temp (!) 97.4 °F (36.3 °C) (Temporal)   Resp 18   Ht 5' 9\" (1.753 m)   Wt 101 kg (223 lb)   SpO2 90%   BMI 32.93 kg/m²     Pain Screening:  Pain Score: 0-No pain  ECOG   0  Physical Exam  Vitals and nursing note reviewed.   Constitutional:       General: He is not in acute distress.     Appearance: Normal appearance.   HENT:      Head: Normocephalic and atraumatic.      Mouth/Throat:      Mouth: Mucous membranes are moist.      Pharynx: Oropharynx is clear.   Eyes:      General: No scleral icterus.     " Extraocular Movements: Extraocular movements intact.      Conjunctiva/sclera: Conjunctivae normal.   Neck:      Comments: Post surgical changes  Cardiovascular:      Rate and Rhythm: Normal rate and regular rhythm.      Pulses: Normal pulses.      Heart sounds: No murmur heard.  Pulmonary:      Effort: Pulmonary effort is normal.      Breath sounds: Normal breath sounds. No wheezing, rhonchi or rales.   Abdominal:      General: Bowel sounds are normal.      Palpations: Abdomen is soft.      Tenderness: There is no abdominal tenderness.   Musculoskeletal:         General: No swelling or tenderness. Normal range of motion.      Cervical back: Neck supple.   Lymphadenopathy:      Cervical: No cervical adenopathy.   Skin:     General: Skin is warm and dry.      Coloration: Skin is not pale.      Findings: No bruising, erythema or rash.      Comments: Tender nodules on the right forearm    Neurological:      General: No focal deficit present.      Mental Status: He is alert and oriented to person, place, and time.      Motor: No weakness.   Psychiatric:         Mood and Affect: Mood normal.         Behavior: Behavior normal.         Labs: I have reviewed the following labs:  Lab Results   Component Value Date/Time    WBC 7.51 01/16/2025 10:09 AM    RBC 4.62 01/16/2025 10:09 AM    Hemoglobin 12.2 01/16/2025 10:09 AM    Hematocrit 40.1 01/16/2025 10:09 AM    MCV 87 01/16/2025 10:09 AM    MCH 26.4 (L) 01/16/2025 10:09 AM    RDW 19.7 (H) 01/16/2025 10:09 AM    Platelets 232 01/16/2025 10:09 AM    Segmented % 64 01/16/2025 10:09 AM    Lymphocytes % 21 01/16/2025 10:09 AM    Monocytes % 10 01/16/2025 10:09 AM    Eosinophils Relative 4 01/16/2025 10:09 AM    Basophils Relative 1 01/16/2025 10:09 AM    Immature Grans % 0 01/16/2025 10:09 AM    Absolute Neutrophils 4.79 01/16/2025 10:09 AM     Lab Results   Component Value Date/Time    Potassium 4.5 01/16/2025 10:09 AM    Chloride 100 01/16/2025 10:09 AM    CO2 30 01/16/2025  10:09 AM    BUN 21 01/16/2025 10:09 AM    Creatinine 1.20 01/16/2025 10:09 AM    Glucose, Fasting 105 (H) 01/16/2025 10:09 AM    Calcium 9.8 01/16/2025 10:09 AM    AST 30 01/16/2025 10:09 AM    ALT 15 01/16/2025 10:09 AM    Alkaline Phosphatase 101 01/16/2025 10:09 AM    Total Protein 7.8 01/16/2025 10:09 AM    Albumin 4.3 01/16/2025 10:09 AM    Total Bilirubin 0.48 01/16/2025 10:09 AM    eGFR 59 01/16/2025 10:09 AM               Disclaimer: This document was prepared using [employer] technology. If a word or phrase is confusing, or does not make sense, this is likely due to recognition error which was not discovered during this clinician's review. If you believe an error has occurred, please contact me through [employer] service line for [email or web address].      Follow Up    All questions were answered to the patient's satisfaction during this encounter. The patient knows the contact information for our office and knows to reach out for any relevant concerns related to this encounter. They are to call for any temperature 100.4 or higher, new symptoms including but not restricted to shaking chills, decreased appetite, nausea, vomiting, diarrhea, increased fatigue, shortness of breath or chest pain, confusion, and not feeling the strength to come to the clinic. For all other listed problems and medical diagnosis in their chart - they are managed by PCP and/or other specialists, which the patient acknowledges.     I spent 40 minutes reviewing the records (labs, clinician notes, outside records, medical history, ordering medicine/tests/procedures, monitoring of anti-neoplastic toxicities, interpreting the imaging/labs previously done) and coordination of care as well as direct time with the patient today, of which greater than 50% of the time was spent in counseling and coordination of care with the patient/family.

## 2025-01-22 ENCOUNTER — OFFICE VISIT (OUTPATIENT)
Dept: HEMATOLOGY ONCOLOGY | Facility: CLINIC | Age: 75
End: 2025-01-22
Payer: MEDICARE

## 2025-01-22 ENCOUNTER — HOSPITAL ENCOUNTER (OUTPATIENT)
Dept: INFUSION CENTER | Facility: CLINIC | Age: 75
Discharge: HOME | End: 2025-01-22
Payer: MEDICARE

## 2025-01-22 VITALS
RESPIRATION RATE: 18 BRPM | DIASTOLIC BLOOD PRESSURE: 78 MMHG | OXYGEN SATURATION: 90 % | HEART RATE: 69 BPM | WEIGHT: 223 LBS | SYSTOLIC BLOOD PRESSURE: 122 MMHG | HEIGHT: 69 IN | BODY MASS INDEX: 33.03 KG/M2 | TEMPERATURE: 97.4 F

## 2025-01-22 VITALS
BODY MASS INDEX: 32.82 KG/M2 | WEIGHT: 221.6 LBS | HEIGHT: 69 IN | RESPIRATION RATE: 18 BRPM | TEMPERATURE: 97.1 F | HEART RATE: 83 BPM | DIASTOLIC BLOOD PRESSURE: 83 MMHG | SYSTOLIC BLOOD PRESSURE: 148 MMHG

## 2025-01-22 DIAGNOSIS — E03.9 HYPOTHYROIDISM, UNSPECIFIED TYPE: ICD-10-CM

## 2025-01-22 DIAGNOSIS — C18.2 MALIGNANT NEOPLASM OF ASCENDING COLON (HCC): Primary | ICD-10-CM

## 2025-01-22 DIAGNOSIS — C18.2 MALIGNANT NEOPLASM OF ASCENDING COLON (HCC): ICD-10-CM

## 2025-01-22 DIAGNOSIS — I10 ESSENTIAL (PRIMARY) HYPERTENSION: ICD-10-CM

## 2025-01-22 DIAGNOSIS — M79.601 RIGHT ARM PAIN: Primary | ICD-10-CM

## 2025-01-22 PROCEDURE — 96413 CHEMO IV INFUSION 1 HR: CPT

## 2025-01-22 PROCEDURE — 96367 TX/PROPH/DG ADDL SEQ IV INF: CPT

## 2025-01-22 PROCEDURE — 96415 CHEMO IV INFUSION ADDL HR: CPT

## 2025-01-22 PROCEDURE — G2211 COMPLEX E/M VISIT ADD ON: HCPCS | Performed by: INTERNAL MEDICINE

## 2025-01-22 PROCEDURE — 99215 OFFICE O/P EST HI 40 MIN: CPT | Performed by: INTERNAL MEDICINE

## 2025-01-22 RX ORDER — SODIUM CHLORIDE 9 MG/ML
20 INJECTION, SOLUTION INTRAVENOUS ONCE AS NEEDED
Status: CANCELLED | OUTPATIENT
Start: 2025-01-22

## 2025-01-22 RX ORDER — DEXTROSE MONOHYDRATE 50 MG/ML
20 INJECTION, SOLUTION INTRAVENOUS ONCE
Status: CANCELLED | OUTPATIENT
Start: 2025-01-22

## 2025-01-22 RX ORDER — SODIUM CHLORIDE 9 MG/ML
20 INJECTION, SOLUTION INTRAVENOUS ONCE AS NEEDED
Status: DISCONTINUED | OUTPATIENT
Start: 2025-01-22 | End: 2025-01-25 | Stop reason: HOSPADM

## 2025-01-22 RX ORDER — DEXTROSE MONOHYDRATE 50 MG/ML
20 INJECTION, SOLUTION INTRAVENOUS ONCE
Status: COMPLETED | OUTPATIENT
Start: 2025-01-22 | End: 2025-01-22

## 2025-01-22 RX ADMIN — DEXAMETHASONE SODIUM PHOSPHATE: 10 INJECTION, SOLUTION INTRAMUSCULAR; INTRAVENOUS at 10:21

## 2025-01-22 RX ADMIN — SODIUM CHLORIDE 20 ML/HR: 0.9 INJECTION, SOLUTION INTRAVENOUS at 10:20

## 2025-01-22 RX ADMIN — OXALIPLATIN 279.5 MG: 5 INJECTION, SOLUTION INTRAVENOUS at 11:00

## 2025-01-22 RX ADMIN — DEXTROSE 20 ML/HR: 5 SOLUTION INTRAVENOUS at 10:49

## 2025-01-22 NOTE — ASSESSMENT & PLAN NOTE
Proceed with cycle 2 day 1 XELOX today 01/22/2025.(Deferred by the patient as he wanted port placed before the next treatment)  S/p port placement on 01/17/2025.   RTC 01/22/2025 with labs or sooner prn.   Given right tender forearm nodules will get doppler.   Orders:    VAS upper limb venous duplex scan, unilateral/limited; Future

## 2025-01-22 NOTE — PROGRESS NOTES
Pt here for chemo, offers no complaints, VS stable,  Pt aware of their next appt, 1/22 at 10 am.  AVS given,  Infusion completed w/o complications, pt D/C home

## 2025-02-11 ENCOUNTER — TELEPHONE (OUTPATIENT)
Dept: HEMATOLOGY ONCOLOGY | Facility: CLINIC | Age: 75
End: 2025-02-11

## 2025-02-11 ENCOUNTER — HOSPITAL ENCOUNTER (OUTPATIENT)
Dept: INFUSION CENTER | Facility: CLINIC | Age: 75
Discharge: HOME | End: 2025-02-11
Payer: MEDICARE

## 2025-02-11 DIAGNOSIS — C18.2 MALIGNANT NEOPLASM OF ASCENDING COLON (HCC): Primary | ICD-10-CM

## 2025-02-11 LAB
ALBUMIN SERPL BCG-MCNC: 3.8 G/DL (ref 3.5–5)
ALP SERPL-CCNC: 98 U/L (ref 34–104)
ALT SERPL W P-5'-P-CCNC: 20 U/L (ref 7–52)
ANION GAP SERPL CALCULATED.3IONS-SCNC: 7 MMOL/L (ref 4–13)
AST SERPL W P-5'-P-CCNC: 40 U/L (ref 13–39)
BASOPHILS # BLD AUTO: 0.05 THOUSANDS/ΜL (ref 0–0.1)
BASOPHILS NFR BLD AUTO: 1 % (ref 0–1)
BILIRUB SERPL-MCNC: 0.45 MG/DL (ref 0.2–1)
BUN SERPL-MCNC: 15 MG/DL (ref 5–25)
CALCIUM SERPL-MCNC: 8.8 MG/DL (ref 8.4–10.2)
CHLORIDE SERPL-SCNC: 102 MMOL/L (ref 96–108)
CO2 SERPL-SCNC: 27 MMOL/L (ref 21–32)
CREAT SERPL-MCNC: 0.96 MG/DL (ref 0.6–1.3)
EOSINOPHIL # BLD AUTO: 0.31 THOUSAND/ΜL (ref 0–0.61)
EOSINOPHIL NFR BLD AUTO: 4 % (ref 0–6)
ERYTHROCYTE [DISTWIDTH] IN BLOOD BY AUTOMATED COUNT: 20.3 % (ref 11.6–15.1)
GFR SERPL CREATININE-BSD FRML MDRD: 78 ML/MIN/1.73SQ M
GLUCOSE SERPL-MCNC: 104 MG/DL (ref 65–140)
HCT VFR BLD AUTO: 33 % (ref 36.5–49.3)
HGB BLD-MCNC: 10.7 G/DL (ref 12–17)
IMM GRANULOCYTES # BLD AUTO: 0.03 THOUSAND/UL (ref 0–0.2)
IMM GRANULOCYTES NFR BLD AUTO: 0 % (ref 0–2)
LYMPHOCYTES # BLD AUTO: 1.43 THOUSANDS/ΜL (ref 0.6–4.47)
LYMPHOCYTES NFR BLD AUTO: 20 % (ref 14–44)
MCH RBC QN AUTO: 27.8 PG (ref 26.8–34.3)
MCHC RBC AUTO-ENTMCNC: 32.4 G/DL (ref 31.4–37.4)
MCV RBC AUTO: 86 FL (ref 82–98)
MONOCYTES # BLD AUTO: 0.89 THOUSAND/ΜL (ref 0.17–1.22)
MONOCYTES NFR BLD AUTO: 12 % (ref 4–12)
NEUTROPHILS # BLD AUTO: 4.44 THOUSANDS/ΜL (ref 1.85–7.62)
NEUTS SEG NFR BLD AUTO: 63 % (ref 43–75)
NRBC BLD AUTO-RTO: 0 /100 WBCS
PLATELET # BLD AUTO: 200 THOUSANDS/UL (ref 149–390)
PMV BLD AUTO: 8.9 FL (ref 8.9–12.7)
POTASSIUM SERPL-SCNC: 3.5 MMOL/L (ref 3.5–5.3)
PROT SERPL-MCNC: 7 G/DL (ref 6.4–8.4)
RBC # BLD AUTO: 3.85 MILLION/UL (ref 3.88–5.62)
SODIUM SERPL-SCNC: 136 MMOL/L (ref 135–147)
WBC # BLD AUTO: 7.15 THOUSAND/UL (ref 4.31–10.16)

## 2025-02-11 PROCEDURE — 85025 COMPLETE CBC W/AUTO DIFF WBC: CPT | Performed by: INTERNAL MEDICINE

## 2025-02-11 PROCEDURE — 80053 COMPREHEN METABOLIC PANEL: CPT | Performed by: INTERNAL MEDICINE

## 2025-02-11 NOTE — TELEPHONE ENCOUNTER
Called and spoke with patient who states he is feeling very sick. Made provider aware and recommendation is to defer his treatment one week. Encouraged he maintain hydrating and increasing vitamin C. Made aware to call us with any elevated temp.  He verbalizes understanding

## 2025-02-11 NOTE — PROGRESS NOTES
Pt presents for lab specimen collection via port a cath. Pt offering complaints of head congestion, chills, runny nose, and scratchy throat no fevers coughing up yellow, he does have treatment tomorrow, reached out to Kamila Macias RN, Kamila Macias to call and follow up with pt later today. Advised pt to also call his PCP.  Port accessed, labs drawn, saline locked, and de accessed without difficulty. Declined AVS, next appointment on 2/12/25 at 8:30am.

## 2025-02-11 NOTE — TELEPHONE ENCOUNTER
MO  Existing  Please defer 1 week because of cold    (Medication time out/change to orders)    Date patient scheduled: tomorrow    Original medication ordered: Xelox    New Medication ordered: na    Office RN notified patient? Yes  Timeout done with Ariana FOSTER

## 2025-02-11 NOTE — PROGRESS NOTES
Time out completed with office RN, Kamila Macias:  Pt will be deferred 1 week d/t current illness per Dr. Aceves.

## 2025-02-12 ENCOUNTER — HOSPITAL ENCOUNTER (OUTPATIENT)
Dept: INFUSION CENTER | Facility: CLINIC | Age: 75
Discharge: HOME | End: 2025-02-12

## 2025-02-12 ENCOUNTER — TELEPHONE (OUTPATIENT)
Dept: HEMATOLOGY ONCOLOGY | Facility: CLINIC | Age: 75
End: 2025-02-12

## 2025-02-12 DIAGNOSIS — C18.2 MALIGNANT NEOPLASM OF ASCENDING COLON (HCC): Primary | ICD-10-CM

## 2025-02-18 ENCOUNTER — HOSPITAL ENCOUNTER (OUTPATIENT)
Dept: INFUSION CENTER | Facility: CLINIC | Age: 75
Discharge: HOME | End: 2025-02-18
Payer: MEDICARE

## 2025-02-18 DIAGNOSIS — C18.2 MALIGNANT NEOPLASM OF ASCENDING COLON (HCC): Primary | ICD-10-CM

## 2025-02-18 LAB
ALBUMIN SERPL BCG-MCNC: 3.9 G/DL (ref 3.5–5)
ALP SERPL-CCNC: 100 U/L (ref 34–104)
ALT SERPL W P-5'-P-CCNC: 16 U/L (ref 7–52)
ANION GAP SERPL CALCULATED.3IONS-SCNC: 5 MMOL/L (ref 4–13)
AST SERPL W P-5'-P-CCNC: 33 U/L (ref 13–39)
BASOPHILS # BLD AUTO: 0.06 THOUSANDS/ΜL (ref 0–0.1)
BASOPHILS NFR BLD AUTO: 1 % (ref 0–1)
BILIRUB SERPL-MCNC: 0.43 MG/DL (ref 0.2–1)
BUN SERPL-MCNC: 17 MG/DL (ref 5–25)
CALCIUM SERPL-MCNC: 9.2 MG/DL (ref 8.4–10.2)
CHLORIDE SERPL-SCNC: 99 MMOL/L (ref 96–108)
CO2 SERPL-SCNC: 31 MMOL/L (ref 21–32)
CREAT SERPL-MCNC: 1.18 MG/DL (ref 0.6–1.3)
EOSINOPHIL # BLD AUTO: 0.37 THOUSAND/ΜL (ref 0–0.61)
EOSINOPHIL NFR BLD AUTO: 6 % (ref 0–6)
ERYTHROCYTE [DISTWIDTH] IN BLOOD BY AUTOMATED COUNT: 22 % (ref 11.6–15.1)
GFR SERPL CREATININE-BSD FRML MDRD: 60 ML/MIN/1.73SQ M
GLUCOSE P FAST SERPL-MCNC: 99 MG/DL (ref 65–99)
GLUCOSE SERPL-MCNC: 99 MG/DL (ref 65–140)
HCT VFR BLD AUTO: 36.7 % (ref 36.5–49.3)
HGB BLD-MCNC: 11.1 G/DL (ref 12–17)
IMM GRANULOCYTES # BLD AUTO: 0.01 THOUSAND/UL (ref 0–0.2)
IMM GRANULOCYTES NFR BLD AUTO: 0 % (ref 0–2)
LYMPHOCYTES # BLD AUTO: 1.61 THOUSANDS/ΜL (ref 0.6–4.47)
LYMPHOCYTES NFR BLD AUTO: 26 % (ref 14–44)
MCH RBC QN AUTO: 27 PG (ref 26.8–34.3)
MCHC RBC AUTO-ENTMCNC: 30.2 G/DL (ref 31.4–37.4)
MCV RBC AUTO: 89 FL (ref 82–98)
MONOCYTES # BLD AUTO: 0.66 THOUSAND/ΜL (ref 0.17–1.22)
MONOCYTES NFR BLD AUTO: 11 % (ref 4–12)
NEUTROPHILS # BLD AUTO: 3.56 THOUSANDS/ΜL (ref 1.85–7.62)
NEUTS SEG NFR BLD AUTO: 56 % (ref 43–75)
NRBC BLD AUTO-RTO: 0 /100 WBCS
PLATELET # BLD AUTO: 231 THOUSANDS/UL (ref 149–390)
PMV BLD AUTO: 8.5 FL (ref 8.9–12.7)
POTASSIUM SERPL-SCNC: 4.4 MMOL/L (ref 3.5–5.3)
PROT SERPL-MCNC: 7.5 G/DL (ref 6.4–8.4)
RBC # BLD AUTO: 4.11 MILLION/UL (ref 3.88–5.62)
SODIUM SERPL-SCNC: 135 MMOL/L (ref 135–147)
WBC # BLD AUTO: 6.27 THOUSAND/UL (ref 4.31–10.16)

## 2025-02-18 PROCEDURE — 80053 COMPREHEN METABOLIC PANEL: CPT | Performed by: INTERNAL MEDICINE

## 2025-02-18 PROCEDURE — 85025 COMPLETE CBC W/AUTO DIFF WBC: CPT | Performed by: INTERNAL MEDICINE

## 2025-02-18 NOTE — PROGRESS NOTES
Jace Varghese presents for [facility], offers no complaints. Port accessed and flushed with positive blood return. Labs collected from port well with no complications.      Jace Varghese is aware of future appt on 2/19 at 10:30 am. Port flushed and de-accessed.    AVS declined.

## 2025-02-19 ENCOUNTER — HOSPITAL ENCOUNTER (OUTPATIENT)
Dept: INFUSION CENTER | Facility: CLINIC | Age: 75
Discharge: HOME | End: 2025-02-19
Payer: MEDICARE

## 2025-02-19 ENCOUNTER — OFFICE VISIT (OUTPATIENT)
Dept: HEMATOLOGY ONCOLOGY | Facility: CLINIC | Age: 75
End: 2025-02-19
Payer: MEDICARE

## 2025-02-19 ENCOUNTER — TELEPHONE (OUTPATIENT)
Dept: HEMATOLOGY ONCOLOGY | Facility: CLINIC | Age: 75
End: 2025-02-19

## 2025-02-19 VITALS
DIASTOLIC BLOOD PRESSURE: 77 MMHG | HEART RATE: 66 BPM | RESPIRATION RATE: 18 BRPM | SYSTOLIC BLOOD PRESSURE: 118 MMHG | BODY MASS INDEX: 32.65 KG/M2 | HEIGHT: 69 IN | TEMPERATURE: 96.3 F | WEIGHT: 220.46 LBS

## 2025-02-19 VITALS
WEIGHT: 221.2 LBS | BODY MASS INDEX: 32.76 KG/M2 | HEART RATE: 66 BPM | TEMPERATURE: 96.3 F | DIASTOLIC BLOOD PRESSURE: 77 MMHG | RESPIRATION RATE: 18 BRPM | SYSTOLIC BLOOD PRESSURE: 118 MMHG | HEIGHT: 69 IN

## 2025-02-19 DIAGNOSIS — E03.9 HYPOTHYROIDISM, UNSPECIFIED TYPE: ICD-10-CM

## 2025-02-19 DIAGNOSIS — C18.2 MALIGNANT NEOPLASM OF ASCENDING COLON (HCC): Primary | ICD-10-CM

## 2025-02-19 DIAGNOSIS — I10 ESSENTIAL (PRIMARY) HYPERTENSION: ICD-10-CM

## 2025-02-19 PROCEDURE — 96415 CHEMO IV INFUSION ADDL HR: CPT

## 2025-02-19 PROCEDURE — 96413 CHEMO IV INFUSION 1 HR: CPT

## 2025-02-19 PROCEDURE — G2211 COMPLEX E/M VISIT ADD ON: HCPCS | Performed by: INTERNAL MEDICINE

## 2025-02-19 PROCEDURE — 99214 OFFICE O/P EST MOD 30 MIN: CPT | Performed by: INTERNAL MEDICINE

## 2025-02-19 PROCEDURE — 96367 TX/PROPH/DG ADDL SEQ IV INF: CPT

## 2025-02-19 RX ORDER — SODIUM CHLORIDE 9 MG/ML
20 INJECTION, SOLUTION INTRAVENOUS ONCE AS NEEDED
Status: CANCELLED | OUTPATIENT
Start: 2025-02-19

## 2025-02-19 RX ORDER — DEXTROSE MONOHYDRATE 50 MG/ML
20 INJECTION, SOLUTION INTRAVENOUS ONCE
Status: COMPLETED | OUTPATIENT
Start: 2025-02-19 | End: 2025-02-19

## 2025-02-19 RX ORDER — DEXTROSE MONOHYDRATE 50 MG/ML
20 INJECTION, SOLUTION INTRAVENOUS ONCE
Status: CANCELLED | OUTPATIENT
Start: 2025-02-19

## 2025-02-19 RX ORDER — SODIUM CHLORIDE 9 MG/ML
20 INJECTION, SOLUTION INTRAVENOUS ONCE AS NEEDED
Status: DISCONTINUED | OUTPATIENT
Start: 2025-02-19 | End: 2025-02-22 | Stop reason: HOSPADM

## 2025-02-19 RX ADMIN — SODIUM CHLORIDE 20 ML/HR: 0.9 INJECTION, SOLUTION INTRAVENOUS at 10:51

## 2025-02-19 RX ADMIN — OXALIPLATIN 280.8 MG: 5 INJECTION, SOLUTION INTRAVENOUS at 11:24

## 2025-02-19 RX ADMIN — DEXAMETHASONE SODIUM PHOSPHATE: 10 INJECTION, SOLUTION INTRAMUSCULAR; INTRAVENOUS at 10:50

## 2025-02-19 RX ADMIN — DEXTROSE 20 ML/HR: 5 SOLUTION INTRAVENOUS at 11:25

## 2025-02-19 NOTE — PROGRESS NOTES
Name: Jace Varghese      : 1950      MRN: [id number]  Encounter Provider: Ariela Aceves MD  Encounter Date: 2025   Encounter department: [facility] [place]  :  Assessment & Plan  Malignant neoplasm of ascending colon (HCC)  Proceed with cycle 3 day 1 XELOX today. (Originally planned for 2025-Deferred by the patient as he was sick)  S/p port placement on 2025.   RTC 4 weeks or sooner prn with labs.          Hypothyroidism, unspecified type  Continue levothyroxine.  Management as per PCP.           Essential (primary) hypertension  On metoprolol.  Management as per PCP.           RTC in 4 weeks or sooner prn    History of Present Illness   Chief Complaint   Patient presents with    Follow-up   74 y.o. c PMHx notable for hypothyroidism and hypertension, seen in consultation on 24 for recently diagnosed stage IIIb colon cancer. Patient states he feels well after the surgery. He has recovered well.     Interval History:  Patient presents today for follow up.  He feels fine.  Denies any abdominal pain or diarrhea.  No chest pains or shortness of breath.  Reports peeling of the skin over the callus on his foot.  No skin peeling of his hands.  He does notice some cracked skin on the fingertips. No fevers.  Denies any headaches, lightheadedness or dizziness.    Oncology History   Cancer Staging   Malignant neoplasm of ascending colon (HCC)  Staging form: Colon and Rectum, AJCC 8th Edition  - Pathologic stage from 2024: Stage IIIB (pT3, pN2a, cM0) - Signed by Ariela Aceves MD on 2024  Stage prefix: Initial diagnosis  Histologic grading system: 4 grade system  Histologic grade (G): G2  Lymph-vascular invasion (LVI): LVI present/identified, NOS  Oncology History   Malignant neoplasm of ascending colon (HCC)   2024 Initial Diagnosis    Malignant neoplasm of ascending colon (HCC)     2024 -  Cancer Staged    Staging form: Colon and  "Rectum, AJCC 8th Edition  - Pathologic stage from 9/23/2024: Stage IIIB (pT3, pN2a, cM0) - Signed by Ariela Aceves MD on 11/11/2024  Stage prefix: Initial diagnosis  Histologic grading system: 4 grade system  Histologic grade (G): G2  Lymph-vascular invasion (LVI): LVI present/identified, NOS       12/11/2024 -  Chemotherapy    capecitabine (XELODA), 1,000 mg/m2 = 2,150 mg, Oral, 2 times daily with meals, 1 of 1 cycle, Start date: --, End date: --  alteplase (CATHFLO), 2 mg, Intracatheter, Every 1 Minute as needed, 3 of 8 cycles  oxaliplatin (ELOXATIN) chemo infusion, 130 mg/m2 = 279.5 mg, Intravenous, Once, 3 of 8 cycles  Administration: 279.5 mg (12/11/2024), 279.5 mg (1/22/2025)        Pertinent Medical History   02/19/25: reviewed    Review of Systems  14 point review of systems is negative except that mentioned in HPI.        Objective   /77 (BP Location: Left arm, Patient Position: Sitting, Cuff Size: Adult)   Pulse 66   Temp (!) 96.3 °F (35.7 °C) (Temporal)   Resp 18   Ht 5' 9.02\" (1.753 m)   Wt 100 kg (220 lb 7.4 oz)   BMI 32.54 kg/m²     Pain Screening:     ECOG   0  Physical Exam  Vitals and nursing note reviewed.   Constitutional:       General: He is not in acute distress.     Appearance: Normal appearance.   HENT:      Head: Normocephalic and atraumatic.      Mouth/Throat:      Mouth: Mucous membranes are moist.      Pharynx: Oropharynx is clear.   Eyes:      General: No scleral icterus.     Extraocular Movements: Extraocular movements intact.      Conjunctiva/sclera: Conjunctivae normal.   Neck:      Comments: Post surgical changes  Cardiovascular:      Rate and Rhythm: Normal rate and regular rhythm.      Pulses: Normal pulses.      Heart sounds: No murmur heard.  Pulmonary:      Effort: Pulmonary effort is normal.      Breath sounds: Normal breath sounds. No wheezing, rhonchi or rales.   Abdominal:      General: Bowel sounds are normal.      Palpations: Abdomen is soft.      " Tenderness: There is no abdominal tenderness.   Musculoskeletal:         General: No swelling or tenderness. Normal range of motion.      Cervical back: Neck supple.   Lymphadenopathy:      Cervical: No cervical adenopathy.   Skin:     General: Skin is warm and dry.      Coloration: Skin is not pale.      Findings: No bruising, erythema or rash.   Neurological:      General: No focal deficit present.      Mental Status: He is alert and oriented to person, place, and time.      Motor: No weakness.   Psychiatric:         Mood and Affect: Mood normal.         Behavior: Behavior normal.         Labs: I have reviewed the following labs:  Lab Results   Component Value Date/Time    WBC 6.27 02/18/2025 09:26 AM    RBC 4.11 02/18/2025 09:26 AM    Hemoglobin 11.1 (L) 02/18/2025 09:26 AM    Hematocrit 36.7 02/18/2025 09:26 AM    MCV 89 02/18/2025 09:26 AM    MCH 27.0 02/18/2025 09:26 AM    RDW 22.0 (H) 02/18/2025 09:26 AM    Platelets 231 02/18/2025 09:26 AM    Segmented % 56 02/18/2025 09:26 AM    Lymphocytes % 26 02/18/2025 09:26 AM    Monocytes % 11 02/18/2025 09:26 AM    Eosinophils Relative 6 02/18/2025 09:26 AM    Basophils Relative 1 02/18/2025 09:26 AM    Immature Grans % 0 02/18/2025 09:26 AM    Absolute Neutrophils 3.56 02/18/2025 09:26 AM     Lab Results   Component Value Date/Time    Potassium 4.4 02/18/2025 09:26 AM    Chloride 99 02/18/2025 09:26 AM    CO2 31 02/18/2025 09:26 AM    BUN 17 02/18/2025 09:26 AM    Creatinine 1.18 02/18/2025 09:26 AM    Glucose, Fasting 99 02/18/2025 09:26 AM    Calcium 9.2 02/18/2025 09:26 AM    AST 33 02/18/2025 09:26 AM    ALT 16 02/18/2025 09:26 AM    Alkaline Phosphatase 100 02/18/2025 09:26 AM    Total Protein 7.5 02/18/2025 09:26 AM    Albumin 3.9 02/18/2025 09:26 AM    Total Bilirubin 0.43 02/18/2025 09:26 AM    eGFR 60 02/18/2025 09:26 AM               Disclaimer: This document was prepared using [employer]. If a word or phrase is confusing, or does not  make sense, this is likely due to recognition error which was not discovered during this clinician's review. If you believe an error has occurred, please contact me through [employer] service line for [email or web address]?cation.      Follow Up    All questions were answered to the patient's satisfaction during this encounter. The patient knows the contact information for our office and knows to reach out for any relevant concerns related to this encounter. They are to call for any temperature 100.4 or higher, new symptoms including but not restricted to shaking chills, decreased appetite, nausea, vomiting, diarrhea, increased fatigue, shortness of breath or chest pain, confusion, and not feeling the strength to come to the clinic. For all other listed problems and medical diagnosis in their chart - they are managed by PCP and/or other specialists, which the patient acknowledges.     I spent 37 minutes reviewing the records (labs, clinician notes, outside records, medical history, ordering medicine/tests/procedures, monitoring of anti-neoplastic toxicities, interpreting the imaging/labs previously done) and coordination of care as well as direct time with the patient today, of which greater than 50% of the time was spent in counseling and coordination of care with the patient/family.

## 2025-02-19 NOTE — ASSESSMENT & PLAN NOTE
Proceed with cycle 3 day 1 XELOX today. (Originally planned for 02/12/2025-Deferred by the patient as he was sick)  S/p port placement on 01/17/2025.   RTC 4 weeks or sooner prn with labs.

## 2025-02-19 NOTE — PROGRESS NOTES
Pt presents for oxaliplatin offering complaints of peripheral neuropathy ongoing, and peeling on hands and feet. Reached out to Kamila Macias RN, okay to proceed with treatment today. Port a cath accessed with positive blood return noted. Pt tolerated infusion without incident. Port a cath flushed positive blood return noted, de accessed without difficulty. AVS declined. Walked out in stable condition, Next appointment on 3/10/25 at 2pm.

## 2025-03-10 ENCOUNTER — HOSPITAL ENCOUNTER (OUTPATIENT)
Dept: INFUSION CENTER | Facility: CLINIC | Age: 75
Discharge: HOME | End: 2025-03-10
Payer: MEDICARE

## 2025-03-10 ENCOUNTER — TELEPHONE (OUTPATIENT)
Dept: FAMILY MEDICINE CLINIC | Facility: CLINIC | Age: 75
End: 2025-03-10

## 2025-03-10 DIAGNOSIS — E03.9 HYPOTHYROIDISM, UNSPECIFIED TYPE: ICD-10-CM

## 2025-03-10 DIAGNOSIS — I10 PRIMARY HYPERTENSION: Primary | ICD-10-CM

## 2025-03-10 DIAGNOSIS — C18.2 MALIGNANT NEOPLASM OF ASCENDING COLON (HCC): Primary | ICD-10-CM

## 2025-03-10 LAB
ALBUMIN SERPL BCG-MCNC: 4 G/DL (ref 3.5–5)
ALP SERPL-CCNC: 101 U/L (ref 34–104)
ALT SERPL W P-5'-P-CCNC: 15 U/L (ref 7–52)
ANION GAP SERPL CALCULATED.3IONS-SCNC: 7 MMOL/L (ref 4–13)
AST SERPL W P-5'-P-CCNC: 36 U/L (ref 13–39)
BASOPHILS # BLD AUTO: 0.06 THOUSANDS/ÂΜL (ref 0–0.1)
BASOPHILS NFR BLD AUTO: 1 % (ref 0–1)
BILIRUB SERPL-MCNC: 0.5 MG/DL (ref 0.2–1)
BUN SERPL-MCNC: 17 MG/DL (ref 5–25)
CALCIUM SERPL-MCNC: 8.8 MG/DL (ref 8.4–10.2)
CHLORIDE SERPL-SCNC: 100 MMOL/L (ref 96–108)
CO2 SERPL-SCNC: 28 MMOL/L (ref 21–32)
CREAT SERPL-MCNC: 1.04 MG/DL (ref 0.6–1.3)
EOSINOPHIL # BLD AUTO: 0.51 THOUSAND/ÂΜL (ref 0–0.61)
EOSINOPHIL NFR BLD AUTO: 8 % (ref 0–6)
ERYTHROCYTE [DISTWIDTH] IN BLOOD BY AUTOMATED COUNT: 23.6 % (ref 11.6–15.1)
GFR SERPL CREATININE-BSD FRML MDRD: 70 ML/MIN/1.73SQ M
GLUCOSE SERPL-MCNC: 88 MG/DL (ref 65–140)
HCT VFR BLD AUTO: 36 % (ref 36.5–49.3)
HGB BLD-MCNC: 11.9 G/DL (ref 12–17)
IMM GRANULOCYTES # BLD AUTO: 0.03 THOUSAND/UL (ref 0–0.2)
IMM GRANULOCYTES NFR BLD AUTO: 1 % (ref 0–2)
LYMPHOCYTES # BLD AUTO: 1.7 THOUSANDS/ÂΜL (ref 0.6–4.47)
LYMPHOCYTES NFR BLD AUTO: 26 % (ref 14–44)
MCH RBC QN AUTO: 29.5 PG (ref 26.8–34.3)
MCHC RBC AUTO-ENTMCNC: 33.1 G/DL (ref 31.4–37.4)
MCV RBC AUTO: 89 FL (ref 82–98)
MONOCYTES # BLD AUTO: 1.08 THOUSAND/ÂΜL (ref 0.17–1.22)
MONOCYTES NFR BLD AUTO: 16 % (ref 4–12)
NEUTROPHILS # BLD AUTO: 3.24 THOUSANDS/ÂΜL (ref 1.85–7.62)
NEUTS SEG NFR BLD AUTO: 48 % (ref 43–75)
NRBC BLD AUTO-RTO: 0 /100 WBCS
PLATELET # BLD AUTO: 166 THOUSANDS/UL (ref 149–390)
PMV BLD AUTO: 8.8 FL (ref 8.9–12.7)
POTASSIUM SERPL-SCNC: 3.9 MMOL/L (ref 3.5–5.3)
PROT SERPL-MCNC: 7.2 G/DL (ref 6.4–8.4)
RBC # BLD AUTO: 4.03 MILLION/UL (ref 3.88–5.62)
SODIUM SERPL-SCNC: 135 MMOL/L (ref 135–147)
WBC # BLD AUTO: 6.62 THOUSAND/UL (ref 4.31–10.16)

## 2025-03-10 PROCEDURE — 80053 COMPREHEN METABOLIC PANEL: CPT | Performed by: INTERNAL MEDICINE

## 2025-03-10 PROCEDURE — 85025 COMPLETE CBC W/AUTO DIFF WBC: CPT | Performed by: INTERNAL MEDICINE

## 2025-03-10 NOTE — PROGRESS NOTES
Pt presents for lab specimen collection via port a cath complaining of worsening numbness in the feet, calluses are peeling off the heals, painful bump on his heal, and his hands are cracking. Pt also has a painful lump in his right arm. There was an ultrasound ordered for that in the past but he canceled it because it got better but it is back and bothering him. Reached out to Ellen Pederson RN, Dr made aware. Port accessed, labs drawn, saline locked, and de accessed without difficulty. Declined AVS, next appointment on 3/12/25 at 12:30pm.

## 2025-03-11 DIAGNOSIS — C18.2 MALIGNANT NEOPLASM OF ASCENDING COLON (HCC): ICD-10-CM

## 2025-03-11 RX ORDER — DEXTROSE MONOHYDRATE 50 MG/ML
20 INJECTION, SOLUTION INTRAVENOUS ONCE
Status: CANCELLED | OUTPATIENT
Start: 2025-03-12

## 2025-03-11 RX ORDER — SODIUM CHLORIDE 9 MG/ML
20 INJECTION, SOLUTION INTRAVENOUS ONCE AS NEEDED
Status: CANCELLED | OUTPATIENT
Start: 2025-03-12

## 2025-03-11 NOTE — PROGRESS NOTES
Communication was sent to oral chemotherapy team with dose reduction of Xeloda to change from 1000mg/m2 to 800mg/m2. Pt had complained of issues with neuropathy and peeling of skin.

## 2025-03-12 ENCOUNTER — HOSPITAL ENCOUNTER (OUTPATIENT)
Dept: INFUSION CENTER | Facility: CLINIC | Age: 75
Discharge: HOME | End: 2025-03-12
Payer: MEDICARE

## 2025-03-12 VITALS
WEIGHT: 221.4 LBS | SYSTOLIC BLOOD PRESSURE: 136 MMHG | HEIGHT: 69 IN | HEART RATE: 75 BPM | BODY MASS INDEX: 32.79 KG/M2 | RESPIRATION RATE: 17 BRPM | TEMPERATURE: 96.8 F | DIASTOLIC BLOOD PRESSURE: 72 MMHG

## 2025-03-12 DIAGNOSIS — C18.2 MALIGNANT NEOPLASM OF ASCENDING COLON (HCC): Primary | ICD-10-CM

## 2025-03-12 PROCEDURE — 96413 CHEMO IV INFUSION 1 HR: CPT

## 2025-03-12 PROCEDURE — 96367 TX/PROPH/DG ADDL SEQ IV INF: CPT

## 2025-03-12 PROCEDURE — 96415 CHEMO IV INFUSION ADDL HR: CPT

## 2025-03-12 RX ORDER — SODIUM CHLORIDE 9 MG/ML
20 INJECTION, SOLUTION INTRAVENOUS ONCE AS NEEDED
Status: DISCONTINUED | OUTPATIENT
Start: 2025-03-12 | End: 2025-03-15 | Stop reason: HOSPADM

## 2025-03-12 RX ORDER — CAPECITABINE 500 MG/1
500 TABLET, FILM COATED ORAL 2 TIMES DAILY
Qty: 112 TABLET | Refills: 5 | Status: SHIPPED | OUTPATIENT
Start: 2025-03-12

## 2025-03-12 RX ORDER — DEXTROSE MONOHYDRATE 50 MG/ML
20 INJECTION, SOLUTION INTRAVENOUS ONCE
Status: COMPLETED | OUTPATIENT
Start: 2025-03-12 | End: 2025-03-12

## 2025-03-12 RX ORDER — CAPECITABINE 150 MG/1
1200 TABLET, FILM COATED ORAL 2 TIMES DAILY
Qty: 28 TABLET | Refills: 5 | Status: SHIPPED | OUTPATIENT
Start: 2025-03-12

## 2025-03-12 RX ADMIN — DEXAMETHASONE SODIUM PHOSPHATE: 10 INJECTION, SOLUTION INTRAMUSCULAR; INTRAVENOUS at 12:47

## 2025-03-12 RX ADMIN — OXALIPLATIN 279.5 MG: 5 INJECTION, SOLUTION INTRAVENOUS at 13:30

## 2025-03-12 RX ADMIN — SODIUM CHLORIDE 20 ML/HR: 0.9 INJECTION, SOLUTION INTRAVENOUS at 12:45

## 2025-03-12 RX ADMIN — DEXTROSE 20 ML/HR: 5 SOLUTION INTRAVENOUS at 13:26

## 2025-03-12 NOTE — PROGRESS NOTES
Pt here for oxaliplatin, c/o neuropathy and skin peeling, MD aware. Confirmed with Kamila Macias RN pt is to continue taking current dose of Xeloda until he receives new dose reduction and he has f/u with Dr Aceves 3/19, pt verbalizes understanding. PAC accessed with positive blood return noted. Tolerated entire infusion without complication. PAC flushed and de-accessed. AVS declined. Next appt 3/31 10am. Walked out in stable condition.

## 2025-03-19 ENCOUNTER — OFFICE VISIT (OUTPATIENT)
Dept: HEMATOLOGY ONCOLOGY | Facility: CLINIC | Age: 75
End: 2025-03-19
Payer: MEDICARE

## 2025-03-19 ENCOUNTER — TELEPHONE (OUTPATIENT)
Dept: HEMATOLOGY ONCOLOGY | Facility: CLINIC | Age: 75
End: 2025-03-19

## 2025-03-19 VITALS
HEIGHT: 69 IN | OXYGEN SATURATION: 94 % | RESPIRATION RATE: 18 BRPM | SYSTOLIC BLOOD PRESSURE: 130 MMHG | DIASTOLIC BLOOD PRESSURE: 80 MMHG | TEMPERATURE: 97.9 F | BODY MASS INDEX: 33.18 KG/M2 | HEART RATE: 83 BPM | WEIGHT: 224 LBS

## 2025-03-19 DIAGNOSIS — N18.31 CHRONIC KIDNEY DISEASE, STAGE 3A (HCC): ICD-10-CM

## 2025-03-19 DIAGNOSIS — C18.2 MALIGNANT NEOPLASM OF ASCENDING COLON (HCC): Primary | ICD-10-CM

## 2025-03-19 DIAGNOSIS — E03.9 HYPOTHYROIDISM, UNSPECIFIED TYPE: ICD-10-CM

## 2025-03-19 DIAGNOSIS — I10 PRIMARY HYPERTENSION: ICD-10-CM

## 2025-03-19 PROCEDURE — 99214 OFFICE O/P EST MOD 30 MIN: CPT | Performed by: INTERNAL MEDICINE

## 2025-03-19 PROCEDURE — G2211 COMPLEX E/M VISIT ADD ON: HCPCS | Performed by: INTERNAL MEDICINE

## 2025-03-19 RX ORDER — DIPHENOXYLATE HYDROCHLORIDE AND ATROPINE SULFATE 2.5; .025 MG/1; MG/1
1 TABLET ORAL 4 TIMES DAILY PRN
Qty: 20 TABLET | Refills: 0 | Status: SHIPPED | OUTPATIENT
Start: 2025-03-19

## 2025-03-19 NOTE — ASSESSMENT & PLAN NOTE
Patient started on adjuvant XELOX.  He is complaining of significant neuropathy.  Will discontinue oxaliplatin.  Dose of Xeloda decreased given significant PPE.  Which is now improving after decreasing the dose.     Will continue Xeloda.  Monitor labs.    Get CT abdomen and pelvis.    CEA with next blood draw.   Orders:    CT abdomen pelvis w contrast; Future    CEA; Future    diphenoxylate-atropine (LOMOTIL) 2.5-0.025 mg per tablet; Take 1 tablet by mouth 4 (four) times a day as needed for diarrhea

## 2025-03-19 NOTE — PROGRESS NOTES
Name: Jace Varghese      : 1950      MRN: [id number]  Encounter Provider: Ariela Aceves MD  Encounter Date: 3/19/2025   Encounter department: [facility] [place]  :  Assessment & Plan  Malignant neoplasm of ascending colon (HCC)  Patient started on adjuvant XELOX.  He is complaining of significant neuropathy.  Will discontinue oxaliplatin.  Dose of Xeloda decreased given significant PPE.  Which is now improving after decreasing the dose.     Will continue Xeloda.  Monitor labs.    Get CT abdomen and pelvis.    CEA with next blood draw.   Orders:    CT abdomen pelvis w contrast; Future    CEA; Future    diphenoxylate-atropine (LOMOTIL) 2.5-0.025 mg per tablet; Take 1 tablet by mouth 4 (four) times a day as needed for diarrhea        Chronic kidney disease, stage 3a (HCC)    Primary hypertension  Blood pressure well-controlled.  Continue management as per PCP .       Hypothyroidism, unspecified type  Continue levothyroxine.  Management as per PCP.             RTC in 4 weeks or sooner prn    History of Present Illness   Chief Complaint   Patient presents with    Follow-up   74 y.o. c PMHx notable for hypothyroidism and hypertension, seen in consultation on 24 for recently diagnosed stage IIIb colon cancer.  Started on adjuvant CapeOx.    Interval History:  Patient presents today for follow up. He is accompanied by his wife Hamida.  He reports the peeling of his fingertips and feet has improved much after decreasing the dose of capecitabine.  He tells me that he will stop treatment after the next cycle as he cannot tolerate the side effects anymore.  He reports tingling and numbness in his feet extending up to the ankles now.  No trouble walking or loss of balance.  He also reports tingling in his fingers.  Also endorses occasional diarrhea.     Oncology History   Cancer Staging   Malignant neoplasm of ascending colon (HCC)  Staging form: Colon and Rectum, AJCC  "8th Edition  - Pathologic stage from 9/23/2024: Stage IIIB (pT3, pN2a, cM0) - Signed by Ariela Aceves MD on 11/11/2024  Stage prefix: Initial diagnosis  Histologic grading system: 4 grade system  Histologic grade (G): G2  Lymph-vascular invasion (LVI): LVI present/identified, NOS  Oncology History   Malignant neoplasm of ascending colon (HCC)   9/23/2024 Initial Diagnosis    Malignant neoplasm of ascending colon (HCC)     9/23/2024 -  Cancer Staged    Staging form: Colon and Rectum, AJCC 8th Edition  - Pathologic stage from 9/23/2024: Stage IIIB (pT3, pN2a, cM0) - Signed by Ariela Aceves MD on 11/11/2024  Stage prefix: Initial diagnosis  Histologic grading system: 4 grade system  Histologic grade (G): G2  Lymph-vascular invasion (LVI): LVI present/identified, NOS       12/11/2024 -  Chemotherapy    capecitabine (XELODA), 1,000 mg/m2 = 2,150 mg, Oral, 2 times daily with meals, 1 of 1 cycle, Start date: --, End date: --  alteplase (CATHFLO), 2 mg, Intracatheter, Every 1 Minute as needed, 4 of 8 cycles  oxaliplatin (ELOXATIN) chemo infusion, 130 mg/m2 = 279.5 mg, Intravenous, Once, 4 of 8 cycles  Administration: 279.5 mg (12/11/2024), 279.5 mg (1/22/2025), 280.8 mg (2/19/2025), 279.5 mg (3/12/2025)        Pertinent Medical History   03/19/25: reviewed    Review of Systems  14 point review of systems is negative except that mentioned in HPI.        Objective   /80 (BP Location: Left arm, Patient Position: Sitting, Cuff Size: Adult)   Pulse 83   Temp 97.9 °F (36.6 °C) (Temporal)   Resp 18   Ht 5' 9.02\" (1.753 m)   Wt 102 kg (224 lb)   SpO2 94%   BMI 33.06 kg/m²     Pain Screening:  Pain Score: 0-No pain  ECOG   1  Physical Exam  Vitals and nursing note reviewed.   Constitutional:       General: He is not in acute distress.     Appearance: Normal appearance.   HENT:      Head: Normocephalic and atraumatic.      Mouth/Throat:      Mouth: Mucous membranes are moist.      Pharynx: Oropharynx is clear. "   Eyes:      General: No scleral icterus.     Extraocular Movements: Extraocular movements intact.      Conjunctiva/sclera: Conjunctivae normal.   Neck:      Comments: Post surgical changes  Cardiovascular:      Rate and Rhythm: Normal rate and regular rhythm.      Pulses: Normal pulses.      Heart sounds: No murmur heard.  Pulmonary:      Effort: Pulmonary effort is normal.      Breath sounds: Normal breath sounds. No wheezing, rhonchi or rales.   Abdominal:      General: Bowel sounds are normal.      Palpations: Abdomen is soft.      Tenderness: There is no abdominal tenderness.   Musculoskeletal:         General: No swelling or tenderness. Normal range of motion.      Cervical back: Neck supple.   Lymphadenopathy:      Cervical: No cervical adenopathy.   Skin:     General: Skin is warm and dry.      Coloration: Skin is not pale.      Findings: No bruising, erythema or rash.   Neurological:      General: No focal deficit present.      Mental Status: He is alert and oriented to person, place, and time.      Motor: No weakness.   Psychiatric:         Mood and Affect: Mood normal.         Behavior: Behavior normal.         Labs: I have reviewed the following labs:  Lab Results   Component Value Date/Time    WBC 6.62 03/10/2025 02:07 PM    RBC 4.03 03/10/2025 02:07 PM    Hemoglobin 11.9 (L) 03/10/2025 02:07 PM    Hematocrit 36.0 (L) 03/10/2025 02:07 PM    MCV 89 03/10/2025 02:07 PM    MCH 29.5 03/10/2025 02:07 PM    RDW 23.6 (H) 03/10/2025 02:07 PM    Platelets 166 03/10/2025 02:07 PM    Segmented % 48 03/10/2025 02:07 PM    Lymphocytes % 26 03/10/2025 02:07 PM    Monocytes % 16 (H) 03/10/2025 02:07 PM    Eosinophils Relative 8 (H) 03/10/2025 02:07 PM    Basophils Relative 1 03/10/2025 02:07 PM    Immature Grans % 1 03/10/2025 02:07 PM    Absolute Neutrophils 3.24 03/10/2025 02:07 PM     Lab Results   Component Value Date/Time    Potassium 3.9 03/10/2025 02:07 PM    Chloride 100 03/10/2025 02:07 PM    CO2 28  03/10/2025 02:07 PM    BUN 17 03/10/2025 02:07 PM    Creatinine 1.04 03/10/2025 02:07 PM    Glucose, Fasting 99 02/18/2025 09:26 AM    Calcium 8.8 03/10/2025 02:07 PM    AST 36 03/10/2025 02:07 PM    ALT 15 03/10/2025 02:07 PM    Alkaline Phosphatase 101 03/10/2025 02:07 PM    Total Protein 7.2 03/10/2025 02:07 PM    Albumin 4.0 03/10/2025 02:07 PM    Total Bilirubin 0.50 03/10/2025 02:07 PM    eGFR 70 03/10/2025 02:07 PM               Disclaimer: This document was prepared using [employer] technology. If a word or phrase is confusing, or does not make sense, this is likely due to recognition error which was not discovered during this clinician's review. If you believe an error has occurred, please contact me through [employer] service line for [email or web address].      Follow Up    All questions were answered to the patient's satisfaction during this encounter. The patient knows the contact information for our office and knows to reach out for any relevant concerns related to this encounter. They are to call for any temperature 100.4 or higher, new symptoms including but not restricted to shaking chills, decreased appetite, nausea, vomiting, diarrhea, increased fatigue, shortness of breath or chest pain, confusion, and not feeling the strength to come to the clinic. For all other listed problems and medical diagnosis in their chart - they are managed by PCP and/or other specialists, which the patient acknowledges.     I spent 37 minutes reviewing the records (labs, clinician notes, outside records, medical history, ordering medicine/tests/procedures, monitoring of anti-neoplastic toxicities, interpreting the imaging/labs previously done) and coordination of care as well as direct time with the patient today, of which greater than 50% of the time was spent in counseling and coordination of care with the patient/family.

## 2025-03-20 ENCOUNTER — HOSPITAL ENCOUNTER (OUTPATIENT)
Dept: CT IMAGING | Facility: CLINIC | Age: 75
Discharge: HOME | End: 2025-03-20
Payer: MEDICARE

## 2025-03-20 DIAGNOSIS — C18.2 MALIGNANT NEOPLASM OF ASCENDING COLON (HCC): ICD-10-CM

## 2025-03-20 PROCEDURE — 74177 CT ABD & PELVIS W/CONTRAST: CPT

## 2025-03-20 RX ADMIN — IOHEXOL 75 ML: 350 INJECTION, SOLUTION INTRAVENOUS at 14:11

## 2025-03-28 ENCOUNTER — TELEPHONE (OUTPATIENT)
Dept: HEMATOLOGY ONCOLOGY | Facility: CLINIC | Age: 75
End: 2025-03-28

## 2025-03-28 NOTE — TELEPHONE ENCOUNTER
Please cancel treatment appts per Dr. Vang last note. Keep lab appointments for now. Will adjust as needed. Thanks!

## 2025-03-31 ENCOUNTER — HOSPITAL ENCOUNTER (OUTPATIENT)
Dept: INFUSION CENTER | Facility: CLINIC | Age: 75
Discharge: HOME | End: 2025-03-31
Payer: MEDICARE

## 2025-03-31 DIAGNOSIS — C18.2 MALIGNANT NEOPLASM OF ASCENDING COLON (HCC): Primary | ICD-10-CM

## 2025-03-31 DIAGNOSIS — I10 PRIMARY HYPERTENSION: ICD-10-CM

## 2025-03-31 LAB
ALBUMIN SERPL BCG-MCNC: 4.1 G/DL (ref 3.5–5)
ALP SERPL-CCNC: 104 U/L (ref 34–104)
ALT SERPL W P-5'-P-CCNC: 14 U/L (ref 7–52)
ANION GAP SERPL CALCULATED.3IONS-SCNC: 7 MMOL/L (ref 4–13)
AST SERPL W P-5'-P-CCNC: 34 U/L (ref 13–39)
BASOPHILS # BLD AUTO: 0.06 THOUSANDS/ÂΜL (ref 0–0.1)
BASOPHILS NFR BLD AUTO: 1 % (ref 0–1)
BILIRUB SERPL-MCNC: 0.99 MG/DL (ref 0.2–1)
BUN SERPL-MCNC: 19 MG/DL (ref 5–25)
CALCIUM SERPL-MCNC: 9.5 MG/DL (ref 8.4–10.2)
CHLORIDE SERPL-SCNC: 99 MMOL/L (ref 96–108)
CO2 SERPL-SCNC: 27 MMOL/L (ref 21–32)
CREAT SERPL-MCNC: 1.03 MG/DL (ref 0.6–1.3)
EOSINOPHIL # BLD AUTO: 0.27 THOUSAND/ÂΜL (ref 0–0.61)
EOSINOPHIL NFR BLD AUTO: 4 % (ref 0–6)
ERYTHROCYTE [DISTWIDTH] IN BLOOD BY AUTOMATED COUNT: 23.8 % (ref 11.6–15.1)
GFR SERPL CREATININE-BSD FRML MDRD: 71 ML/MIN/1.73SQ M
GLUCOSE SERPL-MCNC: 98 MG/DL (ref 65–140)
HCT VFR BLD AUTO: 35.8 % (ref 36.5–49.3)
HGB BLD-MCNC: 11.8 G/DL (ref 12–17)
IMM GRANULOCYTES # BLD AUTO: 0.02 THOUSAND/UL (ref 0–0.2)
IMM GRANULOCYTES NFR BLD AUTO: 0 % (ref 0–2)
LYMPHOCYTES # BLD AUTO: 1.18 THOUSANDS/ÂΜL (ref 0.6–4.47)
LYMPHOCYTES NFR BLD AUTO: 17 % (ref 14–44)
MCH RBC QN AUTO: 31.1 PG (ref 26.8–34.3)
MCHC RBC AUTO-ENTMCNC: 33 G/DL (ref 31.4–37.4)
MCV RBC AUTO: 95 FL (ref 82–98)
MONOCYTES # BLD AUTO: 1.19 THOUSAND/ÂΜL (ref 0.17–1.22)
MONOCYTES NFR BLD AUTO: 17 % (ref 4–12)
NEUTROPHILS # BLD AUTO: 4.36 THOUSANDS/ÂΜL (ref 1.85–7.62)
NEUTS SEG NFR BLD AUTO: 61 % (ref 43–75)
NRBC BLD AUTO-RTO: 0 /100 WBCS
PLATELET # BLD AUTO: 169 THOUSANDS/UL (ref 149–390)
PMV BLD AUTO: 9.3 FL (ref 8.9–12.7)
POTASSIUM SERPL-SCNC: 4.1 MMOL/L (ref 3.5–5.3)
PROT SERPL-MCNC: 7.4 G/DL (ref 6.4–8.4)
RBC # BLD AUTO: 3.79 MILLION/UL (ref 3.88–5.62)
SODIUM SERPL-SCNC: 133 MMOL/L (ref 135–147)
T4 FREE SERPL-MCNC: 0.62 NG/DL (ref 0.61–1.12)
TSH SERPL DL<=0.05 MIU/L-ACNC: 18.84 UIU/ML (ref 0.45–4.5)
WBC # BLD AUTO: 7.08 THOUSAND/UL (ref 4.31–10.16)

## 2025-03-31 PROCEDURE — 84439 ASSAY OF FREE THYROXINE: CPT | Performed by: STUDENT IN AN ORGANIZED HEALTH CARE EDUCATION/TRAINING PROGRAM

## 2025-03-31 PROCEDURE — 80053 COMPREHEN METABOLIC PANEL: CPT | Performed by: INTERNAL MEDICINE

## 2025-03-31 PROCEDURE — 85025 COMPLETE CBC W/AUTO DIFF WBC: CPT | Performed by: INTERNAL MEDICINE

## 2025-03-31 PROCEDURE — 84443 ASSAY THYROID STIM HORMONE: CPT | Performed by: STUDENT IN AN ORGANIZED HEALTH CARE EDUCATION/TRAINING PROGRAM

## 2025-03-31 NOTE — PROGRESS NOTES
Pt into clinic for port flush and lab draw via port. Pt offers no complaints.    Port accessed, blood return noted, flushed, and de-accessed. Tolerated procedure.     Pt aware of next appointment on 4/21/25 at 12:30pm. AVS declined.

## 2025-04-09 ENCOUNTER — OFFICE VISIT (OUTPATIENT)
Dept: HEMATOLOGY ONCOLOGY | Facility: CLINIC | Age: 75
End: 2025-04-09
Payer: MEDICARE

## 2025-04-09 VITALS
SYSTOLIC BLOOD PRESSURE: 112 MMHG | BODY MASS INDEX: 33.62 KG/M2 | OXYGEN SATURATION: 94 % | HEIGHT: 69 IN | DIASTOLIC BLOOD PRESSURE: 74 MMHG | WEIGHT: 227 LBS | TEMPERATURE: 98.2 F | HEART RATE: 78 BPM | RESPIRATION RATE: 18 BRPM

## 2025-04-09 DIAGNOSIS — C18.2 MALIGNANT NEOPLASM OF ASCENDING COLON (HCC): Primary | ICD-10-CM

## 2025-04-09 DIAGNOSIS — I10 PRIMARY HYPERTENSION: ICD-10-CM

## 2025-04-09 DIAGNOSIS — E03.9 HYPOTHYROIDISM, UNSPECIFIED TYPE: ICD-10-CM

## 2025-04-09 PROCEDURE — 99214 OFFICE O/P EST MOD 30 MIN: CPT | Performed by: INTERNAL MEDICINE

## 2025-04-09 PROCEDURE — G2211 COMPLEX E/M VISIT ADD ON: HCPCS | Performed by: INTERNAL MEDICINE

## 2025-04-09 NOTE — ASSESSMENT & PLAN NOTE
Patient started on adjuvant XELOX.  Oxaliplatin discontinued after cycle 4 given significant neuropathy.  Also dose of Xeloda decreased given significant PPE.  Continue current dose of Xeloda 1 week off 1 week on schedule.  Given high risk stage IIIb cancer advised the patient to complete 6 months of adjuvant treatment.  Continue to monitor labs.  Continue to moisturize the skin for PPE.

## 2025-04-09 NOTE — ASSESSMENT & PLAN NOTE
Continue levothyroxine.  Management as per PCP.    SmartLink not supported outside of the Encounter Diagnoses SmartSection.

## 2025-04-09 NOTE — PROGRESS NOTES
Name: Jace Varghese      : 1950      MRN: [id number]  Encounter Provider: Ariela Aceves MD  Encounter Date: 2025   Encounter department: [facility] [place]  :  Assessment & Plan  Malignant neoplasm of ascending colon (HCC)  Patient started on adjuvant XELOX.  Oxaliplatin discontinued after cycle 4 given significant neuropathy.  Also dose of Xeloda decreased given significant PPE.  Continue current dose of Xeloda 1 week off 1 week on schedule.  Given high risk stage IIIb cancer advised the patient to complete 6 months of adjuvant treatment.  Continue to monitor labs.  Continue to moisturize the skin for PPE.                  Primary hypertension  Blood pressure well-controlled.  Continue management as per PCP .         Return in about 4 weeks (around 2025) for Office Visit.      History of Present Illness   Chief Complaint   Patient presents with    Follow-up   74 y.o. c PMHx notable for hypothyroidism and hypertension, seen in consultation on 24 for recently diagnosed stage IIIb colon cancer.  Started on adjuvant CapeOx.  Oxaliplatin discontinued after cycle 4 given significant neuropathy.    Interval History:  Patient presents today for follow up.  He feels okay.  Neuropathy is getting somewhat better.  Does however report skin peeling on his right heel.  Did not have any diarrhea with the last treatment cycle.  Is currently on a week on and week off schedule for capecitabine.    Oncology History   Cancer Staging   Malignant neoplasm of ascending colon (HCC)  Staging form: Colon and Rectum, AJCC 8th Edition  - Pathologic stage from 2024: Stage IIIB (pT3, pN2a, cM0) - Signed by Ariela Aceves MD on 2024  Stage prefix: Initial diagnosis  Histologic grading system: 4 grade system  Histologic grade (G): G2  Lymph-vascular invasion (LVI): LVI present/identified, NOS  Oncology History   Malignant neoplasm of ascending colon (HCC)   2024  "Initial Diagnosis    Malignant neoplasm of ascending colon (HCC)     9/23/2024 -  Cancer Staged    Staging form: Colon and Rectum, AJCC 8th Edition  - Pathologic stage from 9/23/2024: Stage IIIB (pT3, pN2a, cM0) - Signed by Ariela Aceves MD on 11/11/2024  Stage prefix: Initial diagnosis  Histologic grading system: 4 grade system  Histologic grade (G): G2  Lymph-vascular invasion (LVI): LVI present/identified, NOS       12/11/2024 - 3/12/2025 Chemotherapy    capecitabine (XELODA), 1,000 mg/m2 = 2,150 mg, 1 of 1 cycle, Start date: --, End date: --  oxaliplatin (ELOXATIN) chemo infusion, 130 mg/m2 = 279.5 mg, 4 of 8 cycles  Administration: 279.5 mg (12/11/2024), 279.5 mg (1/22/2025), 280.8 mg (2/19/2025), 279.5 mg (3/12/2025)        Pertinent Medical History   04/09/25: reviewed    Review of Systems  14 point review of systems is negative except that mentioned in HPI.        Objective   /74 (BP Location: Left arm, Patient Position: Sitting, Cuff Size: Adult)   Pulse 78   Temp 98.2 °F (36.8 °C) (Temporal)   Resp 18   Ht 5' 9.02\" (1.753 m)   Wt 103 kg (227 lb)   SpO2 94%   BMI 33.50 kg/m²     Pain Screening:  Pain Score:   2  ECOG   1  Physical Exam  Vitals and nursing note reviewed.   Constitutional:       General: He is not in acute distress.     Appearance: Normal appearance.   HENT:      Head: Normocephalic and atraumatic.      Mouth/Throat:      Mouth: Mucous membranes are moist.      Pharynx: Oropharynx is clear.   Eyes:      General: No scleral icterus.     Extraocular Movements: Extraocular movements intact.      Conjunctiva/sclera: Conjunctivae normal.   Neck:      Comments: Post surgical changes  Cardiovascular:      Rate and Rhythm: Normal rate and regular rhythm.      Pulses: Normal pulses.      Heart sounds: No murmur heard.  Pulmonary:      Effort: Pulmonary effort is normal.      Breath sounds: Normal breath sounds. No wheezing, rhonchi or rales.   Abdominal:      General: Bowel sounds are " normal.      Palpations: Abdomen is soft.      Tenderness: There is no abdominal tenderness.   Musculoskeletal:         General: No swelling or tenderness. Normal range of motion.      Cervical back: Neck supple.   Lymphadenopathy:      Cervical: No cervical adenopathy.   Skin:     General: Skin is warm and dry.      Coloration: Skin is not pale.      Findings: No bruising, erythema or rash.   Neurological:      General: No focal deficit present.      Mental Status: He is alert and oriented to person, place, and time.      Motor: No weakness.   Psychiatric:         Mood and Affect: Mood normal.         Behavior: Behavior normal.         Labs: I have reviewed the following labs:  Lab Results   Component Value Date/Time    WBC 7.08 03/31/2025 10:02 AM    RBC 3.79 (L) 03/31/2025 10:02 AM    Hemoglobin 11.8 (L) 03/31/2025 10:02 AM    Hematocrit 35.8 (L) 03/31/2025 10:02 AM    MCV 95 03/31/2025 10:02 AM    MCH 31.1 03/31/2025 10:02 AM    RDW 23.8 (H) 03/31/2025 10:02 AM    Platelets 169 03/31/2025 10:02 AM    Segmented % 61 03/31/2025 10:02 AM    Lymphocytes % 17 03/31/2025 10:02 AM    Monocytes % 17 (H) 03/31/2025 10:02 AM    Eosinophils Relative 4 03/31/2025 10:02 AM    Basophils Relative 1 03/31/2025 10:02 AM    Immature Grans % 0 03/31/2025 10:02 AM    Absolute Neutrophils 4.36 03/31/2025 10:02 AM     Lab Results   Component Value Date/Time    Potassium 4.1 03/31/2025 10:02 AM    Chloride 99 03/31/2025 10:02 AM    CO2 27 03/31/2025 10:02 AM    BUN 19 03/31/2025 10:02 AM    Creatinine 1.03 03/31/2025 10:02 AM    Glucose, Fasting 99 02/18/2025 09:26 AM    Calcium 9.5 03/31/2025 10:02 AM    AST 34 03/31/2025 10:02 AM    ALT 14 03/31/2025 10:02 AM    Alkaline Phosphatase 104 03/31/2025 10:02 AM    Total Protein 7.4 03/31/2025 10:02 AM    Albumin 4.1 03/31/2025 10:02 AM    Total Bilirubin 0.99 03/31/2025 10:02 AM    eGFR 71 03/31/2025 10:02 AM               Disclaimer: This document was prepared using [employer]  technology. If a word or phrase is confusing, or does not make sense, this is likely due to recognition error which was not discovered during this clinician's review. If you believe an error has occurred, please contact me through [facility] service line for [email or web address].      Follow Up    All questions were answered to the patient's satisfaction during this encounter. The patient knows the contact information for our office and knows to reach out for any relevant concerns related to this encounter. They are to call for any temperature 100.4 or higher, new symptoms including but not restricted to shaking chills, decreased appetite, nausea, vomiting, diarrhea, increased fatigue, shortness of breath or chest pain, confusion, and not feeling the strength to come to the clinic. For all other listed problems and medical diagnosis in their chart - they are managed by PCP and/or other specialists, which the patient acknowledges.     I spent 33 minutes reviewing the records (labs, clinician notes, outside records, medical history, ordering medicine/tests/procedures, monitoring of anti-neoplastic toxicities, interpreting the imaging/labs previously done) and coordination of care as well as direct time with the patient today, of which greater than 50% of the time was spent in counseling and coordination of care with the patient/family.

## 2025-04-14 DIAGNOSIS — E03.9 HYPOTHYROIDISM, UNSPECIFIED TYPE: ICD-10-CM

## 2025-04-15 RX ORDER — LEVOTHYROXINE SODIUM 150 UG/1
150 TABLET ORAL DAILY
Qty: 30 TABLET | Refills: 0 | Status: SHIPPED | OUTPATIENT
Start: 2025-04-15 | End: 2025-04-22 | Stop reason: SDUPTHER

## 2025-04-15 NOTE — TELEPHONE ENCOUNTER
Patient needs an appointment. Please contact the patient to schedule an appointment. Last office visit: 6/5/24

## 2025-04-21 ENCOUNTER — HOSPITAL ENCOUNTER (OUTPATIENT)
Dept: INFUSION CENTER | Facility: CLINIC | Age: 75
Discharge: HOME | End: 2025-04-21
Payer: MEDICARE

## 2025-04-21 DIAGNOSIS — C18.2 MALIGNANT NEOPLASM OF ASCENDING COLON (HCC): Primary | ICD-10-CM

## 2025-04-21 LAB — CEA SERPL-MCNC: 7.9 NG/ML (ref 0–3)

## 2025-04-21 PROCEDURE — 82378 CARCINOEMBRYONIC ANTIGEN: CPT

## 2025-04-21 NOTE — PROGRESS NOTES
Pt to clinic for port flush and lab draw via port, offers no complaints today, tolerated procedure without complications, aware of next appointment on 5/12/25 at 11am, port de-accessed, avs declined.

## 2025-04-22 ENCOUNTER — RESULTS FOLLOW-UP (OUTPATIENT)
Dept: FAMILY MEDICINE CLINIC | Facility: CLINIC | Age: 75
End: 2025-04-22

## 2025-04-22 DIAGNOSIS — E03.9 HYPOTHYROIDISM, UNSPECIFIED TYPE: ICD-10-CM

## 2025-04-22 RX ORDER — LEVOTHYROXINE SODIUM 175 UG/1
175 TABLET ORAL DAILY
Qty: 90 TABLET | Refills: 3 | Status: SHIPPED | OUTPATIENT
Start: 2025-04-22 | End: 2026-04-22

## 2025-04-22 NOTE — TELEPHONE ENCOUNTER
Patient also asks if his chemotherapy can interfere with thyroid medication causing abnormal results.

## 2025-04-23 ENCOUNTER — TELEPHONE (OUTPATIENT)
Dept: FAMILY MEDICINE CLINIC | Facility: CLINIC | Age: 75
End: 2025-04-23

## 2025-04-23 DIAGNOSIS — E03.9 HYPOTHYROIDISM, UNSPECIFIED TYPE: Primary | ICD-10-CM

## 2025-05-06 ENCOUNTER — OFFICE VISIT (OUTPATIENT)
Dept: HEMATOLOGY ONCOLOGY | Facility: CLINIC | Age: 75
End: 2025-05-06
Payer: MEDICARE

## 2025-05-06 VITALS
SYSTOLIC BLOOD PRESSURE: 116 MMHG | WEIGHT: 229 LBS | OXYGEN SATURATION: 96 % | BODY MASS INDEX: 33.92 KG/M2 | DIASTOLIC BLOOD PRESSURE: 70 MMHG | HEIGHT: 69 IN | HEART RATE: 65 BPM | RESPIRATION RATE: 18 BRPM | TEMPERATURE: 97.7 F

## 2025-05-06 DIAGNOSIS — C18.2 MALIGNANT NEOPLASM OF ASCENDING COLON (HCC): Primary | ICD-10-CM

## 2025-05-06 DIAGNOSIS — I25.10 CORONARY ARTERY DISEASE INVOLVING NATIVE CORONARY ARTERY OF NATIVE HEART WITHOUT ANGINA PECTORIS: ICD-10-CM

## 2025-05-06 DIAGNOSIS — E03.9 HYPOTHYROIDISM, UNSPECIFIED TYPE: ICD-10-CM

## 2025-05-06 DIAGNOSIS — I10 PRIMARY HYPERTENSION: ICD-10-CM

## 2025-05-06 PROCEDURE — G2211 COMPLEX E/M VISIT ADD ON: HCPCS | Performed by: INTERNAL MEDICINE

## 2025-05-06 PROCEDURE — 99215 OFFICE O/P EST HI 40 MIN: CPT | Performed by: INTERNAL MEDICINE

## 2025-05-06 NOTE — ASSESSMENT & PLAN NOTE
Patient started on adjuvant XELOX.  Oxaliplatin discontinued after cycle 4 given significant neuropathy.  Also dose of Xeloda decreased given significant PPE.  Continue current dose of Xeloda 1 week off 1 week on schedule.  Given high risk stage IIIb cancer advised the patient to complete 6 months of adjuvant treatment.  Continue to monitor labs.  Orders:    CEA; Future    CBC and differential; Standing    Comprehensive metabolic panel; Standing

## 2025-05-06 NOTE — PROGRESS NOTES
Name: Jace Varghese      : 1950      MRN: [id number]  Encounter Provider: Ariela Aceves MD  Encounter Date: 2025   Encounter department: [facility] [place]  :  Assessment & Plan  Malignant neoplasm of ascending colon (HCC)  Patient started on adjuvant XELOX.  Oxaliplatin discontinued after cycle 4 given significant neuropathy.  Also dose of Xeloda decreased given significant PPE.  Continue current dose of Xeloda 1 week off 1 week on schedule.  Given high risk stage IIIb cancer advised the patient to complete 6 months of adjuvant treatment.  Continue to monitor labs.  Orders:    CEA; Future    CBC and differential; Standing    Comprehensive metabolic panel; Standing    Primary hypertension  Blood pressure well-controlled.  Continue management as per PCP .           Coronary artery disease involving native coronary artery of native heart without angina pectoris  Follows with PCP         Hypothyroidism, unspecified type  Continue levothyroxine.  Management as per PCP.               Return in about 6 weeks (around 2025) for Office Visit.    Assessment & Plan  1. Colon cancer.  CEA levels have shown a slight increase to 7.9, which could be attributed to various factors including smoking. A CT scan was clear. Tingling in the feet occurs after taking medication but does not affect walking or balance and resolves by the end of the week off medication. The patient is advised to complete the final cycle of treatment, ensuring a full 6-month course. A repeat CEA test will be conducted towards the end of 2025, along with a CBC and CMP. Regular blood work will be scheduled every 2 weeks, with the next one due before the commencement of the subsequent cycle.    The treatment plan includes completing the last cycle of chemotherapy to ensure a full 6-month course. The goal is to manage and monitor the patient's condition effectively.         History of Present  Illness   Chief Complaint   Patient presents with    Follow-up   74 y.o. c PMHx notable for hypothyroidism and hypertension, seen in consultation on 11/12/24 for recently diagnosed stage IIIb colon cancer.  Started on adjuvant CapeOx.  Oxaliplatin discontinued after cycle 4 given significant neuropathy.    Interval History:  History of Present Illness  The patient presents for a follow-up of colon cancer.    He reports satisfactory tolerance of his current medication regimen, although he notes an interaction with his thyroid medication. Sleep disturbances occur due to the need to administer thyroid medication one hour prior to eating, necessitating a 3:00 AM wake-up call. His daily routine involves taking medications at 5:00 AM, followed by breakfast. He is currently on the last day of his treatment cycle and has one more week of home-based medication. A CEA level of 7.9 was reported. Tingling sensations in his feet occur within 15 minutes of medication administration, but not in his hands. This sensation persists until the next dose but does not interfere with ambulation or balance. The tingling sensation in his feet almost disappears by the end of the week when he is off the medication. No gastrointestinal symptoms such as diarrhea or constipation are reported, and there is no presence of blood in his stools. He has not undergone any blood work since 03/31/2025. A port is in place.    He has poison ivy and has not taken anything for it. Hot water treatment was used, which made it better. The itch goes away for about four hours. He does not want to try any medications for that. Various anti-itch medications have been tried, but the water works the best.    SOCIAL HISTORY  The patient admits to smoking.    Oncology History   Cancer Staging   Malignant neoplasm of ascending colon (HCC)  Staging form: Colon and Rectum, AJCC 8th Edition  - Pathologic stage from 9/23/2024: Stage IIIB (pT3, pN2a, cM0) - Signed by Ariela  "MD Ketan on 11/11/2024  Stage prefix: Initial diagnosis  Histologic grading system: 4 grade system  Histologic grade (G): G2  Lymph-vascular invasion (LVI): LVI present/identified, NOS  Oncology History   Malignant neoplasm of ascending colon (HCC)   9/23/2024 Initial Diagnosis    Malignant neoplasm of ascending colon (HCC)     9/23/2024 -  Cancer Staged    Staging form: Colon and Rectum, AJCC 8th Edition  - Pathologic stage from 9/23/2024: Stage IIIB (pT3, pN2a, cM0) - Signed by Ariela Aceves MD on 11/11/2024  Stage prefix: Initial diagnosis  Histologic grading system: 4 grade system  Histologic grade (G): G2  Lymph-vascular invasion (LVI): LVI present/identified, NOS       12/11/2024 - 3/12/2025 Chemotherapy    capecitabine (XELODA), 1,000 mg/m2 = 2,150 mg, 1 of 1 cycle, Start date: --, End date: --  oxaliplatin (ELOXATIN) chemo infusion, 130 mg/m2 = 279.5 mg, 4 of 8 cycles  Administration: 279.5 mg (12/11/2024), 279.5 mg (1/22/2025), 280.8 mg (2/19/2025), 279.5 mg (3/12/2025)        Pertinent Medical History   05/06/25: reviewed    Review of Systems  14 point review of systems is negative except that mentioned in HPI.        Objective   /70 (BP Location: Left arm, Patient Position: Sitting, Cuff Size: Extra-Large)   Pulse 65   Temp 97.7 °F (36.5 °C) (Temporal)   Resp 18   Ht 5' 9.02\" (1.753 m)   Wt 104 kg (229 lb)   SpO2 96%   BMI 33.80 kg/m²     Pain Screening:  Pain Score: 0-No pain  ECOG   1  Physical Exam  Vitals and nursing note reviewed.   Constitutional:       General: He is not in acute distress.     Appearance: Normal appearance.   HENT:      Head: Normocephalic and atraumatic.      Mouth/Throat:      Mouth: Mucous membranes are moist.      Pharynx: Oropharynx is clear.   Eyes:      General: No scleral icterus.     Extraocular Movements: Extraocular movements intact.      Conjunctiva/sclera: Conjunctivae normal.   Neck:      Comments: Post surgical changes  Cardiovascular:      Rate " and Rhythm: Normal rate and regular rhythm.      Pulses: Normal pulses.      Heart sounds: No murmur heard.  Pulmonary:      Effort: Pulmonary effort is normal.      Breath sounds: Normal breath sounds. No wheezing, rhonchi or rales.   Abdominal:      General: Bowel sounds are normal.      Palpations: Abdomen is soft.      Tenderness: There is no abdominal tenderness.   Musculoskeletal:         General: No swelling or tenderness. Normal range of motion.      Cervical back: Neck supple.   Lymphadenopathy:      Cervical: No cervical adenopathy.   Skin:     General: Skin is warm and dry.      Coloration: Skin is not pale.      Findings: No bruising, erythema or rash.   Neurological:      General: No focal deficit present.      Mental Status: He is alert and oriented to person, place, and time.      Motor: No weakness.   Psychiatric:         Mood and Affect: Mood normal.         Behavior: Behavior normal.         Labs: I have reviewed the following labs:  Lab Results   Component Value Date/Time    WBC 7.08 03/31/2025 10:02 AM    RBC 3.79 (L) 03/31/2025 10:02 AM    Hemoglobin 11.8 (L) 03/31/2025 10:02 AM    Hematocrit 35.8 (L) 03/31/2025 10:02 AM    MCV 95 03/31/2025 10:02 AM    MCH 31.1 03/31/2025 10:02 AM    RDW 23.8 (H) 03/31/2025 10:02 AM    Platelets 169 03/31/2025 10:02 AM    Segmented % 61 03/31/2025 10:02 AM    Lymphocytes % 17 03/31/2025 10:02 AM    Monocytes % 17 (H) 03/31/2025 10:02 AM    Eosinophils Relative 4 03/31/2025 10:02 AM    Basophils Relative 1 03/31/2025 10:02 AM    Immature Grans % 0 03/31/2025 10:02 AM    Absolute Neutrophils 4.36 03/31/2025 10:02 AM     Lab Results   Component Value Date/Time    Potassium 4.1 03/31/2025 10:02 AM    Chloride 99 03/31/2025 10:02 AM    CO2 27 03/31/2025 10:02 AM    BUN 19 03/31/2025 10:02 AM    Creatinine 1.03 03/31/2025 10:02 AM    Glucose, Fasting 99 02/18/2025 09:26 AM    Calcium 9.5 03/31/2025 10:02 AM    AST 34 03/31/2025 10:02 AM    ALT 14 03/31/2025 10:02 AM     Alkaline Phosphatase 104 03/31/2025 10:02 AM    Total Protein 7.4 03/31/2025 10:02 AM    Albumin 4.1 03/31/2025 10:02 AM    Total Bilirubin 0.99 03/31/2025 10:02 AM    eGFR 71 03/31/2025 10:02 AM               Disclaimer: This document was prepared using [employer] technology. If a word or phrase is confusing, or does not make sense, this is likely due to recognition error which was not discovered during this clinician's review. If you believe an error has occurred, please contact me through [facility] service line for [email or web address].      Follow Up    All questions were answered to the patient's satisfaction during this encounter. The patient knows the contact information for our office and knows to reach out for any relevant concerns related to this encounter. They are to call for any temperature 100.4 or higher, new symptoms including but not restricted to shaking chills, decreased appetite, nausea, vomiting, diarrhea, increased fatigue, shortness of breath or chest pain, confusion, and not feeling the strength to come to the clinic. For all other listed problems and medical diagnosis in their chart - they are managed by PCP and/or other specialists, which the patient acknowledges.     I spent 41 minutes reviewing the records (labs, clinician notes, outside records, medical history, ordering medicine/tests/procedures, monitoring of anti-neoplastic toxicities, interpreting the imaging/labs previously done) and coordination of care as well as direct time with the patient today, of which greater than 50% of the time was spent in counseling and coordination of care with the patient/family.

## 2025-05-06 NOTE — ASSESSMENT & PLAN NOTE
Patient started on adjuvant XELOX.  Oxaliplatin discontinued after cycle 4 given significant neuropathy.  Also dose of Xeloda decreased given significant PPE.  Continue current dose of Xeloda 1 week off 1 week on schedule.  Given high risk stage IIIb cancer advised the patient to complete 6 months of adjuvant treatment.  Continue to monitor labs.  Continue to moisturize the skin for PPE.       SmartLink not supported outside of the Encounter Diagnoses SmartSection.

## 2025-05-14 DIAGNOSIS — I21.11 ST ELEVATION MYOCARDIAL INFARCTION INVOLVING RIGHT CORONARY ARTERY (HCC): ICD-10-CM

## 2025-05-14 RX ORDER — METOPROLOL TARTRATE 25 MG/1
25 TABLET, FILM COATED ORAL EVERY 12 HOURS
Qty: 180 TABLET | Refills: 0 | OUTPATIENT
Start: 2025-05-14

## 2025-05-14 RX ORDER — METOPROLOL TARTRATE 25 MG/1
25 TABLET, FILM COATED ORAL EVERY 12 HOURS
Qty: 180 TABLET | Refills: 0 | Status: SHIPPED | OUTPATIENT
Start: 2025-05-14

## 2025-05-27 ENCOUNTER — TELEPHONE (OUTPATIENT)
Age: 75
End: 2025-05-27

## 2025-05-28 ENCOUNTER — HOSPITAL ENCOUNTER (OUTPATIENT)
Dept: INFUSION CENTER | Facility: CLINIC | Age: 75
Discharge: HOME | End: 2025-05-28
Payer: MEDICARE

## 2025-05-28 DIAGNOSIS — C18.2 MALIGNANT NEOPLASM OF ASCENDING COLON (HCC): Primary | ICD-10-CM

## 2025-05-28 LAB
ALBUMIN SERPL BCG-MCNC: 4.2 G/DL (ref 3.5–5)
ALP SERPL-CCNC: 99 U/L (ref 34–104)
ALT SERPL W P-5'-P-CCNC: 14 U/L (ref 7–52)
ANION GAP SERPL CALCULATED.3IONS-SCNC: 6 MMOL/L (ref 4–13)
AST SERPL W P-5'-P-CCNC: 32 U/L (ref 13–39)
BASOPHILS # BLD AUTO: 0.06 THOUSANDS/ÂΜL (ref 0–0.1)
BASOPHILS NFR BLD AUTO: 1 % (ref 0–1)
BILIRUB SERPL-MCNC: 0.86 MG/DL (ref 0.2–1)
BUN SERPL-MCNC: 19 MG/DL (ref 5–25)
CALCIUM SERPL-MCNC: 9.2 MG/DL (ref 8.4–10.2)
CHLORIDE SERPL-SCNC: 100 MMOL/L (ref 96–108)
CO2 SERPL-SCNC: 28 MMOL/L (ref 21–32)
CREAT SERPL-MCNC: 1.12 MG/DL (ref 0.6–1.3)
EOSINOPHIL # BLD AUTO: 0.33 THOUSAND/ÂΜL (ref 0–0.61)
EOSINOPHIL NFR BLD AUTO: 5 % (ref 0–6)
ERYTHROCYTE [DISTWIDTH] IN BLOOD BY AUTOMATED COUNT: 18.7 % (ref 11.6–15.1)
GFR SERPL CREATININE-BSD FRML MDRD: 64 ML/MIN/1.73SQ M
GLUCOSE P FAST SERPL-MCNC: 96 MG/DL (ref 65–99)
GLUCOSE SERPL-MCNC: 96 MG/DL (ref 65–140)
HCT VFR BLD AUTO: 35.8 % (ref 36.5–49.3)
HGB BLD-MCNC: 11.9 G/DL (ref 12–17)
IMM GRANULOCYTES # BLD AUTO: 0.02 THOUSAND/UL (ref 0–0.2)
IMM GRANULOCYTES NFR BLD AUTO: 0 % (ref 0–2)
LYMPHOCYTES # BLD AUTO: 1.13 THOUSANDS/ÂΜL (ref 0.6–4.47)
LYMPHOCYTES NFR BLD AUTO: 17 % (ref 14–44)
MCH RBC QN AUTO: 32.9 PG (ref 26.8–34.3)
MCHC RBC AUTO-ENTMCNC: 33.2 G/DL (ref 31.4–37.4)
MCV RBC AUTO: 99 FL (ref 82–98)
MONOCYTES # BLD AUTO: 1.12 THOUSAND/ÂΜL (ref 0.17–1.22)
MONOCYTES NFR BLD AUTO: 17 % (ref 4–12)
NEUTROPHILS # BLD AUTO: 4.04 THOUSANDS/ÂΜL (ref 1.85–7.62)
NEUTS SEG NFR BLD AUTO: 60 % (ref 43–75)
NRBC BLD AUTO-RTO: 0 /100 WBCS
PLATELET # BLD AUTO: 160 THOUSANDS/UL (ref 149–390)
PMV BLD AUTO: 8.9 FL (ref 8.9–12.7)
POTASSIUM SERPL-SCNC: 3.9 MMOL/L (ref 3.5–5.3)
PROT SERPL-MCNC: 7.5 G/DL (ref 6.4–8.4)
RBC # BLD AUTO: 3.62 MILLION/UL (ref 3.88–5.62)
SODIUM SERPL-SCNC: 134 MMOL/L (ref 135–147)
WBC # BLD AUTO: 6.7 THOUSAND/UL (ref 4.31–10.16)

## 2025-05-28 PROCEDURE — 80053 COMPREHEN METABOLIC PANEL: CPT

## 2025-05-28 PROCEDURE — 85025 COMPLETE CBC W/AUTO DIFF WBC: CPT

## 2025-05-28 NOTE — PROGRESS NOTES
Patient presents today for lab draw via port. Port accessed with positive blood return. Port flushed and de accessed without complications. Patient tolerated procedure well. AVS declined. Patient aware of next appointment on 6/9 at 8:30.

## 2025-06-10 ENCOUNTER — TELEPHONE (OUTPATIENT)
Dept: INFUSION CENTER | Facility: CLINIC | Age: 75
End: 2025-06-10

## 2025-06-10 NOTE — TELEPHONE ENCOUNTER
Lvm for pt reviewing No Show policy and next scheduled appt. Left call back number if he would like to reschedule for a sooner appt

## 2025-06-10 NOTE — TELEPHONE ENCOUNTER
Patient has 2 no shows within a 6 month period.  Patient no showed on 5/12/25 and 6/9/25. Infusion techs please contact patient to review the no show policy as well as his next infusion appt.

## 2025-06-18 ENCOUNTER — OFFICE VISIT (OUTPATIENT)
Dept: HEMATOLOGY ONCOLOGY | Facility: CLINIC | Age: 75
End: 2025-06-18
Payer: MEDICARE

## 2025-06-18 VITALS
RESPIRATION RATE: 18 BRPM | BODY MASS INDEX: 33.77 KG/M2 | OXYGEN SATURATION: 93 % | DIASTOLIC BLOOD PRESSURE: 64 MMHG | SYSTOLIC BLOOD PRESSURE: 112 MMHG | HEART RATE: 69 BPM | TEMPERATURE: 97.5 F | HEIGHT: 69 IN | WEIGHT: 228 LBS

## 2025-06-18 DIAGNOSIS — I10 PRIMARY HYPERTENSION: ICD-10-CM

## 2025-06-18 DIAGNOSIS — E78.2 MIXED HYPERLIPIDEMIA: ICD-10-CM

## 2025-06-18 DIAGNOSIS — C18.2 MALIGNANT NEOPLASM OF ASCENDING COLON (HCC): Primary | ICD-10-CM

## 2025-06-18 DIAGNOSIS — I25.10 CORONARY ARTERY DISEASE INVOLVING NATIVE CORONARY ARTERY OF NATIVE HEART WITHOUT ANGINA PECTORIS: ICD-10-CM

## 2025-06-18 DIAGNOSIS — E03.9 HYPOTHYROIDISM, UNSPECIFIED TYPE: ICD-10-CM

## 2025-06-18 PROCEDURE — G2211 COMPLEX E/M VISIT ADD ON: HCPCS | Performed by: INTERNAL MEDICINE

## 2025-06-18 PROCEDURE — 99214 OFFICE O/P EST MOD 30 MIN: CPT | Performed by: INTERNAL MEDICINE

## 2025-06-18 NOTE — ASSESSMENT & PLAN NOTE
Cancer Staging   Malignant neoplasm of ascending colon (HCC)  Staging form: Colon and Rectum, AJCC 8th Edition  - Pathologic stage from 9/23/2024: Stage IIIB (pT3, pN2a, cM0) - Signed by Ariela Aceves MD on 11/11/2024    Patient started on adjuvant XELOX.  Oxaliplatin discontinued after cycle 4 given significant neuropathy.  Also dose of Xeloda decreased given significant PPE.  Continue current dose of Xeloda 1 week off 1 week on schedule.  Given high risk stage IIIb cancer advised the patient to complete 6 months of adjuvant treatment.  Completed 6 of treatment with Xeloda.  Will repeat CEA and check CT abdomen pelvis.      We discussed surveillance per NCCN guidelines as follows:   History and physical examination every 3-6 mo for 2 y, then every 6 mo for a total of 5 y    CEA every 3-6 mo for 2 y, then every 6 mo for a total of 5 y    Chest/abdominal/pelvic CT every 6-12 mo (category 2B for frequency <12 mo) from date of surgery for a total of 5 y    Colonoscopy in 1 y after surgery except if no preoperative colonoscopy due to obstructing lesion, colonoscopy in 3-6 mo If advanced adenoma, repeat in 1 y If no advanced adenoma, repeat in 3 y, then every 5 y   PET/CT scan is not indicated      Orders:    CEA; Future    CBC and differential; Future    Comprehensive metabolic panel; Future    CT chest abdomen pelvis w contrast; Future

## 2025-06-18 NOTE — PROGRESS NOTES
Name: Jace Varghese      : 1950      MRN: [id number]  Encounter Provider: Ariela Aceves MD  Encounter Date: 2025   Encounter department: [facility] [place]  :  Assessment & Plan  Malignant neoplasm of ascending colon (HCC)   Cancer Staging   Malignant neoplasm of ascending colon (HCC)  Staging form: Colon and Rectum, AJCC 8th Edition  - Pathologic stage from 2024: Stage IIIB (pT3, pN2a, cM0) - Signed by Ariela Aceves MD on 2024    Patient started on adjuvant XELOX.  Oxaliplatin discontinued after cycle 4 given significant neuropathy.  Also dose of Xeloda decreased given significant PPE.  Continue current dose of Xeloda 1 week off 1 week on schedule.  Given high risk stage IIIb cancer advised the patient to complete 6 months of adjuvant treatment.  Completed 6 of treatment with Xeloda.  Will repeat CEA and check CT abdomen pelvis.      We discussed surveillance per NCCN guidelines as follows:   History and physical examination every 3-6 mo for 2 y, then every 6 mo for a total of 5 y    CEA every 3-6 mo for 2 y, then every 6 mo for a total of 5 y    Chest/abdominal/pelvic CT every 6-12 mo (category 2B for frequency <12 mo) from date of surgery for a total of 5 y    Colonoscopy in 1 y after surgery except if no preoperative colonoscopy due to obstructing lesion, colonoscopy in 3-6 mo If advanced adenoma, repeat in 1 y If no advanced adenoma, repeat in 3 y, then every 5 y   PET/CT scan is not indicated      Orders:    CEA; Future    CBC and differential; Future    Comprehensive metabolic panel; Future    CT chest abdomen pelvis w contrast; Future     Primary hypertension  Blood pressure well-controlled.  Continue management as per PCP .              Coronary artery disease involving native coronary artery of native heart without angina pectoris  Follows with PCP            Hypothyroidism, unspecified type  Continue levothyroxine.  Management as per  PCP.                  She Return in about 6 weeks (around 7/30/2025) for Office Visit.      History of Present Illness   Chief Complaint   Patient presents with    Follow-up   74 y.o. c PMHx notable for hypothyroidism and hypertension, seen in consultation on 11/12/24 for recently diagnosed stage IIIb colon cancer.  Started on adjuvant CapeOx.  Oxaliplatin discontinued after cycle 4 given significant neuropathy.    Interval History:  History of Present Illness  The patient is a 74-year-old male with colon cancer who presents today for follow-up.    He completed his last course of medication yesterday, which he had been taking since Wednesday. His initial treatment began on 12/11/2024. He reports persistent mild tingling sensations in his fingertips and toes. He is not experiencing any nausea, vomiting, chest pain, shortness of breath, abdominal pain, headaches, lightheadedness, dizziness, or blood in his stools. No diarrhea is reported. He has not experienced any weight loss but notes a slight weight gain, which he is actively trying to reduce. He smokes cigarettes, currently reduced to one or two per day.    SOCIAL HISTORY  He admits to smoking 1 to 2 cigarettes a day.    Oncology History   Cancer Staging   Malignant neoplasm of ascending colon (HCC)  Staging form: Colon and Rectum, AJCC 8th Edition  - Pathologic stage from 9/23/2024: Stage IIIB (pT3, pN2a, cM0) - Signed by Ariela Aceves MD on 11/11/2024  Stage prefix: Initial diagnosis  Histologic grading system: 4 grade system  Histologic grade (G): G2  Lymph-vascular invasion (LVI): LVI present/identified, NOS  Oncology History   Malignant neoplasm of ascending colon (HCC)   9/23/2024 Initial Diagnosis    Malignant neoplasm of ascending colon (HCC)     9/23/2024 -  Cancer Staged    Staging form: Colon and Rectum, AJCC 8th Edition  - Pathologic stage from 9/23/2024: Stage IIIB (pT3, pN2a, cM0) - Signed by Ariela Aceves MD on 11/11/2024  Stage prefix:  "Initial diagnosis  Histologic grading system: 4 grade system  Histologic grade (G): G2  Lymph-vascular invasion (LVI): LVI present/identified, NOS       12/11/2024 - 3/12/2025 Chemotherapy    capecitabine (XELODA), 1,000 mg/m2 = 2,150 mg, 1 of 1 cycle, Start date: --, End date: --  oxaliplatin (ELOXATIN) chemo infusion, 130 mg/m2 = 279.5 mg, 4 of 8 cycles  Administration: 279.5 mg (12/11/2024), 279.5 mg (1/22/2025), 280.8 mg (2/19/2025), 279.5 mg (3/12/2025)        Pertinent Medical History   06/18/25: reviewed    Review of Systems  14 point review of systems is negative except that mentioned in HPI.        Objective   /64 (BP Location: Left arm, Patient Position: Sitting, Cuff Size: Adult)   Pulse 69   Temp 97.5 °F (36.4 °C) (Temporal)   Resp 18   Ht 5' 9.02\" (1.753 m)   Wt 103 kg (228 lb)   SpO2 93%   BMI 33.65 kg/m²     Pain Screening:  Pain Score: 0-No pain  ECOG   1  Physical Exam  Vitals and nursing note reviewed.   Constitutional:       General: He is not in acute distress.     Appearance: Normal appearance.   HENT:      Head: Normocephalic and atraumatic.      Mouth/Throat:      Mouth: Mucous membranes are moist.      Pharynx: Oropharynx is clear.     Eyes:      General: No scleral icterus.     Extraocular Movements: Extraocular movements intact.      Conjunctiva/sclera: Conjunctivae normal.     Neck:      Comments: Post surgical changes  Cardiovascular:      Rate and Rhythm: Normal rate and regular rhythm.      Pulses: Normal pulses.      Heart sounds: No murmur heard.  Pulmonary:      Effort: Pulmonary effort is normal.      Breath sounds: Normal breath sounds. No wheezing, rhonchi or rales.   Abdominal:      General: Bowel sounds are normal.      Palpations: Abdomen is soft.      Tenderness: There is no abdominal tenderness.     Musculoskeletal:         General: No swelling or tenderness. Normal range of motion.      Cervical back: Neck supple.   Lymphadenopathy:      Cervical: No cervical " adenopathy.     Skin:     General: Skin is warm and dry.      Coloration: Skin is not pale.      Findings: No bruising, erythema or rash.     Neurological:      General: No focal deficit present.      Mental Status: He is alert and oriented to person, place, and time.      Motor: No weakness.     Psychiatric:         Mood and Affect: Mood normal.         Behavior: Behavior normal.         Labs: I have reviewed the following labs:  Lab Results   Component Value Date/Time    WBC 6.70 05/28/2025 08:52 AM    RBC 3.62 (L) 05/28/2025 08:52 AM    Hemoglobin 11.9 (L) 05/28/2025 08:52 AM    Hematocrit 35.8 (L) 05/28/2025 08:52 AM    MCV 99 (H) 05/28/2025 08:52 AM    MCH 32.9 05/28/2025 08:52 AM    RDW 18.7 (H) 05/28/2025 08:52 AM    Platelets 160 05/28/2025 08:52 AM    Segmented % 60 05/28/2025 08:52 AM    Lymphocytes % 17 05/28/2025 08:52 AM    Monocytes % 17 (H) 05/28/2025 08:52 AM    Eosinophils Relative 5 05/28/2025 08:52 AM    Basophils Relative 1 05/28/2025 08:52 AM    Immature Grans % 0 05/28/2025 08:52 AM    Absolute Neutrophils 4.04 05/28/2025 08:52 AM     Lab Results   Component Value Date/Time    Potassium 3.9 05/28/2025 08:52 AM    Chloride 100 05/28/2025 08:52 AM    CO2 28 05/28/2025 08:52 AM    BUN 19 05/28/2025 08:52 AM    Creatinine 1.12 05/28/2025 08:52 AM    Glucose, Fasting 96 05/28/2025 08:52 AM    Calcium 9.2 05/28/2025 08:52 AM    AST 32 05/28/2025 08:52 AM    ALT 14 05/28/2025 08:52 AM    Alkaline Phosphatase 99 05/28/2025 08:52 AM    Total Protein 7.5 05/28/2025 08:52 AM    Albumin 4.2 05/28/2025 08:52 AM    Total Bilirubin 0.86 05/28/2025 08:52 AM    eGFR 64 05/28/2025 08:52 AM               Disclaimer: This document was prepared using [employer] technology. If a word or phrase is confusing, or does not make sense, this is likely due to recognition error which was not discovered during this clinician's review. If you believe an error has occurred, please contact me through [facility] service line  for [email or web address].      Follow Up    All questions were answered to the patient's satisfaction during this encounter. The patient knows the contact information for our office and knows to reach out for any relevant concerns related to this encounter. They are to call for any temperature 100.4 or higher, new symptoms including but not restricted to shaking chills, decreased appetite, nausea, vomiting, diarrhea, increased fatigue, shortness of breath or chest pain, confusion, and not feeling the strength to come to the clinic. For all other listed problems and medical diagnosis in their chart - they are managed by PCP and/or other specialists, which the patient acknowledges.     I spent 37 minutes reviewing the records (labs, clinician notes, outside records, medical history, ordering medicine/tests/procedures, monitoring of anti-neoplastic toxicities, interpreting the imaging/labs previously done) and coordination of care as well as direct time with the patient today, of which greater than 50% of the time was spent in counseling and coordination of care with the patient/family.

## 2025-07-08 ENCOUNTER — TELEPHONE (OUTPATIENT)
Dept: INFUSION CENTER | Facility: CLINIC | Age: 75
End: 2025-07-08

## 2025-07-08 ENCOUNTER — HOSPITAL ENCOUNTER (OUTPATIENT)
Dept: CT IMAGING | Facility: HOSPITAL | Age: 75
Discharge: HOME | End: 2025-07-08
Attending: INTERNAL MEDICINE
Payer: MEDICARE

## 2025-07-08 DIAGNOSIS — C18.2 MALIGNANT NEOPLASM OF ASCENDING COLON (HCC): ICD-10-CM

## 2025-07-08 PROCEDURE — 74177 CT ABD & PELVIS W/CONTRAST: CPT

## 2025-07-08 PROCEDURE — 71260 CT THORAX DX C+: CPT

## 2025-07-08 RX ADMIN — IOHEXOL 100 ML: 350 INJECTION, SOLUTION INTRAVENOUS at 14:45

## 2025-07-08 NOTE — TELEPHONE ENCOUNTER
Patient left voicemail to reschedule lab appointment from 8/4 to an earlier date. Called patient, no answer, left message to call back to reschedule.

## 2025-07-21 ENCOUNTER — TELEPHONE (OUTPATIENT)
Dept: HEMATOLOGY ONCOLOGY | Facility: CLINIC | Age: 75
End: 2025-07-21

## 2025-07-21 ENCOUNTER — TELEPHONE (OUTPATIENT)
Age: 75
End: 2025-07-21

## 2025-07-21 ENCOUNTER — DOCUMENTATION (OUTPATIENT)
Dept: PULMONOLOGY | Facility: CLINIC | Age: 75
End: 2025-07-21

## 2025-07-21 DIAGNOSIS — C18.2 MALIGNANT NEOPLASM OF ASCENDING COLON (HCC): Primary | ICD-10-CM

## 2025-07-21 DIAGNOSIS — R93.89 ABNORMAL CT OF THE CHEST: ICD-10-CM

## 2025-07-21 DIAGNOSIS — R91.8 LUNG MASS: Primary | ICD-10-CM

## 2025-07-21 NOTE — TELEPHONE ENCOUNTER
Attempted to call patient and left voicemail regarding PET CT that was ordered by Dr Aceves and a consultation with IR  for bx of lung mass. Made aware he would be getting  phone calls from schedulers and IR.  Hope line number was left to return our call at their earliest convenience.

## 2025-07-21 NOTE — TELEPHONE ENCOUNTER
Called patient and patient spouse with new appt scheduled for PET CT and with rescheduled f/u appt . Left message with all info and with hope line tel number

## 2025-07-21 NOTE — PROGRESS NOTES
Interventional Pulmonary/ Imaging review    Jace Varghese74 y.o.male  MRN [id number]    At the request of Dr. Talavera, imaging was reviewed to determine if appropriate to pursue tissue sampling.     CT of the chest performed on 7/8/2025 shows masslike consolidation in the right upper lobe measuring 3 cm with mild enlargement of right paratracheal and hilar lymph nodes.  There were a few areas of nodularity approximating a bulla in July 2024.    PET scan scheduled for 7/31/2025 .    Based on imaging, it is reasonable to move forward with bronchoscopic procedure, will determine whether appropriate for navigational bronchoscopy with EBUS or EBUS alone depending on PET scan.    I asked Dr. Talavera to communicate with patient's oncologist and to place a pulmonary referral so that we can establish care.    Anticoagulation: none    Diabetes meds: none    Madisyn Guerrier,

## 2025-07-21 NOTE — TELEPHONE ENCOUNTER
We have received a referral for this patient.  had already reviewed and spoke with referring physician. Please review and advise on next steps when needed. Thank you    Date Referral Received: 7/21/25    Priority of Referral: Routine    Referred by: Gigi Talavera, DO    Diagnosis: Lung Mass    Provider Comments: I spoke with Dr. Guerrier, plan to discuss R apical mass biopsy pending PET. (PET 7/31/25)    Location: [place]

## 2025-07-25 ENCOUNTER — HOSPITAL ENCOUNTER (OUTPATIENT)
Dept: INFUSION CENTER | Facility: CLINIC | Age: 75
End: 2025-07-25
Payer: MEDICARE

## 2025-07-25 DIAGNOSIS — E03.9 HYPOTHYROIDISM, UNSPECIFIED TYPE: ICD-10-CM

## 2025-07-25 DIAGNOSIS — C18.2 MALIGNANT NEOPLASM OF ASCENDING COLON (HCC): Primary | ICD-10-CM

## 2025-07-25 LAB
ALBUMIN SERPL BCG-MCNC: 4.4 G/DL (ref 3.5–5)
ALP SERPL-CCNC: 93 U/L (ref 34–104)
ALT SERPL W P-5'-P-CCNC: 16 U/L (ref 7–52)
ANION GAP SERPL CALCULATED.3IONS-SCNC: 7 MMOL/L (ref 4–13)
AST SERPL W P-5'-P-CCNC: 28 U/L (ref 13–39)
BASOPHILS # BLD AUTO: 0.07 THOUSANDS/ÂΜL (ref 0–0.1)
BASOPHILS NFR BLD AUTO: 1 % (ref 0–1)
BILIRUB SERPL-MCNC: 0.35 MG/DL (ref 0.2–1)
BUN SERPL-MCNC: 22 MG/DL (ref 5–25)
CALCIUM SERPL-MCNC: 9.9 MG/DL (ref 8.4–10.2)
CEA SERPL-MCNC: 3.3 NG/ML (ref 0–3)
CHLORIDE SERPL-SCNC: 100 MMOL/L (ref 96–108)
CO2 SERPL-SCNC: 28 MMOL/L (ref 21–32)
CREAT SERPL-MCNC: 1.09 MG/DL (ref 0.6–1.3)
EOSINOPHIL # BLD AUTO: 0.37 THOUSAND/ÂΜL (ref 0–0.61)
EOSINOPHIL NFR BLD AUTO: 5 % (ref 0–6)
ERYTHROCYTE [DISTWIDTH] IN BLOOD BY AUTOMATED COUNT: 15.4 % (ref 11.6–15.1)
GFR SERPL CREATININE-BSD FRML MDRD: 66 ML/MIN/1.73SQ M
GLUCOSE SERPL-MCNC: 90 MG/DL (ref 65–140)
HCT VFR BLD AUTO: 37.3 % (ref 36.5–49.3)
HGB BLD-MCNC: 12.7 G/DL (ref 12–17)
IMM GRANULOCYTES # BLD AUTO: 0.01 THOUSAND/UL (ref 0–0.2)
IMM GRANULOCYTES NFR BLD AUTO: 0 % (ref 0–2)
LYMPHOCYTES # BLD AUTO: 1.49 THOUSANDS/ÂΜL (ref 0.6–4.47)
LYMPHOCYTES NFR BLD AUTO: 19 % (ref 14–44)
MCH RBC QN AUTO: 31.3 PG (ref 26.8–34.3)
MCHC RBC AUTO-ENTMCNC: 34 G/DL (ref 31.4–37.4)
MCV RBC AUTO: 92 FL (ref 82–98)
MONOCYTES # BLD AUTO: 0.69 THOUSAND/ÂΜL (ref 0.17–1.22)
MONOCYTES NFR BLD AUTO: 9 % (ref 4–12)
NEUTROPHILS # BLD AUTO: 5.11 THOUSANDS/ÂΜL (ref 1.85–7.62)
NEUTS SEG NFR BLD AUTO: 66 % (ref 43–75)
NRBC BLD AUTO-RTO: 0 /100 WBCS
PLATELET # BLD AUTO: 255 THOUSANDS/UL (ref 149–390)
PMV BLD AUTO: 8.8 FL (ref 8.9–12.7)
POTASSIUM SERPL-SCNC: 4.2 MMOL/L (ref 3.5–5.3)
PROT SERPL-MCNC: 7.7 G/DL (ref 6.4–8.4)
RBC # BLD AUTO: 4.06 MILLION/UL (ref 3.88–5.62)
SODIUM SERPL-SCNC: 135 MMOL/L (ref 135–147)
TSH SERPL DL<=0.05 MIU/L-ACNC: 0.97 UIU/ML (ref 0.45–4.5)
WBC # BLD AUTO: 7.74 THOUSAND/UL (ref 4.31–10.16)

## 2025-07-25 PROCEDURE — 82378 CARCINOEMBRYONIC ANTIGEN: CPT

## 2025-07-25 PROCEDURE — 80053 COMPREHEN METABOLIC PANEL: CPT

## 2025-07-25 PROCEDURE — 84443 ASSAY THYROID STIM HORMONE: CPT | Performed by: INTERNAL MEDICINE

## 2025-07-25 PROCEDURE — 85025 COMPLETE CBC W/AUTO DIFF WBC: CPT

## 2025-07-25 NOTE — PROGRESS NOTES
Jace Varghese presents today for lab draw via port. Port accessed with positive blood return. Port flushed and de-accessed without complications.  AVS declined and left in stable condition.    Patient aware of next appointment on 9/19 /2025 at 2 PM.

## 2025-08-01 ENCOUNTER — TELEPHONE (OUTPATIENT)
Age: 75
End: 2025-08-01

## 2025-08-02 DIAGNOSIS — I21.11 ST ELEVATION MYOCARDIAL INFARCTION INVOLVING RIGHT CORONARY ARTERY (HCC): ICD-10-CM

## 2025-08-04 RX ORDER — METOPROLOL TARTRATE 25 MG/1
25 TABLET, FILM COATED ORAL EVERY 12 HOURS
Qty: 180 TABLET | Refills: 0 | Status: SHIPPED | OUTPATIENT
Start: 2025-08-04

## 2025-08-05 ENCOUNTER — HOSPITAL ENCOUNTER (OUTPATIENT)
Dept: RADIOLOGY | Age: 75
Discharge: HOME | End: 2025-08-05
Attending: INTERNAL MEDICINE
Payer: MEDICARE

## 2025-08-05 DIAGNOSIS — C18.2 MALIGNANT NEOPLASM OF ASCENDING COLON (HCC): ICD-10-CM

## 2025-08-05 DIAGNOSIS — R93.89 ABNORMAL CT OF THE CHEST: ICD-10-CM

## 2025-08-05 LAB — GLUCOSE SERPL-MCNC: 114 MG/DL (ref 65–140)

## 2025-08-05 PROCEDURE — 78815 PET IMAGE W/CT SKULL-THIGH: CPT

## 2025-08-05 PROCEDURE — A9552 F18 FDG: HCPCS

## 2025-08-05 PROCEDURE — 82948 REAGENT STRIP/BLOOD GLUCOSE: CPT

## 2025-08-07 ENCOUNTER — OFFICE VISIT (OUTPATIENT)
Dept: HEMATOLOGY ONCOLOGY | Facility: CLINIC | Age: 75
End: 2025-08-07
Payer: MEDICARE

## 2025-08-07 VITALS
SYSTOLIC BLOOD PRESSURE: 118 MMHG | BODY MASS INDEX: 32.88 KG/M2 | HEIGHT: 69 IN | OXYGEN SATURATION: 96 % | RESPIRATION RATE: 16 BRPM | DIASTOLIC BLOOD PRESSURE: 68 MMHG | WEIGHT: 222 LBS | HEART RATE: 90 BPM | TEMPERATURE: 97.8 F

## 2025-08-07 DIAGNOSIS — C18.2 MALIGNANT NEOPLASM OF ASCENDING COLON (HCC): Primary | ICD-10-CM

## 2025-08-07 DIAGNOSIS — E03.9 HYPOTHYROIDISM, UNSPECIFIED TYPE: ICD-10-CM

## 2025-08-07 DIAGNOSIS — I12.9 HYPERTENSIVE KIDNEY DISEASE WITH STAGE 3 CHRONIC KIDNEY DISEASE, UNSPECIFIED WHETHER STAGE 3A OR 3B CKD (HCC): ICD-10-CM

## 2025-08-07 DIAGNOSIS — N18.31 STAGE 3A CHRONIC KIDNEY DISEASE (HCC): ICD-10-CM

## 2025-08-07 DIAGNOSIS — Z85.818 HISTORY OF CANCER TONSIL: ICD-10-CM

## 2025-08-07 DIAGNOSIS — N18.30 HYPERTENSIVE KIDNEY DISEASE WITH STAGE 3 CHRONIC KIDNEY DISEASE, UNSPECIFIED WHETHER STAGE 3A OR 3B CKD (HCC): ICD-10-CM

## 2025-08-07 DIAGNOSIS — I10 PRIMARY HYPERTENSION: ICD-10-CM

## 2025-08-07 DIAGNOSIS — I25.10 CORONARY ARTERY DISEASE INVOLVING NATIVE CORONARY ARTERY OF NATIVE HEART WITHOUT ANGINA PECTORIS: ICD-10-CM

## 2025-08-07 PROCEDURE — G2212 PROLONG OUTPT/OFFICE VIS: HCPCS | Performed by: INTERNAL MEDICINE

## 2025-08-07 PROCEDURE — 99215 OFFICE O/P EST HI 40 MIN: CPT | Performed by: INTERNAL MEDICINE

## 2025-08-12 ENCOUNTER — CONSULT (OUTPATIENT)
Dept: PULMONOLOGY | Facility: CLINIC | Age: 75
End: 2025-08-12
Attending: RADIOLOGY
Payer: MEDICARE

## 2025-08-19 ENCOUNTER — ANESTHESIA (OUTPATIENT)
Dept: GASTROENTEROLOGY | Facility: HOSPITAL | Age: 75
End: 2025-08-19
Payer: MEDICARE

## 2025-08-19 ENCOUNTER — ANESTHESIA EVENT (OUTPATIENT)
Dept: GASTROENTEROLOGY | Facility: HOSPITAL | Age: 75
End: 2025-08-19
Payer: MEDICARE

## 2025-08-19 ENCOUNTER — HOSPITAL ENCOUNTER (OUTPATIENT)
Dept: RADIOLOGY | Facility: HOSPITAL | Age: 75
Discharge: HOME | End: 2025-08-19
Payer: MEDICARE

## 2025-08-19 ENCOUNTER — HOSPITAL ENCOUNTER (OUTPATIENT)
Dept: GASTROENTEROLOGY | Facility: HOSPITAL | Age: 75
Setting detail: OUTPATIENT SURGERY
Discharge: HOME | End: 2025-08-19
Attending: INTERNAL MEDICINE
Payer: MEDICARE

## 2025-08-19 VITALS
RESPIRATION RATE: 16 BRPM | OXYGEN SATURATION: 95 % | TEMPERATURE: 97 F | DIASTOLIC BLOOD PRESSURE: 61 MMHG | SYSTOLIC BLOOD PRESSURE: 117 MMHG | HEART RATE: 91 BPM

## 2025-08-19 DIAGNOSIS — R91.8 PULMONARY MASS: ICD-10-CM

## 2025-08-19 PROCEDURE — 88173 CYTOPATH EVAL FNA REPORT: CPT | Performed by: PATHOLOGY

## 2025-08-19 PROCEDURE — 88342 IMHCHEM/IMCYTCHM 1ST ANTB: CPT | Performed by: PATHOLOGY

## 2025-08-19 PROCEDURE — 88341 IMHCHEM/IMCYTCHM EA ADD ANTB: CPT | Performed by: PATHOLOGY

## 2025-08-19 PROCEDURE — 71045 X-RAY EXAM CHEST 1 VIEW: CPT

## 2025-08-19 PROCEDURE — 88172 CYTP DX EVAL FNA 1ST EA SITE: CPT | Performed by: PATHOLOGY

## 2025-08-19 PROCEDURE — 88305 TISSUE EXAM BY PATHOLOGIST: CPT | Performed by: PATHOLOGY

## 2025-08-19 RX ORDER — LIDOCAINE HYDROCHLORIDE 20 MG/ML
INJECTION, SOLUTION EPIDURAL; INFILTRATION; INTRACAUDAL; PERINEURAL AS NEEDED
Status: DISCONTINUED | OUTPATIENT
Start: 2025-08-19 | End: 2025-08-19

## 2025-08-19 RX ORDER — PHENYLEPHRINE HCL IN 0.9% NACL 1 MG/10 ML
SYRINGE (ML) INTRAVENOUS AS NEEDED
Status: DISCONTINUED | OUTPATIENT
Start: 2025-08-19 | End: 2025-08-19

## 2025-08-19 RX ORDER — EPHEDRINE SULFATE 50 MG/ML
INJECTION INTRAVENOUS AS NEEDED
Status: DISCONTINUED | OUTPATIENT
Start: 2025-08-19 | End: 2025-08-19

## 2025-08-19 RX ORDER — ONDANSETRON 2 MG/ML
4 INJECTION INTRAMUSCULAR; INTRAVENOUS ONCE AS NEEDED
Status: DISCONTINUED | OUTPATIENT
Start: 2025-08-19 | End: 2025-08-19 | Stop reason: HOSPADM

## 2025-08-19 RX ORDER — ALBUTEROL SULFATE 0.83 MG/ML
2.5 SOLUTION RESPIRATORY (INHALATION) ONCE AS NEEDED
Status: COMPLETED | OUTPATIENT
Start: 2025-08-19 | End: 2025-08-19

## 2025-08-19 RX ORDER — FENTANYL CITRATE/PF 50 MCG/ML
25 SYRINGE (ML) INJECTION
Status: DISCONTINUED | OUTPATIENT
Start: 2025-08-19 | End: 2025-08-19 | Stop reason: HOSPADM

## 2025-08-19 RX ORDER — KETAMINE HCL IN NACL, ISO-OSM 100MG/10ML
SYRINGE (ML) INJECTION AS NEEDED
Status: DISCONTINUED | OUTPATIENT
Start: 2025-08-19 | End: 2025-08-19

## 2025-08-19 RX ORDER — PROPOFOL 10 MG/ML
INJECTION, EMULSION INTRAVENOUS AS NEEDED
Status: DISCONTINUED | OUTPATIENT
Start: 2025-08-19 | End: 2025-08-19

## 2025-08-19 RX ORDER — SODIUM CHLORIDE, SODIUM LACTATE, POTASSIUM CHLORIDE, CALCIUM CHLORIDE 600; 310; 30; 20 MG/100ML; MG/100ML; MG/100ML; MG/100ML
INJECTION, SOLUTION INTRAVENOUS CONTINUOUS PRN
Status: DISCONTINUED | OUTPATIENT
Start: 2025-08-19 | End: 2025-08-19

## 2025-08-19 RX ORDER — DEXAMETHASONE SODIUM PHOSPHATE 10 MG/ML
INJECTION, SOLUTION INTRAMUSCULAR; INTRAVENOUS AS NEEDED
Status: DISCONTINUED | OUTPATIENT
Start: 2025-08-19 | End: 2025-08-19

## 2025-08-19 RX ORDER — ONDANSETRON 2 MG/ML
INJECTION INTRAMUSCULAR; INTRAVENOUS AS NEEDED
Status: DISCONTINUED | OUTPATIENT
Start: 2025-08-19 | End: 2025-08-19

## 2025-08-19 RX ORDER — FENTANYL CITRATE 50 UG/ML
INJECTION, SOLUTION INTRAMUSCULAR; INTRAVENOUS AS NEEDED
Status: DISCONTINUED | OUTPATIENT
Start: 2025-08-19 | End: 2025-08-19

## 2025-08-19 RX ORDER — GLYCOPYRROLATE 0.2 MG/ML
INJECTION INTRAMUSCULAR; INTRAVENOUS AS NEEDED
Status: DISCONTINUED | OUTPATIENT
Start: 2025-08-19 | End: 2025-08-19

## 2025-08-19 RX ADMIN — ALBUTEROL SULFATE 2.5 MG: 2.5 SOLUTION RESPIRATORY (INHALATION) at 12:31

## 2025-08-19 RX ADMIN — Medication 100 MCG: at 10:25

## 2025-08-19 RX ADMIN — PROPOFOL 70 MG: 10 INJECTION, EMULSION INTRAVENOUS at 11:11

## 2025-08-19 RX ADMIN — PROPOFOL 120 MCG/KG/MIN: 10 INJECTION, EMULSION INTRAVENOUS at 10:23

## 2025-08-19 RX ADMIN — EPHEDRINE SULFATE 5 MG: 50 INJECTION INTRAVENOUS at 10:58

## 2025-08-19 RX ADMIN — LIDOCAINE HYDROCHLORIDE 100 MG: 20 INJECTION, SOLUTION EPIDURAL; INFILTRATION; INTRACAUDAL; PERINEURAL at 10:20

## 2025-08-19 RX ADMIN — FENTANYL CITRATE 50 MCG: 50 INJECTION INTRAMUSCULAR; INTRAVENOUS at 10:22

## 2025-08-19 RX ADMIN — FENTANYL CITRATE 50 MCG: 50 INJECTION INTRAMUSCULAR; INTRAVENOUS at 10:26

## 2025-08-19 RX ADMIN — PHENYLEPHRINE HYDROCHLORIDE 30 MCG/MIN: 50 INJECTION INTRAVENOUS at 10:25

## 2025-08-19 RX ADMIN — EPHEDRINE SULFATE 10 MG: 50 INJECTION INTRAVENOUS at 11:36

## 2025-08-19 RX ADMIN — PROPOFOL 50 MG: 10 INJECTION, EMULSION INTRAVENOUS at 11:10

## 2025-08-19 RX ADMIN — PROPOFOL 50 MG: 10 INJECTION, EMULSION INTRAVENOUS at 10:57

## 2025-08-19 RX ADMIN — FENTANYL CITRATE 50 MCG: 50 INJECTION INTRAMUSCULAR; INTRAVENOUS at 11:13

## 2025-08-19 RX ADMIN — SODIUM CHLORIDE, SODIUM LACTATE, POTASSIUM CHLORIDE, AND CALCIUM CHLORIDE: .6; .31; .03; .02 INJECTION, SOLUTION INTRAVENOUS at 11:50

## 2025-08-19 RX ADMIN — Medication 100 MCG: at 11:15

## 2025-08-19 RX ADMIN — Medication 20 MG: at 11:18

## 2025-08-19 RX ADMIN — Medication 100 MCG: at 10:32

## 2025-08-19 RX ADMIN — PROPOFOL 50 MG: 10 INJECTION, EMULSION INTRAVENOUS at 10:28

## 2025-08-19 RX ADMIN — EPHEDRINE SULFATE 5 MG: 50 INJECTION INTRAVENOUS at 10:36

## 2025-08-19 RX ADMIN — Medication 200 MCG: at 11:53

## 2025-08-19 RX ADMIN — PROPOFOL 50 MG: 10 INJECTION, EMULSION INTRAVENOUS at 10:29

## 2025-08-19 RX ADMIN — ONDANSETRON 4 MG: 2 INJECTION INTRAMUSCULAR; INTRAVENOUS at 11:41

## 2025-08-19 RX ADMIN — PROPOFOL 50 MG: 10 INJECTION, EMULSION INTRAVENOUS at 10:46

## 2025-08-19 RX ADMIN — SODIUM CHLORIDE, SODIUM LACTATE, POTASSIUM CHLORIDE, AND CALCIUM CHLORIDE: .6; .31; .03; .02 INJECTION, SOLUTION INTRAVENOUS at 10:17

## 2025-08-19 RX ADMIN — DEXAMETHASONE SODIUM PHOSPHATE 5 MG: 10 INJECTION, SOLUTION INTRAMUSCULAR; INTRAVENOUS at 10:33

## 2025-08-19 RX ADMIN — REMIFENTANIL HYDROCHLORIDE 0.2 MCG/KG/MIN: 1 INJECTION, POWDER, LYOPHILIZED, FOR SOLUTION INTRAVENOUS at 11:30

## 2025-08-19 RX ADMIN — GLYCOPYRROLATE 0.1 MG: 0.2 INJECTION, SOLUTION INTRAMUSCULAR; INTRAVENOUS at 11:20

## 2025-08-20 ENCOUNTER — NURSE TRIAGE (OUTPATIENT)
Age: 75
End: 2025-08-20

## 2025-08-22 ENCOUNTER — DOCUMENTATION (OUTPATIENT)
Dept: PULMONOLOGY | Facility: CLINIC | Age: 75
End: 2025-08-22

## (undated) DEVICE — SUT VICRYL 0 CT-1 27 IN J340H

## (undated) DEVICE — TOWEL SURG XR DETECT GREEN STRL RFD

## (undated) DEVICE — SPONGE SCRUB 4 PCT CHLORHEXIDINE

## (undated) DEVICE — SUT VICRYL 2-0 CT-1 27 IN J259H

## (undated) DEVICE — SUT VICRYL 1 CTX 36 IN J977H

## (undated) DEVICE — SUT VICRYL 0 18 IN J906G

## (undated) DEVICE — BANDAGE, ESMARK LF STR 6"X9' (20/CS): Brand: CYPRESS

## (undated) DEVICE — SPONGE LAP 18 X 18 IN

## (undated) DEVICE — 2, DISPOSABLE SUCTION/IRRIGATOR WITHOUT DISPOSABLE TIP: Brand: STRYKEFLOW

## (undated) DEVICE — PLUMEPEN PRO 10FT

## (undated) DEVICE — BETHLEHEM UNIVERSAL  MIONR EXT: Brand: CARDINAL HEALTH

## (undated) DEVICE — INTERCEED

## (undated) DEVICE — GLOVE SRG BIOGEL ECLIPSE 7.5

## (undated) DEVICE — ECHELON 60 ENDOPATH STAPLER,  ENDOSCOPIC LINEAR CUTTER-STRAIGHT (NO CARTRIDGE): Brand: ECHELON ENDOPATH

## (undated) DEVICE — PURSE STRING DEVICE: Brand: PURSTRING

## (undated) DEVICE — 3.5MM DRILL BIT/QC/110MM

## (undated) DEVICE — INTENDED FOR TISSUE SEPARATION, AND OTHER PROCEDURES THAT REQUIRE A SHARP SURGICAL BLADE TO PUNCTURE OR CUT.: Brand: BARD-PARKER SAFETY BLADES SIZE 15, STERILE

## (undated) DEVICE — ACE WRAP 4 IN STERILE

## (undated) DEVICE — ABDOMINAL PAD: Brand: DERMACEA

## (undated) DEVICE — STRL COTTON TIP APPLCTR 6IN PK: Brand: CARDINAL HEALTH

## (undated) DEVICE — SPONGE 4 X 4 XRAY 16 PLY STRL LF RFD

## (undated) DEVICE — 4.0MM CANCELLOUS BONE SCREW FULLY THREADED/16MM
Type: IMPLANTABLE DEVICE | Site: ANKLE | Status: NON-FUNCTIONAL
Removed: 2023-10-29

## (undated) DEVICE — DRAPE SHEET X-LG

## (undated) DEVICE — GLOVE SRG BIOGEL ORTHOPEDIC 8

## (undated) DEVICE — VISUALIZATION SYSTEM: Brand: CLEARIFY

## (undated) DEVICE — DRAPE SHEET THREE QUARTER

## (undated) DEVICE — TROCAR: Brand: KII® SLEEVE

## (undated) DEVICE — SUT VICRYL 3-0 SH 27 IN J416H

## (undated) DEVICE — LIGACLIP 10-M/L, 10MM ENDOSCOPIC ROTATING MULTIPLE CLIP APPLIERS: Brand: LIGACLIP

## (undated) DEVICE — POOLE SUCTION HANDLE: Brand: CARDINAL HEALTH

## (undated) DEVICE — GAUZE SPONGES,16 PLY: Brand: CURITY

## (undated) DEVICE — TRAY FOLEY 16FR URIMETER SURESTEP

## (undated) DEVICE — INSUFFLATION NEEDLE TO ESTABLISH PNEUMOPERITONEUM.: Brand: INSUFFLATION NEEDLE

## (undated) DEVICE — TIBURON LAPAROSCOPIC ABDOMINAL DRAPE: Brand: CONVERTORS

## (undated) DEVICE — PADDING CAST 4 IN  COTTON STRL

## (undated) DEVICE — SUT VICRYL 4-0 PS-2 27 IN J426H

## (undated) DEVICE — 3M™ STERI-STRIP™ REINFORCED ADHESIVE SKIN CLOSURES, R1546, 1/4 IN X 4 IN (6 MM X 100 MM), 10 STRIPS/ENVELOPE: Brand: 3M™ STERI-STRIP™

## (undated) DEVICE — DRAPE C-ARM X-RAY

## (undated) DEVICE — POV-IOD SOLUTION 4OZ BT

## (undated) DEVICE — 2.0MM DRILL BIT WITH DEPTH MARK/QC/140MM

## (undated) DEVICE — 1.25MM NON-THREADED GUIDE WIRE 150MM
Type: IMPLANTABLE DEVICE | Site: ANKLE | Status: NON-FUNCTIONAL
Removed: 2023-10-29

## (undated) DEVICE — HARMONIC 1100 SHEARS, 36CM SHAFT LENGTH: Brand: HARMONIC

## (undated) DEVICE — DRAPE C-ARMOUR

## (undated) DEVICE — 3M™ STERI-DRAPE™ U-DRAPE 1015: Brand: STERI-DRAPE™

## (undated) DEVICE — ACE WRAP 4 IN UNSTERILE

## (undated) DEVICE — 3M™ IOBAN™ 2 ANTIMICROBIAL INCISE DRAPE 6650EZ: Brand: IOBAN™ 2

## (undated) DEVICE — LIGHT HANDLE COVER SLEEVE DISP BLUE STELLAR

## (undated) DEVICE — ENDO STITCH 0 POLYSORB

## (undated) DEVICE — 40583 XL ADVANCED TRENDELENBURG POSITIONING KIT: Brand: 40583 XL ADVANCED TRENDELENBURG POSITIONING KIT

## (undated) DEVICE — CHLORAPREP HI-LITE 26ML ORANGE

## (undated) DEVICE — TUBING SUCTION 5MM X 12 FT

## (undated) DEVICE — SCD SEQUENTIAL COMPRESSION COMFORT SLEEVE MEDIUM KNEE LENGTH: Brand: KENDALL SCD

## (undated) DEVICE — 4-PORT MANIFOLD: Brand: NEPTUNE 2

## (undated) DEVICE — TROCAR: Brand: KII FIOS FIRST ENTRY

## (undated) DEVICE — ANTI-FOG SOLUTION WITH FOAM PAD: Brand: DEVON

## (undated) DEVICE — DRAPE EQUIPMENT RF WAND

## (undated) DEVICE — LIGACLIP 12 MM ENDOSCOPIC ROTATING MULTIPLE CLIP APPLIER (LARGE): Brand: LIGACLIP

## (undated) DEVICE — PAD GROUNDING ADULT

## (undated) DEVICE — BETHLEHEM MAJOR GENERAL PACK: Brand: CARDINAL HEALTH

## (undated) DEVICE — KERLIX BANDAGE ROLL: Brand: KERLIX

## (undated) DEVICE — SILK SINGLE STITCH RELOAD: Brand: SOFSILK

## (undated) DEVICE — ACCESS PLATFORM FOR MINIMALLY INVASIVE SURGERY.: Brand: GELPORT® LAPAROSCOPIC  SYSTEM

## (undated) DEVICE — SUT STRATAFIX SPIRAL PDS PLUS 1 CTX 18 IN SXPP1A400

## (undated) DEVICE — ENDOPATH ECHELON ENDOSCOPIC LINEAR CUTTER RELOADS, BLUE, 60MM: Brand: ECHELON ENDOPATH

## (undated) DEVICE — 1.6MM KIRSCHNER WIRE W/TROCAR POINT 150MM
Type: IMPLANTABLE DEVICE | Site: ANKLE | Status: NON-FUNCTIONAL
Removed: 2023-10-29

## (undated) DEVICE — SUT ETHILON 3-0 PS-1 18 IN 1663H

## (undated) DEVICE — BULB SYRINGE,IRRIGATION WITH PROTECTIVE CAP: Brand: DOVER

## (undated) DEVICE — [HIGH FLOW INSUFFLATOR,  DO NOT USE IF PACKAGE IS DAMAGED,  KEEP DRY,  KEEP AWAY FROM SUNLIGHT,  PROTECT FROM HEAT AND RADIOACTIVE SOURCES.]: Brand: PNEUMOSURE

## (undated) DEVICE — 5 MM CURVED DISSECTORS WITH MONOPOLAR CAUTERY: Brand: ENDOPATH

## (undated) DEVICE — COTTON TIP APPLICTOR 2 PK

## (undated) DEVICE — PROXIMATE LINEAR CUTTER RELOAD, BLUE, 75MM: Brand: PROXIMATE

## (undated) DEVICE — SUT SILK 3-0 SH 30 IN K832H

## (undated) DEVICE — MAYO STAND COVER: Brand: CONVERTORS

## (undated) DEVICE — CURITY NON-ADHERENT STRIPS: Brand: CURITY

## (undated) DEVICE — COBAN 4 IN STERILE

## (undated) DEVICE — GLOVE INDICATOR PI UNDERGLOVE SZ 8 BLUE

## (undated) DEVICE — 3M™ TEGADERM™ TRANSPARENT FILM DRESSING FRAME STYLE, 1626W, 4 IN X 4-3/4 IN (10 CM X 12 CM), 50/CT 4CT/CASE: Brand: 3M™ TEGADERM™

## (undated) DEVICE — 3M™ TEGADERM™ TRANSPARENT FILM DRESSING FRAME STYLE, 1624W, 2-3/8 IN X 2-3/4 IN (6 CM X 7 CM), 100/CT 4CT/CASE: Brand: 3M™ TEGADERM™

## (undated) DEVICE — GLOVE INDICATOR PI UNDERGLOVE SZ 7.5 BLUE

## (undated) DEVICE — 1820 FOAM BLOCK NEEDLE COUNTER: Brand: DEVON

## (undated) DEVICE — TUBING SMOKE EVAC W/FILTRATION DEVICE PLUMEPORT ACTIV

## (undated) DEVICE — ACE WRAP 6 IN UNSTERILE

## (undated) DEVICE — METZENBAUM ADTEC SINGLE USE DISSECTING SCISSORS, SHAFT ONLY, MONOPOLAR, CURVED TO LEFT, WORKING LENGTH: 12 1/4", (310 MM), DIAM. 5 MM, INSULATED, DOUBLE ACTION, STERILE, DISPOSABLE, PACKAGE OF 10 PIECES: Brand: AESCULAP

## (undated) DEVICE — WOUND RETRACTOR AND PROTECTOR: Brand: ALEXIS O WOUND PROTECTOR-RETRACTOR

## (undated) DEVICE — 3.5MM CORTEX SCREW SELF-TAPPING 16MM
Type: IMPLANTABLE DEVICE | Site: ANKLE | Status: NON-FUNCTIONAL
Removed: 2023-10-29

## (undated) DEVICE — HYDROPHILIC WOUND DRESSING WITH ZINC PLUS VITAMINS A AND B6.: Brand: DERMAGRAN®-B

## (undated) DEVICE — DRESSING MEPILEX AG BORDER POST-OP 4 X 10 IN

## (undated) DEVICE — 2.7MM CANNULATED DRILL BIT/QC 160MM

## (undated) DEVICE — 2.5MM DRILL BIT/QC/GOLD/110MM

## (undated) DEVICE — SPONGE LAP 18 X 18 IN STRL RFD

## (undated) DEVICE — ELECTRODE BLADE MOD E-Z CLEAN  2.75IN 7CM -0012AM

## (undated) DEVICE — 2.8MM PERCUTANEOUS DRILL BIT F/LCP® PL QC/200MM/100MM CALIB: Brand: LCP

## (undated) DEVICE — PROXIMATE RELOADABLE LINEAR CUTTER WITH SAFETY LOCK-OUT, 75MM: Brand: PROXIMATE